# Patient Record
Sex: MALE | Race: WHITE | NOT HISPANIC OR LATINO | Employment: OTHER | URBAN - METROPOLITAN AREA
[De-identification: names, ages, dates, MRNs, and addresses within clinical notes are randomized per-mention and may not be internally consistent; named-entity substitution may affect disease eponyms.]

---

## 2017-05-04 ENCOUNTER — HOSPITAL ENCOUNTER (OUTPATIENT)
Dept: CARDIOLOGY | Facility: HOSPITAL | Age: 52
Discharge: HOME | End: 2017-05-04
Attending: FAMILY MEDICINE | Admitting: FAMILY MEDICINE

## 2017-11-27 ENCOUNTER — HOSPITAL ENCOUNTER (OUTPATIENT)
Dept: OTHER | Facility: HOSPITAL | Age: 52
Discharge: HOME | End: 2017-11-27
Attending: ORTHOPAEDIC SURGERY | Admitting: ORTHOPAEDIC SURGERY

## 2017-11-27 LAB
ANION GAP SERPL CALC-SCNC: 12 MMOL/L (ref 10–20)
BACTERIA SPEC AEROBE CULT: NORMAL
BASOPHILS # BLD AUTO: 0 10*3/UL (ref 0–0.2)
BASOPHILS NFR BLD AUTO: 1 % (ref 0–2)
BILIRUB UR QL STRIP: NEGATIVE MG/DL
BUN SERPL-MCNC: 18 MG/DL (ref 8–20)
BUN/CREAT SERPL: 20 (ref 6.2–20.3)
CALCIUM SERPL-MCNC: 8.5 MG/DL (ref 8.9–10.3)
CASTS URNS QL MICRO: NORMAL /[LPF]
CHLORIDE SERPL-SCNC: 105 MMOL/L (ref 101–111)
COLOR UR: YELLOW
CONV BACTERIA IN URINE MICRO: NEGATIVE
CONV CLARITY OF URINE: CLEAR
CONV CO2: 24 MMOL/L (ref 22–32)
CONV HYALINE CASTS IN URINE MICRO: 0 /[LPF] (ref 0–5)
CONV PROTEIN IN URINE BY AUTOMATED TEST STRIP: NEGATIVE MG/DL
CONV SMALL ROUND CELLS: NORMAL /[HPF]
CONV UROBILINOGEN IN URINE BY AUTOMATED TEST STRIP: 1 MG/DL
CREAT UR-MCNC: 0.9 MG/DL (ref 0.7–1.2)
CULTURE INDICATED?: NORMAL
DIFFERENTIAL METHOD BLD: (no result)
EOSINOPHIL # BLD AUTO: 0.1 10*3/UL (ref 0–0.3)
EOSINOPHIL # BLD AUTO: 2 % (ref 0–3)
ERYTHROCYTE [DISTWIDTH] IN BLOOD BY AUTOMATED COUNT: 13.6 % (ref 11.5–14.5)
GLUCOSE SERPL-MCNC: 112 MG/DL (ref 65–99)
GLUCOSE UR QL: NEGATIVE MG/DL
HCT VFR BLD AUTO: 41.4 % (ref 40–54)
HGB BLD-MCNC: 14 G/DL (ref 14–18)
HGB UR QL STRIP: NEGATIVE
INR PPP: 1
KETONES UR QL STRIP: NEGATIVE MG/DL
LEUKOCYTE ESTERASE UR QL STRIP: NEGATIVE
LYMPHOCYTES # BLD AUTO: 0.9 10*3/UL (ref 0.8–4.8)
LYMPHOCYTES NFR BLD AUTO: 19 % (ref 18–42)
Lab: NORMAL
MCH RBC QN AUTO: 33.6 PG (ref 26–32)
MCHC RBC AUTO-ENTMCNC: 33.8 G/DL (ref 32–36)
MCV RBC AUTO: 99.4 FL (ref 80–94)
MICRO REPORT STATUS: NORMAL
MONOCYTES # BLD AUTO: 0.4 10*3/UL (ref 0.1–1.3)
MONOCYTES NFR BLD AUTO: 7 % (ref 2–11)
NEUTROPHILS # BLD AUTO: 3.5 10*3/UL (ref 2.3–8.6)
NEUTROPHILS NFR BLD AUTO: 71 % (ref 50–75)
NITRITE UR QL STRIP: NEGATIVE
NRBC BLD AUTO-RTO: 0 /100{WBCS}
NRBC/RBC NFR BLD MANUAL: 0 10*3/UL
PH UR STRIP.AUTO: 7 [PH] (ref 4.5–8)
PLATELET # BLD AUTO: 129 10*3/UL (ref 150–450)
PMV BLD AUTO: 8.7 FL (ref 7.4–10.4)
POTASSIUM SERPL-SCNC: 4 MMOL/L (ref 3.6–5.1)
PROTHROMBIN TIME: 10.6 SEC (ref 9.6–11.7)
RBC # BLD AUTO: 4.16 10*6/UL (ref 4.6–6)
RBC #/AREA URNS HPF: 2 /[HPF] (ref 0–3)
SODIUM SERPL-SCNC: 137 MMOL/L (ref 136–144)
SP GR UR: 1.02 (ref 1–1.03)
SPECIMEN SOURCE: NORMAL
SPERM URNS QL MICRO: NORMAL /[HPF]
SQUAMOUS SPT QL MICRO: 0 /[HPF] (ref 0–5)
UNIDENT CRYS URNS QL MICRO: NORMAL /[HPF]
WBC # BLD AUTO: 5 10*3/UL (ref 4.5–11.5)
WBC #/AREA URNS HPF: 1 /[HPF] (ref 0–5)
YEAST SPEC QL WET PREP: NORMAL /[HPF]

## 2017-12-12 ENCOUNTER — HOSPITAL ENCOUNTER (OUTPATIENT)
Dept: OTHER | Facility: HOSPITAL | Age: 52
Setting detail: SPECIMEN
Discharge: HOME | End: 2017-12-12
Attending: ORTHOPAEDIC SURGERY | Admitting: ORTHOPAEDIC SURGERY

## 2017-12-12 LAB
ALBUMIN SERPL-MCNC: 3.2 G/DL (ref 3.5–4.8)
ALBUMIN/GLOB SERPL: 0.8 {RATIO} (ref 1–1.7)
ALP SERPL-CCNC: 61 IU/L (ref 32–91)
ALT SERPL-CCNC: 30 IU/L (ref 17–63)
ANION GAP SERPL CALC-SCNC: 9.3 MMOL/L (ref 10–20)
AST SERPL-CCNC: 35 IU/L (ref 15–41)
BILIRUB SERPL-MCNC: 0.8 MG/DL (ref 0.3–1.2)
BUN SERPL-MCNC: 14 MG/DL (ref 8–20)
BUN/CREAT SERPL: 17.5 (ref 6.2–20.3)
CALCIUM SERPL-MCNC: 8.8 MG/DL (ref 8.9–10.3)
CHLORIDE SERPL-SCNC: 104 MMOL/L (ref 101–111)
CONV CO2: 26 MMOL/L (ref 22–32)
CONV TOTAL PROTEIN: 7.1 G/DL (ref 6.1–7.9)
CREAT UR-MCNC: 0.8 MG/DL (ref 0.7–1.2)
GLOBULIN UR ELPH-MCNC: 3.9 G/DL (ref 2.5–3.8)
GLUCOSE SERPL-MCNC: 118 MG/DL (ref 65–99)
POTASSIUM SERPL-SCNC: 4.3 MMOL/L (ref 3.6–5.1)
SODIUM SERPL-SCNC: 135 MMOL/L (ref 136–144)

## 2018-02-26 ENCOUNTER — HOSPITAL ENCOUNTER (OUTPATIENT)
Dept: CARDIOLOGY | Facility: HOSPITAL | Age: 53
Discharge: HOME | End: 2018-02-26
Attending: NURSE PRACTITIONER | Admitting: NURSE PRACTITIONER

## 2018-03-02 ENCOUNTER — INPATIENT HOSPITAL (OUTPATIENT)
Dept: URBAN - METROPOLITAN AREA HOSPITAL 84 | Facility: HOSPITAL | Age: 53
End: 2018-03-02

## 2018-03-02 DIAGNOSIS — R19.5 OTHER FECAL ABNORMALITIES: ICD-10-CM

## 2018-03-02 DIAGNOSIS — D12.3 BENIGN NEOPLASM OF TRANSVERSE COLON: ICD-10-CM

## 2018-03-02 DIAGNOSIS — K29.70 GASTRITIS, UNSPECIFIED, WITHOUT BLEEDING: ICD-10-CM

## 2018-03-02 DIAGNOSIS — K31.9 DISEASE OF STOMACH AND DUODENUM, UNSPECIFIED: ICD-10-CM

## 2018-03-02 DIAGNOSIS — K25.9 GASTRIC ULCER, UNSPECIFIED AS ACUTE OR CHRONIC, WITHOUT HEMO: ICD-10-CM

## 2018-03-02 PROCEDURE — 45385 COLONOSCOPY W/LESION REMOVAL: CPT | Performed by: INTERNAL MEDICINE

## 2018-03-02 PROCEDURE — 43239 EGD BIOPSY SINGLE/MULTIPLE: CPT | Performed by: INTERNAL MEDICINE

## 2018-03-09 ENCOUNTER — HOSPITAL ENCOUNTER (OUTPATIENT)
Dept: LAB | Facility: HOSPITAL | Age: 53
Discharge: HOME | End: 2018-03-09
Attending: NURSE PRACTITIONER | Admitting: NURSE PRACTITIONER

## 2018-03-12 ENCOUNTER — HOSPITAL ENCOUNTER (OUTPATIENT)
Dept: LAB | Facility: HOSPITAL | Age: 53
Discharge: HOME | End: 2018-03-12
Attending: NURSE PRACTITIONER | Admitting: NURSE PRACTITIONER

## 2018-03-14 ENCOUNTER — HOSPITAL ENCOUNTER (OUTPATIENT)
Dept: LAB | Facility: HOSPITAL | Age: 53
Discharge: HOME | End: 2018-03-14
Attending: NURSE PRACTITIONER | Admitting: NURSE PRACTITIONER

## 2018-03-15 ENCOUNTER — HOSPITAL ENCOUNTER (OUTPATIENT)
Dept: PHARMACY | Facility: HOSPITAL | Age: 53
Discharge: HOME | End: 2018-03-15
Attending: NURSE PRACTITIONER | Admitting: NURSE PRACTITIONER

## 2018-03-19 ENCOUNTER — HOSPITAL ENCOUNTER (OUTPATIENT)
Dept: PHARMACY | Facility: HOSPITAL | Age: 53
Discharge: HOME | End: 2018-03-19
Attending: NURSE PRACTITIONER | Admitting: NURSE PRACTITIONER

## 2018-03-26 ENCOUNTER — HOSPITAL ENCOUNTER (OUTPATIENT)
Dept: PHARMACY | Facility: HOSPITAL | Age: 53
Discharge: HOME | End: 2018-03-26
Attending: NURSE PRACTITIONER | Admitting: NURSE PRACTITIONER

## 2018-03-28 ENCOUNTER — HOSPITAL ENCOUNTER (OUTPATIENT)
Dept: PHARMACY | Facility: HOSPITAL | Age: 53
Discharge: HOME | End: 2018-03-28
Attending: NURSE PRACTITIONER | Admitting: NURSE PRACTITIONER

## 2018-03-29 ENCOUNTER — HOSPITAL ENCOUNTER (OUTPATIENT)
Dept: PHARMACY | Facility: HOSPITAL | Age: 53
Discharge: HOME | End: 2018-03-29
Attending: NURSE PRACTITIONER | Admitting: NURSE PRACTITIONER

## 2018-04-02 ENCOUNTER — HOSPITAL ENCOUNTER (OUTPATIENT)
Dept: PHARMACY | Facility: HOSPITAL | Age: 53
Discharge: HOME | End: 2018-04-02
Attending: NURSE PRACTITIONER | Admitting: NURSE PRACTITIONER

## 2018-04-04 ENCOUNTER — HOSPITAL ENCOUNTER (OUTPATIENT)
Dept: PHARMACY | Facility: HOSPITAL | Age: 53
Discharge: HOME | End: 2018-04-04
Attending: NURSE PRACTITIONER | Admitting: NURSE PRACTITIONER

## 2018-04-09 ENCOUNTER — OFFICE (OUTPATIENT)
Dept: URBAN - METROPOLITAN AREA CLINIC 64 | Facility: CLINIC | Age: 53
End: 2018-04-09

## 2018-04-09 VITALS
HEART RATE: 80 BPM | HEIGHT: 73 IN | SYSTOLIC BLOOD PRESSURE: 139 MMHG | WEIGHT: 308 LBS | DIASTOLIC BLOOD PRESSURE: 79 MMHG

## 2018-04-09 DIAGNOSIS — R11.0 NAUSEA: ICD-10-CM

## 2018-04-09 DIAGNOSIS — K59.00 CONSTIPATION, UNSPECIFIED: ICD-10-CM

## 2018-04-09 DIAGNOSIS — K29.70 GASTRITIS, UNSPECIFIED, WITHOUT BLEEDING: ICD-10-CM

## 2018-04-09 DIAGNOSIS — K62.5 HEMORRHAGE OF ANUS AND RECTUM: ICD-10-CM

## 2018-04-09 DIAGNOSIS — Z86.010 PERSONAL HISTORY OF COLONIC POLYPS: ICD-10-CM

## 2018-04-09 DIAGNOSIS — K62.89 OTHER SPECIFIED DISEASES OF ANUS AND RECTUM: ICD-10-CM

## 2018-04-09 PROCEDURE — 99214 OFFICE O/P EST MOD 30 MIN: CPT | Performed by: NURSE PRACTITIONER

## 2018-04-09 RX ORDER — PANTOPRAZOLE SODIUM 40 MG/1
40 TABLET, DELAYED RELEASE ORAL
Qty: 90 | Refills: 3 | Status: COMPLETED
Start: 2018-04-09 | End: 2018-08-14

## 2018-04-09 RX ORDER — LINACLOTIDE 290 UG/1
290 CAPSULE, GELATIN COATED ORAL
Qty: 90 | Refills: 3 | Status: COMPLETED
Start: 2018-04-09 | End: 2018-06-11

## 2018-04-09 RX ORDER — ONDANSETRON HYDROCHLORIDE 4 MG/1
16 TABLET, ORALLY DISINTEGRATING ORAL
Qty: 60 | Refills: 0 | Status: COMPLETED
Start: 2018-04-09 | End: 2018-08-14

## 2018-04-11 ENCOUNTER — HOSPITAL ENCOUNTER (OUTPATIENT)
Dept: PHARMACY | Facility: HOSPITAL | Age: 53
Discharge: HOME | End: 2018-04-11
Attending: NURSE PRACTITIONER | Admitting: NURSE PRACTITIONER

## 2018-04-19 ENCOUNTER — HOSPITAL ENCOUNTER (OUTPATIENT)
Dept: PHYSICAL THERAPY | Facility: HOSPITAL | Age: 53
Setting detail: RECURRING SERIES
Discharge: HOME | End: 2018-08-01
Attending: NURSE PRACTITIONER | Admitting: NURSE PRACTITIONER

## 2018-05-16 ENCOUNTER — CONVERSION ENCOUNTER (OUTPATIENT)
Dept: SURGERY | Facility: CLINIC | Age: 53
End: 2018-05-16

## 2018-06-11 ENCOUNTER — OFFICE (OUTPATIENT)
Dept: URBAN - METROPOLITAN AREA CLINIC 64 | Facility: CLINIC | Age: 53
End: 2018-06-11

## 2018-06-11 VITALS
WEIGHT: 313 LBS | HEIGHT: 73 IN | SYSTOLIC BLOOD PRESSURE: 141 MMHG | DIASTOLIC BLOOD PRESSURE: 81 MMHG | HEART RATE: 77 BPM

## 2018-06-11 DIAGNOSIS — K62.5 HEMORRHAGE OF ANUS AND RECTUM: ICD-10-CM

## 2018-06-11 DIAGNOSIS — Z79.01 LONG TERM (CURRENT) USE OF ANTICOAGULANTS: ICD-10-CM

## 2018-06-11 DIAGNOSIS — K62.89 OTHER SPECIFIED DISEASES OF ANUS AND RECTUM: ICD-10-CM

## 2018-06-11 DIAGNOSIS — K59.00 CONSTIPATION, UNSPECIFIED: ICD-10-CM

## 2018-06-11 DIAGNOSIS — R11.0 NAUSEA: ICD-10-CM

## 2018-06-11 DIAGNOSIS — R07.9 CHEST PAIN, UNSPECIFIED: ICD-10-CM

## 2018-06-11 PROCEDURE — 99214 OFFICE O/P EST MOD 30 MIN: CPT | Performed by: NURSE PRACTITIONER

## 2018-06-11 RX ORDER — LIDOCAINE 50 MG/G
CREAM TOPICAL
Qty: 30 | Refills: 1 | Status: COMPLETED
Start: 2018-06-11 | End: 2018-08-14

## 2018-06-11 RX ORDER — ONDANSETRON HYDROCHLORIDE 4 MG/1
16 TABLET, ORALLY DISINTEGRATING ORAL
Qty: 60 | Refills: 0 | Status: COMPLETED
Start: 2018-06-11 | End: 2022-11-04

## 2018-06-11 RX ORDER — HYDROCORTISONE 25 MG/G
OINTMENT TOPICAL
Qty: 30 | Refills: 6 | Status: COMPLETED
Start: 2018-06-11 | End: 2022-11-04

## 2018-06-20 ENCOUNTER — CONVERSION ENCOUNTER (OUTPATIENT)
Dept: SURGERY | Facility: CLINIC | Age: 53
End: 2018-06-20

## 2018-07-25 ENCOUNTER — HOSPITAL ENCOUNTER (OUTPATIENT)
Dept: CARDIOLOGY | Facility: HOSPITAL | Age: 53
Discharge: HOME | End: 2018-07-25
Attending: SURGERY | Admitting: SURGERY

## 2018-08-01 ENCOUNTER — CONVERSION ENCOUNTER (OUTPATIENT)
Dept: SURGERY | Facility: CLINIC | Age: 53
End: 2018-08-01

## 2018-08-14 ENCOUNTER — OFFICE (OUTPATIENT)
Dept: URBAN - METROPOLITAN AREA CLINIC 64 | Facility: CLINIC | Age: 53
End: 2018-08-14

## 2018-08-14 ENCOUNTER — HOSPITAL ENCOUNTER (OUTPATIENT)
Dept: PHYSICAL THERAPY | Facility: HOSPITAL | Age: 53
Setting detail: RECURRING SERIES
Discharge: HOME | End: 2018-10-11
Attending: NURSE PRACTITIONER | Admitting: NURSE PRACTITIONER

## 2018-08-14 VITALS
DIASTOLIC BLOOD PRESSURE: 72 MMHG | WEIGHT: 315 LBS | HEART RATE: 102 BPM | HEIGHT: 73 IN | SYSTOLIC BLOOD PRESSURE: 110 MMHG

## 2018-08-14 DIAGNOSIS — R11.0 NAUSEA: ICD-10-CM

## 2018-08-14 DIAGNOSIS — K62.89 OTHER SPECIFIED DISEASES OF ANUS AND RECTUM: ICD-10-CM

## 2018-08-14 DIAGNOSIS — K59.00 CONSTIPATION, UNSPECIFIED: ICD-10-CM

## 2018-08-14 DIAGNOSIS — R07.9 CHEST PAIN, UNSPECIFIED: ICD-10-CM

## 2018-08-14 DIAGNOSIS — K62.5 HEMORRHAGE OF ANUS AND RECTUM: ICD-10-CM

## 2018-08-14 PROCEDURE — 99213 OFFICE O/P EST LOW 20 MIN: CPT | Performed by: NURSE PRACTITIONER

## 2018-09-06 ENCOUNTER — CONVERSION ENCOUNTER (OUTPATIENT)
Dept: SURGERY | Facility: CLINIC | Age: 53
End: 2018-09-06

## 2018-09-18 ENCOUNTER — CONVERSION ENCOUNTER (OUTPATIENT)
Dept: SURGERY | Facility: CLINIC | Age: 53
End: 2018-09-18

## 2018-09-18 ENCOUNTER — HOSPITAL ENCOUNTER (OUTPATIENT)
Dept: CARDIOLOGY | Facility: HOSPITAL | Age: 53
Discharge: HOME | End: 2018-09-18
Attending: INTERNAL MEDICINE | Admitting: INTERNAL MEDICINE

## 2018-10-10 ENCOUNTER — CONVERSION ENCOUNTER (OUTPATIENT)
Dept: SURGERY | Facility: CLINIC | Age: 53
End: 2018-10-10

## 2018-11-20 ENCOUNTER — OFFICE (OUTPATIENT)
Dept: URBAN - METROPOLITAN AREA CLINIC 64 | Facility: CLINIC | Age: 53
End: 2018-11-20

## 2018-11-20 VITALS
HEART RATE: 80 BPM | SYSTOLIC BLOOD PRESSURE: 154 MMHG | HEIGHT: 73 IN | WEIGHT: 315 LBS | DIASTOLIC BLOOD PRESSURE: 76 MMHG

## 2018-11-20 DIAGNOSIS — K62.5 HEMORRHAGE OF ANUS AND RECTUM: ICD-10-CM

## 2018-11-20 DIAGNOSIS — K62.89 OTHER SPECIFIED DISEASES OF ANUS AND RECTUM: ICD-10-CM

## 2018-11-20 DIAGNOSIS — R07.9 CHEST PAIN, UNSPECIFIED: ICD-10-CM

## 2018-11-20 DIAGNOSIS — K59.00 CONSTIPATION, UNSPECIFIED: ICD-10-CM

## 2018-11-20 PROCEDURE — 99213 OFFICE O/P EST LOW 20 MIN: CPT | Performed by: NURSE PRACTITIONER

## 2018-11-28 ENCOUNTER — CONVERSION ENCOUNTER (OUTPATIENT)
Dept: SURGERY | Facility: CLINIC | Age: 53
End: 2018-11-28

## 2018-12-11 ENCOUNTER — HOSPITAL ENCOUNTER (OUTPATIENT)
Dept: PHYSICAL THERAPY | Facility: HOSPITAL | Age: 53
Setting detail: RECURRING SERIES
Discharge: HOME | End: 2018-12-31
Attending: PHYSICIAN ASSISTANT | Admitting: PHYSICIAN ASSISTANT

## 2018-12-12 ENCOUNTER — CONVERSION ENCOUNTER (OUTPATIENT)
Dept: SURGERY | Facility: CLINIC | Age: 53
End: 2018-12-12

## 2018-12-19 ENCOUNTER — CONVERSION ENCOUNTER (OUTPATIENT)
Dept: SURGERY | Facility: CLINIC | Age: 53
End: 2018-12-19

## 2019-01-30 ENCOUNTER — CONVERSION ENCOUNTER (OUTPATIENT)
Dept: SURGERY | Facility: CLINIC | Age: 54
End: 2019-01-30

## 2019-02-26 ENCOUNTER — CONVERSION ENCOUNTER (OUTPATIENT)
Dept: SURGERY | Facility: CLINIC | Age: 54
End: 2019-02-26

## 2019-04-03 ENCOUNTER — CONVERSION ENCOUNTER (OUTPATIENT)
Dept: SURGERY | Facility: CLINIC | Age: 54
End: 2019-04-03

## 2019-04-30 ENCOUNTER — HOSPITAL ENCOUNTER (OUTPATIENT)
Dept: OTHER | Facility: HOSPITAL | Age: 54
Discharge: HOME | End: 2019-04-30
Attending: SURGERY | Admitting: SURGERY

## 2019-05-13 ENCOUNTER — HOSPITAL ENCOUNTER (OUTPATIENT)
Dept: PHYSICAL THERAPY | Facility: HOSPITAL | Age: 54
Setting detail: RECURRING SERIES
Discharge: HOME | End: 2019-06-06

## 2019-06-03 VITALS
OXYGEN SATURATION: 95 % | HEART RATE: 106 BPM | DIASTOLIC BLOOD PRESSURE: 82 MMHG | HEIGHT: 73 IN | OXYGEN SATURATION: 95 % | BODY MASS INDEX: 45.22 KG/M2 | HEIGHT: 73 IN | WEIGHT: 315 LBS | WEIGHT: 315 LBS | WEIGHT: 315 LBS | HEART RATE: 73 BPM | WEIGHT: 315 LBS | OXYGEN SATURATION: 94 % | BODY MASS INDEX: 41.75 KG/M2 | DIASTOLIC BLOOD PRESSURE: 98 MMHG | SYSTOLIC BLOOD PRESSURE: 119 MMHG | WEIGHT: 315 LBS | HEIGHT: 73 IN | SYSTOLIC BLOOD PRESSURE: 112 MMHG | HEART RATE: 97 BPM | BODY MASS INDEX: 41.75 KG/M2 | DIASTOLIC BLOOD PRESSURE: 76 MMHG | SYSTOLIC BLOOD PRESSURE: 115 MMHG | BODY MASS INDEX: 41.75 KG/M2 | OXYGEN SATURATION: 96 % | WEIGHT: 315 LBS | SYSTOLIC BLOOD PRESSURE: 126 MMHG | HEIGHT: 73 IN | OXYGEN SATURATION: 93 % | WEIGHT: 315 LBS | BODY MASS INDEX: 41.75 KG/M2 | DIASTOLIC BLOOD PRESSURE: 78 MMHG | SYSTOLIC BLOOD PRESSURE: 130 MMHG | HEART RATE: 90 BPM | SYSTOLIC BLOOD PRESSURE: 128 MMHG | SYSTOLIC BLOOD PRESSURE: 151 MMHG | HEART RATE: 80 BPM | DIASTOLIC BLOOD PRESSURE: 70 MMHG | BODY MASS INDEX: 41.75 KG/M2 | HEART RATE: 78 BPM | DIASTOLIC BLOOD PRESSURE: 73 MMHG | BODY MASS INDEX: 41.75 KG/M2 | WEIGHT: 315 LBS | HEART RATE: 77 BPM | SYSTOLIC BLOOD PRESSURE: 147 MMHG | HEIGHT: 73 IN | DIASTOLIC BLOOD PRESSURE: 79 MMHG | OXYGEN SATURATION: 96 % | SYSTOLIC BLOOD PRESSURE: 114 MMHG | WEIGHT: 315 LBS | OXYGEN SATURATION: 96 % | OXYGEN SATURATION: 96 % | DIASTOLIC BLOOD PRESSURE: 90 MMHG | OXYGEN SATURATION: 97 % | BODY MASS INDEX: 41.75 KG/M2 | WEIGHT: 311 LBS | HEIGHT: 73 IN | HEART RATE: 72 BPM | HEIGHT: 73 IN | HEART RATE: 112 BPM | WEIGHT: 315 LBS | BODY MASS INDEX: 42.81 KG/M2 | SYSTOLIC BLOOD PRESSURE: 138 MMHG | SYSTOLIC BLOOD PRESSURE: 114 MMHG | HEART RATE: 109 BPM | BODY MASS INDEX: 44.63 KG/M2 | HEART RATE: 74 BPM | OXYGEN SATURATION: 96 % | DIASTOLIC BLOOD PRESSURE: 86 MMHG | HEIGHT: 73 IN | OXYGEN SATURATION: 96 % | BODY MASS INDEX: 41.22 KG/M2 | DIASTOLIC BLOOD PRESSURE: 77 MMHG | DIASTOLIC BLOOD PRESSURE: 84 MMHG | DIASTOLIC BLOOD PRESSURE: 94 MMHG | SYSTOLIC BLOOD PRESSURE: 131 MMHG | HEART RATE: 84 BPM | BODY MASS INDEX: 42.61 KG/M2 | OXYGEN SATURATION: 100 % | WEIGHT: 315 LBS

## 2019-08-16 PROBLEM — R07.9 CHEST PAIN: Status: ACTIVE | Noted: 2018-09-06

## 2019-08-16 PROBLEM — R06.02 SHORTNESS OF BREATH: Status: ACTIVE | Noted: 2018-09-06

## 2019-12-11 PROBLEM — I82.409 DEEP VEIN THROMBOSIS (DVT) (HCC): Status: ACTIVE | Noted: 2018-05-16

## 2019-12-11 PROBLEM — I89.0 LYMPHEDEMA: Status: ACTIVE | Noted: 2018-06-20

## 2019-12-11 PROBLEM — L03.119 CELLULITIS OF LOWER EXTREMITY: Status: ACTIVE | Noted: 2018-12-19

## 2019-12-11 PROBLEM — M19.90 ARTHRITIS: Status: ACTIVE | Noted: 2018-05-16

## 2019-12-11 PROBLEM — J45.909 ASTHMA: Status: ACTIVE | Noted: 2018-05-16

## 2019-12-26 ENCOUNTER — OFFICE VISIT (OUTPATIENT)
Dept: CARDIOLOGY | Facility: CLINIC | Age: 54
End: 2019-12-26

## 2019-12-26 VITALS
OXYGEN SATURATION: 95 % | HEART RATE: 84 BPM | WEIGHT: 315 LBS | BODY MASS INDEX: 41.75 KG/M2 | HEIGHT: 73 IN | DIASTOLIC BLOOD PRESSURE: 85 MMHG | SYSTOLIC BLOOD PRESSURE: 123 MMHG

## 2019-12-26 DIAGNOSIS — R07.89 CHEST PRESSURE: Primary | ICD-10-CM

## 2019-12-26 DIAGNOSIS — R07.9 CHEST PAIN, UNSPECIFIED TYPE: ICD-10-CM

## 2019-12-26 DIAGNOSIS — R06.02 SHORTNESS OF BREATH: ICD-10-CM

## 2019-12-26 PROCEDURE — 93000 ELECTROCARDIOGRAM COMPLETE: CPT | Performed by: INTERNAL MEDICINE

## 2019-12-26 PROCEDURE — 99214 OFFICE O/P EST MOD 30 MIN: CPT | Performed by: INTERNAL MEDICINE

## 2019-12-26 NOTE — PROGRESS NOTES
Encounter Date:12/26/2019  Last seen 2/26/2019      Patient ID: Robert Randhawa Jr. is a 54 y.o. male.    Chief Complaint:  Acute visit-chest discomfort      History of Present Illness  Patient recently has been having chest discomfort mostly located in the left precordial area exertional substernal heaviness and tightness lasting for 10 minutes.    Since I have last seen, the patient has been without any shortness of breath, palpitations, dizziness or syncope.  Denies having any headache ,abdominal pain ,nausea, vomiting , diarrhea constipation, loss of weight or loss of appetite.  Denies having any excessive bruising ,hematuria or blood in the stool.    Review of all systems negative except as indicated  Assessment and Plan       ]]]]]]]]]]]]]]]]]]  Impression  ===========   -chest discomfort and shortness of breath -possible angina pectoris.     Myriam scan Cardiolite test is negative for myocardial ischemia 09/18/2018  Echocardiogram 09/18/2018 is normal     -exogenous obesity     -history of chronic DVT asthma and arthritis. Eliquis has been discontinued recently.      -status post left knee surgery  appendectomy     -former smoker     -allergic to sulfa     -family history is negative for coronary artery disease  ============  Plan  ============   Patient is having chest discomfort concerning for angina pectoris.  EKG showed sinus rhythm without any ischemic changes  Medications were reviewed and updated.  Stress Cardiolite test  Echocardiogram  Follow-up in the office on the same day.  Further plan will depend on patient's progress.  [[[[[[[[[[[[[[[[[[           Diagnosis Plan   1. Chest pressure  Adult Transthoracic Echo Complete W/ Cont if Necessary Per Protocol    Stress Test With Myocardial Perfusion One Day   2. Shortness of breath     3. Chest pain, unspecified type     LAB RESULTS (LAST 7 DAYS)    CBC        BMP        CMP         BNP        TROPONIN        CoAg        Creatinine Clearance  CrCl  cannot be calculated (Patient's most recent lab result is older than the maximum 30 days allowed.).    ABG        Radiology  No radiology results for the last day                The following portions of the patient's history were reviewed and updated as appropriate: allergies, current medications, past family history, past medical history, past social history, past surgical history and problem list.    Review of Systems   Constitution: Negative for malaise/fatigue.   Cardiovascular: Positive for chest pain and leg swelling (left leg). Negative for palpitations and syncope.   Respiratory: Positive for shortness of breath.    Skin: Negative for rash.   Gastrointestinal: Negative for nausea and vomiting.   Neurological: Positive for light-headedness. Negative for dizziness and numbness.         Current Outpatient Medications:   •  oxyCODONE (oxyCONTIN) 10 MG 12 hr tablet, Daily., Disp: , Rfl:   •  Plecanatide (TRULANCE PO), TRULANCE TABS, Disp: , Rfl:     Allergies   Allergen Reactions   • Sulfa Antibiotics Unknown (See Comments)       Family History   Problem Relation Age of Onset   • Heart disease Mother    • Diabetes Mother    • Arthritis Father        Past Surgical History:   Procedure Laterality Date   • APPENDECTOMY     • KNEE SURGERY Left 2018       Past Medical History:   Diagnosis Date   • Arthritis    • Asthma    • Chronic deep vein thrombosis (DVT) (CMS/[facility])        Family History   Problem Relation Age of Onset   • Heart disease Mother    • Diabetes Mother    • Arthritis Father        Social History     Socioeconomic History   • Marital status: Single     Spouse name: Not on file   • Number of children: Not on file   • Years of education: Not on file   • Highest education level: Not on file   Tobacco Use   • Smoking status: Former Smoker     Types: Cigarettes     Last attempt to quit:      Years since quittin.0   • Smokeless tobacco: Never Used   Substance and Sexual Activity   • Alcohol use:  "Never     Frequency: Never           ECG 12 Lead  Date/Time: 12/26/2019 11:21 AM  Performed by: Kenney Reyez MD  Authorized by: Kenney Reyez MD   Comparison: compared with previous ECG   Similar to previous ECG  Comments: Normal sinus rhythm nonspecific ST-T wave changes 72/min normal axis normal intervals no ectopy no significant change from 11/28/2018              Objective:       Physical Exam    /85   Pulse 84   Ht 185.4 cm (73\")   Wt (!) 154 kg (339 lb 8 oz)   SpO2 95%   BMI 44.79 kg/m²   The patient is alert, oriented and in no distress.    Vital signs as noted above.    Head and neck revealed no carotid bruits or jugular venous distension.  No thyromegaly or lymphadenopathy is present.    Lungs clear.  No wheezing.  Breath sounds are normal bilaterally.    Heart normal first and second heart sounds.  No murmur..  No pericardial rub is present.  No gallop is present.    Abdomen soft and nontender.  No organomegaly is present.    Extremities revealed good peripheral pulses without any pedal edema.    Skin warm and dry.    Musculoskeletal system is grossly normal.    CNS grossly normal.        "

## 2020-01-07 ENCOUNTER — TELEPHONE (OUTPATIENT)
Dept: CARDIOLOGY | Facility: CLINIC | Age: 55
End: 2020-01-07

## 2020-01-07 NOTE — TELEPHONE ENCOUNTER
Pt states he can not walk tmt rosana, sched tomorrow at [facility], please advise  [phone]

## 2020-01-08 ENCOUNTER — OFFICE VISIT (OUTPATIENT)
Dept: CARDIOLOGY | Facility: CLINIC | Age: 55
End: 2020-01-08

## 2020-01-08 ENCOUNTER — HOSPITAL ENCOUNTER (OUTPATIENT)
Dept: CARDIOLOGY | Facility: HOSPITAL | Age: 55
Discharge: HOME | End: 2020-01-08
Admitting: INTERNAL MEDICINE

## 2020-01-08 ENCOUNTER — HOSPITAL ENCOUNTER (OUTPATIENT)
Dept: NUCLEAR MEDICINE | Facility: HOSPITAL | Age: 55
Discharge: HOME | End: 2020-01-08

## 2020-01-08 VITALS
WEIGHT: 315 LBS | DIASTOLIC BLOOD PRESSURE: 84 MMHG | BODY MASS INDEX: 41.75 KG/M2 | HEIGHT: 73 IN | SYSTOLIC BLOOD PRESSURE: 128 MMHG | HEART RATE: 75 BPM

## 2020-01-08 VITALS
BODY MASS INDEX: 41.75 KG/M2 | DIASTOLIC BLOOD PRESSURE: 84 MMHG | SYSTOLIC BLOOD PRESSURE: 128 MMHG | HEART RATE: 75 BPM | HEIGHT: 73 IN | RESPIRATION RATE: 20 BRPM | WEIGHT: 315 LBS

## 2020-01-08 DIAGNOSIS — R07.89 CHEST PRESSURE: ICD-10-CM

## 2020-01-08 DIAGNOSIS — R06.02 SHORTNESS OF BREATH: Primary | ICD-10-CM

## 2020-01-08 LAB
BH CV ECHO MEAS - ACS: 1.9 CM
BH CV ECHO MEAS - AO MAX PG (FULL): 1.6 MMHG
BH CV ECHO MEAS - AO MAX PG: 8.9 MMHG
BH CV ECHO MEAS - AO MEAN PG (FULL): 2.1 MMHG
BH CV ECHO MEAS - AO MEAN PG: 5.6 MMHG
BH CV ECHO MEAS - AO ROOT AREA (BSA CORRECTED): 1.3
BH CV ECHO MEAS - AO ROOT AREA: 9 CM^2
BH CV ECHO MEAS - AO ROOT DIAM: 3.4 CM
BH CV ECHO MEAS - AO V2 MAX: 149.5 CM/SEC
BH CV ECHO MEAS - AO V2 MEAN: 112.3 CM/SEC
BH CV ECHO MEAS - AO V2 VTI: 29.5 CM
BH CV ECHO MEAS - AORTIC HR: 78 BPM
BH CV ECHO MEAS - AORTIC R-R: 0.77 SEC
BH CV ECHO MEAS - ASC AORTA: 2.9 CM
BH CV ECHO MEAS - AVA(I,A): 2.7 CM^2
BH CV ECHO MEAS - AVA(I,D): 2.7 CM^2
BH CV ECHO MEAS - AVA(V,A): 2.8 CM^2
BH CV ECHO MEAS - AVA(V,D): 2.8 CM^2
BH CV ECHO MEAS - BSA(HAYCOCK): 2.9 M^2
BH CV ECHO MEAS - BSA: 2.7 M^2
BH CV ECHO MEAS - BZI_BMI: 44.7 KILOGRAMS/M^2
BH CV ECHO MEAS - BZI_METRIC_HEIGHT: 185.4 CM
BH CV ECHO MEAS - BZI_METRIC_WEIGHT: 153.8 KG
BH CV ECHO MEAS - CI(AO): 7.7 L/MIN/M^2
BH CV ECHO MEAS - CI(LVOT): 2.3 L/MIN/M^2
BH CV ECHO MEAS - CO(AO): 20.6 L/MIN
BH CV ECHO MEAS - CO(LVOT): 6.2 L/MIN
BH CV ECHO MEAS - EDV(CUBED): 96.9 ML
BH CV ECHO MEAS - EDV(MOD-SP2): 87.7 ML
BH CV ECHO MEAS - EDV(MOD-SP4): 136.7 ML
BH CV ECHO MEAS - EDV(TEICH): 97 ML
BH CV ECHO MEAS - EF(CUBED): 61.7 %
BH CV ECHO MEAS - EF(MOD-BP): 65 %
BH CV ECHO MEAS - EF(MOD-SP2): 58.1 %
BH CV ECHO MEAS - EF(MOD-SP4): 67.8 %
BH CV ECHO MEAS - EF(TEICH): 53.3 %
BH CV ECHO MEAS - ESV(CUBED): 37.1 ML
BH CV ECHO MEAS - ESV(MOD-SP2): 36.8 ML
BH CV ECHO MEAS - ESV(MOD-SP4): 44 ML
BH CV ECHO MEAS - ESV(TEICH): 45.3 ML
BH CV ECHO MEAS - FS: 27.4 %
BH CV ECHO MEAS - IVS/LVPW: 0.76
BH CV ECHO MEAS - IVSD: 1.1 CM
BH CV ECHO MEAS - LA DIMENSION(2D): 3.1 CM
BH CV ECHO MEAS - LV DIASTOLIC VOL/BSA (35-75): 50.8 ML/M^2
BH CV ECHO MEAS - LV MASS(C)D: 212.3 GRAMS
BH CV ECHO MEAS - LV MASS(C)DI: 78.8 GRAMS/M^2
BH CV ECHO MEAS - LV MAX PG: 7.3 MMHG
BH CV ECHO MEAS - LV MEAN PG: 3.5 MMHG
BH CV ECHO MEAS - LV SYSTOLIC VOL/BSA (12-30): 16.4 ML/M^2
BH CV ECHO MEAS - LV V1 MAX: 135 CM/SEC
BH CV ECHO MEAS - LV V1 MEAN: 80.9 CM/SEC
BH CV ECHO MEAS - LV V1 VTI: 25.9 CM
BH CV ECHO MEAS - LVIDD: 4.6 CM
BH CV ECHO MEAS - LVIDS: 3.3 CM
BH CV ECHO MEAS - LVOT AREA: 3.1 CM^2
BH CV ECHO MEAS - LVOT DIAM: 2 CM
BH CV ECHO MEAS - LVPWD: 1.4 CM
BH CV ECHO MEAS - MV A MAX VEL: 71 CM/SEC
BH CV ECHO MEAS - MV DEC SLOPE: 213.1 CM/SEC^2
BH CV ECHO MEAS - MV DEC TIME: 0.25 SEC
BH CV ECHO MEAS - MV E MAX VEL: 52.2 CM/SEC
BH CV ECHO MEAS - MV E/A: 0.74
BH CV ECHO MEAS - MV MAX PG: 2.5 MMHG
BH CV ECHO MEAS - MV MEAN PG: 1.2 MMHG
BH CV ECHO MEAS - MV V2 MAX: 79 CM/SEC
BH CV ECHO MEAS - MV V2 MEAN: 52.5 CM/SEC
BH CV ECHO MEAS - MV V2 VTI: 15.4 CM
BH CV ECHO MEAS - MVA(VTI): 5.2 CM^2
BH CV ECHO MEAS - PA ACC TIME: 0.15 SEC
BH CV ECHO MEAS - PA MAX PG (FULL): 1.1 MMHG
BH CV ECHO MEAS - PA MAX PG: 6.3 MMHG
BH CV ECHO MEAS - PA MEAN PG (FULL): 1.1 MMHG
BH CV ECHO MEAS - PA MEAN PG: 3.5 MMHG
BH CV ECHO MEAS - PA PR(ACCEL): 10.5 MMHG
BH CV ECHO MEAS - PA V2 MAX: 125.1 CM/SEC
BH CV ECHO MEAS - PA V2 MEAN: 89.7 CM/SEC
BH CV ECHO MEAS - PA V2 VTI: 23.9 CM
BH CV ECHO MEAS - PVA(I,A): 6.7 CM^2
BH CV ECHO MEAS - PVA(I,D): 6.7 CM^2
BH CV ECHO MEAS - PVA(V,A): 7 CM^2
BH CV ECHO MEAS - PVA(V,D): 7 CM^2
BH CV ECHO MEAS - QP/QS: 2
BH CV ECHO MEAS - RAP SYSTOLE: 8 MMHG
BH CV ECHO MEAS - RV MAX PG: 5.1 MMHG
BH CV ECHO MEAS - RV MEAN PG: 2.4 MMHG
BH CV ECHO MEAS - RV V1 MAX: 113.3 CM/SEC
BH CV ECHO MEAS - RV V1 MEAN: 69.2 CM/SEC
BH CV ECHO MEAS - RV V1 VTI: 20.8 CM
BH CV ECHO MEAS - RVDD: 3.8 CM
BH CV ECHO MEAS - RVOT AREA: 7.7 CM^2
BH CV ECHO MEAS - RVOT DIAM: 3.1 CM
BH CV ECHO MEAS - RVSP: 25.4 MMHG
BH CV ECHO MEAS - SI(AO): 98 ML/M^2
BH CV ECHO MEAS - SI(CUBED): 22.2 ML/M^2
BH CV ECHO MEAS - SI(LVOT): 29.5 ML/M^2
BH CV ECHO MEAS - SI(MOD-SP2): 18.9 ML/M^2
BH CV ECHO MEAS - SI(MOD-SP4): 34.4 ML/M^2
BH CV ECHO MEAS - SI(TEICH): 19.2 ML/M^2
BH CV ECHO MEAS - SV(AO): 264 ML
BH CV ECHO MEAS - SV(CUBED): 59.8 ML
BH CV ECHO MEAS - SV(LVOT): 79.4 ML
BH CV ECHO MEAS - SV(MOD-SP2): 51 ML
BH CV ECHO MEAS - SV(MOD-SP4): 92.6 ML
BH CV ECHO MEAS - SV(RVOT): 161.1 ML
BH CV ECHO MEAS - SV(TEICH): 51.7 ML
BH CV ECHO MEAS - TR MAX VEL: 208.8 CM/SEC
BH CV NUCLEAR PRIOR STUDY: 3
BH CV STRESS BP STAGE 1: NORMAL
BH CV STRESS BP STAGE 2: NORMAL
BH CV STRESS BP STAGE 3: NORMAL
BH CV STRESS BP STAGE 4: NORMAL
BH CV STRESS COMMENTS STAGE 1: NORMAL
BH CV STRESS COMMENTS STAGE 2: NORMAL
BH CV STRESS DOSE REGADENOSON STAGE 1: 0.4
BH CV STRESS DURATION MIN STAGE 1: 0
BH CV STRESS DURATION MIN STAGE 2: 4
BH CV STRESS DURATION SEC STAGE 1: 10
BH CV STRESS DURATION SEC STAGE 2: 0
BH CV STRESS HR STAGE 1: 92
BH CV STRESS HR STAGE 2: 98
BH CV STRESS HR STAGE 3: 90
BH CV STRESS HR STAGE 4: 91
BH CV STRESS PROTOCOL 1: NORMAL
BH CV STRESS RECOVERY BP: NORMAL MMHG
BH CV STRESS RECOVERY HR: 88 BPM
BH CV STRESS STAGE 1: 1
BH CV STRESS STAGE 2: 2
BH CV STRESS STAGE 3: 3
BH CV STRESS STAGE 4: 4
LV EF 2D ECHO EST: 60 %
MAXIMAL PREDICTED HEART RATE: 165 BPM
MAXIMAL PREDICTED HEART RATE: 165 BPM
PERCENT MAX PREDICTED HR: 54.55 %
STRESS BASELINE BP: NORMAL MMHG
STRESS BASELINE HR: 74 BPM
STRESS PERCENT HR: 64 %
STRESS POST PEAK BP: NORMAL MMHG
STRESS POST PEAK HR: 90 BPM
STRESS TARGET HR: 140 BPM
STRESS TARGET HR: 140 BPM

## 2020-01-08 PROCEDURE — 0 TECHNETIUM SESTAMIBI: Performed by: INTERNAL MEDICINE

## 2020-01-08 PROCEDURE — 93016 CV STRESS TEST SUPVJ ONLY: CPT | Performed by: INTERNAL MEDICINE

## 2020-01-08 PROCEDURE — 93306 TTE W/DOPPLER COMPLETE: CPT

## 2020-01-08 PROCEDURE — 93018 CV STRESS TEST I&R ONLY: CPT | Performed by: INTERNAL MEDICINE

## 2020-01-08 PROCEDURE — A9500 TC99M SESTAMIBI: HCPCS | Performed by: INTERNAL MEDICINE

## 2020-01-08 PROCEDURE — 78452 HT MUSCLE IMAGE SPECT MULT: CPT

## 2020-01-08 PROCEDURE — 93306 TTE W/DOPPLER COMPLETE: CPT | Performed by: INTERNAL MEDICINE

## 2020-01-08 PROCEDURE — 93017 CV STRESS TEST TRACING ONLY: CPT

## 2020-01-08 PROCEDURE — 99213 OFFICE O/P EST LOW 20 MIN: CPT | Performed by: INTERNAL MEDICINE

## 2020-01-08 PROCEDURE — 25010000002 REGADENOSON 0.4 MG/5ML SOLUTION: Performed by: INTERNAL MEDICINE

## 2020-01-08 PROCEDURE — 78452 HT MUSCLE IMAGE SPECT MULT: CPT | Performed by: INTERNAL MEDICINE

## 2020-01-08 RX ADMIN — REGADENOSON 0.4 MG: 0.08 INJECTION, SOLUTION INTRAVENOUS at 12:45

## 2020-01-08 RX ADMIN — TECHNETIUM TC 99M SESTAMIBI 1 DOSE: 1 INJECTION INTRAVENOUS at 12:45

## 2020-01-08 RX ADMIN — TECHNETIUM TC 99M SESTAMIBI 1 DOSE: 1 INJECTION INTRAVENOUS at 10:50

## 2020-01-08 NOTE — PROGRESS NOTES
Encounter Date:01/08/2020  Last seen 12/26/2019      Patient ID: Robert Randhawa Jr. is a 54 y.o. male.    Chief Complaint:  Chest discomfort  Shortness of breath      History of Present Illness  Patient recently has been having chest discomfort mostly located in the left precordial area exertional substernal heaviness and tightness lasting for 10 minutes.     Since I have last seen, the patient has been without any shortness of breath, palpitations, dizziness or syncope.  Denies having any headache ,abdominal pain ,nausea, vomiting , diarrhea constipation, loss of weight or loss of appetite.  Denies having any excessive bruising ,hematuria or blood in the stool.     Review of all systems negative except as indicated    Assessment and Plan       ]]]]]]]]]]]]]]]]]]  Impression  ===========   -chest discomfort and shortness of breath -possible angina pectoris.  Echocardiogram 1/8/2020 revealed right atrium right ventricle enlargement and normal left ventricle function.  Normal cardiac valves.  Lexiscan Cardiolite test is negative for myocardial ischemia 1/8/2020 (performed at [facility] as an outpatient due to patient's weight)     Myriam scan Cardiolite test is negative for myocardial ischemia 09/18/2018  Echocardiogram 09/18/2018 is normal except for mild right atrial enlargement     -exogenous obesity     -history of chronic DVT asthma and arthritis. Eliquis has been discontinued recently.      -status post left knee surgery  appendectomy     -former smoker     -allergic to sulfa     -family history is negative for coronary artery disease  ============  Plan  ============  Patient is having chest discomfort concerning for angina pectoris.  EKG showed sinus rhythm without any ischemic changes  Medications were reviewed and updated.  Stress Cardiolite test-normal as above  Echocardiogram- mild right atrial enlargement  Follow-up in the office in 6 weeks.  No need for cardiac catheterization at this  time  Consideration for cardiac catheterization if patient has continued symptoms  Further plan will depend on patient's progress.  [[[[[[[[[[[[[[[[[[            Diagnosis Plan   1. Shortness of breath     2. Chest pressure     LAB RESULTS (LAST 7 DAYS)    CBC        BMP        CMP         BNP        TROPONIN        CoAg        Creatinine Clearance  CrCl cannot be calculated (Patient's most recent lab result is older than the maximum 30 days allowed.).    ABG        Radiology  No radiology results for the last day                The following portions of the patient's history were reviewed and updated as appropriate: allergies, current medications, past family history, past medical history, past social history, past surgical history and problem list.    Review of Systems   Constitution: Negative for malaise/fatigue.   Cardiovascular: Negative for chest pain, leg swelling, palpitations and syncope.   Respiratory: Negative for shortness of breath.    Skin: Negative for rash.   Gastrointestinal: Negative for nausea and vomiting.   Neurological: Negative for dizziness, light-headedness and numbness.         Current Outpatient Medications:   •  oxyCODONE (oxyCONTIN) 10 MG 12 hr tablet, Daily., Disp: , Rfl:   •  Plecanatide (TRULANCE PO), TRULANCE TABS, Disp: , Rfl:   No current facility-administered medications for this visit.     Allergies   Allergen Reactions   • Sulfa Antibiotics Unknown (See Comments)       Family History   Problem Relation Age of Onset   • Heart disease Mother    • Diabetes Mother    • Arthritis Father        Past Surgical History:   Procedure Laterality Date   • APPENDECTOMY  1974   • KNEE SURGERY Left 2018       Past Medical History:   Diagnosis Date   • Arthritis    • Asthma    • Chronic deep vein thrombosis (DVT) (CMS/[facility])        Family History   Problem Relation Age of Onset   • Heart disease Mother    • Diabetes Mother    • Arthritis Father        Social History     Socioeconomic History   •  "Marital status: Single     Spouse name: Not on file   • Number of children: Not on file   • Years of education: Not on file   • Highest education level: Not on file   Tobacco Use   • Smoking status: Former Smoker     Types: Cigarettes     Last attempt to quit:      Years since quittin.0   • Smokeless tobacco: Never Used   Substance and Sexual Activity   • Alcohol use: Never     Frequency: Never         Procedures      Objective:       Physical Exam    /84   Pulse 75   Ht 185.4 cm (73\")   Wt (!) 154 kg (339 lb)   BMI 44.73 kg/m²   The patient is alert, oriented and in no distress.    Vital signs as noted above.    Head and neck revealed no carotid bruits or jugular venous distension.  No thyromegaly or lymphadenopathy is present.    Lungs clear.  No wheezing.  Breath sounds are normal bilaterally.    Heart normal first and second heart sounds.  No murmur..  No pericardial rub is present.  No gallop is present.    Abdomen soft and nontender.  No organomegaly is present.    Extremities revealed good peripheral pulses without any pedal edema.    Skin warm and dry.    Musculoskeletal system is grossly normal.    CNS grossly normal.        "

## 2020-02-24 ENCOUNTER — OFFICE VISIT (OUTPATIENT)
Dept: CARDIOLOGY | Facility: CLINIC | Age: 55
End: 2020-02-24

## 2020-02-24 VITALS
HEIGHT: 73 IN | WEIGHT: 315 LBS | HEART RATE: 80 BPM | DIASTOLIC BLOOD PRESSURE: 84 MMHG | BODY MASS INDEX: 41.75 KG/M2 | SYSTOLIC BLOOD PRESSURE: 118 MMHG | OXYGEN SATURATION: 95 %

## 2020-02-24 DIAGNOSIS — R07.89 CHEST PRESSURE: ICD-10-CM

## 2020-02-24 DIAGNOSIS — R06.02 SHORTNESS OF BREATH: ICD-10-CM

## 2020-02-24 DIAGNOSIS — I20.0 UNSTABLE ANGINA (HCC): Primary | ICD-10-CM

## 2020-02-24 PROCEDURE — 99215 OFFICE O/P EST HI 40 MIN: CPT | Performed by: INTERNAL MEDICINE

## 2020-02-24 NOTE — PROGRESS NOTES
Encounter Date:02/24/2020  Last seen 1/8/2020      Patient ID: Robert Randhawa Jr. is a 54 y.o. male.    Chief Complaint:  Follow-up  Chest discomfort and shortness of breath-continued symptoms      History of Present Illness  Patient has continued symptoms of chest discomfort and shortness of breath    Since I have last seen, the patient has been without any Palpitations, dizziness or syncope.  Denies having any headache ,abdominal pain ,nausea, vomiting , diarrhea constipation, loss of weight or loss of appetite.  Denies having any excessive bruising ,hematuria or blood in the stool.    Review of all systems negative except as indicated  Assessment and Plan       ]]]]]]]]]]]]]]]]]]  Impression  ===========   -chest discomfort and shortness of breath -possible unstable angina pectoris.    Echocardiogram 1/8/2020 revealed right atrium right ventricle enlargement and normal left ventricle function.  Normal cardiac valves.  Lexiscan Cardiolite test is negative for myocardial ischemia 1/8/2020 (performed at [facility] as an outpatient due to patient's weight)      -exogenous obesity     -history of chronic DVT asthma and arthritis. Eliquis has been discontinued recently.      -status post left knee surgery  appendectomy     -former smoker     -allergic to sulfa     -family history is negative for coronary artery disease  ============  Plan  ============  Patient is having continued chest discomfort concerning for unstable angina pectoris.  EKG showed sinus rhythm without any ischemic changes  Medications were reviewed and updated.  Stress Cardiolite test-normal as above  Echocardiogram- mild right atrial enlargement  In view of symptoms that are suggestive of unstable angina pectoris patient was advised cardiac catheterization and coronary arteriography (right and left heart).  Risks and benefits pros and cons of the procedure were discussed with patient.  Further plan will depend on patient's  progress.  [[[[[[[[[[[[[[[[[[           Diagnosis Plan   1. Unstable angina (CMS/HCC)  Case Request Cath Lab: Left and Right Heart Cath with Coronary Angiography    CBC (No Diff)    Basic Metabolic Panel    Protime-INR    Lipid Panel    ECG 12 Lead    XR Chest 2 View   2. Chest pressure  Case Request Cath Lab: Left and Right Heart Cath with Coronary Angiography    CBC (No Diff)    Basic Metabolic Panel    Protime-INR    Lipid Panel    ECG 12 Lead    XR Chest 2 View   3. Shortness of breath  Case Request Cath Lab: Left and Right Heart Cath with Coronary Angiography    CBC (No Diff)    Basic Metabolic Panel    Protime-INR    Lipid Panel    ECG 12 Lead    XR Chest 2 View   LAB RESULTS (LAST 7 DAYS)    CBC        BMP        CMP         BNP        TROPONIN        CoAg        Creatinine Clearance  CrCl cannot be calculated (Patient's most recent lab result is older than the maximum 30 days allowed.).    ABG        Radiology  No radiology results for the last day                The following portions of the patient's history were reviewed and updated as appropriate: allergies, current medications, past family history, past medical history, past social history, past surgical history and problem list.    Review of Systems   Constitution: Negative for malaise/fatigue.   Cardiovascular: Positive for chest pain (at times). Negative for leg swelling, palpitations and syncope.   Respiratory: Positive for shortness of breath.    Skin: Negative for rash.   Gastrointestinal: Negative for nausea and vomiting.   Neurological: Positive for light-headedness (at times) and numbness (arm). Negative for dizziness.       No current outpatient medications on file.    Allergies   Allergen Reactions   • Sulfa Antibiotics Unknown (See Comments)       Family History   Problem Relation Age of Onset   • Heart disease Mother    • Diabetes Mother    • Arthritis Father        Past Surgical History:   Procedure Laterality Date   • APPENDECTOMY  1974  "  • KNEE SURGERY Left 2018       Past Medical History:   Diagnosis Date   • Arthritis    • Asthma    • Chronic deep vein thrombosis (DVT) (CMS/[facility])        Family History   Problem Relation Age of Onset   • Heart disease Mother    • Diabetes Mother    • Arthritis Father        Social History     Socioeconomic History   • Marital status: Single     Spouse name: Not on file   • Number of children: Not on file   • Years of education: Not on file   • Highest education level: Not on file   Tobacco Use   • Smoking status: Former Smoker     Types: Cigarettes     Last attempt to quit:      Years since quittin.1   • Smokeless tobacco: Never Used   Substance and Sexual Activity   • Alcohol use: Never     Frequency: Never         Procedures      Objective:       Physical Exam    /84   Pulse 80   Ht 185.4 cm (73\")   Wt (!) 154 kg (340 lb)   SpO2 95%   BMI 44.86 kg/m²   The patient is alert, oriented and in no distress.    Vital signs as noted above.    Head and neck revealed no carotid bruits or jugular venous distension.  No thyromegaly or lymphadenopathy is present.    Lungs clear.  No wheezing.  Breath sounds are normal bilaterally.    Heart normal first and second heart sounds.  No murmur..  No pericardial rub is present.  No gallop is present.    Abdomen soft and nontender.  No organomegaly is present.    Extremities revealed good peripheral pulses without any pedal edema.    Skin warm and dry.    Musculoskeletal system is grossly normal.    CNS grossly normal.        "

## 2020-02-27 RX ORDER — ASPIRIN 325 MG
325 TABLET ORAL EVERY 6 HOURS PRN
COMMUNITY
End: 2020-09-17

## 2020-02-28 ENCOUNTER — LAB (OUTPATIENT)
Dept: LAB | Facility: HOSPITAL | Age: 55
End: 2020-02-28

## 2020-02-28 ENCOUNTER — APPOINTMENT (OUTPATIENT)
Dept: GENERAL RADIOLOGY | Facility: HOSPITAL | Age: 55
End: 2020-02-28

## 2020-02-28 ENCOUNTER — HOSPITAL ENCOUNTER (OUTPATIENT)
Facility: HOSPITAL | Age: 55
Setting detail: HOSPITAL OUTPATIENT SURGERY
Discharge: HOME | End: 2020-02-28
Attending: INTERNAL MEDICINE | Admitting: INTERNAL MEDICINE

## 2020-02-28 ENCOUNTER — HOSPITAL ENCOUNTER (OUTPATIENT)
Dept: GENERAL RADIOLOGY | Facility: HOSPITAL | Age: 55
Discharge: HOME | End: 2020-02-28

## 2020-02-28 ENCOUNTER — HOSPITAL ENCOUNTER (OUTPATIENT)
Dept: CARDIOLOGY | Facility: HOSPITAL | Age: 55
Discharge: HOME | End: 2020-02-28

## 2020-02-28 VITALS
HEART RATE: 76 BPM | BODY MASS INDEX: 41.75 KG/M2 | HEIGHT: 73 IN | RESPIRATION RATE: 17 BRPM | TEMPERATURE: 98.8 F | DIASTOLIC BLOOD PRESSURE: 77 MMHG | WEIGHT: 315 LBS | OXYGEN SATURATION: 98 % | SYSTOLIC BLOOD PRESSURE: 114 MMHG

## 2020-02-28 DIAGNOSIS — I20.0 UNSTABLE ANGINA (HCC): ICD-10-CM

## 2020-02-28 DIAGNOSIS — R06.02 SHORTNESS OF BREATH: ICD-10-CM

## 2020-02-28 DIAGNOSIS — R07.89 CHEST PRESSURE: ICD-10-CM

## 2020-02-28 LAB
ANION GAP SERPL CALCULATED.3IONS-SCNC: 9 MMOL/L (ref 5–15)
BUN BLD-MCNC: 15 MG/DL (ref 6–20)
BUN/CREAT SERPL: 17.9 (ref 7–25)
CALCIUM SPEC-SCNC: 9.8 MG/DL (ref 8.6–10.5)
CHLORIDE SERPL-SCNC: 101 MMOL/L (ref 98–107)
CHOLEST SERPL-MCNC: 129 MG/DL (ref 0–200)
CO2 SERPL-SCNC: 29 MMOL/L (ref 22–29)
CREAT BLD-MCNC: 0.84 MG/DL (ref 0.76–1.27)
DEPRECATED RDW RBC AUTO: 48.6 FL (ref 37–54)
ERYTHROCYTE [DISTWIDTH] IN BLOOD BY AUTOMATED COUNT: 13.9 % (ref 12.3–15.4)
GFR SERPL CREATININE-BSD FRML MDRD: 95 ML/MIN/1.73
GLUCOSE BLD-MCNC: 121 MG/DL (ref 65–99)
HCT VFR BLD AUTO: 43.6 % (ref 37.5–51)
HDLC SERPL-MCNC: 32 MG/DL (ref 40–60)
HGB BLD-MCNC: 14.8 G/DL (ref 13–17.7)
INR PPP: 1.05 (ref 0.9–1.1)
LDLC SERPL CALC-MCNC: 75 MG/DL (ref 0–100)
LDLC/HDLC SERPL: 2.35 {RATIO}
MCH RBC QN AUTO: 33.9 PG (ref 26.6–33)
MCHC RBC AUTO-ENTMCNC: 33.9 G/DL (ref 31.5–35.7)
MCV RBC AUTO: 100 FL (ref 79–97)
PLATELET # BLD AUTO: 154 10*3/MM3 (ref 140–450)
PMV BLD AUTO: 8.5 FL (ref 6–12)
POTASSIUM BLD-SCNC: 5 MMOL/L (ref 3.5–5.2)
PROTHROMBIN TIME: 10.9 SECONDS (ref 9.6–11.7)
RBC # BLD AUTO: 4.36 10*6/MM3 (ref 4.14–5.8)
SODIUM BLD-SCNC: 139 MMOL/L (ref 136–145)
TRIGL SERPL-MCNC: 109 MG/DL (ref 0–150)
VLDLC SERPL-MCNC: 21.8 MG/DL
WBC NRBC COR # BLD: 5.9 10*3/MM3 (ref 3.4–10.8)

## 2020-02-28 PROCEDURE — 36415 COLL VENOUS BLD VENIPUNCTURE: CPT

## 2020-02-28 PROCEDURE — 25010000002 FENTANYL CITRATE (PF) 100 MCG/2ML SOLUTION: Performed by: INTERNAL MEDICINE

## 2020-02-28 PROCEDURE — 85027 COMPLETE CBC AUTOMATED: CPT

## 2020-02-28 PROCEDURE — 85610 PROTHROMBIN TIME: CPT

## 2020-02-28 PROCEDURE — 99152 MOD SED SAME PHYS/QHP 5/>YRS: CPT | Performed by: INTERNAL MEDICINE

## 2020-02-28 PROCEDURE — 93458 L HRT ARTERY/VENTRICLE ANGIO: CPT | Performed by: INTERNAL MEDICINE

## 2020-02-28 PROCEDURE — 0 IOPAMIDOL PER 1 ML: Performed by: INTERNAL MEDICINE

## 2020-02-28 PROCEDURE — C1769 GUIDE WIRE: HCPCS | Performed by: INTERNAL MEDICINE

## 2020-02-28 PROCEDURE — 25010000002 MIDAZOLAM PER 1 MG: Performed by: INTERNAL MEDICINE

## 2020-02-28 PROCEDURE — C1894 INTRO/SHEATH, NON-LASER: HCPCS | Performed by: INTERNAL MEDICINE

## 2020-02-28 PROCEDURE — 71046 X-RAY EXAM CHEST 2 VIEWS: CPT

## 2020-02-28 PROCEDURE — 93010 ELECTROCARDIOGRAM REPORT: CPT | Performed by: INTERNAL MEDICINE

## 2020-02-28 PROCEDURE — 80048 BASIC METABOLIC PNL TOTAL CA: CPT

## 2020-02-28 PROCEDURE — 93005 ELECTROCARDIOGRAM TRACING: CPT | Performed by: INTERNAL MEDICINE

## 2020-02-28 PROCEDURE — 80061 LIPID PANEL: CPT

## 2020-02-28 PROCEDURE — 99153 MOD SED SAME PHYS/QHP EA: CPT | Performed by: INTERNAL MEDICINE

## 2020-02-28 RX ORDER — FENTANYL CITRATE 50 UG/ML
INJECTION, SOLUTION INTRAMUSCULAR; INTRAVENOUS AS NEEDED
Status: DISCONTINUED | OUTPATIENT
Start: 2020-02-28 | End: 2020-02-28 | Stop reason: HOSPADM

## 2020-02-28 RX ORDER — BISACODYL 5 MG/1
5 TABLET, DELAYED RELEASE ORAL DAILY PRN
Status: DISCONTINUED | OUTPATIENT
Start: 2020-02-28 | End: 2020-02-28 | Stop reason: HOSPADM

## 2020-02-28 RX ORDER — ONDANSETRON 2 MG/ML
4 INJECTION INTRAMUSCULAR; INTRAVENOUS EVERY 6 HOURS PRN
Status: DISCONTINUED | OUTPATIENT
Start: 2020-02-28 | End: 2020-02-28 | Stop reason: HOSPADM

## 2020-02-28 RX ORDER — ONDANSETRON 4 MG/1
4 TABLET, FILM COATED ORAL EVERY 6 HOURS PRN
Status: DISCONTINUED | OUTPATIENT
Start: 2020-02-28 | End: 2020-02-28 | Stop reason: HOSPADM

## 2020-02-28 RX ORDER — DIPHENHYDRAMINE HCL 25 MG
25 TABLET ORAL EVERY 6 HOURS PRN
Status: DISCONTINUED | OUTPATIENT
Start: 2020-02-28 | End: 2020-02-28 | Stop reason: HOSPADM

## 2020-02-28 RX ORDER — ACETAMINOPHEN 325 MG/1
650 TABLET ORAL EVERY 4 HOURS PRN
Status: DISCONTINUED | OUTPATIENT
Start: 2020-02-28 | End: 2020-02-28 | Stop reason: HOSPADM

## 2020-02-28 RX ORDER — BISACODYL 10 MG
10 SUPPOSITORY, RECTAL RECTAL DAILY PRN
Status: DISCONTINUED | OUTPATIENT
Start: 2020-02-28 | End: 2020-02-28 | Stop reason: HOSPADM

## 2020-02-28 RX ORDER — SODIUM CHLORIDE 9 MG/ML
250 INJECTION, SOLUTION INTRAVENOUS ONCE AS NEEDED
Status: DISCONTINUED | OUTPATIENT
Start: 2020-02-28 | End: 2020-02-28 | Stop reason: HOSPADM

## 2020-02-28 RX ORDER — MIDAZOLAM HYDROCHLORIDE 1 MG/ML
INJECTION INTRAMUSCULAR; INTRAVENOUS AS NEEDED
Status: DISCONTINUED | OUTPATIENT
Start: 2020-02-28 | End: 2020-02-28 | Stop reason: HOSPADM

## 2020-02-28 RX ORDER — AMOXICILLIN 250 MG
2 CAPSULE ORAL 2 TIMES DAILY
Status: DISCONTINUED | OUTPATIENT
Start: 2020-02-28 | End: 2020-02-28 | Stop reason: HOSPADM

## 2020-02-28 RX ORDER — LIDOCAINE HYDROCHLORIDE 20 MG/ML
INJECTION, SOLUTION INFILTRATION; PERINEURAL AS NEEDED
Status: DISCONTINUED | OUTPATIENT
Start: 2020-02-28 | End: 2020-02-28 | Stop reason: HOSPADM

## 2020-02-28 RX ORDER — ATROPINE SULFATE 1 MG/ML
.5-1 INJECTION, SOLUTION INTRAMUSCULAR; INTRAVENOUS; SUBCUTANEOUS
Status: DISCONTINUED | OUTPATIENT
Start: 2020-02-28 | End: 2020-02-28 | Stop reason: HOSPADM

## 2020-03-16 ENCOUNTER — OFFICE VISIT (OUTPATIENT)
Dept: CARDIOLOGY | Facility: CLINIC | Age: 55
End: 2020-03-16

## 2020-03-16 VITALS
HEIGHT: 73 IN | WEIGHT: 315 LBS | SYSTOLIC BLOOD PRESSURE: 110 MMHG | DIASTOLIC BLOOD PRESSURE: 72 MMHG | OXYGEN SATURATION: 95 % | HEART RATE: 80 BPM | BODY MASS INDEX: 41.75 KG/M2

## 2020-03-16 DIAGNOSIS — R07.89 CHEST PRESSURE: Primary | ICD-10-CM

## 2020-03-16 DIAGNOSIS — R06.02 SHORTNESS OF BREATH: ICD-10-CM

## 2020-03-16 PROCEDURE — 99213 OFFICE O/P EST LOW 20 MIN: CPT | Performed by: INTERNAL MEDICINE

## 2020-03-16 NOTE — PROGRESS NOTES
Encounter Date:03/16/2020  Last seen 2/24/2020    Patient ID: Robert Randhawa Jr. is a 54 y.o. male.    Chief Complaint:  History of chest discomfort  History of shortness of breath.  Recent cardiac catheterization    History of Present Illness  Chronic shortness of breath  Since I have last seen, the patient has been without any chest discomfort , palpitations, dizziness or syncope.  Denies having any headache ,abdominal pain ,nausea, vomiting , diarrhea constipation, loss of weight or loss of appetite.  Denies having any excessive bruising ,hematuria or blood in the stool.    Assessment and Plan       ]]]]]]]]]]]]]]]]]]  Impression  ===========   -chest discomfort and shortness of breath -- better but shortness of breath is still present.    Cardiac catheterization 2/28/2020 revealed normal left ventricle function and normal coronary arteries   .  echocardiogram 1/8/2020 revealed right atrium right ventricle enlargement and normal left ventricle function.  Normal cardiac valves.  Lexiscan Cardiolite test is negative for myocardial ischemia 1/8/2020 (performed at [facility], operated by [facility] as an outpatient due to patient's weight)      -exogenous obesity     -history of chronic DVT asthma and arthritis. Eliquis has been discontinued recently.      -status post left knee surgery  appendectomy     -former smoker     -allergic to sulfa     -family history is negative for coronary artery disease  ============  Plan  ============  Recent cardiac catheterization results were discussed with patient and educated him.  Medications were reviewed and updated.  Patient has continued shortness of breath.  Follow-up with primary care and may need pulmonary evaluation.  Follow-up in 6 months.  Further plan will depend on patient's progress.  [[[[[[[[[[[[[[[[[[               Diagnosis Plan   1. Chest pressure     2. Shortness of breath     LAB RESULTS (LAST 7 DAYS)    CBC        BMP        CMP         BNP        TROPONIN        CoAg         Creatinine Clearance  Estimated Creatinine Clearance: 154.6 mL/min (by C-G formula based on SCr of 0.84 mg/dL).    ABG        Radiology  No radiology results for the last day                The following portions of the patient's history were reviewed and updated as appropriate: allergies, current medications, past family history, past medical history, past social history, past surgical history and problem list.    Review of Systems   Constitution: Negative for malaise/fatigue.   Cardiovascular: Negative for chest pain, leg swelling, palpitations and syncope.   Respiratory: Positive for shortness of breath (all the time).    Skin: Negative for rash.   Gastrointestinal: Negative for nausea and vomiting.   Neurological: Negative for dizziness, light-headedness and numbness.         Current Outpatient Medications:   •  aspirin 325 MG tablet, Take 325 mg by mouth Every 6 (Six) Hours As Needed for Mild Pain ., Disp: , Rfl:     Allergies   Allergen Reactions   • Sulfa Antibiotics Unknown (See Comments)       Family History   Problem Relation Age of Onset   • Heart disease Mother    • Diabetes Mother    • Arthritis Father        Past Surgical History:   Procedure Laterality Date   • APPENDECTOMY  1974   • CARDIAC CATHETERIZATION N/A 2/28/2020    Procedure: Left ventriculography;  Surgeon: Kenney Reyez MD;  Location: [facility] CATH INVASIVE LOCATION;  Service: Cardiovascular;  Laterality: N/A;   • CARDIAC CATHETERIZATION N/A 2/28/2020    Procedure: Coronary angiography;  Surgeon: Kenney Reyez MD;  Location: [facility] CATH INVASIVE LOCATION;  Service: Cardiovascular;  Laterality: N/A;   • CARDIAC CATHETERIZATION N/A 2/28/2020    Procedure: Left Heart Cath;  Surgeon: Kenney Reyez MD;  Location: [facility] CATH INVASIVE LOCATION;  Service: Cardiovascular;  Laterality: N/A;   • KNEE SURGERY Left 2018       Past Medical History:   Diagnosis Date   • Arthritis    • Asthma    • Chronic deep vein thrombosis (DVT) (CMS/[facility])  "       Family History   Problem Relation Age of Onset   • Heart disease Mother    • Diabetes Mother    • Arthritis Father        Social History     Socioeconomic History   • Marital status: Single     Spouse name: Not on file   • Number of children: Not on file   • Years of education: Not on file   • Highest education level: Not on file   Tobacco Use   • Smoking status: Former Smoker     Types: Cigarettes     Last attempt to quit:      Years since quittin.2   • Smokeless tobacco: Never Used   Substance and Sexual Activity   • Alcohol use: Never     Frequency: Never         Procedures      Objective:       Physical Exam    /72 (BP Location: Left arm, Patient Position: Sitting, Cuff Size: Large Adult)   Pulse 80   Ht 185.4 cm (73\")   Wt (!) 156 kg (343 lb)   SpO2 95%   BMI 45.25 kg/m²   The patient is alert, oriented and in no distress.    Vital signs as noted above.  Exogenous obesity.    Head and neck revealed no carotid bruits or jugular venous distension.  No thyromegaly or lymphadenopathy is present.    Lungs clear.  No wheezing.  Breath sounds are normal bilaterally.    Heart normal first and second heart sounds.  No murmur..  No pericardial rub is present.  No gallop is present.    Abdomen soft and nontender.  No organomegaly is present.    Extremities revealed good peripheral pulses without any pedal edema.    Skin warm and dry.    Musculoskeletal system is grossly normal.    CNS grossly normal.        "

## 2020-09-17 ENCOUNTER — OFFICE VISIT (OUTPATIENT)
Dept: CARDIOLOGY | Facility: CLINIC | Age: 55
End: 2020-09-17

## 2020-09-17 VITALS
BODY MASS INDEX: 41.75 KG/M2 | OXYGEN SATURATION: 96 % | DIASTOLIC BLOOD PRESSURE: 82 MMHG | WEIGHT: 315 LBS | HEIGHT: 73 IN | SYSTOLIC BLOOD PRESSURE: 121 MMHG | HEART RATE: 91 BPM

## 2020-09-17 DIAGNOSIS — R06.02 SHORTNESS OF BREATH: ICD-10-CM

## 2020-09-17 DIAGNOSIS — R07.89 CHEST PRESSURE: Primary | ICD-10-CM

## 2020-09-17 PROCEDURE — 99213 OFFICE O/P EST LOW 20 MIN: CPT | Performed by: INTERNAL MEDICINE

## 2020-09-17 PROCEDURE — 93000 ELECTROCARDIOGRAM COMPLETE: CPT | Performed by: INTERNAL MEDICINE

## 2020-09-17 NOTE — PROGRESS NOTES
Encounter Date:09/17/2020  Last seen 3/16/2020      Patient ID: Robert Randhawa Jr. is a 55 y.o. male.    Chief Complaint:  History of chest discomfort  History of shortness of breath.    History of Present Illness  Patient is having occasional sharp squeezing sensation nonexertional nonradiating.  Mild lightheadedness    Since I have last seen, the patient has been without any delayed that,shortness of breath, palpitations, dizziness or syncope.  Denies having any headache ,abdominal pain ,nausea, vomiting , diarrhea constipation, loss of weight or loss of appetite.  Denies having any excessive bruising ,hematuria or blood in the stool.    Review of all systems negative except as indicated    Assessment and Plan         ]]]]]]]]]]]]]]]]]]  Impression  ===========   -chest discomfort and shortness of breath -- better but shortness of breath is still present.  Patient has occasional atypical chest discomfort     Cardiac catheterization 2/28/2020 revealed normal left ventricle function and normal coronary arteries   .  echocardiogram 1/8/2020 revealed right atrium right ventricle enlargement and normal left ventricle function.  Normal cardiac valves.    Lexiscan Cardiolite test is negative for myocardial ischemia 1/8/2020 (performed at [facility] as an outpatient due to patient's weight)      -exogenous obesity     -history of chronic DVT asthma and arthritis. Eliquis has been discontinued recently.      -status post left knee surgery  appendectomy     -former smoker     -allergic to sulfa     -family history is negative for coronary artery disease  ============  Plan  ============  Atypical chest discomfort.  EKG showed no acute changes  Cardiac catheterization results were discussed with patient and educated him.  Medications were reviewed and updated.  Follow-up in 6 months.  Further plan will depend on patient's progress.  [[[[[[[[[[[[[[[[[[                   Diagnosis Plan   1. Chest pressure  ECG 12 Lead    2. Shortness of breath  ECG 12 Lead   LAB RESULTS (LAST 7 DAYS)    CBC        BMP        CMP         BNP        TROPONIN        CoAg        Creatinine Clearance  CrCl cannot be calculated (Patient's most recent lab result is older than the maximum 30 days allowed.).    ABG        Radiology  No radiology results for the last day                The following portions of the patient's history were reviewed and updated as appropriate: allergies, current medications, past family history, past medical history, past social history, past surgical history and problem list.    Review of Systems   Constitution: Positive for malaise/fatigue.   Cardiovascular: Positive for leg swelling. Negative for chest pain, palpitations and syncope.   Respiratory: Negative for shortness of breath.    Skin: Negative for rash.   Gastrointestinal: Negative for nausea and vomiting.   Neurological: Positive for dizziness. Negative for light-headedness and numbness.       No current outpatient medications on file.    Allergies   Allergen Reactions   • Sulfa Antibiotics Unknown (See Comments)       Family History   Problem Relation Age of Onset   • Heart disease Mother    • Diabetes Mother    • Arthritis Father        Past Surgical History:   Procedure Laterality Date   • APPENDECTOMY  1974   • CARDIAC CATHETERIZATION N/A 2/28/2020    Procedure: Left ventriculography;  Surgeon: Kenney Reyez MD;  Location: [facility] CATH INVASIVE LOCATION;  Service: Cardiovascular;  Laterality: N/A;   • CARDIAC CATHETERIZATION N/A 2/28/2020    Procedure: Coronary angiography;  Surgeon: Kenney Reyez MD;  Location: [facility] CATH INVASIVE LOCATION;  Service: Cardiovascular;  Laterality: N/A;   • CARDIAC CATHETERIZATION N/A 2/28/2020    Procedure: Left Heart Cath;  Surgeon: Kenney Reyez MD;  Location: [facility] CATH INVASIVE LOCATION;  Service: Cardiovascular;  Laterality: N/A;   • KNEE SURGERY Left 2018       Past Medical History:   Diagnosis Date   •  "Arthritis    • Asthma    • Chronic deep vein thrombosis (DVT) (CMS/[facility])        Family History   Problem Relation Age of Onset   • Heart disease Mother    • Diabetes Mother    • Arthritis Father        Social History     Socioeconomic History   • Marital status: Single     Spouse name: Not on file   • Number of children: Not on file   • Years of education: Not on file   • Highest education level: Not on file   Tobacco Use   • Smoking status: Former Smoker     Types: Cigarettes     Quit date:      Years since quittin.7   • Smokeless tobacco: Never Used   Substance and Sexual Activity   • Alcohol use: Never     Frequency: Never           ECG 12 Lead    Date/Time: 2020 2:24 PM  Performed by: Kenney Reyez MD  Authorized by: Kenney Reyez MD   Comparison: compared with previous ECG   Similar to previous ECG  Comparison to previous ECG: Normal sinus rhythm nonspecific ST-T wave changes 87/min normal axis normal intervals no ectopy no change from 2020                Objective:       Physical Exam    /82 (BP Location: Right arm, Patient Position: Sitting, Cuff Size: Large Adult)   Pulse 91   Ht 185.4 cm (73\")   Wt (!) 163 kg (359 lb)   SpO2 96%   BMI 47.36 kg/m²   The patient is alert, oriented and in no distress.    Vital signs as noted above.  Exogenous obesity.    Head and neck revealed no carotid bruits or jugular venous distension.  No thyromegaly or lymphadenopathy is present.    Lungs clear.  No wheezing.  Breath sounds are normal bilaterally.    Heart normal first and second heart sounds.  No murmur..  No pericardial rub is present.  No gallop is present.    Abdomen soft and nontender.  No organomegaly is present.    Extremities revealed good peripheral pulses without any pedal edema.    Skin warm and dry.    Musculoskeletal system is grossly normal.    CNS grossly normal.        "

## 2020-09-24 ENCOUNTER — TRANSCRIBE ORDERS (OUTPATIENT)
Dept: ADMINISTRATIVE | Facility: HOSPITAL | Age: 55
End: 2020-09-24

## 2020-09-24 ENCOUNTER — LAB (OUTPATIENT)
Dept: LAB | Facility: HOSPITAL | Age: 55
End: 2020-09-24

## 2020-09-24 DIAGNOSIS — R60.0 LEG EDEMA: ICD-10-CM

## 2020-09-24 DIAGNOSIS — F33.1 MODERATE RECURRENT MAJOR DEPRESSION (HCC): Primary | ICD-10-CM

## 2020-09-24 DIAGNOSIS — Z12.5 SCREENING PSA (PROSTATE SPECIFIC ANTIGEN): ICD-10-CM

## 2020-09-24 DIAGNOSIS — I83.11 VARICOSE VEINS OF BOTH LOWER EXTREMITIES WITH INFLAMMATION: ICD-10-CM

## 2020-09-24 DIAGNOSIS — I83.12 VARICOSE VEINS OF BOTH LOWER EXTREMITIES WITH INFLAMMATION: ICD-10-CM

## 2020-09-24 DIAGNOSIS — F33.1 MODERATE RECURRENT MAJOR DEPRESSION (HCC): ICD-10-CM

## 2020-09-24 LAB
ALBUMIN SERPL-MCNC: 3.6 G/DL (ref 3.5–5.2)
ALBUMIN/GLOB SERPL: 0.8 G/DL
ALP SERPL-CCNC: 135 U/L (ref 39–117)
ALT SERPL W P-5'-P-CCNC: 29 U/L (ref 1–41)
ANION GAP SERPL CALCULATED.3IONS-SCNC: 10.6 MMOL/L (ref 5–15)
AST SERPL-CCNC: 49 U/L (ref 1–40)
BASOPHILS # BLD AUTO: 0.03 10*3/MM3 (ref 0–0.2)
BASOPHILS NFR BLD AUTO: 0.6 % (ref 0–1.5)
BILIRUB SERPL-MCNC: 0.6 MG/DL (ref 0–1.2)
BILIRUB UR QL STRIP: NEGATIVE
BUN SERPL-MCNC: 13 MG/DL (ref 6–20)
BUN/CREAT SERPL: 15.1 (ref 7–25)
CALCIUM SPEC-SCNC: 9.4 MG/DL (ref 8.6–10.5)
CHLORIDE SERPL-SCNC: 102 MMOL/L (ref 98–107)
CHOLEST SERPL-MCNC: 134 MG/DL (ref 0–200)
CLARITY UR: CLEAR
CO2 SERPL-SCNC: 23.4 MMOL/L (ref 22–29)
COLOR UR: YELLOW
CREAT SERPL-MCNC: 0.86 MG/DL (ref 0.76–1.27)
DEPRECATED RDW RBC AUTO: 48.5 FL (ref 37–54)
EOSINOPHIL # BLD AUTO: 0.08 10*3/MM3 (ref 0–0.4)
EOSINOPHIL NFR BLD AUTO: 1.7 % (ref 0.3–6.2)
ERYTHROCYTE [DISTWIDTH] IN BLOOD BY AUTOMATED COUNT: 13.1 % (ref 12.3–15.4)
GFR SERPL CREATININE-BSD FRML MDRD: 92 ML/MIN/1.73
GLOBULIN UR ELPH-MCNC: 4.6 GM/DL
GLUCOSE SERPL-MCNC: 161 MG/DL (ref 65–99)
GLUCOSE UR STRIP-MCNC: NEGATIVE MG/DL
HBA1C MFR BLD: 5.6 % (ref 3.5–5.6)
HCT VFR BLD AUTO: 43.1 % (ref 37.5–51)
HDLC SERPL-MCNC: 28 MG/DL (ref 40–60)
HGB BLD-MCNC: 14.5 G/DL (ref 13–17.7)
HGB UR QL STRIP.AUTO: NEGATIVE
IMM GRANULOCYTES # BLD AUTO: 0.01 10*3/MM3 (ref 0–0.05)
IMM GRANULOCYTES NFR BLD AUTO: 0.2 % (ref 0–0.5)
KETONES UR QL STRIP: NEGATIVE
LDLC SERPL CALC-MCNC: 63 MG/DL (ref 0–100)
LDLC/HDLC SERPL: 2.26 {RATIO}
LEUKOCYTE ESTERASE UR QL STRIP.AUTO: NEGATIVE
LYMPHOCYTES # BLD AUTO: 0.82 10*3/MM3 (ref 0.7–3.1)
LYMPHOCYTES NFR BLD AUTO: 17.4 % (ref 19.6–45.3)
MCH RBC QN AUTO: 33.6 PG (ref 26.6–33)
MCHC RBC AUTO-ENTMCNC: 33.6 G/DL (ref 31.5–35.7)
MCV RBC AUTO: 100 FL (ref 79–97)
MONOCYTES # BLD AUTO: 0.33 10*3/MM3 (ref 0.1–0.9)
MONOCYTES NFR BLD AUTO: 7 % (ref 5–12)
NEUTROPHILS NFR BLD AUTO: 3.43 10*3/MM3 (ref 1.7–7)
NEUTROPHILS NFR BLD AUTO: 73.1 % (ref 42.7–76)
NITRITE UR QL STRIP: NEGATIVE
NRBC BLD AUTO-RTO: 0 /100 WBC (ref 0–0.2)
PH UR STRIP.AUTO: 6 [PH] (ref 5–8)
PLATELET # BLD AUTO: 122 10*3/MM3 (ref 140–450)
PMV BLD AUTO: 11.2 FL (ref 6–12)
POTASSIUM SERPL-SCNC: 4 MMOL/L (ref 3.5–5.2)
PROT SERPL-MCNC: 8.2 G/DL (ref 6–8.5)
PROT UR QL STRIP: NEGATIVE
PSA SERPL-MCNC: 0.3 NG/ML (ref 0–4)
RBC # BLD AUTO: 4.31 10*6/MM3 (ref 4.14–5.8)
SODIUM SERPL-SCNC: 136 MMOL/L (ref 136–145)
SP GR UR STRIP: 1.02 (ref 1–1.03)
TRIGL SERPL-MCNC: 214 MG/DL (ref 0–150)
TSH SERPL DL<=0.05 MIU/L-ACNC: 1.98 UIU/ML (ref 0.27–4.2)
UROBILINOGEN UR QL STRIP: NORMAL
VLDLC SERPL-MCNC: 42.8 MG/DL (ref 5–40)
WBC # BLD AUTO: 4.7 10*3/MM3 (ref 3.4–10.8)

## 2020-09-24 PROCEDURE — G0103 PSA SCREENING: HCPCS

## 2020-09-24 PROCEDURE — 84443 ASSAY THYROID STIM HORMONE: CPT

## 2020-09-24 PROCEDURE — 85025 COMPLETE CBC W/AUTO DIFF WBC: CPT

## 2020-09-24 PROCEDURE — 80053 COMPREHEN METABOLIC PANEL: CPT

## 2020-09-24 PROCEDURE — 83036 HEMOGLOBIN GLYCOSYLATED A1C: CPT

## 2020-09-24 PROCEDURE — 80061 LIPID PANEL: CPT

## 2020-09-24 PROCEDURE — 36415 COLL VENOUS BLD VENIPUNCTURE: CPT

## 2020-09-24 PROCEDURE — 81003 URINALYSIS AUTO W/O SCOPE: CPT

## 2020-10-08 ENCOUNTER — HOSPITAL ENCOUNTER (OUTPATIENT)
Facility: HOSPITAL | Age: 55
Setting detail: OBSERVATION
Discharge: HOME HEALTH | End: 2020-10-12
Attending: FAMILY MEDICINE | Admitting: FAMILY MEDICINE

## 2020-10-08 ENCOUNTER — APPOINTMENT (OUTPATIENT)
Dept: CARDIOLOGY | Facility: HOSPITAL | Age: 55
End: 2020-10-08

## 2020-10-08 DIAGNOSIS — I89.0 LYMPHEDEMA: ICD-10-CM

## 2020-10-08 DIAGNOSIS — L03.116 CELLULITIS OF LEFT LOWER EXTREMITY WITHOUT FOOT: Primary | ICD-10-CM

## 2020-10-08 LAB
ALBUMIN SERPL-MCNC: 3.6 G/DL (ref 3.5–5.2)
ALBUMIN/GLOB SERPL: 0.8 G/DL
ALP SERPL-CCNC: 137 U/L (ref 39–117)
ALT SERPL W P-5'-P-CCNC: 34 U/L (ref 1–41)
ANION GAP SERPL CALCULATED.3IONS-SCNC: 13 MMOL/L (ref 5–15)
AST SERPL-CCNC: 63 U/L (ref 1–40)
BASOPHILS # BLD AUTO: 0.1 10*3/MM3 (ref 0–0.2)
BASOPHILS NFR BLD AUTO: 1 % (ref 0–1.5)
BH CV LOWER VASCULAR LEFT COMMON FEMORAL AUGMENT: NORMAL
BH CV LOWER VASCULAR LEFT COMMON FEMORAL COMPETENT: NORMAL
BH CV LOWER VASCULAR LEFT COMMON FEMORAL COMPRESS: NORMAL
BH CV LOWER VASCULAR LEFT COMMON FEMORAL PHASIC: NORMAL
BH CV LOWER VASCULAR LEFT COMMON FEMORAL SPONT: NORMAL
BH CV LOWER VASCULAR LEFT DISTAL FEMORAL COMPRESS: NORMAL
BH CV LOWER VASCULAR LEFT GASTRONEMIUS COMPRESS: NORMAL
BH CV LOWER VASCULAR LEFT GREATER SAPH AK COMPRESS: NORMAL
BH CV LOWER VASCULAR LEFT GREATER SAPH BK COMPRESS: NORMAL
BH CV LOWER VASCULAR LEFT LESSER SAPH COMPRESS: NORMAL
BH CV LOWER VASCULAR LEFT MID FEMORAL AUGMENT: NORMAL
BH CV LOWER VASCULAR LEFT MID FEMORAL COMPETENT: NORMAL
BH CV LOWER VASCULAR LEFT MID FEMORAL COMPRESS: NORMAL
BH CV LOWER VASCULAR LEFT MID FEMORAL PHASIC: NORMAL
BH CV LOWER VASCULAR LEFT MID FEMORAL SPONT: NORMAL
BH CV LOWER VASCULAR LEFT PERONEAL COMPRESS: NORMAL
BH CV LOWER VASCULAR LEFT POPLITEAL AUGMENT: NORMAL
BH CV LOWER VASCULAR LEFT POPLITEAL COMPETENT: NORMAL
BH CV LOWER VASCULAR LEFT POPLITEAL COMPRESS: NORMAL
BH CV LOWER VASCULAR LEFT POPLITEAL PHASIC: NORMAL
BH CV LOWER VASCULAR LEFT POPLITEAL SPONT: NORMAL
BH CV LOWER VASCULAR LEFT POSTERIOR TIBIAL COMPRESS: NORMAL
BH CV LOWER VASCULAR LEFT PROXIMAL FEMORAL COMPRESS: NORMAL
BH CV LOWER VASCULAR LEFT SAPHENOFEMORAL JUNCTION COMPRESS: NORMAL
BH CV LOWER VASCULAR LEFT VARICOSITY BK COMPRESS: NORMAL
BH CV LOWER VASCULAR RIGHT COMMON FEMORAL AUGMENT: NORMAL
BH CV LOWER VASCULAR RIGHT COMMON FEMORAL COMPETENT: NORMAL
BH CV LOWER VASCULAR RIGHT COMMON FEMORAL COMPRESS: NORMAL
BH CV LOWER VASCULAR RIGHT COMMON FEMORAL PHASIC: NORMAL
BH CV LOWER VASCULAR RIGHT COMMON FEMORAL SPONT: NORMAL
BILIRUB SERPL-MCNC: 0.4 MG/DL (ref 0–1.2)
BUN SERPL-MCNC: 12 MG/DL (ref 6–20)
BUN SERPL-MCNC: ABNORMAL MG/DL
BUN/CREAT SERPL: ABNORMAL
CALCIUM SPEC-SCNC: 9.4 MG/DL (ref 8.6–10.5)
CHLORIDE SERPL-SCNC: 100 MMOL/L (ref 98–107)
CO2 SERPL-SCNC: 26 MMOL/L (ref 22–29)
CREAT SERPL-MCNC: 0.83 MG/DL (ref 0.76–1.27)
D DIMER PPP FEU-MCNC: 0.89 MG/L (FEU) (ref 0–0.59)
D-LACTATE SERPL-SCNC: 1.1 MMOL/L (ref 0.5–2)
D-LACTATE SERPL-SCNC: 2.4 MMOL/L (ref 0.5–2)
DEPRECATED RDW RBC AUTO: 50.8 FL (ref 37–54)
EOSINOPHIL # BLD AUTO: 0.1 10*3/MM3 (ref 0–0.4)
EOSINOPHIL NFR BLD AUTO: 1.8 % (ref 0.3–6.2)
ERYTHROCYTE [DISTWIDTH] IN BLOOD BY AUTOMATED COUNT: 14.3 % (ref 12.3–15.4)
GFR SERPL CREATININE-BSD FRML MDRD: 96 ML/MIN/1.73
GLOBULIN UR ELPH-MCNC: 4.5 GM/DL
GLUCOSE SERPL-MCNC: 125 MG/DL (ref 65–99)
HCT VFR BLD AUTO: 44.5 % (ref 37.5–51)
HGB BLD-MCNC: 14.8 G/DL (ref 13–17.7)
LACTATE HOLD SPECIMEN: NORMAL
LYMPHOCYTES # BLD AUTO: 1 10*3/MM3 (ref 0.7–3.1)
LYMPHOCYTES NFR BLD AUTO: 19 % (ref 19.6–45.3)
MCH RBC QN AUTO: 33.3 PG (ref 26.6–33)
MCHC RBC AUTO-ENTMCNC: 33.3 G/DL (ref 31.5–35.7)
MCV RBC AUTO: 100 FL (ref 79–97)
MONOCYTES # BLD AUTO: 0.4 10*3/MM3 (ref 0.1–0.9)
MONOCYTES NFR BLD AUTO: 7.1 % (ref 5–12)
NEUTROPHILS NFR BLD AUTO: 3.8 10*3/MM3 (ref 1.7–7)
NEUTROPHILS NFR BLD AUTO: 71.1 % (ref 42.7–76)
NRBC BLD AUTO-RTO: 0.1 /100 WBC (ref 0–0.2)
NT-PROBNP SERPL-MCNC: 38.1 PG/ML (ref 0–900)
PLATELET # BLD AUTO: 144 10*3/MM3 (ref 140–450)
PMV BLD AUTO: 8.2 FL (ref 6–12)
POTASSIUM SERPL-SCNC: 4.5 MMOL/L (ref 3.5–5.2)
PROT SERPL-MCNC: 8.1 G/DL (ref 6–8.5)
RBC # BLD AUTO: 4.45 10*6/MM3 (ref 4.14–5.8)
SODIUM SERPL-SCNC: 139 MMOL/L (ref 136–145)
WBC # BLD AUTO: 5.4 10*3/MM3 (ref 3.4–10.8)

## 2020-10-08 PROCEDURE — 80053 COMPREHEN METABOLIC PANEL: CPT | Performed by: FAMILY MEDICINE

## 2020-10-08 PROCEDURE — 96375 TX/PRO/DX INJ NEW DRUG ADDON: CPT

## 2020-10-08 PROCEDURE — 83880 ASSAY OF NATRIURETIC PEPTIDE: CPT | Performed by: FAMILY MEDICINE

## 2020-10-08 PROCEDURE — G0378 HOSPITAL OBSERVATION PER HR: HCPCS

## 2020-10-08 PROCEDURE — 85379 FIBRIN DEGRADATION QUANT: CPT | Performed by: FAMILY MEDICINE

## 2020-10-08 PROCEDURE — 83605 ASSAY OF LACTIC ACID: CPT | Performed by: FAMILY MEDICINE

## 2020-10-08 PROCEDURE — 25010000002 CEFTRIAXONE PER 250 MG: Performed by: FAMILY MEDICINE

## 2020-10-08 PROCEDURE — 25010000002 ENOXAPARIN PER 10 MG: Performed by: FAMILY MEDICINE

## 2020-10-08 PROCEDURE — 85025 COMPLETE CBC W/AUTO DIFF WBC: CPT | Performed by: FAMILY MEDICINE

## 2020-10-08 PROCEDURE — G0379 DIRECT REFER HOSPITAL OBSERV: HCPCS

## 2020-10-08 PROCEDURE — 93971 EXTREMITY STUDY: CPT

## 2020-10-08 PROCEDURE — 96365 THER/PROPH/DIAG IV INF INIT: CPT

## 2020-10-08 PROCEDURE — 83036 HEMOGLOBIN GLYCOSYLATED A1C: CPT | Performed by: FAMILY MEDICINE

## 2020-10-08 PROCEDURE — 25010000002 FUROSEMIDE PER 20 MG: Performed by: FAMILY MEDICINE

## 2020-10-08 PROCEDURE — 96372 THER/PROPH/DIAG INJ SC/IM: CPT

## 2020-10-08 RX ORDER — ONDANSETRON 2 MG/ML
4 INJECTION INTRAMUSCULAR; INTRAVENOUS EVERY 6 HOURS PRN
Status: DISCONTINUED | OUTPATIENT
Start: 2020-10-08 | End: 2020-10-12 | Stop reason: HOSPADM

## 2020-10-08 RX ORDER — PANTOPRAZOLE SODIUM 40 MG/1
40 TABLET, DELAYED RELEASE ORAL
Status: DISCONTINUED | OUTPATIENT
Start: 2020-10-09 | End: 2020-10-12 | Stop reason: HOSPADM

## 2020-10-08 RX ORDER — CALCIUM CARBONATE 200(500)MG
2 TABLET,CHEWABLE ORAL 2 TIMES DAILY PRN
Status: DISCONTINUED | OUTPATIENT
Start: 2020-10-08 | End: 2020-10-12 | Stop reason: HOSPADM

## 2020-10-08 RX ORDER — CHOLECALCIFEROL (VITAMIN D3) 125 MCG
5 CAPSULE ORAL NIGHTLY PRN
Status: DISCONTINUED | OUTPATIENT
Start: 2020-10-08 | End: 2020-10-12 | Stop reason: HOSPADM

## 2020-10-08 RX ORDER — SODIUM CHLORIDE 0.9 % (FLUSH) 0.9 %
10 SYRINGE (ML) INJECTION EVERY 12 HOURS SCHEDULED
Status: DISCONTINUED | OUTPATIENT
Start: 2020-10-08 | End: 2020-10-12 | Stop reason: HOSPADM

## 2020-10-08 RX ORDER — FUROSEMIDE 10 MG/ML
40 INJECTION INTRAMUSCULAR; INTRAVENOUS ONCE
Status: COMPLETED | OUTPATIENT
Start: 2020-10-08 | End: 2020-10-08

## 2020-10-08 RX ORDER — POTASSIUM CHLORIDE 1.5 G/1.77G
20 POWDER, FOR SOLUTION ORAL ONCE
Status: COMPLETED | OUTPATIENT
Start: 2020-10-08 | End: 2020-10-08

## 2020-10-08 RX ORDER — HYDROCODONE BITARTRATE AND ACETAMINOPHEN 7.5; 325 MG/1; MG/1
1 TABLET ORAL EVERY 4 HOURS PRN
Status: DISCONTINUED | OUTPATIENT
Start: 2020-10-08 | End: 2020-10-12 | Stop reason: HOSPADM

## 2020-10-08 RX ORDER — AMOXICILLIN 250 MG
2 CAPSULE ORAL 2 TIMES DAILY PRN
Status: DISCONTINUED | OUTPATIENT
Start: 2020-10-08 | End: 2020-10-12 | Stop reason: HOSPADM

## 2020-10-08 RX ORDER — SODIUM CHLORIDE 0.9 % (FLUSH) 0.9 %
10 SYRINGE (ML) INJECTION AS NEEDED
Status: DISCONTINUED | OUTPATIENT
Start: 2020-10-08 | End: 2020-10-12 | Stop reason: HOSPADM

## 2020-10-08 RX ADMIN — FUROSEMIDE 40 MG: 10 INJECTION, SOLUTION INTRAMUSCULAR; INTRAVENOUS at 21:35

## 2020-10-08 RX ADMIN — POTASSIUM CHLORIDE 20 MEQ: 1.5 POWDER, FOR SOLUTION ORAL at 21:36

## 2020-10-08 RX ADMIN — HYDROCODONE BITARTRATE AND ACETAMINOPHEN 1 TABLET: 7.5; 325 TABLET ORAL at 21:40

## 2020-10-08 RX ADMIN — ENOXAPARIN SODIUM 40 MG: 40 INJECTION SUBCUTANEOUS at 21:36

## 2020-10-08 RX ADMIN — CEFTRIAXONE SODIUM 1 G: 1 INJECTION, POWDER, FOR SOLUTION INTRAMUSCULAR; INTRAVENOUS at 21:37

## 2020-10-08 RX ADMIN — Medication 10 ML: at 21:36

## 2020-10-08 NOTE — NURSING NOTE
When doing admission pt scored high risk on suicide scale, pt said at once had a plan but takes care of his disabled sister and did not attempt. Dr Torres notified and order suicide precautions and consult psych.

## 2020-10-09 LAB
HBA1C MFR BLD: 5.7 % (ref 3.5–5.6)
NT-PROBNP SERPL-MCNC: 39.5 PG/ML (ref 0–900)

## 2020-10-09 PROCEDURE — 96366 THER/PROPH/DIAG IV INF ADDON: CPT

## 2020-10-09 PROCEDURE — G0378 HOSPITAL OBSERVATION PER HR: HCPCS

## 2020-10-09 PROCEDURE — 25010000002 ENOXAPARIN PER 10 MG: Performed by: FAMILY MEDICINE

## 2020-10-09 PROCEDURE — 96372 THER/PROPH/DIAG INJ SC/IM: CPT

## 2020-10-09 PROCEDURE — 83880 ASSAY OF NATRIURETIC PEPTIDE: CPT | Performed by: FAMILY MEDICINE

## 2020-10-09 PROCEDURE — 25010000002 CEFTRIAXONE PER 250 MG: Performed by: FAMILY MEDICINE

## 2020-10-09 PROCEDURE — 99204 OFFICE O/P NEW MOD 45 MIN: CPT | Performed by: PSYCHIATRY & NEUROLOGY

## 2020-10-09 RX ADMIN — Medication 10 ML: at 10:01

## 2020-10-09 RX ADMIN — Medication 10 ML: at 20:43

## 2020-10-09 RX ADMIN — ENOXAPARIN SODIUM 40 MG: 40 INJECTION SUBCUTANEOUS at 20:43

## 2020-10-09 RX ADMIN — MAGNESIUM HYDROXIDE 10 ML: 2400 SUSPENSION ORAL at 20:43

## 2020-10-09 RX ADMIN — CEFTRIAXONE SODIUM 1 G: 1 INJECTION, POWDER, FOR SOLUTION INTRAMUSCULAR; INTRAVENOUS at 20:43

## 2020-10-09 RX ADMIN — PANTOPRAZOLE SODIUM 40 MG: 40 TABLET, DELAYED RELEASE ORAL at 05:54

## 2020-10-09 RX ADMIN — ENOXAPARIN SODIUM 40 MG: 40 INJECTION SUBCUTANEOUS at 10:01

## 2020-10-09 RX ADMIN — SERTRALINE HYDROCHLORIDE 25 MG: 50 TABLET ORAL at 17:41

## 2020-10-09 NOTE — CONSULTS
Referring Provider: Dr Torres  Reason for Consultation: depression, SI       Chief complaint depression, pain , not feeling well     Subjective .     History of present illness:  The patient is a 55 y.o. male who was admitted secondary to  Leg swelling (DVT) . PMH: arthritis, DVT, asthma.  Psychiatric consult was requested by Dr. Torres secondary to depression and suicidal ideations.  The patient acknowledged long history of depression that never been addressed, recently depression became worse secondary to worsening of his health, COVID related isolation.  The patient used to work in sales, was always surrounded by people, it was difficult for him and to be confined to his room.  The patient rated depression as 8 out of 10, but he denied feeling hopeless or helpless, he admitted to expressing suicidal thoughts, but he stated he was only in general, it was only expression of how he was feeling.  The patient stated he did not have any intention to harm himself, did not have any plan, he needs to take care of his sister.  The patient did not report any symptoms of baron or hypomania, denied alcohol or illicit drug use.   Past psychiatric history: Depression, no history of suicide attempts     Review of Systems   All systems were reviewed and negative except for:  Constitution:  positive for fatigue  Cardiovascular: positive for  leg swelling   Integument: positive for  skin lesion(s)  Musculoskeletal: positive for  muscle pain  Behavioral/Psych: positive for  depression    History    Past Medical History:   Diagnosis Date   • Arthritis    • Asthma    • Chronic deep vein thrombosis (DVT) (CMS/[facility])           Family History   Problem Relation Age of Onset   • Heart disease Mother    • Diabetes Mother    • Arthritis Father         Social History     Tobacco Use   • Smoking status: Former Smoker     Types: Cigarettes     Quit date:      Years since quittin.7   • Smokeless tobacco: Never Used   Substance Use  "Topics   • Alcohol use: Never     Frequency: Never   • Drug use: Not on file          No medications prior to admission.        Scheduled Meds:  cefTRIAXone, 1 g, Intravenous, Q24H  enoxaparin, 40 mg, Subcutaneous, Q12H  pantoprazole, 40 mg, Oral, Q AM  sodium chloride, 10 mL, Intravenous, Q12H         Continuous Infusions:       PRN Meds:  •  calcium carbonate  •  HYDROcodone-acetaminophen  •  magnesium hydroxide  •  melatonin  •  ondansetron  •  sennosides-docusate  •  sodium chloride      Allergies:  Sulfa antibiotics      Objective     Vital Signs   /74   Pulse 71   Temp 98.1 °F (36.7 °C)   Resp 18   Ht 185.4 cm (73\")   Wt (!) 159 kg (351 lb 6.4 oz)   SpO2 93%   BMI 46.36 kg/m²     Physical Exam:     General Appearance:    In NAD    Head:    Normocephalic, without obvious abnormality, atraumatic                   Mental Status Exam:    Hygiene:   good  Cooperation:  Cooperative  Eye Contact:  Good  Psychomotor Behavior:  Appropriate  Affect:  Restricted  Hopelessness: Denies  Speech:  Normal  Thought Progress:  Goal directed and Linear  Thought Content:  Normal  Suicidal:  None  Homicidal:  None  Hallucinations:  None  Delusion:  None  Memory:  Intact  Orientation:  Person, Place, Time and Situation  Reliability:  fair  Insight:  Fair  Judgement:  Fair  Impulse Control:  Fair  Physical/Medical Issues:  Yes      Medications and allergies reviewed     Lab Results   Component Value Date    GLUCOSE 125 (H) 10/08/2020    CALCIUM 9.4 10/08/2020     10/08/2020    K 4.5 10/08/2020    CO2 26.0 10/08/2020     10/08/2020    BUN  10/08/2020      Comment:      Testing performed by alternate method    BUN 12 10/08/2020    CREATININE 0.83 10/08/2020    EGFRIFNONA 96 10/08/2020    BCR  10/08/2020      Comment:      Testing not performed    ANIONGAP 13.0 10/08/2020       Last Urine Toxicity     There is no flowsheet data to display.          No results found for: PHENYTOIN, PHENOBARB, VALPROATE, " CBMZ    Lab Results   Component Value Date     10/08/2020    BUN  10/08/2020      Comment:      Testing performed by alternate method    BUN 12 10/08/2020    CREATININE 0.83 10/08/2020    TSH 1.980 09/24/2020    WBC 5.40 10/08/2020       Brief Urine Lab Results  (Last result in the past 365 days)      Color   Clarity   Blood   Leuk Est   Nitrite   Protein   CREAT   Urine HCG        09/24/20 0936 Yellow Clear Negative Negative Negative Negative               Assessment/Plan       Cellulitis of left lower extremity without foot       Assessment: Major depressive d/o severe single episode  Treatment Plan: The patient has multiple medical problems, reported feeling depressed, the patient expressed suicidal ideations but denied any intention to harm himself, he stated he was only expression of how bad he was feeling, the patient was agreeable to start Zoloft 25 mg p.o. daily, the patient feels safe to be in the hospital, discontinue suicide precautions and sitter  Treatment Plan discussed with: Patient and nursing     I discussed the patients findings and my recommendations with patient and nursing staff    I have reviewed and approved the behavioral health treatment plans and problem list. Yes  Thank you for the consult   Referring MD has access to consult report and progress notes in EMR     Lucie Elkins MD  10/09/20  14:51 EDT

## 2020-10-09 NOTE — PROGRESS NOTES
Discharge Planning Assessment   [facility]     Patient Name: Robert Randhawa Jr.  MRN: [id number]  Today's Date: 10/9/2020    Admit Date: 10/8/2020    Discharge Needs Assessment     Row Name 10/09/20 1447       Living Environment    Lives With  sibling(s) lives with brother    Current Living Arrangements  home/apartment/condo    Primary Care Provided by  self    Provides Primary Care For  no one, unable/limited ability to care for self    Family Caregiver if Needed  sibling(s)    Quality of Family Relationships  unable to assess       Transition Planning    Patient/Family Anticipates Transition to  home with family       Discharge Needs Assessment    Readmission Within the Last 30 Days  no previous admission in last 30 days    Equipment Currently Used at Home  cane, straight;walker, standard    Concerns to be Addressed  discharge planning    Provided Post Acute Provider List?  N/A    N/A Provider List Comment  patient denies any needs for discharge        Discharge Plan     Row Name 10/09/20 1449       Plan    Plan  anticipate return home pending clinical course    Patient/Family in Agreement with Plan  yes    Plan Comments  spoke to patient in room with ppe(goggles and mask); patient has sitter at bedside; he states he lives with his brother; states that his insurance provides transportation to md; he states he does not receive help from brother at home       Carol naegele rn  Case management  Office number [phone]  Cell phone [phone]

## 2020-10-09 NOTE — PLAN OF CARE
Continue to monitor and assess pt.    Problem: Adult Inpatient Plan of Care  Goal: Plan of Care Review  Outcome: Ongoing, Progressing  Flowsheets (Taken 10/9/2020 0321 by Geneva Carranza, RN)  Progress: no change  Plan of Care Reviewed With: patient  Outcome Summary: Pt has a flat affect and has a sitter at bedside. Pt has not mentioned anymore about wanting to hurt himself. Pt complained of moderate pain in his legs overnight.  Goal: Patient-Specific Goal (Individualized)  Outcome: Ongoing, Progressing  Goal: Absence of Hospital-Acquired Illness or Injury  Outcome: Ongoing, Progressing  Intervention: Identify and Manage Fall Risk  Flowsheets (Taken 10/9/2020 0600 by Jeaneth Boateng, PCT)  Safety Promotion/Fall Prevention:   assistive device/personal items within reach   clutter free environment maintained   fall prevention program maintained   nonskid shoes/slippers when out of bed   safety round/check completed  Goal: Optimal Comfort and Wellbeing  Outcome: Ongoing, Progressing  Intervention: Provide Person-Centered Care  Flowsheets (Taken 10/9/2020 1100 by Robert Denise)  Trust Relationship/Rapport:   care explained   thoughts/feelings acknowledged  Goal: Readiness for Transition of Care  Outcome: Ongoing, Progressing  Intervention: Mutually Develop Transition Plan  Flowsheets  Taken 10/9/2020 1447 by Naegele, Carol, RN  Equipment Currently Used at Home:   cane, straight   walker, standard  Concerns to be Addressed: discharge planning  Readmission Within the Last 30 Days: no previous admission in last 30 days  Patient/Family Anticipates Transition to: home with family  Taken 10/8/2020 1656 by Nicol Ignacio, RN  Transportation Anticipated: family or friend will provide  Patient/Family Anticipated Services at Transition:      Problem: Fall Injury Risk  Goal: Absence of Fall and Fall-Related Injury  Outcome: Ongoing, Progressing  Intervention: Identify and Manage Contributors to Fall Injury  Risk  Flowsheets (Taken 10/9/2020 1502)  Medication Review/Management:   medications reviewed   provider consulted  Self-Care Promotion: independence encouraged  Intervention: Promote Injury-Free Environment  Flowsheets (Taken 10/9/2020 0600 by Jeaneth Boateng, PCT)  Safety Promotion/Fall Prevention:   assistive device/personal items within reach   clutter free environment maintained   fall prevention program maintained   nonskid shoes/slippers when out of bed   safety round/check completed     Problem: Asthma Comorbidity  Goal: Maintenance of Asthma Control  Outcome: Ongoing, Progressing  Intervention: Maintain Asthma Symptom Control  Flowsheets (Taken 10/9/2020 1502)  Medication Review/Management:   medications reviewed   provider consulted     Problem: COPD Comorbidity  Goal: Maintenance of COPD Symptom Control  Outcome: Ongoing, Progressing  Intervention: Maintain COPD-Symptom Control  Flowsheets (Taken 10/9/2020 1502)  Medication Review/Management:   medications reviewed   provider consulted     Problem: Diabetes Comorbidity  Goal: Blood Glucose Level Within Desired Range  Outcome: Ongoing, Progressing  Intervention: Maintain Glycemic Control  Flowsheets (Taken 10/9/2020 1502)  Glycemic Management: oral hydration promoted     Problem: Heart Failure Comorbidity  Goal: Maintenance of Heart Failure Symptom Control  Outcome: Ongoing, Progressing  Intervention: Maintain Heart Failure-Management Strategies  Flowsheets (Taken 10/9/2020 1502)  Medication Review/Management:   medications reviewed   provider consulted     Problem: Hypertension Comorbidity  Goal: Blood Pressure in Desired Range  Outcome: Ongoing, Progressing  Intervention: Maintain Hypertension-Management Strategies  Flowsheets (Taken 10/9/2020 1502)  Medication Review/Management:   medications reviewed   provider consulted     Problem: Obstructive Sleep Apnea Risk or Actual (Comorbidity Management)  Goal: Unobstructed Breathing During Sleep  Outcome:  Ongoing, Progressing     Problem: Pain Chronic (Persistent) (Comorbidity Management)  Goal: Acceptable Pain Control and Functional Ability  Outcome: Ongoing, Progressing  Intervention: Manage Persistent Pain  Recent Flowsheet Documentation  Taken 10/9/2020 1502 by Nilam Martinez RN  Medication Review/Management:   medications reviewed   provider consulted     Problem: Seizure Disorder Comorbidity  Goal: Maintenance of Seizure Control  Outcome: Ongoing, Progressing  Intervention: Maintain Seizure-Symptom Control  Flowsheets (Taken 10/9/2020 1502)  Seizure Precautions: (psyc. consult placed and ok to d/c sitter)   activity supervised   other (see comments)     Problem: Suicide Risk  Goal: Absence of Self-Harm  Outcome: Ongoing, Progressing  Intervention: Assess Risk to Self and Maintain Safety  Flowsheets (Taken 10/9/2020 1502)  Behavior Management: (sitter.  sitter d/c after psyc say pt.) other (see comments)     Problem: Skin Injury Risk Increased  Goal: Skin Health and Integrity  Outcome: Ongoing, Progressing  Intervention: Optimize Skin Protection  Flowsheets (Taken 10/9/2020 1502)  Pressure Reduction Techniques: frequent weight shift encouraged  Head of Bed (HOB): HOB at 60-90 degrees   Goal Outcome Evaluation:  Plan of Care Reviewed With: patient  Progress: no change

## 2020-10-09 NOTE — SIGNIFICANT NOTE
"PT at first voiced that friends and family are not very supportive. \"I may call ten people and only hear from two.\" PT reports that he has been lonely and has limited social life due to lack of transportation. He hopes a brother soon will help him with transportation needs. He has certain family/friends bring food to the house when needed. PT reports that he cares for his sister who has special needs. Does not want her to have to go to a facility because he does not believe she will get the care she needs. PT reports that he is Zoroastrianism and is not currently supported by a local Orthodox. PT said he would get back with me if he would like me to seek a Orthodox for della and social support.        10/09/20 1100   Coping/Psychosocial   Observed Emotional State cooperative;agitated   Verbalized Emotional State loneliness   Trust Relationship/Rapport care explained;thoughts/feelings acknowledged   Family/Support Persons family;friend   Involvement in Care not present at bedside   Additional Documentation Spiritual Care (Group)   Spiritual Care   Spiritual Care Visit Type initial   Spiritual Care Source patient request (describe)   Receptivity to Spiritual Care visit welcomed   Spiritual Care Request spiritual/moral support;coping/stress of illness support   Spiritual Care Interventions supportive conversation provided   Response to Spiritual Care receptive of support;engaged in conversation   Use of Spiritual Resources spiritual support, lacking support network;non-Caodaism use of spiritual care   Spiritual Care Follow-Up follow-up planned regularly for general support     "

## 2020-10-09 NOTE — NURSING NOTE
Pt seen today for LE edema.  He has hx of lymphedema, and does have lymphedema pumps at home that he states he can not use due to difficulty taking them off.      Today he has nonpitting edema to BLE more so in the left. Some area of erythema to BLE. And one small partial thickness ulceration to the loeft lower extremity measuring 1x1x0.1cm wound bed is pink/moist/clean with moderate serosang drainage. Wound was cleaned with ns, maxsorb and silicone foam dressing placed. Rec. Change q  Days.    BLE pedal pulse not palpable through edema, bilat feet warm, cap refill wnl.  No shortness of air reported by patient.     Kerlix was placed to BLE and setopress compression applied. Discussed with pt that he would likely benefit from outpatient lymphedema treatment and will need to use his lymphedema pumps or problem with cellulitis / decreased mobitly will likely continue.     Recommend take down and reapply setopress compression daily ( see orders)

## 2020-10-09 NOTE — PROGRESS NOTES
"     LOS: 1 day   Patient Care Team:  Alfredo Torres MD as PCP - General  Alfredo Torres MD as PCP - Family Medicine  Kenney Reyez MD as Consulting Physician (Cardiology)    Subjective     Interval History: Robert Randhawa presents for evaluation of Edema, Skin Lesion for the past several years. Per the patient \"he  states \"\"I am here today for the pain and redness and sever swelling in my left leg.\".\" Patient is following up on  the following: Pain, Edema, Skin Lesion, Weight gain, Weakness In the patient's own words, \"The patient is a  very pleasant 55-year-old gentleman with a very elevated BMI and a very sedentary lifestyle he has history of  chronic left lower extremity DVT, bilateral lower extremity edema, venous hypertension both lower extremities  with chronic stasis dermatitis both lower extremities left worse than right. He was seen in our office a few  weeks ago. He had some swelling in his left lower extremity. He was treated he was advised to elevate his left  lower extremity while at home. He is coming here today complaining of pain in left lower extremity, swelling,  redness and severe itching and he has some sores on his leg that is becoming red and seeping fluid. His  weight on 9/21/2020 was 350 pounds and today his weight is 357 pounds despite he has been taking some  diuretics. He says that he is feeling feverish. He also complaining of feeling very depressed because of the  COVID-19. He says that he is confined to home he cannot drive. He has to depend on others to get him around  specially for follow-up appointments and wound care. He says that he is severely depressed..\"    Patient Complaints: Still complaining of redness heat itching and swelling lower extremity.    History taken from: patient    Review of Systems   Constitutional: Positive for unexpected weight change.   Respiratory: Positive for shortness of breath.    Endocrine: Negative.    Genitourinary: Positive for frequency.   " "  Musculoskeletal: Positive for arthralgias and joint swelling.   Skin: Positive for rash and wound.   Allergic/Immunologic: Negative.    Neurological: Positive for numbness.   Hematological: Negative.    Psychiatric/Behavioral: Positive for dysphoric mood.           Objective     Vital Signs  /74   Pulse 71   Temp 98.1 °F (36.7 °C)   Resp 18   Ht 185.4 cm (73\")   Wt (!) 159 kg (351 lb 6.4 oz)   SpO2 93%   BMI 46.36 kg/m²       Physical Exam:     General Appearance:    Alert, cooperative, in no acute distress   Head:    Normocephalic, without obvious abnormality, atraumatic   Eyes:            Lids and lashes normal, conjunctivae and sclerae normal, no   icterus, no pallor, corneas clear, PERRLA   Ears:    Ears appear intact with no abnormalities noted   Throat:   No oral lesions, no thrush, oral mucosa moist   Neck:   No adenopathy, supple, trachea midline, no thyromegaly, no   carotid bruit, no JVD   Lungs:     Clear to auscultation,respirations regular, even and                  unlabored    Heart:    Regular rhythm and normal rate, normal S1 and S2, no            murmur, no gallop, no rub, no click   Chest Wall:    No abnormalities observed   Abdomen:     Normal bowel sounds, no masses, no organomegaly, soft        non-tender, non-distended, no guarding, no rebound                tenderness   Extremities:   Moves all extremities well, The patient has chronic stasis dermatitis both lower extremities left more than right. He has a scratches and xcoriations on the left lower extremity below the knee. There is quite a bit of redness in the left lower extremity  skin.   Pulses:   Pulses palpable and equal bilaterally   Skin:   No bleeding, bruising or rash   Lymph nodes:   No palpable adenopathy   Neurologic:   Cranial nerves 2 - 12 grossly intact, sensation intact, DTR       present and equal bilaterally        Results Review:    Lab Results (last 24 hours)     Procedure Component Value Units Date/Time "    Hemoglobin A1c [[id number]]  (Abnormal) Collected: 10/08/20 1755    Specimen: Blood Updated: 10/09/20 0728     Hemoglobin A1C 5.7 %     Narrative:      Hemoglobin A1C Reference Range:    <5.7 %        Normal  5.7-6.4 %     Increased risk for diabetes  > 6.4 %        Diabetes       These guidelines have been recommended by the American Diabetic Association for Hgb A1c.      The following 2010 guidelines have been recommended by the American Diabetes Association for Hemoglobin A1c.    HBA1c 5.7-6.4% Increased risk for future diabetes (pre-diabetes)  HBA1c     >6.4% Diabetes      Lactic Acid, Reflex [[id number]]  (Normal) Collected: 10/08/20 2223    Specimen: Blood Updated: 10/08/20 2252     Lactate 1.1 mmol/L     Lactic Acid, Reflex Timer (This will reflex a repeat order 3-3:15 hours after ordered.) [[id number]] Collected: 10/08/20 1755    Specimen: Blood Updated: 10/08/20 2145     Hold Tube Hold for add-ons.     Comment: Auto resulted.       BUN [[id number]]  (Normal) Collected: 10/08/20 1755    Specimen: Blood Updated: 10/08/20 1944     BUN 12 mg/dL     Comprehensive Metabolic Panel [[id number]]  (Abnormal) Collected: 10/08/20 1755    Specimen: Blood Updated: 10/08/20 1856     Glucose 125 mg/dL      BUN --     Comment: Testing performed by alternate method        Creatinine 0.83 mg/dL      Sodium 139 mmol/L      Potassium 4.5 mmol/L      Comment: Slight hemolysis detected by analyzer. Results may be affected.        Chloride 100 mmol/L      CO2 26.0 mmol/L      Calcium 9.4 mg/dL      Total Protein 8.1 g/dL      Albumin 3.60 g/dL      ALT (SGPT) 34 U/L      AST (SGOT) 63 U/L      Comment: Slight hemolysis detected by analyzer. Results may be affected.        Alkaline Phosphatase 137 U/L      Total Bilirubin 0.4 mg/dL      eGFR Non African Amer 96 mL/min/1.73      Globulin 4.5 gm/dL      A/G Ratio 0.8 g/dL      BUN/Creatinine Ratio --     Comment: Testing not performed        Anion Gap 13.0 mmol/L     Narrative:       GFR Normal >60  Chronic Kidney Disease <60  Kidney Failure <15      BNP [[id number]]  (Normal) Collected: 10/08/20 1755    Specimen: Blood Updated: 10/08/20 1846     proBNP 38.1 pg/mL     Narrative:      Among patients with dyspnea, NT-proBNP is highly sensitive for the detection of acute congestive heart failure. In addition NT-proBNP of <300 pg/ml effectively rules out acute congestive heart failure with 99% negative predictive value.    Results may be falsely decreased if patient taking Biotin.      Lactic Acid, Plasma [[id number]]  (Abnormal) Collected: 10/08/20 1755    Specimen: Blood Updated: 10/08/20 1841     Lactate 2.4 mmol/L     D-dimer, Quantitative [[id number]]  (Abnormal) Collected: 10/08/20 1755    Specimen: Blood Updated: 10/08/20 1821     D-Dimer, Quantitative 0.89 mg/L (FEU)     Narrative:      Reference Range  --------------------------------------------------------------------     < 0.50   Negative Predictive Value  0.50-0.59   Indeterminate    >= 0.60   Probable VTE             A very low percentage of patients with DVT may yield D-Dimer results   below the cut-off of 0.50 mg/L FEU.  This is known to be more   prevalent in patients with distal DVT.             Results of this test should always be interpreted in conjunction with   the patient's medical history, clinical presentation and other   findings.  Clinical diagnosis should not be based on the result of   [place] D-Dimer alone.    CBC Auto Differential [[id number]]  (Abnormal) Collected: 10/08/20 1755    Specimen: Blood Updated: 10/08/20 1805     WBC 5.40 10*3/mm3      RBC 4.45 10*6/mm3      Hemoglobin 14.8 g/dL      Hematocrit 44.5 %      .0 fL      MCH 33.3 pg      MCHC 33.3 g/dL      RDW 14.3 %      RDW-SD 50.8 fl      MPV 8.2 fL      Platelets 144 10*3/mm3      Neutrophil % 71.1 %      Lymphocyte % 19.0 %      Monocyte % 7.1 %      Eosinophil % 1.8 %      Basophil % 1.0 %      Neutrophils, Absolute 3.80 10*3/mm3      Lymphocytes,  Absolute 1.00 10*3/mm3      Monocytes, Absolute 0.40 10*3/mm3      Eosinophils, Absolute 0.10 10*3/mm3      Basophils, Absolute 0.10 10*3/mm3      nRBC 0.1 /100 WBC            ECG/EMG Results (last 24 hours)     ** No results found for the last 24 hours. **          Imaging Results (Last 24 Hours)     ** No results found for the last 24 hours. **               I reviewed the patient's new clinical results.    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  Will have dressings x2.  Labs are pending.  He will have Rocephin continued 1 g each 24 hours.  We will ask dietitian to consult.      Plan for disposition: Discharge planning consult,  consult    Alfredo Torres MD  10/09/20  17:42 EDT

## 2020-10-09 NOTE — PLAN OF CARE
Goal Outcome Evaluation:  Plan of Care Reviewed With: patient  Progress: no change  Outcome Summary: Pt has a flat affect and has a sitter at bedside. Pt has not mentioned anymore about wanting to hurt himself. Pt complained of moderate pain in his legs overnight.

## 2020-10-10 LAB
ALBUMIN SERPL-MCNC: 3.1 G/DL (ref 3.5–5.2)
ALBUMIN/GLOB SERPL: 0.8 G/DL
ALP SERPL-CCNC: 103 U/L (ref 39–117)
ALT SERPL W P-5'-P-CCNC: 31 U/L (ref 1–41)
ANION GAP SERPL CALCULATED.3IONS-SCNC: 7 MMOL/L (ref 5–15)
AST SERPL-CCNC: 58 U/L (ref 1–40)
BASOPHILS # BLD AUTO: 0 10*3/MM3 (ref 0–0.2)
BASOPHILS NFR BLD AUTO: 0.8 % (ref 0–1.5)
BILIRUB SERPL-MCNC: 0.7 MG/DL (ref 0–1.2)
BUN SERPL-MCNC: 13 MG/DL (ref 6–20)
BUN SERPL-MCNC: ABNORMAL MG/DL
BUN/CREAT SERPL: ABNORMAL
CALCIUM SPEC-SCNC: 8.6 MG/DL (ref 8.6–10.5)
CHLORIDE SERPL-SCNC: 103 MMOL/L (ref 98–107)
CO2 SERPL-SCNC: 26 MMOL/L (ref 22–29)
CREAT SERPL-MCNC: 0.8 MG/DL (ref 0.76–1.27)
DEPRECATED RDW RBC AUTO: 49.4 FL (ref 37–54)
EOSINOPHIL # BLD AUTO: 0.1 10*3/MM3 (ref 0–0.4)
EOSINOPHIL NFR BLD AUTO: 1.4 % (ref 0.3–6.2)
ERYTHROCYTE [DISTWIDTH] IN BLOOD BY AUTOMATED COUNT: 14.1 % (ref 12.3–15.4)
GFR SERPL CREATININE-BSD FRML MDRD: 100 ML/MIN/1.73
GLOBULIN UR ELPH-MCNC: 4 GM/DL
GLUCOSE SERPL-MCNC: 110 MG/DL (ref 65–99)
HCT VFR BLD AUTO: 40.1 % (ref 37.5–51)
HGB BLD-MCNC: 13.5 G/DL (ref 13–17.7)
LYMPHOCYTES # BLD AUTO: 0.9 10*3/MM3 (ref 0.7–3.1)
LYMPHOCYTES NFR BLD AUTO: 20.9 % (ref 19.6–45.3)
MCH RBC QN AUTO: 33.7 PG (ref 26.6–33)
MCHC RBC AUTO-ENTMCNC: 33.7 G/DL (ref 31.5–35.7)
MCV RBC AUTO: 100 FL (ref 79–97)
MONOCYTES # BLD AUTO: 0.3 10*3/MM3 (ref 0.1–0.9)
MONOCYTES NFR BLD AUTO: 7.9 % (ref 5–12)
NEUTROPHILS NFR BLD AUTO: 2.9 10*3/MM3 (ref 1.7–7)
NEUTROPHILS NFR BLD AUTO: 69 % (ref 42.7–76)
NRBC BLD AUTO-RTO: 0.2 /100 WBC (ref 0–0.2)
PLATELET # BLD AUTO: 108 10*3/MM3 (ref 140–450)
PMV BLD AUTO: 8.8 FL (ref 6–12)
POTASSIUM SERPL-SCNC: 4.1 MMOL/L (ref 3.5–5.2)
PROT SERPL-MCNC: 7.1 G/DL (ref 6–8.5)
RBC # BLD AUTO: 4.01 10*6/MM3 (ref 4.14–5.8)
SODIUM SERPL-SCNC: 136 MMOL/L (ref 136–145)
WBC # BLD AUTO: 4.3 10*3/MM3 (ref 3.4–10.8)

## 2020-10-10 PROCEDURE — 96366 THER/PROPH/DIAG IV INF ADDON: CPT

## 2020-10-10 PROCEDURE — 96372 THER/PROPH/DIAG INJ SC/IM: CPT

## 2020-10-10 PROCEDURE — 85025 COMPLETE CBC W/AUTO DIFF WBC: CPT | Performed by: FAMILY MEDICINE

## 2020-10-10 PROCEDURE — 80053 COMPREHEN METABOLIC PANEL: CPT | Performed by: FAMILY MEDICINE

## 2020-10-10 PROCEDURE — 25010000002 ENOXAPARIN PER 10 MG: Performed by: FAMILY MEDICINE

## 2020-10-10 PROCEDURE — G0378 HOSPITAL OBSERVATION PER HR: HCPCS

## 2020-10-10 PROCEDURE — 25010000002 CEFTRIAXONE PER 250 MG: Performed by: FAMILY MEDICINE

## 2020-10-10 RX ADMIN — ENOXAPARIN SODIUM 40 MG: 40 INJECTION SUBCUTANEOUS at 20:09

## 2020-10-10 RX ADMIN — Medication 10 ML: at 10:17

## 2020-10-10 RX ADMIN — SERTRALINE HYDROCHLORIDE 25 MG: 50 TABLET ORAL at 10:16

## 2020-10-10 RX ADMIN — ENOXAPARIN SODIUM 40 MG: 40 INJECTION SUBCUTANEOUS at 10:16

## 2020-10-10 RX ADMIN — PANTOPRAZOLE SODIUM 40 MG: 40 TABLET, DELAYED RELEASE ORAL at 05:28

## 2020-10-10 RX ADMIN — CEFTRIAXONE SODIUM 1 G: 1 INJECTION, POWDER, FOR SOLUTION INTRAMUSCULAR; INTRAVENOUS at 20:09

## 2020-10-10 RX ADMIN — Medication 10 ML: at 20:09

## 2020-10-10 NOTE — PLAN OF CARE
Goal Outcome Evaluation:  Plan of Care Reviewed With: patient  Progress: no change  Outcome Summary: Pt has flat affect, he has been removed from suicide precautions, pt remains a falls risk, wound care dressed his wounds and ordered qd.  Will cotninue with current orders, care plans and monitoring.

## 2020-10-10 NOTE — PLAN OF CARE
Continue to monitor pt and assess pain.  Will redress his wounds.    Problem: Adult Inpatient Plan of Care  Goal: Plan of Care Review  Outcome: Ongoing, Progressing  Flowsheets (Taken 10/10/2020 0256 by Margo Aj LPN)  Progress: no change  Plan of Care Reviewed With: patient  Outcome Summary: Pt has flat affect, he has been removed from suicide precautions, pt remains a falls risk, wound care dressed his wounds and ordered qd.  Will cotninue with current orders, care plans and monitoring.  Goal: Patient-Specific Goal (Individualized)  Outcome: Ongoing, Progressing  Goal: Absence of Hospital-Acquired Illness or Injury  Outcome: Ongoing, Progressing  Intervention: Identify and Manage Fall Risk  Flowsheets  Taken 10/10/2020 1000 by Luis Freire  Safety Promotion/Fall Prevention: safety round/check completed  Taken 10/10/2020 0728 by Nilam Martinez RN  Safety Promotion/Fall Prevention:   assistive device/personal items within reach   clutter free environment maintained   fall prevention program maintained  Intervention: Prevent Infection  Recent Flowsheet Documentation  Taken 10/10/2020 0728 by Nilam Martinez RN  Infection Prevention: personal protective equipment utilized  Goal: Optimal Comfort and Wellbeing  Outcome: Ongoing, Progressing  Intervention: Provide Person-Centered Care  Flowsheets (Taken 10/9/2020 1947 by Margo Aj LPN)  Trust Relationship/Rapport:   care explained   questions answered   questions encouraged  Goal: Readiness for Transition of Care  Outcome: Ongoing, Progressing  Intervention: Mutually Develop Transition Plan  Flowsheets  Taken 10/9/2020 1447 by Naegele, Carol, RN  Equipment Currently Used at Home:   cane, straight   walker, standard  Concerns to be Addressed: discharge planning  Readmission Within the Last 30 Days: no previous admission in last 30 days  Patient/Family Anticipates Transition to: home with family  Taken 10/8/2020 1656 by Nicol Ignacio,  RN  Transportation Anticipated: family or friend will provide  Patient/Family Anticipated Services at Transition:      Problem: Fall Injury Risk  Goal: Absence of Fall and Fall-Related Injury  Outcome: Ongoing, Progressing  Intervention: Identify and Manage Contributors to Fall Injury Risk  Flowsheets  Taken 10/10/2020 0728  Medication Review/Management: medications reviewed  Taken 10/9/2020 1502  Self-Care Promotion: independence encouraged  Intervention: Promote Injury-Free Environment  Recent Flowsheet Documentation  Taken 10/10/2020 0728 by Nilam Martinez RN  Safety Promotion/Fall Prevention:   assistive device/personal items within reach   clutter free environment maintained   fall prevention program maintained     Problem: Asthma Comorbidity  Goal: Maintenance of Asthma Control  Outcome: Ongoing, Progressing  Intervention: Maintain Asthma Symptom Control  Flowsheets (Taken 10/10/2020 0728)  Medication Review/Management: medications reviewed     Problem: COPD Comorbidity  Goal: Maintenance of COPD Symptom Control  Outcome: Ongoing, Progressing  Intervention: Maintain COPD-Symptom Control  Flowsheets (Taken 10/10/2020 0728)  Medication Review/Management: medications reviewed     Problem: Diabetes Comorbidity  Goal: Blood Glucose Level Within Desired Range  Outcome: Ongoing, Progressing  Intervention: Maintain Glycemic Control  Flowsheets (Taken 10/9/2020 1502)  Glycemic Management: oral hydration promoted     Problem: Heart Failure Comorbidity  Goal: Maintenance of Heart Failure Symptom Control  Outcome: Ongoing, Progressing  Intervention: Maintain Heart Failure-Management Strategies  Flowsheets (Taken 10/10/2020 0728)  Medication Review/Management: medications reviewed     Problem: Hypertension Comorbidity  Goal: Blood Pressure in Desired Range  Outcome: Ongoing, Progressing  Intervention: Maintain Hypertension-Management Strategies  Flowsheets (Taken 10/10/2020 0728)  Medication  Review/Management: medications reviewed     Problem: Obstructive Sleep Apnea Risk or Actual (Comorbidity Management)  Goal: Unobstructed Breathing During Sleep  Outcome: Ongoing, Progressing     Problem: Pain Chronic (Persistent) (Comorbidity Management)  Goal: Acceptable Pain Control and Functional Ability  Outcome: Ongoing, Progressing  Intervention: Develop Pain Management Plan  Flowsheets (Taken 10/10/2020 1353)  Pain Management Interventions:   awakened for pain meds per patient request   pillow support provided  Intervention: Manage Persistent Pain  Flowsheets (Taken 10/10/2020 0728)  Medication Review/Management: medications reviewed  Intervention: Optimize Psychosocial Wellbeing  Flowsheets (Taken 10/9/2020 1947 by Margo Aj LPN)  Diversional Activities:   television   smartphone  Family/Support System Care:   self-care encouraged   support provided     Problem: Seizure Disorder Comorbidity  Goal: Maintenance of Seizure Control  Outcome: Ongoing, Progressing  Intervention: Maintain Seizure-Symptom Control  Flowsheets (Taken 10/10/2020 0728)  Seizure Precautions: clutter-free environment maintained     Problem: Suicide Risk  Goal: Absence of Self-Harm  Outcome: Ongoing, Progressing  Intervention: Assess Risk to Self and Maintain Safety  Flowsheets  Taken 10/9/2020 2300 by Margo Aj LPN  Self-Harm Prevention: environmental self-harm risks assessed  Taken 10/9/2020 1502 by Nilam Martinez RN  Behavior Management: (sitter.  sitter d/c after psyc say pt.) other (see comments)     Problem: Skin Injury Risk Increased  Goal: Skin Health and Integrity  Outcome: Ongoing, Progressing  Intervention: Optimize Skin Protection  Flowsheets  Taken 10/10/2020 0500 by Margo Aj LPN  Head of Bed (HOB): HOB elevated  Taken 10/9/2020 1947 by Margo Aj LPN  Pressure Reduction Techniques: frequent weight shift encouraged  Intervention: Promote and Optimize Oral Intake  Flowsheets (Taken 10/10/2020  1353)  Oral Nutrition Promotion: physical activity promoted   Goal Outcome Evaluation:  Plan of Care Reviewed With: patient  Progress: no change

## 2020-10-10 NOTE — CONSULTS
Nutrition Services    Patient Name:  Robert Randhawa Jr.  YOB: 1965  MRN: [id number]  Admit Date:  10/8/2020    Progress note:  Education consult received.  Chart reviewed.    RD attempted to reach patient via room telephone without answer.  Diet education on a Low Sodium diet including sample meal plan mailed to patient's home address.  RD contact information included for any follow up questions.    RD to follow per protocol.      Electronically signed by:  Wendi Reyes RD  10/10/20 15:58 EDT

## 2020-10-11 PROCEDURE — 25010000002 CEFTRIAXONE PER 250 MG: Performed by: FAMILY MEDICINE

## 2020-10-11 PROCEDURE — 96372 THER/PROPH/DIAG INJ SC/IM: CPT

## 2020-10-11 PROCEDURE — 25010000002 ENOXAPARIN PER 10 MG: Performed by: FAMILY MEDICINE

## 2020-10-11 PROCEDURE — G0378 HOSPITAL OBSERVATION PER HR: HCPCS

## 2020-10-11 PROCEDURE — 96366 THER/PROPH/DIAG IV INF ADDON: CPT

## 2020-10-11 RX ORDER — SPIRONOLACTONE 25 MG/1
25 TABLET ORAL 2 TIMES DAILY
Qty: 180 TABLET | Refills: 2 | Status: SHIPPED | OUTPATIENT
Start: 2020-10-11 | End: 2021-05-24

## 2020-10-11 RX ORDER — SERTRALINE HYDROCHLORIDE 25 MG/1
25 TABLET, FILM COATED ORAL DAILY
Qty: 30 TABLET | Refills: 2 | Status: SHIPPED | OUTPATIENT
Start: 2020-10-12 | End: 2020-12-15

## 2020-10-11 RX ORDER — FUROSEMIDE 20 MG/1
20 TABLET ORAL 2 TIMES DAILY
Qty: 180 TABLET | Refills: 2 | Status: SHIPPED | OUTPATIENT
Start: 2020-10-11 | End: 2021-05-24

## 2020-10-11 RX ORDER — CEPHALEXIN 500 MG/1
500 CAPSULE ORAL 4 TIMES DAILY
Qty: 40 CAPSULE | Refills: 0 | Status: SHIPPED | OUTPATIENT
Start: 2020-10-11 | End: 2020-10-21

## 2020-10-11 RX ADMIN — PANTOPRAZOLE SODIUM 40 MG: 40 TABLET, DELAYED RELEASE ORAL at 05:49

## 2020-10-11 RX ADMIN — ENOXAPARIN SODIUM 40 MG: 40 INJECTION SUBCUTANEOUS at 21:07

## 2020-10-11 RX ADMIN — ENOXAPARIN SODIUM 40 MG: 40 INJECTION SUBCUTANEOUS at 09:10

## 2020-10-11 RX ADMIN — Medication 10 ML: at 09:15

## 2020-10-11 RX ADMIN — SERTRALINE HYDROCHLORIDE 25 MG: 50 TABLET ORAL at 09:11

## 2020-10-11 RX ADMIN — Medication 10 ML: at 21:07

## 2020-10-11 RX ADMIN — CEFTRIAXONE SODIUM 1 G: 1 INJECTION, POWDER, FOR SOLUTION INTRAMUSCULAR; INTRAVENOUS at 21:06

## 2020-10-11 NOTE — PLAN OF CARE
Goal Outcome Evaluation:  Plan of Care Reviewed With: patient  Progress: no change  Outcome Summary: No c/o pain or discomfort. Pt remains on ABT for tx, will continue with currrent orders, care plans and monitoring.

## 2020-10-11 NOTE — NURSING NOTE
Called Dr. Torres about pt and pt's concerns.   Wanted to see if pt can have home health to help with dressing changes.  Contact  to let them know of the need for Home health and they are going to speak to him.

## 2020-10-11 NOTE — PLAN OF CARE
Continue to monitor and assess pain.  Will be changing dressing on pt this morning.  Problem: Adult Inpatient Plan of Care  Goal: Plan of Care Review  Outcome: Ongoing, Progressing  Flowsheets  Taken 10/11/2020 0344 by Margo Aj LPN  Plan of Care Reviewed With: patient  Outcome Summary: No c/o pain or discomfort. Pt remains on ABT for tx, will continue with currrent orders, care plans and monitoring.  Taken 10/10/2020 0256 by Margo Aj LPN  Progress: no change  Goal: Patient-Specific Goal (Individualized)  Outcome: Ongoing, Progressing  Goal: Absence of Hospital-Acquired Illness or Injury  Outcome: Ongoing, Progressing  Intervention: Identify and Manage Fall Risk  Flowsheets (Taken 10/11/2020 0741)  Safety Promotion/Fall Prevention:   clutter free environment maintained   assistive device/personal items within reach   fall prevention program maintained  Intervention: Prevent Skin Injury  Flowsheets (Taken 10/11/2020 0500 by Margo Aj LPN)  Body Position: position changed independently  Intervention: Prevent Infection  Recent Flowsheet Documentation  Taken 10/11/2020 0741 by Nilam Martinez, RN  Infection Prevention:   personal protective equipment utilized   rest/sleep promoted  Goal: Optimal Comfort and Wellbeing  Outcome: Ongoing, Progressing  Intervention: Provide Person-Centered Care  Flowsheets (Taken 10/11/2020 0741)  Trust Relationship/Rapport:   care explained   empathic listening provided   reassurance provided   thoughts/feelings acknowledged   questions answered  Goal: Readiness for Transition of Care  Outcome: Ongoing, Progressing  Intervention: Mutually Develop Transition Plan  Flowsheets  Taken 10/9/2020 1447 by Naegele, Carol, RN  Equipment Currently Used at Home:   cane, straight   walker, standard  Concerns to be Addressed: discharge planning  Readmission Within the Last 30 Days: no previous admission in last 30 days  Patient/Family Anticipates Transition to: home with  family  Taken 10/8/2020 1656 by Nicol Ignacio, RN  Transportation Anticipated: family or friend will provide  Patient/Family Anticipated Services at Transition:      Problem: Fall Injury Risk  Goal: Absence of Fall and Fall-Related Injury  Outcome: Ongoing, Progressing  Intervention: Identify and Manage Contributors to Fall Injury Risk  Flowsheets  Taken 10/11/2020 0500 by Margo Aj LPN  Medication Review/Management: medications reviewed  Taken 10/9/2020 1502 by Nilam Martinez RN  Self-Care Promotion: independence encouraged  Intervention: Promote Injury-Free Environment  Recent Flowsheet Documentation  Taken 10/11/2020 0741 by Nilam Martinez RN  Safety Promotion/Fall Prevention:   clutter free environment maintained   assistive device/personal items within reach   fall prevention program maintained     Problem: Asthma Comorbidity  Goal: Maintenance of Asthma Control  Outcome: Ongoing, Progressing  Intervention: Maintain Asthma Symptom Control  Flowsheets (Taken 10/11/2020 0500 by Margo Aj LPN)  Medication Review/Management: medications reviewed     Problem: COPD Comorbidity  Goal: Maintenance of COPD Symptom Control  Outcome: Ongoing, Progressing  Intervention: Maintain COPD-Symptom Control  Flowsheets (Taken 10/11/2020 0500 by Margo Aj LPN)  Medication Review/Management: medications reviewed     Problem: Diabetes Comorbidity  Goal: Blood Glucose Level Within Desired Range  Outcome: Ongoing, Progressing  Intervention: Maintain Glycemic Control  Flowsheets (Taken 10/10/2020 2002 by Margo Aj LPN)  Glycemic Management: oral hydration promoted     Problem: Heart Failure Comorbidity  Goal: Maintenance of Heart Failure Symptom Control  Outcome: Ongoing, Progressing  Intervention: Maintain Heart Failure-Management Strategies  Flowsheets (Taken 10/11/2020 0500 by Margo Aj LPN)  Medication Review/Management: medications reviewed     Problem: Hypertension  Comorbidity  Goal: Blood Pressure in Desired Range  Outcome: Ongoing, Progressing  Intervention: Maintain Hypertension-Management Strategies  Flowsheets (Taken 10/11/2020 0500 by Margo Aj LPN)  Medication Review/Management: medications reviewed     Problem: Obstructive Sleep Apnea Risk or Actual (Comorbidity Management)  Goal: Unobstructed Breathing During Sleep  Outcome: Ongoing, Progressing     Problem: Pain Chronic (Persistent) (Comorbidity Management)  Goal: Acceptable Pain Control and Functional Ability  Outcome: Ongoing, Progressing  Intervention: Develop Pain Management Plan  Flowsheets (Taken 10/10/2020 1353)  Pain Management Interventions:   awakened for pain meds per patient request   pillow support provided     Problem: Seizure Disorder Comorbidity  Goal: Maintenance of Seizure Control  Outcome: Ongoing, Progressing  Intervention: Maintain Seizure-Symptom Control  Flowsheets (Taken 10/11/2020 0741)  Seizure Precautions: clutter-free environment maintained     Problem: Suicide Risk  Goal: Absence of Self-Harm  Outcome: Ongoing, Progressing  Intervention: Assess Risk to Self and Maintain Safety  Flowsheets  Taken 10/9/2020 2300 by Margo Aj LPN  Self-Harm Prevention: environmental self-harm risks assessed  Taken 10/9/2020 1502 by Nilam Martinez RN  Behavior Management: (sitter.  sitter d/c after psyc say pt.) other (see comments)  Intervention: Promote Psychosocial Wellbeing  Flowsheets (Taken 10/10/2020 2002 by Margo Aj LPN)  Family/Support System Care:   self-care encouraged   support provided     Problem: Skin Injury Risk Increased  Goal: Skin Health and Integrity  Outcome: Ongoing, Progressing  Intervention: Optimize Skin Protection  Flowsheets  Taken 10/11/2020 0741 by Nilam Martinez RN  Pressure Reduction Techniques:   frequent weight shift encouraged   rest period provided between sit times  Skin Protection: skin-to-skin areas padded  Taken 10/11/2020 0500 by Jean Marie  RITESH Aragon  Head of Bed (HOB): [facility] elevated   Goal Outcome Evaluation:  Plan of Care Reviewed With: patient  Progress: no change

## 2020-10-11 NOTE — PROGRESS NOTES
"     LOS: 1 day   Patient Care Team:  Alfredo Torres MD as PCP - General  Alfredo Torres MD as PCP - Family Medicine  Kenney Reyez MD as Consulting Physician (Cardiology)    Subjective     Interval History: Robert Randhawa presents for evaluation of Edema, Skin Lesion for the past several years. Per the patient \"he  states \"\"I am here today for the pain and redness and sever swelling in my left leg.\".\" Patient is following up on  the following: Pain, Edema, Skin Lesion, Weight gain, Weakness In the patient's own words, \"The patient is a  very pleasant 55-year-old gentleman with a very elevated BMI and a very sedentary lifestyle he has history of  chronic left lower extremity DVT, bilateral lower extremity edema, venous hypertension both lower extremities  with chronic stasis dermatitis both lower extremities left worse than right. He was seen in our office a few  weeks ago. He had some swelling in his left lower extremity. He was treated he was advised to elevate his left  lower extremity while at home. He is coming here today complaining of pain in left lower extremity, swelling,  redness and severe itching and he has some sores on his leg that is becoming red and seeping fluid. His  weight on 9/21/2020 was 350 pounds and today his weight is 357 pounds despite he has been taking some  diuretics. He says that he is feeling feverish. He also complaining of feeling very depressed because of the  COVID-19. He says that he is confined to home he cannot drive. He has to depend on others to get him around  specially for follow-up appointments and wound care. He says that he is severely depressed..\"    Patient Complaints: Still complaining of redness heat itching and swelling lower extremity.    History taken from: patient    Review of Systems   Constitutional: Positive for unexpected weight change.   Respiratory: Positive for shortness of breath.    Endocrine: Negative.    Genitourinary: Positive for frequency. " "  Musculoskeletal: Positive for arthralgias and joint swelling.   Skin: Positive for rash and wound.   Allergic/Immunologic: Negative.    Neurological: Positive for numbness.   Hematological: Negative.    Psychiatric/Behavioral: Positive for dysphoric mood.           Objective     Vital Signs  /78 (BP Location: Right arm, Patient Position: Lying)   Pulse 63   Temp 98.1 °F (36.7 °C) (Oral)   Resp 18   Ht 185.4 cm (73\")   Wt (!) 158 kg (347 lb 14.2 oz)   SpO2 94%   BMI 45.90 kg/m²       Physical Exam:     General Appearance:    Alert, cooperative, in no acute distress   Head:    Normocephalic, without obvious abnormality, atraumatic   Eyes:            Lids and lashes normal, conjunctivae and sclerae normal, no   icterus, no pallor, corneas clear, PERRLA   Ears:    Ears appear intact with no abnormalities noted   Throat:   No oral lesions, no thrush, oral mucosa moist   Neck:   No adenopathy, supple, trachea midline, no thyromegaly, no   carotid bruit, no JVD   Lungs:     Clear to auscultation,respirations regular, even and                  unlabored    Heart:    Regular rhythm and normal rate, normal S1 and S2, no            murmur, no gallop, no rub, no click   Chest Wall:    No abnormalities observed   Abdomen:     Normal bowel sounds, no masses, no organomegaly, soft        non-tender, non-distended, no guarding, no rebound                tenderness   Extremities:   Moves all extremities well, The patient has chronic stasis dermatitis both lower extremities left more than right. He has a scratches and xcoriations on the left lower extremity below the knee. There is quite a bit of redness in the left lower extremity  skin.   Pulses:   Pulses palpable and equal bilaterally   Skin:   No bleeding, bruising or rash   Lymph nodes:   No palpable adenopathy   Neurologic:   Cranial nerves 2 - 12 grossly intact, sensation intact, DTR       present and equal bilaterally        Results Review:    Lab Results (last " 24 hours)     ** No results found for the last 24 hours. **           ECG/EMG Results (last 24 hours)     ** No results found for the last 24 hours. **          Imaging Results (Last 24 Hours)     ** No results found for the last 24 hours. **               I reviewed the patient's new clinical results.    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  Will have dressings x2.  Labs are pending.  He will have Rocephin continued 1 g each 24 hours.  We will ask dietitian to consult.      Plan for disposition: Discharge planning consult,  consult    Alfredo Torres MD  10/10/20  06:10EDT

## 2020-10-11 NOTE — DISCHARGE PLACEMENT REQUEST
"Josemanuel Randhawa Jr. (55 y.o. Male)     Date of Birth Social Security Number Address Home Phone MRN    1965  [address] [facility] [place] [address] [phone] [id number]    [place] Marital Status          [employer] Single       Admission Date Admission Type Admitting Provider Attending Provider Department, Room/Bed    10/8/20 Urgent Alfredo Torres MD Mohammed, Dost, MD [address] MEDICAL INPATIENT, 381/1    Discharge Date Discharge Disposition Discharge Destination                       Attending Provider: Alfredo Torres MD    Allergies: Sulfa Antibiotics    Isolation: None   Infection: None   Code Status: CPR    Ht: 185.4 cm (73\")   Wt: 158 kg (347 lb 14.2 oz)    Admission Cmt: None   Principal Problem: None                Active Insurance as of 10/8/2020     Primary Coverage     Payor Plan Insurance Group Employer/Plan Group    HUMANA MEDICARE REPLACEMENT HUMANA MEDICARE REPLACEMENT [id number]     Payor Plan Address Payor Plan Phone Number Payor Plan Fax Number Effective Dates    [address] [address] [phone]  2019 - None Entered    [facility] [address]       Subscriber Name Subscriber Birth Date Member ID       JOSEMANUEL RANDHAWA Adolfo 1965 [id number]                 Emergency Contacts      (Rel.) Home Phone Work Phone Mobile Phone    Sajan Randhawa (Brother) -- -- [phone]            [address] MEDICAL INPATIENT  [address] [facility] [place] [address]  Phone:  [phone]  Fax:   Date: Oct 11, 2020      Ambulatory Referral to Home Health     Patient:  Josemanuel Randhawa Jr. MRN:  [id number]   [address] [facility] [place] [address] :  1965  SSN:    Phone: [phone] Sex:  M      INSURANCE PAYOR PLAN GROUP # SUBSCRIBER ID   Primary:    HU[employer] MEDICARE REPLACEMENT [id number]      Referring Provider Information:  ALFREDO TORRES Phone: [phone] Fax:       Referral Information:   # Visits:  1 " Referral Type: Home Health [42]   Urgency:  Routine Referral Reason: Specialty Services Required   Start Date: Oct 11, 2020 End Date:  To be determined by Insurer   Diagnosis: Cellulitis of left lower extremity without foot (L03.116 [ICD-10-CM] 682.6 [ICD-9-CM])  Lymphedema (I89.0 [ICD-10-CM] 457.1 [ICD-9-CM])      Refer to Dept:   Refer to Provider:   Refer to Facility:       Face to Face Visit Date: 10/11/2020  Follow-up provider for Plan of Care? I will be treating the patient on an ongoing basis.  Please send me the Plan of Care for signature.  Follow-up provider: LETA TORRES [[id number]]  Reason/Clinical Findings: post-hospital eval  Describe mobility limitations that make leaving home difficult: tires easily  Nursing/Therapeutic Services Requested: Skilled Nursing (HHC to eval)  Nursing/Therapeutic Services Requested: Other  Skilled nursing orders: Wound care dressing/changes  Frequency: 1 Week 1     This document serves as a request of services and does not constitute Insurance authorization or approval of services.  To determine eligibility, please contact the members Insurance carrier to verify and review coverage.     If you have medical questions regarding this request for services. Please contact [address] MEDICAL INPATIENT at [phone] during normal business hours.       Verbal Order Mode: Telephone with readback   Authorizing Provider: Leta Torres MD  Authorizing Provider's NPI: [id number]     Order Entered By: Simin Lott RN 10/11/2020  6:12 PM

## 2020-10-11 NOTE — PROGRESS NOTES
Discharge Planning Assessment   [facility]     Patient Name: Robert Randhawa Jr.  MRN: [id number]  Today's Date: 10/11/2020    Admit Date: 10/8/2020      Discharge Plan     Row Name 10/11/20 1816       Plan    Plan  DC plan: home with [facility] C pending acceptance.    Provided Post Acute Provider List?  Yes    Post Acute Provider List  Home Health    Delivered To  Patient    Method of Delivery  Telephone    Plan Comments  MD requesting [facility] for skilled nursing- dressing changes and wound management. Called pt in room and provided list via phone. Pt's #1 choice is VNA. Referral sent via Epic and called to Jeana [phone]. Will check benefits for HHC on 10/12/20. Order verified.        Continued Care and Services - Admitted Since 10/8/2020     Home Medical Care     Service Provider Request Status Selected Services Address Phone Fax    [facility]  [facility] - Request Sent N/A [address] [address] [phone]              Expected Discharge Date and Time     Expected Discharge Date Expected Discharge Time    Oct 11, 2020             Simin Lott RN

## 2020-10-11 NOTE — NURSING NOTE
Change dressing with Steropress compression tape.  Replaced the keflex tape and washed the legs with NS along with replacing the Steropress compression tape.

## 2020-10-12 VITALS
BODY MASS INDEX: 41.75 KG/M2 | RESPIRATION RATE: 20 BRPM | DIASTOLIC BLOOD PRESSURE: 73 MMHG | HEIGHT: 73 IN | HEART RATE: 63 BPM | WEIGHT: 315 LBS | OXYGEN SATURATION: 94 % | TEMPERATURE: 98.1 F | SYSTOLIC BLOOD PRESSURE: 110 MMHG

## 2020-10-12 PROCEDURE — 25010000002 ENOXAPARIN PER 10 MG: Performed by: FAMILY MEDICINE

## 2020-10-12 PROCEDURE — 96372 THER/PROPH/DIAG INJ SC/IM: CPT

## 2020-10-12 PROCEDURE — G0378 HOSPITAL OBSERVATION PER HR: HCPCS

## 2020-10-12 RX ADMIN — Medication 10 ML: at 08:27

## 2020-10-12 RX ADMIN — ENOXAPARIN SODIUM 40 MG: 40 INJECTION SUBCUTANEOUS at 08:27

## 2020-10-12 RX ADMIN — PANTOPRAZOLE SODIUM 40 MG: 40 TABLET, DELAYED RELEASE ORAL at 05:02

## 2020-10-12 RX ADMIN — SERTRALINE HYDROCHLORIDE 25 MG: 50 TABLET ORAL at 08:27

## 2020-10-12 NOTE — PROGRESS NOTES
Discharge Planning Assessment   [facility]     Patient Name: Robert Randhawa Jr.  MRN: [id number]  Today's Date: 10/12/2020    Admit Date: 10/8/2020          Plan    Plan  home with [facility]    Plan Comments  per lizz with [facility] they can accept patient with home health services       Carol naegele rn  Case management  Office number [phone]  Cell phone [phone]

## 2020-10-12 NOTE — PLAN OF CARE
Goal Outcome Evaluation:  Plan of Care Reviewed With: patient  Progress: improving  Outcome Summary: Pt slept well through the night, no c/o pain noted. Pt's D/C orders are in, waiting on acceptance with home health. Will continue to monitor.

## 2020-10-13 NOTE — PROGRESS NOTES
Discharge Planning Assessment   [facility]     Patient Name: Robert Randhawa Jr.  MRN: [id number]  Today's Date: 10/13/2020    Admit Date: 10/8/2020          Plan    Final Discharge Disposition Code  06 - home with home health care    Final Note  home with [facility]       Carol naegele rn  Case management  Office number [phone]  Cell phone [phone]

## 2020-10-20 NOTE — DISCHARGE SUMMARY
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          Hospital Course    Robert Randhawa presents for evaluation of Edema, Skin Lesion for the past several years. Per the patient \"he  states \"\"I am here today for the pain and redness and sever swelling in my left leg.\".\" Patient is following up on  the following: Pain, Edema, Skin Lesion, Weight gain, Weakness In the patient's own words, \"The patient is a  very pleasant 55-year-old gentleman with a very elevated BMI and a very sedentary lifestyle he has history of  chronic left lower extremity DVT, bilateral lower extremity edema, venous hypertension both lower extremities  with chronic stasis dermatitis both lower extremities left worse than right. He was seen in our office a few  weeks ago. He had some swelling in his left lower extremity. He was treated he was advised to elevate his left  lower extremity while at home. He is coming here today complaining of pain in left lower extremity, swelling,  redness and severe itching and he has some sores on his leg that is becoming red and seeping fluid. His  weight on 9/21/2020 was 350 pounds and today his weight is 357 pounds despite he has been taking some  diuretics. He says that he is feeling feverish. He also complaining of feeling very depressed because of the  COVID-19. He says that he is " "confined to home he cannot drive. He has to depend on others to get him around  specially for follow-up appointments and wound care. He says that he is severely depressed..\" The patient is very noncompliant he was admitted as a direct admit for cellulitis and severe edema.  He was started on IV furosemide and spironolactone mild.  He was a started on Rocephin.  He was advised to elevate lower extremities.  Nurses applied compression dressing twice daily.  Wound was dressed twice daily.  The patient was educated about elevation.  Home health nurse was arranged to go home and take care of him.  He was discharged to home.      Procedures Performed         Consults:   Consults     Date and Time Order Name Status Description    10/8/2020 1722 Inpatient Psychiatrist Consult Completed           Pertinent Test Results:    Lab Results (most recent)     Procedure Component Value Units Date/Time    BUN [[id number]]  (Normal) Collected: 10/10/20 0321    Specimen: Blood Updated: 10/10/20 0717     BUN 13 mg/dL     Comprehensive Metabolic Panel [[id number]]  (Abnormal) Collected: 10/10/20 0321    Specimen: Blood Updated: 10/10/20 0445     Glucose 110 mg/dL      BUN --     Comment: Testing performed by alternate method        Creatinine 0.80 mg/dL      Sodium 136 mmol/L      Potassium 4.1 mmol/L      Chloride 103 mmol/L      CO2 26.0 mmol/L      Calcium 8.6 mg/dL      Total Protein 7.1 g/dL      Albumin 3.10 g/dL      ALT (SGPT) 31 U/L      AST (SGOT) 58 U/L      Alkaline Phosphatase 103 U/L      Total Bilirubin 0.7 mg/dL      eGFR Non African Amer 100 mL/min/1.73      Globulin 4.0 gm/dL      A/G Ratio 0.8 g/dL      BUN/Creatinine Ratio --     Comment: Testing not performed        Anion Gap 7.0 mmol/L     Narrative:      GFR Normal >60  Chronic Kidney Disease <60  Kidney Failure <15      CBC & Differential [[id number]]  (Abnormal) Collected: 10/10/20 0321    Specimen: Blood Updated: 10/10/20 0409    Narrative:      The following " orders were created for panel order CBC & Differential.  Procedure                               Abnormality         Status                     ---------                               -----------         ------                     CBC Auto Differential[[id number]]        Abnormal            Final result                 Please view results for these tests on the individual orders.    CBC Auto Differential [[id number]]  (Abnormal) Collected: 10/10/20 0321    Specimen: Blood Updated: 10/10/20 0409     WBC 4.30 10*3/mm3      RBC 4.01 10*6/mm3      Hemoglobin 13.5 g/dL      Hematocrit 40.1 %      .0 fL      MCH 33.7 pg      MCHC 33.7 g/dL      RDW 14.1 %      RDW-SD 49.4 fl      MPV 8.8 fL      Platelets 108 10*3/mm3      Neutrophil % 69.0 %      Lymphocyte % 20.9 %      Monocyte % 7.9 %      Eosinophil % 1.4 %      Basophil % 0.8 %      Neutrophils, Absolute 2.90 10*3/mm3      Lymphocytes, Absolute 0.90 10*3/mm3      Monocytes, Absolute 0.30 10*3/mm3      Eosinophils, Absolute 0.10 10*3/mm3      Basophils, Absolute 0.00 10*3/mm3      nRBC 0.2 /100 WBC     BNP [[id number]]  (Normal) Collected: 10/09/20 1828    Specimen: Blood Updated: 10/09/20 1948     proBNP 39.5 pg/mL     Narrative:      Among patients with dyspnea, NT-proBNP is highly sensitive for the detection of acute congestive heart failure. In addition NT-proBNP of <300 pg/ml effectively rules out acute congestive heart failure with 99% negative predictive value.    Results may be falsely decreased if patient taking Biotin.      Hemoglobin A1c [[id number]]  (Abnormal) Collected: 10/08/20 1755    Specimen: Blood Updated: 10/09/20 0728     Hemoglobin A1C 5.7 %     Narrative:      Hemoglobin A1C Reference Range:    <5.7 %        Normal  5.7-6.4 %     Increased risk for diabetes  > 6.4 %        Diabetes       These guidelines have been recommended by the American Diabetic Association for Hgb A1c.      The following 2010 guidelines have been recommended by the  American Diabetes Association for Hemoglobin A1c.    HBA1c 5.7-6.4% Increased risk for future diabetes (pre-diabetes)  HBA1c     >6.4% Diabetes      Lactic Acid, Reflex [[id number]]  (Normal) Collected: 10/08/20 2223    Specimen: Blood Updated: 10/08/20 2252     Lactate 1.1 mmol/L     Lactic Acid, Reflex Timer (This will reflex a repeat order 3-3:15 hours after ordered.) [[id number]] Collected: 10/08/20 1755    Specimen: Blood Updated: 10/08/20 2145     Hold Tube Hold for add-ons.     Comment: Auto resulted.       BUN [[id number]]  (Normal) Collected: 10/08/20 1755    Specimen: Blood Updated: 10/08/20 1944     BUN 12 mg/dL     Comprehensive Metabolic Panel [[id number]]  (Abnormal) Collected: 10/08/20 1755    Specimen: Blood Updated: 10/08/20 1856     Glucose 125 mg/dL      BUN --     Comment: Testing performed by alternate method        Creatinine 0.83 mg/dL      Sodium 139 mmol/L      Potassium 4.5 mmol/L      Comment: Slight hemolysis detected by analyzer. Results may be affected.        Chloride 100 mmol/L      CO2 26.0 mmol/L      Calcium 9.4 mg/dL      Total Protein 8.1 g/dL      Albumin 3.60 g/dL      ALT (SGPT) 34 U/L      AST (SGOT) 63 U/L      Comment: Slight hemolysis detected by analyzer. Results may be affected.        Alkaline Phosphatase 137 U/L      Total Bilirubin 0.4 mg/dL      eGFR Non African Amer 96 mL/min/1.73      Globulin 4.5 gm/dL      A/G Ratio 0.8 g/dL      BUN/Creatinine Ratio --     Comment: Testing not performed        Anion Gap 13.0 mmol/L     Narrative:      GFR Normal >60  Chronic Kidney Disease <60  Kidney Failure <15      BNP [[id number]]  (Normal) Collected: 10/08/20 1755    Specimen: Blood Updated: 10/08/20 1846     proBNP 38.1 pg/mL     Narrative:      Among patients with dyspnea, NT-proBNP is highly sensitive for the detection of acute congestive heart failure. In addition NT-proBNP of <300 pg/ml effectively rules out acute congestive heart failure with 99% negative predictive  value.    Results may be falsely decreased if patient taking Biotin.      Lactic Acid, Plasma [[id number]]  (Abnormal) Collected: 10/08/20 1755    Specimen: Blood Updated: 10/08/20 1841     Lactate 2.4 mmol/L     D-dimer, Quantitative [[id number]]  (Abnormal) Collected: 10/08/20 1755    Specimen: Blood Updated: 10/08/20 1821     D-Dimer, Quantitative 0.89 mg/L (FEU)     Narrative:      Reference Range  --------------------------------------------------------------------     < 0.50   Negative Predictive Value  0.50-0.59   Indeterminate    >= 0.60   Probable VTE             A very low percentage of patients with DVT may yield D-Dimer results   below the cut-off of 0.50 mg/L FEU.  This is known to be more   prevalent in patients with distal DVT.             Results of this test should always be interpreted in conjunction with   the patient's medical history, clinical presentation and other   findings.  Clinical diagnosis should not be based on the result of   [place] D-Dimer alone.    CBC Auto Differential [[id number]]  (Abnormal) Collected: 10/08/20 1755    Specimen: Blood Updated: 10/08/20 1805     WBC 5.40 10*3/mm3      RBC 4.45 10*6/mm3      Hemoglobin 14.8 g/dL      Hematocrit 44.5 %      .0 fL      MCH 33.3 pg      MCHC 33.3 g/dL      RDW 14.3 %      RDW-SD 50.8 fl      MPV 8.2 fL      Platelets 144 10*3/mm3      Neutrophil % 71.1 %      Lymphocyte % 19.0 %      Monocyte % 7.1 %      Eosinophil % 1.8 %      Basophil % 1.0 %      Neutrophils, Absolute 3.80 10*3/mm3      Lymphocytes, Absolute 1.00 10*3/mm3      Monocytes, Absolute 0.40 10*3/mm3      Eosinophils, Absolute 0.10 10*3/mm3      Basophils, Absolute 0.10 10*3/mm3      nRBC 0.1 /100 WBC            Imaging Results (Last 72 Hours)     ** No results found for the last 72 hours. **            Results for orders placed during the hospital encounter of 01/08/20   Adult Transthoracic Echo Complete W/ Cont if Necessary Per Protocol    Narrative · Estimated  "EF = 60%.  · Left ventricular systolic function is normal.     Indications  Chest discomfort    Technically satisfactory study.  Mitral valve is structurally normal.  Tricuspid valve is structurally normal.  Aortic valve is structurally normal.  Pulmonic valve could not be well visualized.  No evidence for mitral tricuspid or aortic regurgitation is seen by   Doppler study.  Left atrium is normal in size.  Right atrium is enlarged.  Left ventricle is normal in size and contractility with ejection fraction   of 60%.  Right ventricle is normal in size.  Atrial septum is intact.  Aorta is normal.  No pericardial effusion or intracardiac thrombus is seen.    Impression  Right atrial enlargement.  Left ventricle is normal in size and contractility with ejection fraction   of 60%.  Structurally and functionally normal cardiac valves.  No pericardial effusion or intracardiac thrombus.          ECG/EMG Results (last 24 hours)     ** No results found for the last 24 hours. **          Pulmonary Functions Testing Results:    No results found for: FEV1, FVC, [id number], TLC, DLCO       Condition on Discharge:   Some improvement  Vital Signs  /73 (BP Location: Right arm, Patient Position: Lying)   Pulse 63   Temp 98.1 °F (36.7 °C) (Oral)   Resp 20   Ht 185.4 cm (73\")   Wt (!) 152 kg (335 lb 1.6 oz)   SpO2 94%   BMI 44.21 kg/m²       Physical Exam:     General Appearance:    Alert, cooperative, in no acute distress   Head:    Normocephalic, without obvious abnormality, atraumatic   Eyes:            Lids and lashes normal, conjunctivae and sclerae normal, no   icterus, no pallor, corneas clear, PERRLA   Ears:    Ears appear intact with no abnormalities noted   Throat:   No oral lesions, no thrush, oral mucosa moist   Neck:   No adenopathy, supple, trachea midline, no thyromegaly, no   carotid bruit, no JVD   Lungs:     Clear to auscultation,respirations regular, even and                  unlabored    Heart:    Regular " rhythm and normal rate, normal S1 and S2, no            murmur, no gallop, no rub, no click   Chest Wall:    No abnormalities observed   Abdomen:     Normal bowel sounds, no masses, no organomegaly, soft        non-tender, non-distended, no guarding, no rebound                tenderness   Extremities:  There was some reduction in the edema with elevation and compression dressings   Pulses:   Pulses palpable and equal bilaterally   Skin:  The erythema and heat in the left lower extremity has subsided.  Chronic stasis dermatitis   Lymph nodes:   No palpable adenopathy   Neurologic:   Cranial nerves 2 - 12 grossly intact, sensation intact, DTR       present and equal bilaterally       Discharge Disposition  Home-Health Care c    Discharge Medications     Discharge Medications      New Medications      Instructions Start Date   cephalexin 500 MG capsule  Commonly known as: Keflex   500 mg, Oral, 4 Times Daily      furosemide 20 MG tablet  Commonly known as: Lasix   20 mg, Oral, 2 Times Daily      sertraline 25 MG tablet  Commonly known as: ZOLOFT   25 mg, Oral, Daily      spironolactone 25 MG tablet  Commonly known as: Aldactone   25 mg, Oral, 2 Times Daily             Discharge Diet:     Activity at Discharge:     Follow-up Appointments  Future Appointments   Date Time Provider Department Center   3/29/2021  1:40 PM Kenney Reyez MD [facility]         Test Results Pending at Discharge       Alfredo Torres MD  10/12/20  19:53 EDT    Time: [id number]

## 2020-11-03 NOTE — PROGRESS NOTES
HEMATOLOGY ONCOLOGY OUTPATIENT CONSULTATION       Patient name: Robert Randhawa Jr.  : 1965  MRN: [id number]  Primary Care Physician: Alfredo Torres MD  Referring Physician: Alfredo Torres MD  Reason For Consult:     Chief Complaint   Patient presents with   • Follow-up     Thrombocytopenia         HPI:   History of Present Illness:  Robert Randhawa Jr. is 55 y.o. male who presented to our office on 20 for consultation regarding thrombocytopenia.  Patient had a CBC on 10/10/2020 that showed a platelet count of 108.  MCV was also elevated to 100.  On review previous labs since , patient does have mild intermittent thrombocytopenia.  Also has chronic macrocytosis.  Patient does have history of chronic left leg cellulitis and edema and also chronic DVT.  He does have a history of morbid obesity and also somewhat immobilized..      · 2017: WBC 5, hemoglobin 14, platelets 129, MCV 99.4  · 2018: Doppler of the lower extremities: Chronic left gastrocnemius vein DVT  · 2018: WBC 6.1, hemoglobin 13.6, platelets 148  · 2020: Platelets 154,   · 2020: PSA 0.3, TSH 1.98, WBC 4.7, hemoglobin 14.5, platelets 122 low,  · 10/8/2020-10/12/2020: Patient admitted to [facility] with cellulitis of the left lower extremity, bilateral lower extremity edema left greater than right,.  He was diagnosed with chronic venous hypertension bilaterally.  He was treated with IV diuretics as well as antibiotics Rocephin.  He was treated with compression dressings.    · 10/10/2020: WBC 4.3, hemoglobin 13.5,  high, platelets 108 low,  creatinine 0.8, AST 58 high, ALT 31, bilirubin 0.7, albumin 3.1,        Subjective:  Patient presents to the facility for an initial consult  Regarding thrombocytopenia. His PCP is Dr. Torres. He was seen in the hospital last month regarding cellulitis in his left leg. He does not smoke.  He does report excessive bruising.   Does not drink alcohol..  Does not report any history of liver disease and also does not have any personal or family history of autoimmune disease.  He is not on any medications that cause thrombocytopenia.  Denies any bleeding per rectum.  Also reports being depressed.  He has been very sedentary since last December 2019.  He is on disability.      The following portions of the patient's history were reviewed and updated as appropriate: problem list.    Past Medical History:   Diagnosis Date   • Arthritis    • Asthma    • Chronic deep vein thrombosis (DVT) (CMS/HCC)        Past Surgical History:   Procedure Laterality Date   • APPENDECTOMY  1974   • CARDIAC CATHETERIZATION N/A 2/28/2020    Procedure: Left ventriculography;  Surgeon: Kenney Reyez MD;  Location: [facility] CATH INVASIVE LOCATION;  Service: Cardiovascular;  Laterality: N/A;   • CARDIAC CATHETERIZATION N/A 2/28/2020    Procedure: Coronary angiography;  Surgeon: Kenney Reyez MD;  Location:  [facility] CATH INVASIVE LOCATION;  Service: Cardiovascular;  Laterality: N/A;   • CARDIAC CATHETERIZATION N/A 2/28/2020    Procedure: Left Heart Cath;  Surgeon: Kenney Reyez MD;  Location: [facility] CATH INVASIVE LOCATION;  Service: Cardiovascular;  Laterality: N/A;   • KNEE SURGERY Left 2018         Current Outpatient Medications:   •  furosemide (Lasix) 20 MG tablet, Take 1 tablet by mouth 2 (Two) Times a Day for 90 days., Disp: 180 tablet, Rfl: 2  •  sertraline (ZOLOFT) 25 MG tablet, Take 1 tablet by mouth Daily for 90 days., Disp: 30 tablet, Rfl: 2  •  spironolactone (Aldactone) 25 MG tablet, Take 1 tablet by mouth 2 (Two) Times a Day for 90 days., Disp: 180 tablet, Rfl: 2    Allergies   Allergen Reactions   • Sulfa Antibiotics Unknown (See Comments)       Family History   Problem Relation Age of Onset   • Heart disease Mother    • Diabetes Mother    • Arthritis Father        Cancer-related family history is not on file.    Social History     Tobacco Use   •  "Smoking status: Former Smoker     Types: Cigarettes     Quit date:      Years since quittin.8   • Smokeless tobacco: Never Used   Substance Use Topics   • Alcohol use: Never     Frequency: Never   • Drug use: Not on file     Social History     Social History Narrative   • Not on file        ROS:     Review of Systems   Constitutional: Negative for chills and fever.   HENT: Negative for ear pain, mouth sores, nosebleeds and sore throat.    Eyes: Negative for photophobia and visual disturbance.   Respiratory: Negative for wheezing and stridor.    Cardiovascular: Negative for chest pain and palpitations.   Gastrointestinal: Negative for abdominal pain, diarrhea, nausea and vomiting.   Endocrine: Negative for cold intolerance and heat intolerance.   Genitourinary: Negative for dysuria and hematuria.   Musculoskeletal: Negative for joint swelling and neck stiffness.   Skin: Negative for color change and rash.   Neurological: Negative for seizures and syncope.   Hematological: Negative for adenopathy.        No obvious bleeding   Psychiatric/Behavioral: Negative for agitation, confusion and hallucinations.             Objective:    Vitals:    20 1447   BP: 118/81   Pulse: 101   Resp: 18   Temp: 97.3 °F (36.3 °C)   Weight: (!) 156 kg (343 lb)   Height: 185.4 cm (73\")   PainSc:   6   PainLoc: Leg     Body mass index is 45.25 kg/m².  ECOG  (2) Ambulatory and capable of self care, unable to carry out work activity, up and about > 50% or waking hours    Physical Exam:     Physical Exam  Vitals signs and nursing note reviewed.   Constitutional:       General: He is not in acute distress.     Appearance: Normal appearance. He is obese. He is not ill-appearing or diaphoretic.      Comments: Walks with a cane.   HENT:      Head: Normocephalic and atraumatic.      Mouth/Throat:      Comments: Edentulous  Eyes:      General: No scleral icterus.        Right eye: No discharge.         Left eye: No discharge.      " Conjunctiva/sclera: Conjunctivae normal.   Neck:      Musculoskeletal: Normal range of motion and neck supple.      Thyroid: No thyromegaly.   Cardiovascular:      Rate and Rhythm: Normal rate and regular rhythm.      Heart sounds: Normal heart sounds. No friction rub. No gallop.    Pulmonary:      Effort: Pulmonary effort is normal. No respiratory distress.      Breath sounds: No stridor. No wheezing.   Abdominal:      General: Bowel sounds are normal.      Palpations: Abdomen is soft. There is no mass.      Tenderness: There is no abdominal tenderness. There is no guarding or rebound.   Musculoskeletal: Normal range of motion.         General: No tenderness.      Comments: 3-4+ nonpitting left leg edema.  There is swelling medially on the left lower leg.  Also erythema noted.   Lymphadenopathy:      Cervical: No cervical adenopathy.   Skin:     General: Skin is warm.      Findings: No erythema or rash.   Neurological:      General: No focal deficit present.      Mental Status: He is alert and oriented to person, place, and time.      Motor: No abnormal muscle tone.   Psychiatric:         Mood and Affect: Mood normal.         Behavior: Behavior normal.         Thought Content: Thought content normal.               Lab Results - Last 18 Months   Lab Units 10/10/20  0321 10/08/20  1755 09/24/20  0936   WBC 10*3/mm3 4.30 5.40 4.70   HEMOGLOBIN g/dL 13.5 14.8 14.5   HEMATOCRIT % 40.1 44.5 43.1   PLATELETS 10*3/mm3 108* 144 122*   MCV fL 100.0* 100.0* 100.0*     Lab Results - Last 18 Months   Lab Units 10/10/20  0321 10/08/20  1755 09/24/20  0936   SODIUM mmol/L 136 139 136   POTASSIUM mmol/L 4.1 4.5 4.0   CHLORIDE mmol/L 103 100 102   CO2 mmol/L 26.0 26.0 23.4   BUN  13 12 13   CREATININE mg/dL 0.80 0.83 0.86   CALCIUM mg/dL 8.6 9.4 9.4   BILIRUBIN mg/dL 0.7 0.4 0.6   ALK PHOS U/L 103 137* 135*   ALT (SGPT) U/L 31 34 29   AST (SGOT) U/L 58* 63* 49*   GLUCOSE mg/dL 110* 125* 161*       Lab Results   Component Value Date     GLUCOSE 110 (H) 10/10/2020    BUN  10/10/2020      Comment:      Testing performed by alternate method    BUN 13 10/10/2020    CREATININE 0.80 10/10/2020    EGFRIFNONA 100 10/10/2020    BCR  10/10/2020      Comment:      Testing not performed    K 4.1 10/10/2020    CO2 26.0 10/10/2020    CALCIUM 8.6 10/10/2020    ALBUMIN 3.10 (L) 10/10/2020    LABIL2 0.8 (L) 04/12/2018    AST 58 (H) 10/10/2020    ALT 31 10/10/2020       Lab Results - Last 18 Months   Lab Units 02/28/20  1126   INR  1.05       No results found for: IRON, TIBC, FERRITIN    No results found for: FOLATE    No results found for: OCCULTBLD    No results found for: RETICCTPCT    No results found for: TOCAIDBR26  No results found for: SPEP, UPEP  No results found for: LDH, URICACID  Lab Results   Component Value Date    SEDRATE 54 (H) 04/14/2018     No results found for: FIBRINOGEN, HAPTOGLOBIN  Lab Results   Component Value Date    PTT 31.5 (H) 04/13/2018    INR 1.05 02/28/2020     No results found for:   No results found for: CEA  No components found for: CA-19-9  Lab Results   Component Value Date    PSA 0.304 09/24/2020             Assessment/Plan     Assessment:  1. Thrombocytopenia: This is chronic and mild.  Known since 2017.  Also intermittent since sometimes platelets were noted to be above 150.  Possible etiologies could be hypersplenism ITP.  Other etiologies need to be ruled out.  2. Macrocytosis: Could have liver disease/fatty liver  3. Left leg swelling: History of chronic DVT, venous insufficiency and cellulitis.          Plan:  1. We will do work-up for thrombocytopenia.  Will check B12, folate, serum PIERRE, lupus anticoagulant,.  Check repeat platelet count in blue top tube to rule out pseudothrombocytopenia  2. Ultrasound of the liver and the spleen to rule out hypersplenism, splenomegaly and liver disease.  3. Doppler of the left lower extremity to rule out recurrent DVT.  4. Follow patient back in 3 to 4 weeks.                Thank  you very much for providing the opportunity to participate in this patient’s care. Please do not hesitate to call if there are any other questions        I have reviewed and confirmed the accuracy of the patient's history: Chief complaint, HPI, ROS and Subjective as entered by the MA/LPN/RN. Uri Finley MD 11/04/20     Electronically signed by Uri Finley MD, 11/04/20, 3:27 PM EST.

## 2020-11-04 ENCOUNTER — CONSULT (OUTPATIENT)
Dept: ONCOLOGY | Facility: CLINIC | Age: 55
End: 2020-11-04

## 2020-11-04 ENCOUNTER — LAB (OUTPATIENT)
Dept: LAB | Facility: HOSPITAL | Age: 55
End: 2020-11-04

## 2020-11-04 VITALS
BODY MASS INDEX: 41.75 KG/M2 | WEIGHT: 315 LBS | HEART RATE: 101 BPM | DIASTOLIC BLOOD PRESSURE: 81 MMHG | RESPIRATION RATE: 18 BRPM | TEMPERATURE: 97.3 F | HEIGHT: 73 IN | SYSTOLIC BLOOD PRESSURE: 118 MMHG

## 2020-11-04 DIAGNOSIS — D69.6 THROMBOCYTOPENIA (HCC): ICD-10-CM

## 2020-11-04 DIAGNOSIS — D69.6 THROMBOCYTOPENIA (HCC): Primary | ICD-10-CM

## 2020-11-04 DIAGNOSIS — R60.0 LOCALIZED EDEMA: ICD-10-CM

## 2020-11-04 LAB
BASOPHILS # BLD AUTO: 0.02 10*3/MM3 (ref 0–0.2)
BASOPHILS NFR BLD AUTO: 0.3 % (ref 0–1.5)
DEPRECATED RDW RBC AUTO: 51 FL (ref 37–54)
EOSINOPHIL # BLD AUTO: 0.12 10*3/MM3 (ref 0–0.4)
EOSINOPHIL NFR BLD AUTO: 1.9 % (ref 0.3–6.2)
ERYTHROCYTE [DISTWIDTH] IN BLOOD BY AUTOMATED COUNT: 13.5 % (ref 12.3–15.4)
HCT VFR BLD AUTO: 47.7 % (ref 37.5–51)
HGB BLD-MCNC: 15.4 G/DL (ref 13–17.7)
LDH SERPL-CCNC: 154 U/L (ref 135–225)
LYMPHOCYTES # BLD AUTO: 1.04 10*3/MM3 (ref 0.7–3.1)
LYMPHOCYTES NFR BLD AUTO: 16.8 % (ref 19.6–45.3)
MCH RBC QN AUTO: 33.3 PG (ref 26.6–33)
MCHC RBC AUTO-ENTMCNC: 32.3 G/DL (ref 31.5–35.7)
MCV RBC AUTO: 103 FL (ref 79–97)
MONOCYTES # BLD AUTO: 0.46 10*3/MM3 (ref 0.1–0.9)
MONOCYTES NFR BLD AUTO: 7.4 % (ref 5–12)
NEUTROPHILS NFR BLD AUTO: 4.55 10*3/MM3 (ref 1.7–7)
NEUTROPHILS NFR BLD AUTO: 73.6 % (ref 42.7–76)
PLATELET # BLD AUTO: 133 10*3/MM3 (ref 140–450)
PLATELETS (CITRATED) BY AUTOMATED COUNT: 120 10*3/MM3 (ref 140–450)
PMV BLD AUTO: 10.7 FL (ref 6–12)
RBC # BLD AUTO: 4.63 10*6/MM3 (ref 4.14–5.8)
WBC # BLD AUTO: 6.19 10*3/MM3 (ref 3.4–10.8)

## 2020-11-04 PROCEDURE — 83615 LACTATE (LD) (LDH) ENZYME: CPT

## 2020-11-04 PROCEDURE — 83010 ASSAY OF HAPTOGLOBIN QUANT: CPT

## 2020-11-04 PROCEDURE — 85025 COMPLETE CBC W/AUTO DIFF WBC: CPT

## 2020-11-04 PROCEDURE — 85049 AUTOMATED PLATELET COUNT: CPT | Performed by: INTERNAL MEDICINE

## 2020-11-04 PROCEDURE — 82607 VITAMIN B-12: CPT

## 2020-11-04 PROCEDURE — 99204 OFFICE O/P NEW MOD 45 MIN: CPT | Performed by: INTERNAL MEDICINE

## 2020-11-04 PROCEDURE — 82746 ASSAY OF FOLIC ACID SERUM: CPT

## 2020-11-04 PROCEDURE — 36415 COLL VENOUS BLD VENIPUNCTURE: CPT | Performed by: INTERNAL MEDICINE

## 2020-11-04 PROCEDURE — 86038 ANTINUCLEAR ANTIBODIES: CPT

## 2020-11-05 LAB
CARDIOLIPIN IGG SER IA-ACNC: 16 GPL U/ML (ref 0–14)
CARDIOLIPIN IGM SER IA-ACNC: 11 MPL U/ML (ref 0–12)
FOLATE SERPL-MCNC: 5.7 NG/ML (ref 4.78–24.2)
HAPTOGLOB SERPL-MCNC: 107 MG/DL (ref 30–200)
IGA SERPL-MCNC: 574 MG/DL (ref 90–386)
IGG SERPL-MCNC: 2593 MG/DL (ref 603–1613)
IGM SERPL-MCNC: 127 MG/DL (ref 20–172)
PROT PATTERN SERPL IFE-IMP: ABNORMAL
VIT B12 BLD-MCNC: 525 PG/ML (ref 211–946)

## 2020-11-06 LAB
ANA SER QL: NEGATIVE
B2 GLYCOPROT1 IGA SER-ACNC: <9 GPI IGA UNITS (ref 0–25)
B2 GLYCOPROT1 IGG SER-ACNC: <9 GPI IGG UNITS (ref 0–20)
B2 GLYCOPROT1 IGM SER-ACNC: <9 GPI IGM UNITS (ref 0–32)

## 2020-11-07 LAB
PS IGA SER-ACNC: 7 APS IGA (ref 0–20)
PS IGG SER-ACNC: 5 GPS IGG (ref 0–11)
PS IGM SER-ACNC: 6 MPS IGM (ref 0–25)

## 2020-11-14 LAB
APTT SCREEN TO CONFIRM RATIO: 0.95 RATIO (ref 0–1.4)
CONFIRM APTT/NORMAL: 38.9 SEC (ref 0–55)
LA 2 SCREEN W REFLEX-IMP: NORMAL
SCREEN APTT: 29.6 SEC (ref 0–51.9)
SCREEN DRVVT: 33.8 SEC (ref 0–47)
THROMBIN TIME: 20.4 SEC (ref 0–23)

## 2020-11-16 DIAGNOSIS — R60.0 LOCALIZED EDEMA: ICD-10-CM

## 2020-11-16 DIAGNOSIS — D69.6 THROMBOCYTOPENIA (HCC): Primary | ICD-10-CM

## 2020-11-24 NOTE — PROGRESS NOTES
HEMATOLOGY ONCOLOGY OUTPATIENT FOLLOW UP       Patient name: Robert Randhawa Jr.  : 1965  MRN: [id number]  Primary Care Physician: Alfredo Torres MD  Referring Physician: Alfredo Torres MD  Reason For Consult:     Chief Complaint   Patient presents with   • Follow-up     Thrombocytopenia         HPI:   History of Present Illness:  Robert Randhawa Jr. is 55 y.o. male who presented to our office on 20 for consultation regarding thrombocytopenia.  Patient had a CBC on 10/10/2020 that showed a platelet count of 108.  MCV was also elevated to 100.  On review previous labs since , patient does have mild intermittent thrombocytopenia.  Also has chronic macrocytosis.  Patient does have history of chronic left leg cellulitis and edema and also chronic DVT.  He does have a history of morbid obesity and also somewhat immobilized..      · 2017: WBC 5, hemoglobin 14, platelets 129, MCV 99.4  · 2018: Doppler of the lower extremities: Chronic left gastrocnemius vein DVT  · 2018: WBC 6.1, hemoglobin 13.6, platelets 148  · 2020: Platelets 154,   · 2020: PSA 0.3, TSH 1.98, WBC 4.7, hemoglobin 14.5, platelets 122 low,  · 10/8/2020-10/12/2020: Patient admitted to [facility] with cellulitis of the left lower extremity, bilateral lower extremity edema left greater than right,.  He was diagnosed with chronic venous hypertension bilaterally.  He was treated with IV diuretics as well as antibiotics Rocephin.  He was treated with compression dressings.    · 10/10/2020: WBC 4.3, hemoglobin 13.5,  high, platelets 108 low,  creatinine 0.8, AST 58 high, ALT 31, bilirubin 0.7, albumin 3.1,  · 2020: Patient presents to the facility for an initial consult  Regarding thrombocytopenia. His PCP is Dr. Torres. He was seen in the hospital last month regarding cellulitis in his left leg. He does not smoke.  He does report excessive bruising.  Does not  drink alcohol..  Does not report any history of liver disease and also does not have any personal or family history of autoimmune disease.  He is not on any medications that cause thrombocytopenia.  Denies any bleeding per rectum. Also reports being depressed.  He has been very sedentary since last December 2019.  He is on disability.    · 11/4/2020: Anticardiolipin IgG 16 high, beta-2 glycoprotein antibodies negative, phosphatidylserine antibodies negative, citrated platelet count 120, serum PIERRE negative, serum folate 5.7, haptoglobin low 107, serum immunofixation negative, IgG 2593 high, IgA 574 high, IgM 127, , lupus anticoagulant negative, vitamin B12 525,  · 11/4/2020: WBC 6.2, hemoglobin 15.4, platelets 133 low, ,  · 11/25/2020: Abdominal ultrasound: Shows hepatosplenomegaly.  Evidence of hepatic steatosis.  Liver is enlarged up to 22.4 cm.  Spleen measures 17 cm.  · 11/25/2020: Ultrasound Doppler left leg: Normal.  No evidence of DVT.  · 11/30/2020: WBC 5.45, hemoglobin 15, platelets 120 low, .3    Subjective:  Patient continues to have swelling of the left leg.  It is definitely bothering him.  He is also reporting swelling of the left foot.  He is wearing compression stockings and reports that it is extremely hard for him to wear them.  He has visiting nurses who come and help with the dressings.  He does not recall seeing wound care in the recent past.  He is having pain in his right knee and does not see any orthopedics at this time.    The following portions of the patient's history were reviewed and updated as appropriate: problem list.    Past Medical History:   Diagnosis Date   • Arthritis    • Asthma    • Chronic deep vein thrombosis (DVT) (CMS/HCC)        Past Surgical History:   Procedure Laterality Date   • APPENDECTOMY  1974   • CARDIAC CATHETERIZATION N/A 2/28/2020    Procedure: Left ventriculography;  Surgeon: Kenney Reyez MD;  Location:  INVASIVE LOCATION;   Service: Cardiovascular;  Laterality: N/A;   • CARDIAC CATHETERIZATION N/A 2020    Procedure: Coronary angiography;  Surgeon: Kenney Reyez MD;  Location: [facility] CATH INVASIVE LOCATION;  Service: Cardiovascular;  Laterality: N/A;   • CARDIAC CATHETERIZATION N/A 2020    Procedure: Left Heart Cath;  Surgeon: Kenney Reyez MD;  Location: [facility] CATH INVASIVE LOCATION;  Service: Cardiovascular;  Laterality: N/A;   • KNEE SURGERY Left 2018         Current Outpatient Medications:   •  furosemide (Lasix) 20 MG tablet, Take 1 tablet by mouth 2 (Two) Times a Day for 90 days., Disp: 180 tablet, Rfl: 2  •  sertraline (ZOLOFT) 25 MG tablet, Take 1 tablet by mouth Daily for 90 days., Disp: 30 tablet, Rfl: 2  •  spironolactone (Aldactone) 25 MG tablet, Take 1 tablet by mouth 2 (Two) Times a Day for 90 days., Disp: 180 tablet, Rfl: 2    Allergies   Allergen Reactions   • Sulfa Antibiotics Unknown (See Comments)       Family History   Problem Relation Age of Onset   • Heart disease Mother    • Diabetes Mother    • Arthritis Father        Cancer-related family history is not on file.    Social History     Tobacco Use   • Smoking status: Former Smoker     Types: Cigarettes     Quit date:      Years since quittin.9   • Smokeless tobacco: Never Used   Substance Use Topics   • Alcohol use: Never     Frequency: Never   • Drug use: Not on file     Social History     Social History Narrative   • Not on file        ROS:     Review of Systems   Constitutional: Negative for chills and fever.   HENT: Negative for ear pain, mouth sores, nosebleeds and sore throat.    Eyes: Negative for photophobia and visual disturbance.   Respiratory: Negative for wheezing and stridor.    Cardiovascular: Negative for chest pain and palpitations.   Gastrointestinal: Negative for abdominal pain, diarrhea, nausea and vomiting.   Endocrine: Negative for cold intolerance and heat intolerance.   Genitourinary: Negative for dysuria and  "hematuria.   Musculoskeletal: Positive for arthralgias. Negative for joint swelling and neck stiffness.        Swelling both the legs, left greater than right.  Right knee pain   Skin: Negative for color change and rash.   Neurological: Negative for seizures and syncope.   Hematological: Negative for adenopathy.        No obvious bleeding   Psychiatric/Behavioral: Negative for agitation, confusion and hallucinations.         Objective:    Vitals:    11/30/20 1342   BP: 133/84   Pulse: 79   Resp: 18   Temp: 97.8 °F (36.6 °C)   Weight: (!) 160 kg (352 lb 3.2 oz)   Height: 185.4 cm (73\")   PainSc:   6   PainLoc: Leg     Body mass index is 46.47 kg/m².  ECOG  (2) Ambulatory and capable of self care, unable to carry out work activity, up and about > 50% or waking hours    Physical Exam:     Physical Exam  Vitals signs and nursing note reviewed.   Constitutional:       General: He is not in acute distress.     Appearance: Normal appearance. He is obese. He is not ill-appearing or diaphoretic.      Comments: Walks with a cane.   HENT:      Head: Normocephalic and atraumatic.      Mouth/Throat:      Comments: Edentulous  Eyes:      General: No scleral icterus.        Right eye: No discharge.         Left eye: No discharge.      Conjunctiva/sclera: Conjunctivae normal.   Neck:      Musculoskeletal: Normal range of motion and neck supple.      Thyroid: No thyromegaly.   Cardiovascular:      Rate and Rhythm: Normal rate and regular rhythm.      Heart sounds: Normal heart sounds. No friction rub. No gallop.    Pulmonary:      Effort: Pulmonary effort is normal. No respiratory distress.      Breath sounds: No stridor. No wheezing.   Abdominal:      General: Bowel sounds are normal.      Palpations: Abdomen is soft. There is no mass.      Tenderness: There is no abdominal tenderness. There is no guarding or rebound.   Musculoskeletal: Normal range of motion.         General: No tenderness.      Comments: 3-4+ nonpitting left leg " edema.  There is swelling medially on the left lower leg.  Also erythema noted.   Lymphadenopathy:      Cervical: No cervical adenopathy.   Skin:     General: Skin is warm.      Findings: No erythema or rash.   Neurological:      General: No focal deficit present.      Mental Status: He is alert and oriented to person, place, and time.      Motor: No abnormal muscle tone.   Psychiatric:         Mood and Affect: Mood normal.         Behavior: Behavior normal.         Thought Content: Thought content normal.           Lab Results - Last 18 Months   Lab Units 11/30/20  1338 11/04/20  1617 10/10/20  0321   WBC 10*3/mm3 5.45 6.19 4.30   HEMOGLOBIN g/dL 15.0 15.4 13.5   HEMATOCRIT % 45.3 47.7 40.1   PLATELETS 10*3/mm3 120* 120*  133* 108*   MCV fL 101.3* 103.0* 100.0*     Lab Results - Last 18 Months   Lab Units 10/10/20  0321 10/08/20  1755 09/24/20  0936   SODIUM mmol/L 136 139 136   POTASSIUM mmol/L 4.1 4.5 4.0   CHLORIDE mmol/L 103 100 102   CO2 mmol/L 26.0 26.0 23.4   BUN  13 12 13   CREATININE mg/dL 0.80 0.83 0.86   CALCIUM mg/dL 8.6 9.4 9.4   BILIRUBIN mg/dL 0.7 0.4 0.6   ALK PHOS U/L 103 137* 135*   ALT (SGPT) U/L 31 34 29   AST (SGOT) U/L 58* 63* 49*   GLUCOSE mg/dL 110* 125* 161*       Lab Results   Component Value Date    GLUCOSE 110 (H) 10/10/2020    BUN  10/10/2020      Comment:      Testing performed by alternate method    BUN 13 10/10/2020    CREATININE 0.80 10/10/2020    EGFRIFNONA 100 10/10/2020    BCR  10/10/2020      Comment:      Testing not performed    K 4.1 10/10/2020    CO2 26.0 10/10/2020    CALCIUM 8.6 10/10/2020    ALBUMIN 3.10 (L) 10/10/2020    LABIL2 0.8 (L) 04/12/2018    AST 58 (H) 10/10/2020    ALT 31 10/10/2020       Lab Results - Last 18 Months   Lab Units 02/28/20  1126   INR  1.05       No results found for: IRON, TIBC, FERRITIN    Lab Results   Component Value Date    FOLATE 5.70 11/04/2020       No results found for: OCCULTBLD    No results found for: RETICCTPCT    Lab Results    Component Value Date    REIDXAUK25 525 11/04/2020     No results found for: SPEP, UPEP  LDH   Date Value Ref Range Status   11/04/2020 154 135 - 225 U/L Final     Lab Results   Component Value Date    PIERRE Negative 11/04/2020    SEDRATE 54 (H) 04/14/2018     Lab Results   Component Value Date    HAPTOGLOBIN 107 11/04/2020     Lab Results   Component Value Date    PTT 31.5 (H) 04/13/2018    INR 1.05 02/28/2020     No results found for:   No results found for: CEA  No components found for: CA-19-9  Lab Results   Component Value Date    PSA 0.304 09/24/2020             Assessment/Plan     Assessment:  1. Thrombocytopenia: This is chronic and mild.  Known since 2017.  Also intermittent since sometimes platelets were noted to be above 150.  Ultrasound showed splenomegaly/hepatomegaly.  Most likely etiology is hypersplenism.  ITP could also be a possibility.  Serum PIERRE, lupus anticoagulant testing negative.  B12 and folate levels were normal.  WBC and hemoglobin are normal therefore hematological malignancy is unlikely..  2. Macrocytosis: Likely from fatty liver.  3. Fatty liver:  4. Left leg swelling: History of chronic DVT, venous insufficiency and cellulitis.  Repeat Doppler 11/25/2020 did not show any DVT.        Plan:  1. Mild thrombocytopenia: At this point only observation.  Patient does not have symptoms  2. Recommend wound care consultation for left leg swelling/skin breakdown  3. Podiatry consultation for further swelling of the left foot.  4. Follow-up with me in 6 months     Thank you very much for providing the opportunity to participate in this patient’s care. Please do not hesitate to call if there are any other questions        I have reviewed and confirmed the accuracy of the patient's history: Chief complaint, HPI, ROS and Subjective as entered by the MA/LPN/RN. Uri Finley MD 11/04/20     Electronically signed by Uri Finley MD, 11/04/20, 3:27 PM EST.

## 2020-11-25 ENCOUNTER — HOSPITAL ENCOUNTER (OUTPATIENT)
Dept: ULTRASOUND IMAGING | Facility: HOSPITAL | Age: 55
Discharge: HOME | End: 2020-11-25

## 2020-11-25 ENCOUNTER — HOSPITAL ENCOUNTER (OUTPATIENT)
Dept: CARDIOLOGY | Facility: HOSPITAL | Age: 55
Discharge: HOME | End: 2020-11-25

## 2020-11-25 DIAGNOSIS — R60.0 LOCALIZED EDEMA: ICD-10-CM

## 2020-11-25 DIAGNOSIS — D69.6 THROMBOCYTOPENIA (HCC): ICD-10-CM

## 2020-11-25 LAB
BH CV LOWER VASCULAR LEFT COMMON FEMORAL AUGMENT: NORMAL
BH CV LOWER VASCULAR LEFT COMMON FEMORAL COMPETENT: NORMAL
BH CV LOWER VASCULAR LEFT COMMON FEMORAL COMPRESS: NORMAL
BH CV LOWER VASCULAR LEFT COMMON FEMORAL PHASIC: NORMAL
BH CV LOWER VASCULAR LEFT COMMON FEMORAL SPONT: NORMAL
BH CV LOWER VASCULAR LEFT DISTAL FEMORAL COMPRESS: NORMAL
BH CV LOWER VASCULAR LEFT GASTRONEMIUS COMPRESS: NORMAL
BH CV LOWER VASCULAR LEFT GREATER SAPH AK COMPRESS: NORMAL
BH CV LOWER VASCULAR LEFT GREATER SAPH BK COMPRESS: NORMAL
BH CV LOWER VASCULAR LEFT LESSER SAPH COMPRESS: NORMAL
BH CV LOWER VASCULAR LEFT MID FEMORAL AUGMENT: NORMAL
BH CV LOWER VASCULAR LEFT MID FEMORAL COMPETENT: NORMAL
BH CV LOWER VASCULAR LEFT MID FEMORAL COMPRESS: NORMAL
BH CV LOWER VASCULAR LEFT MID FEMORAL PHASIC: NORMAL
BH CV LOWER VASCULAR LEFT MID FEMORAL SPONT: NORMAL
BH CV LOWER VASCULAR LEFT POPLITEAL AUGMENT: NORMAL
BH CV LOWER VASCULAR LEFT POPLITEAL COMPETENT: NORMAL
BH CV LOWER VASCULAR LEFT POPLITEAL COMPRESS: NORMAL
BH CV LOWER VASCULAR LEFT POPLITEAL PHASIC: NORMAL
BH CV LOWER VASCULAR LEFT POPLITEAL SPONT: NORMAL
BH CV LOWER VASCULAR LEFT PROXIMAL FEMORAL COMPRESS: NORMAL
BH CV LOWER VASCULAR LEFT SAPHENOFEMORAL JUNCTION COMPRESS: NORMAL
BH CV LOWER VASCULAR RIGHT COMMON FEMORAL AUGMENT: NORMAL
BH CV LOWER VASCULAR RIGHT COMMON FEMORAL COMPETENT: NORMAL
BH CV LOWER VASCULAR RIGHT COMMON FEMORAL COMPRESS: NORMAL
BH CV LOWER VASCULAR RIGHT COMMON FEMORAL PHASIC: NORMAL
BH CV LOWER VASCULAR RIGHT COMMON FEMORAL SPONT: NORMAL

## 2020-11-25 PROCEDURE — 93971 EXTREMITY STUDY: CPT

## 2020-11-25 PROCEDURE — 76705 ECHO EXAM OF ABDOMEN: CPT

## 2020-11-30 ENCOUNTER — OFFICE VISIT (OUTPATIENT)
Dept: ONCOLOGY | Facility: CLINIC | Age: 55
End: 2020-11-30

## 2020-11-30 ENCOUNTER — APPOINTMENT (OUTPATIENT)
Dept: LAB | Facility: HOSPITAL | Age: 55
End: 2020-11-30

## 2020-11-30 VITALS
TEMPERATURE: 97.8 F | WEIGHT: 315 LBS | DIASTOLIC BLOOD PRESSURE: 84 MMHG | HEIGHT: 73 IN | HEART RATE: 79 BPM | RESPIRATION RATE: 18 BRPM | BODY MASS INDEX: 41.75 KG/M2 | SYSTOLIC BLOOD PRESSURE: 133 MMHG

## 2020-11-30 DIAGNOSIS — R60.0 LOCALIZED EDEMA: ICD-10-CM

## 2020-11-30 DIAGNOSIS — D69.6 THROMBOCYTOPENIA (HCC): Primary | ICD-10-CM

## 2020-11-30 DIAGNOSIS — M79.89 SWELLING OF LEFT FOOT: ICD-10-CM

## 2020-11-30 LAB
BASOPHILS # BLD AUTO: 0.01 10*3/MM3 (ref 0–0.2)
BASOPHILS NFR BLD AUTO: 0.2 % (ref 0–1.5)
DEPRECATED RDW RBC AUTO: 49.5 FL (ref 37–54)
EOSINOPHIL # BLD AUTO: 0.11 10*3/MM3 (ref 0–0.4)
EOSINOPHIL NFR BLD AUTO: 2 % (ref 0.3–6.2)
ERYTHROCYTE [DISTWIDTH] IN BLOOD BY AUTOMATED COUNT: 13.4 % (ref 12.3–15.4)
HCT VFR BLD AUTO: 45.3 % (ref 37.5–51)
HGB BLD-MCNC: 15 G/DL (ref 13–17.7)
LYMPHOCYTES # BLD AUTO: 0.99 10*3/MM3 (ref 0.7–3.1)
LYMPHOCYTES NFR BLD AUTO: 18.2 % (ref 19.6–45.3)
MCH RBC QN AUTO: 33.6 PG (ref 26.6–33)
MCHC RBC AUTO-ENTMCNC: 33.1 G/DL (ref 31.5–35.7)
MCV RBC AUTO: 101.3 FL (ref 79–97)
MONOCYTES # BLD AUTO: 0.47 10*3/MM3 (ref 0.1–0.9)
MONOCYTES NFR BLD AUTO: 8.6 % (ref 5–12)
NEUTROPHILS NFR BLD AUTO: 3.87 10*3/MM3 (ref 1.7–7)
NEUTROPHILS NFR BLD AUTO: 71 % (ref 42.7–76)
PLATELET # BLD AUTO: 120 10*3/MM3 (ref 140–450)
PMV BLD AUTO: 10.3 FL (ref 6–12)
RBC # BLD AUTO: 4.47 10*6/MM3 (ref 4.14–5.8)
WBC # BLD AUTO: 5.45 10*3/MM3 (ref 3.4–10.8)

## 2020-11-30 PROCEDURE — 99214 OFFICE O/P EST MOD 30 MIN: CPT | Performed by: INTERNAL MEDICINE

## 2020-11-30 PROCEDURE — 85025 COMPLETE CBC W/AUTO DIFF WBC: CPT | Performed by: INTERNAL MEDICINE

## 2020-11-30 PROCEDURE — 36415 COLL VENOUS BLD VENIPUNCTURE: CPT | Performed by: INTERNAL MEDICINE

## 2020-12-07 ENCOUNTER — TRANSCRIBE ORDERS (OUTPATIENT)
Dept: ADMINISTRATIVE | Facility: HOSPITAL | Age: 55
End: 2020-12-07

## 2020-12-07 ENCOUNTER — OFFICE VISIT (OUTPATIENT)
Dept: WOUND CARE | Facility: HOSPITAL | Age: 55
End: 2020-12-07

## 2020-12-07 ENCOUNTER — LAB (OUTPATIENT)
Dept: LAB | Facility: HOSPITAL | Age: 55
End: 2020-12-07

## 2020-12-07 ENCOUNTER — TRANSCRIBE ORDERS (OUTPATIENT)
Dept: LAB | Facility: HOSPITAL | Age: 55
End: 2020-12-07

## 2020-12-07 DIAGNOSIS — R73.03 PRE-DIABETES: ICD-10-CM

## 2020-12-07 DIAGNOSIS — I87.303 IDIOPATHIC CHRONIC VENOUS HYPERTENSION OF BOTH LOWER EXTREMITIES WITHOUT COMPLICATIONS: Primary | ICD-10-CM

## 2020-12-07 DIAGNOSIS — I83.013 VENOUS STASIS ULCER OF RIGHT ANKLE WITH FAT LAYER EXPOSED, UNSPECIFIED WHETHER VARICOSE VEINS PRESENT (HCC): ICD-10-CM

## 2020-12-07 DIAGNOSIS — L97.312 VENOUS STASIS ULCER OF RIGHT ANKLE WITH FAT LAYER EXPOSED, UNSPECIFIED WHETHER VARICOSE VEINS PRESENT (HCC): ICD-10-CM

## 2020-12-07 DIAGNOSIS — I87.303 IDIOPATHIC CHRONIC VENOUS HYPERTENSION OF BOTH LOWER EXTREMITIES WITHOUT COMPLICATIONS: ICD-10-CM

## 2020-12-07 DIAGNOSIS — I83.018 VENOUS STASIS ULCER OF OTHER PART OF RIGHT LOWER LEG WITH FAT LAYER EXPOSED WITH VARICOSE VEINS (HCC): ICD-10-CM

## 2020-12-07 DIAGNOSIS — L97.812 VENOUS STASIS ULCER OF OTHER PART OF RIGHT LOWER LEG WITH FAT LAYER EXPOSED WITH VARICOSE VEINS (HCC): ICD-10-CM

## 2020-12-07 LAB
ALBUMIN SERPL-MCNC: 4 G/DL (ref 3.5–5.2)
ALBUMIN/GLOB SERPL: 0.9 G/DL
ALP SERPL-CCNC: 126 U/L (ref 39–117)
ALT SERPL W P-5'-P-CCNC: 34 U/L (ref 1–41)
ANION GAP SERPL CALCULATED.3IONS-SCNC: 5.3 MMOL/L (ref 5–15)
AST SERPL-CCNC: 59 U/L (ref 1–40)
BASOPHILS # BLD AUTO: 0.04 10*3/MM3 (ref 0–0.2)
BASOPHILS NFR BLD AUTO: 0.6 % (ref 0–1.5)
BILIRUB SERPL-MCNC: 0.8 MG/DL (ref 0–1.2)
BUN SERPL-MCNC: 18 MG/DL (ref 6–20)
BUN/CREAT SERPL: 23.1 (ref 7–25)
CALCIUM SPEC-SCNC: 9.6 MG/DL (ref 8.6–10.5)
CHLORIDE SERPL-SCNC: 100 MMOL/L (ref 98–107)
CO2 SERPL-SCNC: 29.7 MMOL/L (ref 22–29)
CREAT SERPL-MCNC: 0.78 MG/DL (ref 0.76–1.27)
DEPRECATED RDW RBC AUTO: 46.6 FL (ref 37–54)
EOSINOPHIL # BLD AUTO: 0.13 10*3/MM3 (ref 0–0.4)
EOSINOPHIL NFR BLD AUTO: 2 % (ref 0.3–6.2)
ERYTHROCYTE [DISTWIDTH] IN BLOOD BY AUTOMATED COUNT: 13 % (ref 12.3–15.4)
GFR SERPL CREATININE-BSD FRML MDRD: 103 ML/MIN/1.73
GLOBULIN UR ELPH-MCNC: 4.7 GM/DL
GLUCOSE SERPL-MCNC: 84 MG/DL (ref 65–99)
HBA1C MFR BLD: 5.7 % (ref 3.5–5.6)
HCT VFR BLD AUTO: 43.7 % (ref 37.5–51)
HGB BLD-MCNC: 14.8 G/DL (ref 13–17.7)
IMM GRANULOCYTES # BLD AUTO: 0.02 10*3/MM3 (ref 0–0.05)
IMM GRANULOCYTES NFR BLD AUTO: 0.3 % (ref 0–0.5)
LYMPHOCYTES # BLD AUTO: 1.11 10*3/MM3 (ref 0.7–3.1)
LYMPHOCYTES NFR BLD AUTO: 17 % (ref 19.6–45.3)
MCH RBC QN AUTO: 33.5 PG (ref 26.6–33)
MCHC RBC AUTO-ENTMCNC: 33.9 G/DL (ref 31.5–35.7)
MCV RBC AUTO: 98.9 FL (ref 79–97)
MONOCYTES # BLD AUTO: 0.51 10*3/MM3 (ref 0.1–0.9)
MONOCYTES NFR BLD AUTO: 7.8 % (ref 5–12)
NEUTROPHILS NFR BLD AUTO: 4.72 10*3/MM3 (ref 1.7–7)
NEUTROPHILS NFR BLD AUTO: 72.3 % (ref 42.7–76)
NRBC BLD AUTO-RTO: 0 /100 WBC (ref 0–0.2)
PLATELET # BLD AUTO: 138 10*3/MM3 (ref 140–450)
PMV BLD AUTO: 10.9 FL (ref 6–12)
POTASSIUM SERPL-SCNC: 4.7 MMOL/L (ref 3.5–5.2)
PREALB SERPL-MCNC: 14.3 MG/DL (ref 20–40)
PROT SERPL-MCNC: 8.7 G/DL (ref 6–8.5)
RBC # BLD AUTO: 4.42 10*6/MM3 (ref 4.14–5.8)
SODIUM SERPL-SCNC: 135 MMOL/L (ref 136–145)
WBC # BLD AUTO: 6.53 10*3/MM3 (ref 3.4–10.8)

## 2020-12-07 PROCEDURE — 84134 ASSAY OF PREALBUMIN: CPT

## 2020-12-07 PROCEDURE — 83036 HEMOGLOBIN GLYCOSYLATED A1C: CPT

## 2020-12-07 PROCEDURE — 85025 COMPLETE CBC W/AUTO DIFF WBC: CPT

## 2020-12-07 PROCEDURE — G0463 HOSPITAL OUTPT CLINIC VISIT: HCPCS

## 2020-12-07 PROCEDURE — 80053 COMPREHEN METABOLIC PANEL: CPT

## 2020-12-07 PROCEDURE — 36415 COLL VENOUS BLD VENIPUNCTURE: CPT

## 2020-12-08 ENCOUNTER — APPOINTMENT (OUTPATIENT)
Dept: CARDIOLOGY | Facility: HOSPITAL | Age: 55
End: 2020-12-08

## 2020-12-10 ENCOUNTER — HOSPITAL ENCOUNTER (OUTPATIENT)
Dept: CARDIOLOGY | Facility: HOSPITAL | Age: 55
Discharge: HOME | End: 2020-12-10
Admitting: SURGERY

## 2020-12-10 DIAGNOSIS — L97.812 VENOUS STASIS ULCER OF OTHER PART OF RIGHT LOWER LEG WITH FAT LAYER EXPOSED WITH VARICOSE VEINS (HCC): ICD-10-CM

## 2020-12-10 DIAGNOSIS — I83.018 VENOUS STASIS ULCER OF OTHER PART OF RIGHT LOWER LEG WITH FAT LAYER EXPOSED WITH VARICOSE VEINS (HCC): ICD-10-CM

## 2020-12-10 DIAGNOSIS — L97.312 VENOUS STASIS ULCER OF RIGHT ANKLE WITH FAT LAYER EXPOSED, UNSPECIFIED WHETHER VARICOSE VEINS PRESENT (HCC): ICD-10-CM

## 2020-12-10 DIAGNOSIS — I83.013 VENOUS STASIS ULCER OF RIGHT ANKLE WITH FAT LAYER EXPOSED, UNSPECIFIED WHETHER VARICOSE VEINS PRESENT (HCC): ICD-10-CM

## 2020-12-10 LAB
BH CV LOWER ARTERIAL LEFT ABI RATIO: 1.2
BH CV LOWER ARTERIAL LEFT DORSALIS PEDIS SYS MAX: 142 MMHG
BH CV LOWER ARTERIAL LEFT GREAT TOE SYS MAX: 97 MMHG
BH CV LOWER ARTERIAL LEFT POST TIBIAL SYS MAX: 153 MMHG
BH CV LOWER ARTERIAL LEFT TBI RATIO: 0.76
BH CV LOWER ARTERIAL RIGHT ABI RATIO: 1.13
BH CV LOWER ARTERIAL RIGHT DORSALIS PEDIS SYS MAX: 140 MMHG
BH CV LOWER ARTERIAL RIGHT GREAT TOE SYS MAX: 126 MMHG
BH CV LOWER ARTERIAL RIGHT POST TIBIAL SYS MAX: 144 MMHG
BH CV LOWER ARTERIAL RIGHT TBI RATIO: 0.99
UPPER ARTERIAL LEFT ARM BRACHIAL SYS MAX: 117 MMHG
UPPER ARTERIAL RIGHT ARM BRACHIAL SYS MAX: 127 MMHG

## 2020-12-10 PROCEDURE — 93922 UPR/L XTREMITY ART 2 LEVELS: CPT

## 2020-12-14 ENCOUNTER — OFFICE VISIT (OUTPATIENT)
Dept: WOUND CARE | Facility: HOSPITAL | Age: 55
End: 2020-12-14

## 2020-12-15 ENCOUNTER — OFFICE VISIT (OUTPATIENT)
Dept: PODIATRY | Facility: CLINIC | Age: 55
End: 2020-12-15

## 2020-12-15 VITALS
HEART RATE: 99 BPM | HEIGHT: 73 IN | DIASTOLIC BLOOD PRESSURE: 78 MMHG | BODY MASS INDEX: 41.75 KG/M2 | WEIGHT: 315 LBS | SYSTOLIC BLOOD PRESSURE: 117 MMHG

## 2020-12-15 DIAGNOSIS — M19.072 ARTHRITIS OF LEFT FOOT: ICD-10-CM

## 2020-12-15 DIAGNOSIS — I89.0 LYMPHEDEMA: ICD-10-CM

## 2020-12-15 DIAGNOSIS — M79.672 LEFT FOOT PAIN: Primary | ICD-10-CM

## 2020-12-15 DIAGNOSIS — B35.1 ONYCHOMYCOSIS: ICD-10-CM

## 2020-12-15 PROCEDURE — 99203 OFFICE O/P NEW LOW 30 MIN: CPT | Performed by: PODIATRIST

## 2020-12-15 NOTE — PROGRESS NOTES
12/15/2020  Foot and Ankle Surgery - New Patient   Provider: Dr. Ramon Carrasuqillo DPM  Location: [facility]    Subjective:  Robert Randhawa Jr. is a 55 y.o. male.     Chief Complaint   Patient presents with   • Left Foot - Edema, Pain       HPI: Patient is a 55-year-old male with prominent lymphedema involving both lower extremities, left greater than right that presents with significant pain involving the left foot.  He states that these symptoms have been gradually progressive to the left foot.  He was referred to me by Dr. Finley.  Patient is also seen in the wound care center regarding his lymphedema.  He is unaware of any injury to the left lower extremity but states that pain is quite significant with weightbearing activity and at the end of the day.  Patient claims extensive history of arthritis involving multiple joints.  He has required previous left knee replacement.  Patient apparently has seen the lymphedema clinic in the past and does have lymphedema pumps at home.    Allergies   Allergen Reactions   • Sulfa Antibiotics Unknown (See Comments)       Past Medical History:   Diagnosis Date   • Arthritis    • Asthma    • Chronic deep vein thrombosis (DVT) (CMS/[facility])        Past Surgical History:   Procedure Laterality Date   • APPENDECTOMY  1974   • CARDIAC CATHETERIZATION N/A 2/28/2020    Procedure: Left ventriculography;  Surgeon: Kenney Reyez MD;  Location: [facility] INVASIVE LOCATION;  Service: Cardiovascular;  Laterality: N/A;   • CARDIAC CATHETERIZATION N/A 2/28/2020    Procedure: Coronary angiography;  Surgeon: Kenney Reyez MD;  Location: [facility] INVASIVE LOCATION;  Service: Cardiovascular;  Laterality: N/A;   • CARDIAC CATHETERIZATION N/A 2/28/2020    Procedure: Left Heart Cath;  Surgeon: Kenney Reyez MD;  Location: [facility] INVASIVE LOCATION;  Service: Cardiovascular;  Laterality: N/A;   • KNEE SURGERY Left 2018       Family History   Problem Relation Age of Onset  "  • Heart disease Mother    • Diabetes Mother    • Arthritis Father        Social History     Socioeconomic History   • Marital status: Single     Spouse name: Not on file   • Number of children: Not on file   • Years of education: Not on file   • Highest education level: Not on file   Tobacco Use   • Smoking status: Former Smoker     Types: Cigarettes     Quit date:      Years since quittin.9   • Smokeless tobacco: Never Used   Substance and Sexual Activity   • Alcohol use: Never     Frequency: Never   • Drug use: Defer   • Sexual activity: Defer        Current Outpatient Medications on File Prior to Visit   Medication Sig Dispense Refill   • furosemide (Lasix) 20 MG tablet Take 1 tablet by mouth 2 (Two) Times a Day for 90 days. 180 tablet 2   • spironolactone (Aldactone) 25 MG tablet Take 1 tablet by mouth 2 (Two) Times a Day for 90 days. 180 tablet 2   • [DISCONTINUED] sertraline (ZOLOFT) 25 MG tablet Take 1 tablet by mouth Daily for 90 days. 30 tablet 2     No current facility-administered medications on file prior to visit.        Review of Systems:  General: Denies fever, chills, fatigue, and weakness.  Eyes: Denies vision loss, blurry vision, and excessive redness.  ENT: Denies hearing issues and difficulty swallowing.  Cardiovascular: Denies palpitations, chest pain, or syncopal episodes.  Respiratory: Denies shortness of breath, wheezing, and coughing.  GI: Denies abdominal pain, nausea, and vomiting.   : Denies frequency, hematuria, and urgency.  Musculoskeletal:+ Left foot pain  Derm: Denies rash, open wounds, or suspicious lesions.  Neuro: Denies headaches, numbness, loss of coordination, and tremors.  Psych: Denies anxiety and depression.  Endocrine: Denies temperature intolerance and changes in appetite.  Heme: Denies bleeding disorders or abnormal bruising.     Objective   /78   Pulse 99   Ht 185.4 cm (73\")   Wt (!) 160 kg (353 lb)   BMI 46.57 kg/m²     Foot/Ankle Exam:       " General:   Appearance: appears stated age and healthy and obesity    Orientation: AAOx3    Affect: appropriate    Gait: antalgic    Assistance: cane      VASCULAR      Right Foot Vascularity   Normal vascular exam    Dorsalis pedis:  2+  Posterior tibial:  2+  Skin Temperature: warm    Edema Gradin+ and pitting  CFT:  < 3 seconds  Pedal Hair Growth:  Absent     Left Foot Vascularity   Normal vascular exam    Dorsalis pedis:  2+  Posterior tibial:  2+  Skin Temperature: warm    Edema Gradin+ and pitting  CFT:  < 3 seconds  Pedal Hair Growth:  Absent      NEUROLOGIC     Right Foot Neurologic   Light touch sensation:  Normal  Hot/Cold sensation: normal    Achilles reflex:  1+     Left Foot Neurologic   Light touch sensation:  Normal  Hot/cold sensation: normal    Achilles reflex:  1+     MUSCULOSKELETAL      Right Foot Musculoskeletal   Ecchymosis:  None  Tenderness: none    Arch:  Normal     Left Foot Musculoskeletal   Ecchymosis:  None  Tenderness: dorsal foot    Arch:  Normal     MUSCLE STRENGTH     Right Foot Muscle Strength   Normal strength    Foot dorsiflexion:  5  Foot plantar flexion:  5  Foot inversion:  5  Foot eversion:  5     Left Foot Muscle Strength   Normal strength    Foot dorsiflexion:  5  Foot plantar flexion:  5  Foot inversion:  5  Foot eversion:  5     DERMATOLOGIC     Right Foot Dermatologic   Skin: skin intact    Nails: onychomycosis, abnormally thick and dystrophic nails       Left Foot Dermatologic   Skin: skin intact    Nails: onychomycosis, abnormally thick and dystrophic nails        Right Foot Additional Comments No obvious pedal or ankle deformity but there is extensive lymphedema involving both lower extremities.      Left Foot Additional Comments: Pain with palpation to the dorsal midfoot.      Assessment/Plan   Diagnoses and all orders for this visit:    1. Left foot pain (Primary)  -     XR Foot 3+ View Left    2. Arthritis of left foot    3. Lymphedema    4.  Onychomycosis      Patient presents with pain involving the left foot.  Imaging was reviewed today showing moderate to severe degenerative changes involving the midtarsal joints.  No fractures or dislocations are identified.  I explained the imaging, diagnosis, and treatment options at length.  Patient has excessive weight secondary to obesity and lymphedema which is likely causing exacerbation of primary arthritis.  He is in a difficult situation for treatment as I do not feel that any offloading devices will adequately fit him secondary to his lymphedema.  Patient is not an ideal surgical candidate given the lymphedema.  We did discuss proper range of motion and strengthening exercises.  I have asked that he start to use his lymphedema pumps on a routine basis.  He is to keep follow-up appointments with the wound care center.  We did discuss rice therapy.  Patient is to see me on an as-needed basis.    Orders Placed This Encounter   Procedures   • XR Foot 3+ View Left     Order Specific Question:   Reason for Exam:     Answer:   Left foot pain and swelling pain is on the top of the foot RM 14 NWB        Note is dictated utilizing voice recognition software. Unfortunately this leads to occasional typographical errors. I apologize in advance if the situation occurs. If questions occur please do not hesitate to call our office.

## 2020-12-21 ENCOUNTER — OFFICE VISIT (OUTPATIENT)
Dept: WOUND CARE | Facility: HOSPITAL | Age: 55
End: 2020-12-21

## 2020-12-28 ENCOUNTER — OFFICE VISIT (OUTPATIENT)
Dept: WOUND CARE | Facility: HOSPITAL | Age: 55
End: 2020-12-28

## 2021-01-04 ENCOUNTER — OFFICE VISIT (OUTPATIENT)
Dept: WOUND CARE | Facility: HOSPITAL | Age: 56
End: 2021-01-04

## 2021-01-11 ENCOUNTER — OFFICE VISIT (OUTPATIENT)
Dept: WOUND CARE | Facility: HOSPITAL | Age: 56
End: 2021-01-11

## 2021-01-18 ENCOUNTER — OFFICE VISIT (OUTPATIENT)
Dept: WOUND CARE | Facility: HOSPITAL | Age: 56
End: 2021-01-18

## 2021-01-25 ENCOUNTER — OFFICE VISIT (OUTPATIENT)
Dept: WOUND CARE | Facility: HOSPITAL | Age: 56
End: 2021-01-25

## 2021-02-01 ENCOUNTER — OFFICE VISIT (OUTPATIENT)
Dept: WOUND CARE | Facility: HOSPITAL | Age: 56
End: 2021-02-01

## 2021-02-08 ENCOUNTER — OFFICE VISIT (OUTPATIENT)
Dept: WOUND CARE | Facility: HOSPITAL | Age: 56
End: 2021-02-08

## 2021-02-15 ENCOUNTER — OFFICE VISIT (OUTPATIENT)
Dept: WOUND CARE | Facility: HOSPITAL | Age: 56
End: 2021-02-15

## 2021-02-15 ENCOUNTER — TRANSCRIBE ORDERS (OUTPATIENT)
Dept: PHYSICAL THERAPY | Facility: CLINIC | Age: 56
End: 2021-02-15

## 2021-02-15 DIAGNOSIS — I89.0 LYMPHEDEMA OF LOWER EXTREMITY, UNSPECIFIED LATERALITY: Primary | ICD-10-CM

## 2021-02-26 ENCOUNTER — TREATMENT (OUTPATIENT)
Dept: PHYSICAL THERAPY | Facility: CLINIC | Age: 56
End: 2021-02-26

## 2021-02-26 DIAGNOSIS — I89.0 LYMPHEDEMA: Primary | ICD-10-CM

## 2021-02-26 PROCEDURE — 97166 OT EVAL MOD COMPLEX 45 MIN: CPT | Performed by: OCCUPATIONAL THERAPIST

## 2021-02-26 NOTE — PROGRESS NOTES
Occupational Therapy Initial Evaluation    Patient: Robert Randhawa Jr.   : 1965  Diagnosis/ICD-10 Code:  Lymphedema [I89.0]  Referring practitioner: Aide Renee, *  Date of Initial Visit: 2021  Today's Date: 2021  Patient seen for 1 sessions               Subjective Questionnaire: LEFS: 55%      Subjective   Pt is a 55 obese male here for treatment for BLE lymphedema   He was referred lymphedema treatments and compression wraps to LLE   Pt states that he is followed by a cardiologist and a blood doctor   Objective   Pt reports that he has been dealing with lymphedema in his legs since   He reports that his lymphedema became much worse in his LLE when he underwent knee surgery      Since that time he has undergone various treatments from wrapping his legs , to unna boots, to Tubigrip sleeves   At present time he has no wounds but the lower extremities and the feet are edematous   He reports pain level at 5  He is wearing circaid on his RLE     He reports he has a lymphatic pump but he can not get his foot into the pump which therapist and patient discussed as these pump boots are quite large with zippers and are oversized to accommodate edematous extremities    Therapist suggested he contact the vendor and gave the patient updated contact info for the [employer] company representative   Pt reports that he had his legs wrapped a few years ago but he does not have transportation so he can not come to clinic regularly   Discussed self wrapping    The patient stated that he is unable to wrap his legs as he can not bend and manipulate    He reports he has difficulty putting on his own shoes   Pt also reporting that he has been depressed due to his inability to get out and his transportation issues    He also spoke about his living situation ,he lives with his brother but he is depressed as he reports his brother does not help him with ADL or IADL   His sister in law and other brother have  been helpful with grocery and DR appointments     LLE  Dorsum   33 cm   Ankle  33 cm   Above ankle   50 cm   Mid calf    59 cm   Upper calf  62 cm   Knee 54.5 cm       Assessment & Plan     Assessment  Impairments: abnormal gait, abnormal or restricted ROM, activity intolerance, impaired balance, impaired physical strength, lacks appropriate home exercise program, pain with function and safety issue  Assessment details: Pt reports that he is unable to perform self wrapping and has difficulty don doff shoes socks   Pt reports he has pneumatic pump and will follow up with representative regarding being unable to get legs into pump   Suggested wearing the circaid alternately on each legs    Issued tubigrip and assisted in don of sleeve and discussed how to wear and demonstrated able to pull onto leg     Barriers to therapy: size and mobility difficulty   Prognosis: fair  Functional Limitations: carrying objects, stooping, reaching behind back and unable to perform repetitive tasks  Goals  Plan Goals:   stg    Pt to determine if he will be able to attend therapy 2nd to transportation issues   He reports he will follow up and contact dept if he is able   stg  Review HEP  On ankle pumps and knee flex ext   stg   Review deep diaphragmatic breathing to help lymphatic motility   stg   Follow up with [employer] reps regarding pump fitting     LTG     MLD to abdomen and BLE to improve lymphatic motility   LTG    Graded exercise program to improve deep breathing and BLE motion       Pt is indicated for skilled OT    Plan  Planned therapy interventions: manual therapy, postural training, soft tissue mobilization, strengthening and compression  Other planned therapy interventions: manual lymphatic therapy   Frequency: 2x week  Plan details: 20        History # of Personal Factors and/or Comorbidities: MODERATE (1-2)  Examination of Body System(s): # of elements: MODERATE (3)  Clinical Presentation: STABLE   Clinical Decision Making:  MODERATE      Timed Codes        Manual Therapy:         mins  45540;    Therapeutic Exercise:         mins  97736;     Neuromuscular Cat:        mins  44469;    Therapeutic Activity:          mins  33359;      Ultrasound:          mins  42253;    Community/ work         mins  68485  Self Care/ Son         mins  07641  Sensory Re-Int.                  mins   81909  Cogn[employer]ve Re-train         mins  06597     Un-timed Codes  Electrical Stimulation:         mins 9 37546 ( );  OT low eval          mins  85505  OT mod eval.             45      mins   60071  OT high eval          mins 39275        Timed Treatment:  0    mins   Total Treatment:    45    mins    OT SIGNATURE: Andressa Duque OT   DATE TREATMENT INITIATED: 2/26/2021    Initial Certification  Certification Period: 5/27/2021  I certify that the therapy services are furnished while this patient is under my care.  The services outlined above are required by this patient, and will be reviewed every 90 days.     PHYSICIAN: Aide Renee MD      DATE:     Please sign and return via fax to [phone].. Thank you, [facility].

## 2021-05-24 ENCOUNTER — OFFICE VISIT (OUTPATIENT)
Dept: ONCOLOGY | Facility: CLINIC | Age: 56
End: 2021-05-24

## 2021-05-24 ENCOUNTER — LAB (OUTPATIENT)
Dept: LAB | Facility: HOSPITAL | Age: 56
End: 2021-05-24

## 2021-05-24 VITALS
SYSTOLIC BLOOD PRESSURE: 153 MMHG | DIASTOLIC BLOOD PRESSURE: 85 MMHG | HEART RATE: 116 BPM | TEMPERATURE: 97.8 F | HEIGHT: 73 IN | RESPIRATION RATE: 20 BRPM | WEIGHT: 315 LBS | BODY MASS INDEX: 41.75 KG/M2

## 2021-05-24 DIAGNOSIS — D69.6 THROMBOCYTOPENIA (HCC): Primary | ICD-10-CM

## 2021-05-24 DIAGNOSIS — D69.6 THROMBOCYTOPENIA (HCC): ICD-10-CM

## 2021-05-24 LAB
BASOPHILS # BLD AUTO: 0.01 10*3/MM3 (ref 0–0.2)
BASOPHILS NFR BLD AUTO: 0.2 % (ref 0–1.5)
DEPRECATED RDW RBC AUTO: 50.1 FL (ref 37–54)
EOSINOPHIL # BLD AUTO: 0.1 10*3/MM3 (ref 0–0.4)
EOSINOPHIL NFR BLD AUTO: 2.2 % (ref 0.3–6.2)
ERYTHROCYTE [DISTWIDTH] IN BLOOD BY AUTOMATED COUNT: 13.3 % (ref 12.3–15.4)
HCT VFR BLD AUTO: 44.2 % (ref 37.5–51)
HGB BLD-MCNC: 14.3 G/DL (ref 13–17.7)
LYMPHOCYTES # BLD AUTO: 0.89 10*3/MM3 (ref 0.7–3.1)
LYMPHOCYTES NFR BLD AUTO: 19.9 % (ref 19.6–45.3)
MCH RBC QN AUTO: 33.3 PG (ref 26.6–33)
MCHC RBC AUTO-ENTMCNC: 32.4 G/DL (ref 31.5–35.7)
MCV RBC AUTO: 102.8 FL (ref 79–97)
MONOCYTES # BLD AUTO: 0.35 10*3/MM3 (ref 0.1–0.9)
MONOCYTES NFR BLD AUTO: 7.8 % (ref 5–12)
NEUTROPHILS NFR BLD AUTO: 3.13 10*3/MM3 (ref 1.7–7)
NEUTROPHILS NFR BLD AUTO: 69.9 % (ref 42.7–76)
PLATELET # BLD AUTO: 108 10*3/MM3 (ref 140–450)
PMV BLD AUTO: 10.2 FL (ref 6–12)
RBC # BLD AUTO: 4.3 10*6/MM3 (ref 4.14–5.8)
WBC # BLD AUTO: 4.48 10*3/MM3 (ref 3.4–10.8)

## 2021-05-24 PROCEDURE — 36415 COLL VENOUS BLD VENIPUNCTURE: CPT

## 2021-05-24 PROCEDURE — 99214 OFFICE O/P EST MOD 30 MIN: CPT | Performed by: INTERNAL MEDICINE

## 2021-05-24 PROCEDURE — 85025 COMPLETE CBC W/AUTO DIFF WBC: CPT

## 2021-05-24 RX ORDER — SPIRONOLACTONE 25 MG/1
25 TABLET ORAL DAILY
COMMUNITY
End: 2023-01-26

## 2021-05-24 RX ORDER — FUROSEMIDE 20 MG/1
20 TABLET ORAL DAILY
COMMUNITY
End: 2023-01-26

## 2021-05-26 ENCOUNTER — OFFICE VISIT (OUTPATIENT)
Dept: CARDIOLOGY | Facility: CLINIC | Age: 56
End: 2021-05-26

## 2021-05-26 VITALS
HEIGHT: 73 IN | HEART RATE: 106 BPM | DIASTOLIC BLOOD PRESSURE: 88 MMHG | WEIGHT: 315 LBS | BODY MASS INDEX: 41.75 KG/M2 | SYSTOLIC BLOOD PRESSURE: 127 MMHG | OXYGEN SATURATION: 95 %

## 2021-05-26 DIAGNOSIS — E66.01 OBESITY, MORBID (HCC): ICD-10-CM

## 2021-05-26 DIAGNOSIS — R06.02 SHORTNESS OF BREATH: ICD-10-CM

## 2021-05-26 DIAGNOSIS — R07.89 CHEST PRESSURE: Primary | ICD-10-CM

## 2021-05-26 PROCEDURE — 99213 OFFICE O/P EST LOW 20 MIN: CPT | Performed by: INTERNAL MEDICINE

## 2021-05-26 PROCEDURE — 93000 ELECTROCARDIOGRAM COMPLETE: CPT | Performed by: INTERNAL MEDICINE

## 2021-05-26 NOTE — PROGRESS NOTES
Encounter Date:05/26/2021  Last seen 9/17/2020      Patient ID: Robert Randhawa Jr. is a 56 y.o. male.    Chief Complaint:    History of chest discomfort  History of shortness of breath.     History of Present Illness  Since I have last seen, the patient has been without any chest discomfort , unusual shortness of breath, palpitations, dizziness or syncope.  Denies having any headache ,abdominal pain ,nausea, vomiting , diarrhea constipation, loss of weight or loss of appetite.  Denies having any excessive bruising ,hematuria or blood in the stool.    Review of all systems negative except as indicated.    Reviewed ROS.  Assessment and Plan         ]]]]]]]]]]]]]]]]]]  Impression  ===========   -chest discomfort and shortness of breath -- better but shortness of breath is still present.  Patient has occasional atypical chest discomfort     Cardiac catheterization 2/28/2020 revealed normal left ventricle function and normal coronary arteries   .  echocardiogram 1/8/2020 revealed right atrium right ventricle enlargement and normal left ventricle function.  Normal cardiac valves.     Lexiscan Cardiolite test is negative for myocardial ischemia 1/8/2020 (performed at [facility] as an outpatient due to patient's weight)      -exogenous obesity     -history of chronic DVT asthma and arthritis. Eliquis has been discontinued recently.      -status post left knee surgery  appendectomy     -former smoker     -allergic to sulfa     -family history is negative for coronary artery disease  ============  Plan  ============  Atypical chest discomfort.-Better    Shortness of breath-chronic and unchanged.    EKG showed no acute changes    Medications were reviewed and updated.  Follow-up in 6 months.  Further plan will depend on patient's progress.  [[[[[[[[[[[[[[[[[[                Diagnosis Plan   1. Chest pressure  ECG 12 Lead   2. Shortness of breath  ECG 12 Lead   3. Obesity, morbid (CMS/HCC)  ECG 12 Lead   LAB RESULTS (LAST  7 DAYS)    CBC  Results from last 7 days   Lab Units 05/24/21  1427   WBC 10*3/mm3 4.48   RBC 10*6/mm3 4.30   HEMOGLOBIN g/dL 14.3   HEMATOCRIT % 44.2   MCV fL 102.8*   PLATELETS 10*3/mm3 108*       BMP        CMP         BNP        TROPONIN        CoAg        Creatinine Clearance  CrCl cannot be calculated (Patient's most recent lab result is older than the maximum 30 days allowed.).    ABG        Radiology  No radiology results for the last day                The following portions of the patient's history were reviewed and updated as appropriate: allergies, current medications, past family history, past medical history, past social history, past surgical history and problem list.    Review of Systems   Cardiovascular: Positive for leg swelling. Negative for chest pain and palpitations.   Respiratory: Positive for shortness of breath.    Neurological: Positive for dizziness and numbness.         Current Outpatient Medications:   •  furosemide (LASIX) 20 MG tablet, Take 20 mg by mouth Daily., Disp: , Rfl:   •  spironolactone (ALDACTONE) 25 MG tablet, Take 25 mg by mouth Daily., Disp: , Rfl:     Allergies   Allergen Reactions   • Sulfa Antibiotics Unknown (See Comments)       Family History   Problem Relation Age of Onset   • Heart disease Mother    • Diabetes Mother    • Arthritis Father        Past Surgical History:   Procedure Laterality Date   • APPENDECTOMY  1974   • CARDIAC CATHETERIZATION N/A 2/28/2020    Procedure: Left ventriculography;  Surgeon: Kenney Reyez MD;  Location: [facility] CATH INVASIVE LOCATION;  Service: Cardiovascular;  Laterality: N/A;   • CARDIAC CATHETERIZATION N/A 2/28/2020    Procedure: Coronary angiography;  Surgeon: Kenney Reyez MD;  Location: [facility] CATH INVASIVE LOCATION;  Service: Cardiovascular;  Laterality: N/A;   • CARDIAC CATHETERIZATION N/A 2/28/2020    Procedure: Left Heart Cath;  Surgeon: Kenney Reyez MD;  Location: [facility] CATH INVASIVE LOCATION;  Service:  "Cardiovascular;  Laterality: N/A;   • KNEE SURGERY Left 2018       Past Medical History:   Diagnosis Date   • Arthritis    • Asthma    • Chronic deep vein thrombosis (DVT) (CMS/HCC)        Family History   Problem Relation Age of Onset   • Heart disease Mother    • Diabetes Mother    • Arthritis Father        Social History     Socioeconomic History   • Marital status: Single     Spouse name: Not on file   • Number of children: Not on file   • Years of education: Not on file   • Highest education level: Not on file   Tobacco Use   • Smoking status: Former Smoker     Types: Cigarettes     Quit date:      Years since quittin.4   • Smokeless tobacco: Never Used   Substance and Sexual Activity   • Alcohol use: Never   • Drug use: Defer   • Sexual activity: Defer           ECG 12 Lead    Date/Time: 2021 1:35 PM  Performed by: Kenney Reyez MD  Authorized by: Kenney Reyez MD   Comparison: compared with previous ECG   Similar to previous ECG  Comparison to previous ECG: Normal sinus rhythm nonspecific ST-T wave changes 95/min normal axis normal intervals no ectopy no significant change from 2020                Objective:       Physical Exam    /88 (BP Location: Left arm, Patient Position: Sitting, Cuff Size: Large Adult)   Pulse 106   Ht 185.4 cm (73\")   Wt (!) 167 kg (368 lb)   SpO2 95%   BMI 48.55 kg/m²   The patient is alert, oriented and in no distress.    Vital signs as noted above.  Exogenous obesity.  BMI 49.    Head and neck revealed no carotid bruits or jugular venous distension.  No thyromegaly or lymphadenopathy is present.    Lungs clear.  No wheezing.  Breath sounds are normal bilaterally.    Heart normal first and second heart sounds.  No murmur..  No pericardial rub is present.  No gallop is present.    Abdomen soft and nontender.  No organomegaly is present.    Extremities revealed good peripheral pulses.  Bilateral nonpitting edema.    Skin warm and " dry.    Musculoskeletal system is grossly normal.    CNS grossly normal.    Reviewed and unchanged from last visit.

## 2021-06-03 ENCOUNTER — TELEPHONE (OUTPATIENT)
Dept: ONCOLOGY | Facility: CLINIC | Age: 56
End: 2021-06-03

## 2021-06-03 DIAGNOSIS — M79.89 SWELLING OF LEFT FOOT: Primary | ICD-10-CM

## 2021-06-03 DIAGNOSIS — I89.0 LYMPHEDEMA: ICD-10-CM

## 2021-06-03 DIAGNOSIS — I82.4Y2 DEEP VEIN THROMBOSIS (DVT) OF PROXIMAL VEIN OF LEFT LOWER EXTREMITY, UNSPECIFIED CHRONICITY (HCC): ICD-10-CM

## 2021-06-03 DIAGNOSIS — R60.0 LOCALIZED EDEMA: ICD-10-CM

## 2021-06-03 NOTE — TELEPHONE ENCOUNTER
Caller: Robert Randhawa Jr.    Relationship: Self    Best call back number: [phone]    What is the best time to reach you: ANYTIME DURING THEY DAY    What was the call regarding: DR. CAZARES HAD TOLD PATIENT THAT HE WOULD SCHEDULE HOME HEALTH TO WRAP HIS LEGS DUE TO LYMPHODEMA BUT PATIENT HAS NOT HEARD FROM ANYONE REGARDING THAT.      Do you require a callback: PLEASE CONTACT PATIENT AS SOON AS POSSIBLE REGARDING THIS SCHEDULING

## 2021-06-08 NOTE — TELEPHONE ENCOUNTER
Spoke with patient and apologized that the order was not put in for home health. Patient states that he prefers [facility] because he has received care with this company in the past.   Spoke with Estefany with [facility] and was given the fax number [phone] to send office note, demographics and orders to.   This was done and fax confirmation received.

## 2021-06-11 ENCOUNTER — TELEPHONE (OUTPATIENT)
Dept: ONCOLOGY | Facility: CLINIC | Age: 56
End: 2021-06-11

## 2021-06-11 NOTE — TELEPHONE ENCOUNTER
Spoke with Nery from [facility]. Advised that Dr. Finley would like mary ellen-boots placed on patient. Nery merritt/yojana.

## 2021-07-15 ENCOUNTER — HOSPITAL ENCOUNTER (OUTPATIENT)
Facility: HOSPITAL | Age: 56
Setting detail: OBSERVATION
LOS: 1 days | Discharge: HOME HEALTH | End: 2021-07-18
Attending: FAMILY MEDICINE | Admitting: FAMILY MEDICINE

## 2021-07-15 DIAGNOSIS — L03.116 CELLULITIS OF LEFT LOWER EXTREMITY: Primary | ICD-10-CM

## 2021-07-15 LAB
ALBUMIN SERPL-MCNC: 3.4 G/DL (ref 3.5–5.2)
ALBUMIN/GLOB SERPL: 0.8 G/DL
ALP SERPL-CCNC: 138 U/L (ref 39–117)
ALT SERPL W P-5'-P-CCNC: 24 U/L (ref 1–41)
ANION GAP SERPL CALCULATED.3IONS-SCNC: 8 MMOL/L (ref 5–15)
AST SERPL-CCNC: 58 U/L (ref 1–40)
BASOPHILS # BLD AUTO: 0 10*3/MM3 (ref 0–0.2)
BASOPHILS NFR BLD AUTO: 0.7 % (ref 0–1.5)
BILIRUB SERPL-MCNC: 0.7 MG/DL (ref 0–1.2)
BUN SERPL-MCNC: 12 MG/DL (ref 6–20)
BUN/CREAT SERPL: 12.9 (ref 7–25)
CALCIUM SPEC-SCNC: 8.7 MG/DL (ref 8.6–10.5)
CHLORIDE SERPL-SCNC: 104 MMOL/L (ref 98–107)
CO2 SERPL-SCNC: 25 MMOL/L (ref 22–29)
CREAT SERPL-MCNC: 0.93 MG/DL (ref 0.76–1.27)
DEPRECATED RDW RBC AUTO: 50.8 FL (ref 37–54)
EOSINOPHIL # BLD AUTO: 0.1 10*3/MM3 (ref 0–0.4)
EOSINOPHIL NFR BLD AUTO: 1.6 % (ref 0.3–6.2)
ERYTHROCYTE [DISTWIDTH] IN BLOOD BY AUTOMATED COUNT: 14.5 % (ref 12.3–15.4)
GFR SERPL CREATININE-BSD FRML MDRD: 84 ML/MIN/1.73
GLOBULIN UR ELPH-MCNC: 4.3 GM/DL
GLUCOSE SERPL-MCNC: 123 MG/DL (ref 65–99)
HCT VFR BLD AUTO: 42.1 % (ref 37.5–51)
HGB BLD-MCNC: 14.2 G/DL (ref 13–17.7)
LYMPHOCYTES # BLD AUTO: 0.9 10*3/MM3 (ref 0.7–3.1)
LYMPHOCYTES NFR BLD AUTO: 18.7 % (ref 19.6–45.3)
MCH RBC QN AUTO: 34.1 PG (ref 26.6–33)
MCHC RBC AUTO-ENTMCNC: 33.7 G/DL (ref 31.5–35.7)
MCV RBC AUTO: 101.1 FL (ref 79–97)
MONOCYTES # BLD AUTO: 0.4 10*3/MM3 (ref 0.1–0.9)
MONOCYTES NFR BLD AUTO: 8.6 % (ref 5–12)
NEUTROPHILS NFR BLD AUTO: 3.3 10*3/MM3 (ref 1.7–7)
NEUTROPHILS NFR BLD AUTO: 70.4 % (ref 42.7–76)
NRBC BLD AUTO-RTO: 0.2 /100 WBC (ref 0–0.2)
NT-PROBNP SERPL-MCNC: 60.9 PG/ML (ref 0–900)
PLATELET # BLD AUTO: 98 10*3/MM3 (ref 140–450)
PMV BLD AUTO: 8.7 FL (ref 6–12)
POTASSIUM SERPL-SCNC: 4.1 MMOL/L (ref 3.5–5.2)
PROT SERPL-MCNC: 7.7 G/DL (ref 6–8.5)
RBC # BLD AUTO: 4.16 10*6/MM3 (ref 4.14–5.8)
SODIUM SERPL-SCNC: 137 MMOL/L (ref 136–145)
TSH SERPL DL<=0.05 MIU/L-ACNC: 1.23 UIU/ML (ref 0.27–4.2)
WBC # BLD AUTO: 4.7 10*3/MM3 (ref 3.4–10.8)

## 2021-07-15 PROCEDURE — 87186 SC STD MICRODIL/AGAR DIL: CPT | Performed by: FAMILY MEDICINE

## 2021-07-15 PROCEDURE — 96372 THER/PROPH/DIAG INJ SC/IM: CPT

## 2021-07-15 PROCEDURE — 25010000002 ENOXAPARIN PER 10 MG: Performed by: FAMILY MEDICINE

## 2021-07-15 PROCEDURE — 87205 SMEAR GRAM STAIN: CPT | Performed by: FAMILY MEDICINE

## 2021-07-15 PROCEDURE — 85025 COMPLETE CBC W/AUTO DIFF WBC: CPT | Performed by: FAMILY MEDICINE

## 2021-07-15 PROCEDURE — 83036 HEMOGLOBIN GLYCOSYLATED A1C: CPT | Performed by: FAMILY MEDICINE

## 2021-07-15 PROCEDURE — 25010000002 VANCOMYCIN 10 G RECONSTITUTED SOLUTION: Performed by: FAMILY MEDICINE

## 2021-07-15 PROCEDURE — 87070 CULTURE OTHR SPECIMN AEROBIC: CPT | Performed by: FAMILY MEDICINE

## 2021-07-15 PROCEDURE — 80053 COMPREHEN METABOLIC PANEL: CPT | Performed by: FAMILY MEDICINE

## 2021-07-15 PROCEDURE — 87147 CULTURE TYPE IMMUNOLOGIC: CPT | Performed by: FAMILY MEDICINE

## 2021-07-15 PROCEDURE — 83880 ASSAY OF NATRIURETIC PEPTIDE: CPT | Performed by: FAMILY MEDICINE

## 2021-07-15 PROCEDURE — 84443 ASSAY THYROID STIM HORMONE: CPT | Performed by: FAMILY MEDICINE

## 2021-07-15 RX ORDER — BISACODYL 10 MG
10 SUPPOSITORY, RECTAL RECTAL DAILY PRN
Status: DISCONTINUED | OUTPATIENT
Start: 2021-07-15 | End: 2021-07-18 | Stop reason: HOSPADM

## 2021-07-15 RX ORDER — AMOXICILLIN 250 MG
2 CAPSULE ORAL 2 TIMES DAILY
Status: DISCONTINUED | OUTPATIENT
Start: 2021-07-15 | End: 2021-07-18 | Stop reason: HOSPADM

## 2021-07-15 RX ORDER — ACETAMINOPHEN 325 MG/1
650 TABLET ORAL EVERY 4 HOURS PRN
Status: DISCONTINUED | OUTPATIENT
Start: 2021-07-15 | End: 2021-07-18 | Stop reason: HOSPADM

## 2021-07-15 RX ORDER — SPIRONOLACTONE 25 MG/1
25 TABLET ORAL DAILY
Status: DISCONTINUED | OUTPATIENT
Start: 2021-07-15 | End: 2021-07-18 | Stop reason: HOSPADM

## 2021-07-15 RX ORDER — CALCIUM CARBONATE 200(500)MG
2 TABLET,CHEWABLE ORAL 2 TIMES DAILY PRN
Status: DISCONTINUED | OUTPATIENT
Start: 2021-07-15 | End: 2021-07-18 | Stop reason: HOSPADM

## 2021-07-15 RX ORDER — FUROSEMIDE 20 MG/1
20 TABLET ORAL DAILY
Status: DISCONTINUED | OUTPATIENT
Start: 2021-07-15 | End: 2021-07-18 | Stop reason: HOSPADM

## 2021-07-15 RX ORDER — POLYETHYLENE GLYCOL 3350 17 G/17G
17 POWDER, FOR SOLUTION ORAL DAILY PRN
Status: DISCONTINUED | OUTPATIENT
Start: 2021-07-15 | End: 2021-07-18 | Stop reason: HOSPADM

## 2021-07-15 RX ORDER — BISACODYL 5 MG/1
5 TABLET, DELAYED RELEASE ORAL DAILY PRN
Status: DISCONTINUED | OUTPATIENT
Start: 2021-07-15 | End: 2021-07-18 | Stop reason: HOSPADM

## 2021-07-15 RX ORDER — SODIUM CHLORIDE 0.9 % (FLUSH) 0.9 %
3-10 SYRINGE (ML) INJECTION AS NEEDED
Status: DISCONTINUED | OUTPATIENT
Start: 2021-07-15 | End: 2021-07-18 | Stop reason: HOSPADM

## 2021-07-15 RX ORDER — FAMOTIDINE 20 MG/1
40 TABLET, FILM COATED ORAL DAILY
Status: DISCONTINUED | OUTPATIENT
Start: 2021-07-15 | End: 2021-07-18 | Stop reason: HOSPADM

## 2021-07-15 RX ORDER — SODIUM CHLORIDE 0.9 % (FLUSH) 0.9 %
3 SYRINGE (ML) INJECTION EVERY 12 HOURS SCHEDULED
Status: DISCONTINUED | OUTPATIENT
Start: 2021-07-15 | End: 2021-07-18 | Stop reason: HOSPADM

## 2021-07-15 RX ORDER — ONDANSETRON 2 MG/ML
4 INJECTION INTRAMUSCULAR; INTRAVENOUS EVERY 6 HOURS PRN
Status: DISCONTINUED | OUTPATIENT
Start: 2021-07-15 | End: 2021-07-18 | Stop reason: HOSPADM

## 2021-07-15 RX ADMIN — SPIRONOLACTONE 25 MG: 25 TABLET ORAL at 20:52

## 2021-07-15 RX ADMIN — FAMOTIDINE 40 MG: 20 TABLET ORAL at 20:52

## 2021-07-15 RX ADMIN — VANCOMYCIN HYDROCHLORIDE 2000 MG: 100 INJECTION, POWDER, LYOPHILIZED, FOR SOLUTION INTRAVENOUS at 22:34

## 2021-07-15 RX ADMIN — Medication 3 ML: at 20:52

## 2021-07-15 RX ADMIN — DOCUSATE SODIUM 50 MG AND SENNOSIDES 8.6 MG 2 TABLET: 8.6; 5 TABLET, FILM COATED ORAL at 20:52

## 2021-07-15 RX ADMIN — ENOXAPARIN SODIUM 40 MG: 40 INJECTION SUBCUTANEOUS at 20:52

## 2021-07-15 RX ADMIN — ACETAMINOPHEN 650 MG: 325 TABLET, FILM COATED ORAL at 20:53

## 2021-07-15 RX ADMIN — FUROSEMIDE 20 MG: 20 TABLET ORAL at 20:52

## 2021-07-15 RX ADMIN — CLINDAMYCIN PHOSPHATE 450 MG: 150 INJECTION, SOLUTION INTRAVENOUS at 20:55

## 2021-07-15 NOTE — NURSING NOTE
1800- Direct Admit to floor, vitals obtained, placed in yellow gown. Attempted IV site, unsuccessful. Cellulitis to left lower extremity, Abrasion to shin. Patient alert and oriented. Will continue to monitor.

## 2021-07-15 NOTE — PROGRESS NOTES
"Pharmacy Antimicrobial Dosing Service    Subjective:  Robert Randhawa Jr. is a 56 y.o.male admitted with wound infection. Pharmacy has been consulted to dose vancomycin for possible infection.    PMH: obese/infection      Assessment/Plan    1. Day #1 Vancomycin: 2 gm x 1 at 2000 PM then 1250mg ( 15mg/kg DW 114kg) iv q 12hr w next dose at 800 AM on 7/16/21. AUC goal 400-600 trough =10-20 mcg/ml goal.  7 AM draw on 7/17/21.    2. Day #1 clindamyicn 450mg iv q 6 hrs.    Will continue to monitor drug levels, renal function, culture and sensitivities, and patient clinical status.       Objective:  Relevant clinical data and objective history reviewed:  185.4 cm (73\")   (!) 165 kg (363 lb 15.7 oz)   Ideal body weight: 79.9 kg (176 lb 2.4 oz)  Adjusted ideal body weight: 114 kg (251 lb 4.5 oz)  Body mass index is 48.02 kg/m².        Results from last 7 days   Lab Units 07/15/21  1734   CREATININE mg/dL 0.93     Estimated Creatinine Clearance: 141.3 mL/min (by C-G formula based on SCr of 0.93 mg/dL).  No intake/output data recorded.    Results from last 7 days   Lab Units 07/15/21  1734   WBC 10*3/mm3 4.70     Temperature    07/15/21 1735   Temp: 99.5 °F (37.5 °C)     Baseline culture/source/susceptibility:  Microbiology Results (last 10 days)       ** No results found for the last 240 hours. **              Anti-Infectives (From admission, onward)      Ordered     Dose/Rate Route Frequency Start Stop    07/15/21 1840  vancomycin 1250 mg/250 mL 0.9% NS IVPB ([facility])     Ordering Provider: Alfredo Torres MD    1,250 mg Intravenous Every 12 Hours 07/16/21 0800 07/23/21 0759    07/15/21 1839  vancomycin 2000 mg/500 mL 0.9% NS IVPB ([facility])     Ordering Provider: Alfredo Torres MD    2,000 mg Intravenous Once 07/15/21 2000      07/15/21 1721  clindamycin (CLEOCIN) 450 mg in dextrose (D5W) 5 % 100 mL IVPB     Ordering Provider: Alfredo Torres MD    450 mg  200 mL/hr over 30 Minutes Intravenous Every 6 Hours 07/15/21 1812 07/20/21 " 1814    07/15/21 1721  Pharmacy to dose vancomycin     Ordering Provider: Alfredo Torres MD     Does not apply Continuous PRN 07/15/21 1721 07/20/21 1720            Ramona James RPH  07/15/21 18:45 EDT

## 2021-07-16 PROBLEM — L97.921 NON-PRESSURE CHRONIC ULCER LEFT LOWER LEG, LIMITED TO BREAKDOWN SKIN: Status: ACTIVE | Noted: 2021-07-16

## 2021-07-16 PROBLEM — L03.116 CELLULITIS OF LEFT FOOT: Status: ACTIVE | Noted: 2021-07-16

## 2021-07-16 LAB — HBA1C MFR BLD: 5.7 % (ref 3.5–5.6)

## 2021-07-16 PROCEDURE — 25010000002 VANCOMYCIN 10 G RECONSTITUTED SOLUTION: Performed by: FAMILY MEDICINE

## 2021-07-16 PROCEDURE — 99224 PR SBSQ OBSERVATION CARE/DAY 15 MINUTES: CPT | Performed by: NURSE PRACTITIONER

## 2021-07-16 PROCEDURE — 25010000002 ENOXAPARIN PER 10 MG: Performed by: FAMILY MEDICINE

## 2021-07-16 PROCEDURE — G0378 HOSPITAL OBSERVATION PER HR: HCPCS

## 2021-07-16 PROCEDURE — 97166 OT EVAL MOD COMPLEX 45 MIN: CPT

## 2021-07-16 PROCEDURE — 96365 THER/PROPH/DIAG IV INF INIT: CPT

## 2021-07-16 PROCEDURE — 96366 THER/PROPH/DIAG IV INF ADDON: CPT

## 2021-07-16 PROCEDURE — 96372 THER/PROPH/DIAG INJ SC/IM: CPT

## 2021-07-16 PROCEDURE — 97162 PT EVAL MOD COMPLEX 30 MIN: CPT

## 2021-07-16 RX ADMIN — CLINDAMYCIN PHOSPHATE 450 MG: 150 INJECTION, SOLUTION INTRAVENOUS at 06:46

## 2021-07-16 RX ADMIN — CLINDAMYCIN PHOSPHATE 450 MG: 150 INJECTION, SOLUTION INTRAVENOUS at 02:03

## 2021-07-16 RX ADMIN — FUROSEMIDE 20 MG: 20 TABLET ORAL at 09:01

## 2021-07-16 RX ADMIN — DOCUSATE SODIUM 50 MG AND SENNOSIDES 8.6 MG 2 TABLET: 8.6; 5 TABLET, FILM COATED ORAL at 20:03

## 2021-07-16 RX ADMIN — Medication 3 ML: at 20:03

## 2021-07-16 RX ADMIN — DOCUSATE SODIUM 50 MG AND SENNOSIDES 8.6 MG 2 TABLET: 8.6; 5 TABLET, FILM COATED ORAL at 09:01

## 2021-07-16 RX ADMIN — VANCOMYCIN HYDROCHLORIDE 1250 MG: 10 INJECTION, POWDER, LYOPHILIZED, FOR SOLUTION INTRAVENOUS at 19:58

## 2021-07-16 RX ADMIN — ENOXAPARIN SODIUM 40 MG: 40 INJECTION SUBCUTANEOUS at 19:58

## 2021-07-16 RX ADMIN — FAMOTIDINE 40 MG: 20 TABLET ORAL at 09:01

## 2021-07-16 RX ADMIN — VANCOMYCIN HYDROCHLORIDE 1250 MG: 10 INJECTION, POWDER, LYOPHILIZED, FOR SOLUTION INTRAVENOUS at 09:06

## 2021-07-16 RX ADMIN — SPIRONOLACTONE 25 MG: 25 TABLET ORAL at 09:01

## 2021-07-16 RX ADMIN — ENOXAPARIN SODIUM 40 MG: 40 INJECTION SUBCUTANEOUS at 09:01

## 2021-07-16 RX ADMIN — CLINDAMYCIN PHOSPHATE 450 MG: 150 INJECTION, SOLUTION INTRAVENOUS at 23:55

## 2021-07-16 RX ADMIN — CLINDAMYCIN PHOSPHATE 450 MG: 150 INJECTION, SOLUTION INTRAVENOUS at 17:06

## 2021-07-16 RX ADMIN — CLINDAMYCIN PHOSPHATE 450 MG: 150 INJECTION, SOLUTION INTRAVENOUS at 12:58

## 2021-07-16 NOTE — PLAN OF CARE
Goal Outcome Evaluation:  Plan of Care Reviewed With: patient           Outcome Summary: 55 y/o M who presented with cellulitis L LE per chart. Pt lives with his brother and niece but is alone most of the time. Pt reports mutiple falls this year at home secondary to decreased sensation in LLE. Pt reports diffictulty with LB ADLs secondary to decreased AROM with legs. Pt required mod A for donning/doffing socks/shoes on LLE; SBA for functional mobility with RW. OT recommended a shower chair at home for safety with bathing. Pt presenting below baseline and will benefit from HHOT. Will follow 3x/week.

## 2021-07-16 NOTE — PLAN OF CARE
Goal Outcome Evaluation:  Plan of Care Reviewed With: patient           Outcome Summary: 55 y/o M who presented with cellulitis L LE per chart. Pt lives with his brother and nephew but is alone most of the time. He ambulates with STC. He drives. He reports significant difficulty standing from a sitting position on any surface at baseline. He reports 12 falls in the past 6 months all of which have occured in his home and seem to be related to decreased sensation L LE. He reports difficulty perfoming lower body dressing and bathing. OT consult requested. Pt owns RW. He has 2 small steps to enter. Pt with postural instability with cane that is improved with RW. Ambulates 20' with RW and SBA. Recommending use of RW at all times at home and in community and HHPT upon d/c. Pt expressing concern about needing more help at home than he has. Communicated to CM to determine if caregiver services are available for this patient.

## 2021-07-16 NOTE — DISCHARGE PLACEMENT REQUEST
"EdisJosemanuel Jr. (56 y.o. Male)     Date of Birth Social Security Number Address Home Phone MRN    1965  [address] [id number] [phone] [id number]    Congregation Marital Status          None Single       Admission Date Admission Type Admitting Provider Attending Provider Department, Room/Bed    7/15/21 Elective Alfredo Torres MD Mohammed, Dost, MD [facility] SURGICAL INPATIENT, 4123/1    Discharge Date Discharge Disposition Discharge Destination                       Attending Provider: Alfredo Torres MD    Allergies: Sulfa Antibiotics    Isolation: None   Infection: None   Code Status: Prior    Ht: 185.4 cm (73\")   Wt: 165 kg (363 lb 15.7 oz)    Admission Cmt: None   Principal Problem: None                Active Insurance as of 7/15/2021     Primary Coverage     Payor Plan Insurance Group Employer/Plan Group    HUMANA MEDICARE REPLACEMENT HUMANA MEDICARE REPLACEMENT [id number]     Payor Plan Address Payor Plan Phone Number Payor Plan Fax Number Effective Dates    [address] [address] [phone]  12/1/2019 - None Entered    [facility] [address]       Subscriber Name Subscriber Birth Date Member ID       JOSEMANUEL RANDHAWA  1965 [id number]                 Emergency Contacts      (Rel.) Home Phone Work Phone Mobile Phone    Sajan Randhawa (Brother) -- -- [phone]            History & Physical    No notes of this type exist for this encounter.         "

## 2021-07-16 NOTE — THERAPY EVALUATION
Patient Name: Robert Randhawa Jr.  : 1965    MRN: [id number]                              Today's Date: 2021       Admit Date: 7/15/2021    Visit Dx: No diagnosis found.  Patient Active Problem List   Diagnosis   • Chest pain   • Shortness of breath   • Arthritis   • Asthma   • Deep vein thrombosis (DVT) (CMS/[facility])   • Lymphedema   • Cellulitis of lower extremity   • Chest pressure   • Unstable angina (CMS/[facility])   • Cellulitis of left lower extremity without foot   • Obesity, morbid (CMS/[facility])   • Cellulitis of left lower extremity   • Non-pressure chronic ulcer left lower leg, limited to breakdown skin (CMS/[facility])     Past Medical History:   Diagnosis Date   • Arthritis    • Asthma    • Chronic deep vein thrombosis (DVT) (CMS/[facility])      Past Surgical History:   Procedure Laterality Date   • APPENDECTOMY     • CARDIAC CATHETERIZATION N/A 2020    Procedure: Left ventriculography;  Surgeon: Kenney Reyez MD;  Location: [facility] CATH INVASIVE LOCATION;  Service: Cardiovascular;  Laterality: N/A;   • CARDIAC CATHETERIZATION N/A 2020    Procedure: Coronary angiography;  Surgeon: Kenney Reyez MD;  Location:  [facility] CATH INVASIVE LOCATION;  Service: Cardiovascular;  Laterality: N/A;   • CARDIAC CATHETERIZATION N/A 2020    Procedure: Left Heart Cath;  Surgeon: Kenney Reyez MD;  Location: [facility] CATH INVASIVE LOCATION;  Service: Cardiovascular;  Laterality: N/A;   • KNEE SURGERY Left 2018     General Information     Row Name 21 0944          Physical Therapy Time and Intention    Mode of Treatment  physical therapy  -SS     Row Name 21 1250 21 0944       General Information    Patient Profile Reviewed  -- uses STC at baseline. Has RW available  -SS  yes needs assist with lower body bathing and dressing but does not have it. 12 falls in the last 6 months  -SS    Prior Level of Function  independent:;ADL's;all household mobility;community mobility;driving  -SS  --     Barriers to Rehab  previous functional deficit  -SS  --    Row Name 07/16/21 1250          Living Environment    Lives With  sibling(s) brother and his son but pt is alone mostly  -SS     Row Name 07/16/21 1250          Home Main Entrance    Number of Stairs, Main Entrance  two small steps that he can manage with RW  -SS     Row Name 07/16/21 1250          Stairs Within Home, Primary    Number of Stairs, Within Home, Primary  none  -SS     Row Name 07/16/21 1250          Cognition    Orientation Status (Cognition)  oriented x 4  -SS     Row Name 07/16/21 1250          Safety Issues, Functional Mobility    Impairments Affecting Function (Mobility)  endurance/activity tolerance;balance;strength;sensation/sensory awareness  -     Comment, Safety Issues/Impairments (Mobility)  states he cannot feel his L LE which often leads to falls  -SS       User Key  (r) = Recorded By, (t) = Taken By, (c) = Cosigned By    Initials Name Provider Type    SS Vianey Hawley PT Physical Therapist        Mobility     Row Name 07/16/21 1251          Bed Mobility    Bed Mobility  bed mobility (all) activities  -     All Activities, [place] (Bed Mobility)  modified independence  -     Row Name 07/16/21 1251          Sit-Stand Transfer    Sit-Stand [place] (Transfers)  supervision  -     Assistive Device (Sit-Stand Transfers)  walker, front-wheeled  -     Row Name 07/16/21 1251          Gait/Stairs (Locomotion)    [place] Level (Gait)  supervision  -     Assistive Device (Gait)  walker, front-wheeled  -SS     Distance in Feet (Gait)  20'  -     Deviations/Abnormal Patterns (Gait)  gait speed decreased  -     Comment (Gait/Stairs)  limited by fatigue, pain. gait optimized with RW comapred to increased postural instability with cane  -       User Key  (r) = Recorded By, (t) = Taken By, (c) = Cosigned By    Initials Name Provider Type    SS Vianey Hawley PT Physical Therapist        Obj/Interventions      [facility] Name 07/16/21 1253          Range of Motion Comprehensive    Comment, General Range of Motion  B LE ROM limited by pain, edema, body habitus. OT referral requested due to inability to perform LBD and bathing  -[facility] Name 07/16/21 1253          Strength Comprehensive (MMT)    Comment, General Manual Muscle Testing (MMT) Assessment  B LE >3/5  -[facility] Name 07/16/21 1253          Balance    Balance Assessment  sitting static balance;standing static balance  -SS     Static Sitting Balance  WNL  -SS     Static Standing Balance  mild impairment  -SS     Row Name 07/16/21 1253          Sensory Assessment (Somatosensory)    Sensory Assessment (Somatosensory)  -- decreased sensation L LE proprioception and light touch  -       User Key  (r) = Recorded By, (t) = Taken By, (c) = Cosigned By    Initials Name Provider Type    SS Vianey Hawley PT Physical Therapist        Goals/Plan     Row Name 07/16/21 1259          Transfer Goal 1 (PT)    Activity/Assistive Device (Transfer Goal 1, PT)  transfers, all;walker, rolling  -SS     [place] Level/Cues Needed (Transfer Goal 1, PT)  modified independence  -SS     Time Frame (Transfer Goal 1, PT)  long term goal (LTG);2 weeks  -SS     Row Name 07/16/21 1258          Gait Training Goal 1 (PT)    Activity/Assistive Device (Gait Training Goal 1, PT)  gait (walking locomotion);walker, rolling  -SS     [place] Level (Gait Training Goal 1, PT)  modified independence  -SS     Distance (Gait Training Goal 1, PT)  50'  -SS     Time Frame (Gait Training Goal 1, PT)  long term goal (LTG);2 weeks  -       User Key  (r) = Recorded By, (t) = Taken By, (c) = Cosigned By    Initials Name Provider Type    SS Vianey Hawley PT Physical Therapist        Clinical Impression     [facility] Name 07/16/21 125          Pain    Additional Documentation  Pain Scale: FACES Pre/Post-Treatment (Group)  -[facility] Name 07/16/21 1251          Pain Scale: FACES Pre/Post-Treatment     Pain: FACES Scale, Pretreatment  2-->hurts little bit  -     Posttreatment Pain Rating  2-->hurts little bit  -     Row Name 07/16/21 1254          Plan of Care Review    Plan of Care Reviewed With  patient  -     Outcome Summary  57 y/o M who presented with cellulitis L LE per chart. Pt lives with his brother and nephew but is alone most of the time. He ambulates with STC. He drives. He reports significant difficulty standing from a sitting position on any surface at baseline. He reports 12 falls in the past 6 months all of which have occured in his home and seem to be related to decreased sensation L LE. He reports difficulty perfoming lower body dressing and bathing. OT consult requested. Pt owns RW. He has 2 small steps to enter. Pt with postural instability with cane that is improved with RW. Ambulates 20' with RW and SBA. Recommending use of RW at all times at home and in community and HHPT upon d/c. Pt expressing concern about needing more help at home than he has. Communicated to CM to determine if caregiver services are available for this patient.  -     Row Name 07/16/21 1254          Therapy Assessment/Plan (PT)    Rehab Potential (PT)  good, to achieve stated therapy goals  -     Criteria for Skilled Interventions Met (PT)  yes;meets criteria  -     Predicted Duration of Therapy Intervention (PT)  until d/c  -     Row Name 07/16/21 1254          Positioning and Restraints    Pre-Treatment Position  in bed  -     Post Treatment Position  bed  -       User Key  (r) = Recorded By, (t) = Taken By, (c) = Cosigned By    Initials Name Provider Type     Vianey Hawley, PT Physical Therapist        Outcome Measures    No documentation.       Physical Therapy Education                 Title: PT OT SLP Therapies (Done)     Topic: Physical Therapy (Done)     Point: Mobility training (Done)     Learning Progress Summary           Patient Acceptance, E, VU by  at 7/16/2021 1300                                User Key     Initials Effective Dates Name Provider Type Discipline     06/16/21 -  Vianey Hawley PT Physical Therapist PT              PT Recommendation and Plan  Planned Therapy Interventions (PT): gait training, home exercise program, patient/family education, transfer training, strengthening  Plan of Care Reviewed With: patient  Outcome Summary: 55 y/o M who presented with cellulitis L LE per chart. Pt lives with his brother and nephew but is alone most of the time. He ambulates with STC. He drives. He reports significant difficulty standing from a sitting position on any surface at baseline. He reports 12 falls in the past 6 months all of which have occured in his home and seem to be related to decreased sensation L LE. He reports difficulty perfoming lower body dressing and bathing. OT consult requested. Pt owns RW. He has 2 small steps to enter. Pt with postural instability with cane that is improved with RW. Ambulates 20' with RW and SBA. Recommending use of RW at all times at home and in community and HHPT upon d/c. Pt expressing concern about needing more help at home than he has. Communicated to CM to determine if caregiver services are available for this patient.     Time Calculation:   PT Charges     Row Name 07/16/21 1301             Time Calculation    Start Time  0938  -      Stop Time  1015  -      Time Calculation (min)  37 min  -      PT Received On  07/16/21  -      PT - Next Appointment  07/18/21  -      PT Goal Re-Cert Due Date  07/30/21  -         Time Calculation- PT    Total Timed Code Minutes- PT  0 minute(s)  -        User Key  (r) = Recorded By, (t) = Taken By, (c) = Cosigned By    Initials Name Provider Type    SS Vianey Hawley PT Physical Therapist        Therapy Charges for Today     Code Description Service Date Service Provider Modifiers Qty    59129712737 HC PT EVAL MOD COMPLEXITY 4 7/16/2021 Vianey Hawley PT GP 1                Vianey Hawley, PT  7/16/2021

## 2021-07-16 NOTE — PROGRESS NOTES
Wound Initial Evaluation  [facility]     Patient Name: Robert Randhawa Jr.  : 1965  MRN: [id number]  Today's Date: 2021 Room Number: 4123/1      Admit Date: 7/15/2021  Attending: Alfredo Torres MD    Consult Requested By: Dr Torres    Reason For Consult: LE wounds    Chief Complaint: 56-year-old male who was a direct admit from his PCP for cellulitis to the left lower extremity.  Patient has a long history of lower extremity lymphedema and ulcerations.  Patient has seen wound care center as well as lymphedema clinic in the past he is not current with them.  Patient states that he does perform his lymphedema pumps daily.  He has home health who is been performing Unna boot dressings however the patient states that up until a couple days ago he did not have any open wounds.  Patient does have CircAid's he is unsure as to why he is continuing to use an Unna boot when he has CircAid's available.  He presented to his PCP with concerns of increasing erythema and edema and blistering to the leg    Visit Dx:  No diagnosis found.  Patient Active Problem List   Diagnosis   • Chest pain   • Shortness of breath   • Arthritis   • Asthma   • Deep vein thrombosis (DVT) (CMS/HCC)   • Lymphedema   • Cellulitis of lower extremity   • Chest pressure   • Unstable angina (CMS/HCC)   • Cellulitis of left lower extremity without foot   • Obesity, morbid (CMS/HCC)   • Cellulitis of left lower extremity   • Non-pressure chronic ulcer left lower leg, limited to breakdown skin (CMS/HCC)       History:   Past Medical History:   Diagnosis Date   • Arthritis    • Asthma    • Chronic deep vein thrombosis (DVT) (CMS/HCC)      Past Surgical History:   Procedure Laterality Date   • APPENDECTOMY     • CARDIAC CATHETERIZATION N/A 2020    Procedure: Left ventriculography;  Surgeon: Kenney Reyez MD;  Location: [facility] INVASIVE LOCATION;  Service: Cardiovascular;  Laterality: N/A;   • CARDIAC CATHETERIZATION N/A  2020    Procedure: Coronary angiography;  Surgeon: Kenney Reyez MD;  Location: [facility] CATH INVASIVE LOCATION;  Service: Cardiovascular;  Laterality: N/A;   • CARDIAC CATHETERIZATION N/A 2020    Procedure: Left Heart Cath;  Surgeon: Kenney Reyez MD;  Location: [facility] CATH INVASIVE LOCATION;  Service: Cardiovascular;  Laterality: N/A;   • KNEE SURGERY Left 2018     Social History     Socioeconomic History   • Marital status: Single     Spouse name: Not on file   • Number of children: Not on file   • Years of education: Not on file   • Highest education level: Not on file   Tobacco Use   • Smoking status: Former Smoker     Types: Cigarettes     Quit date:      Years since quittin.5   • Smokeless tobacco: Never Used   Substance and Sexual Activity   • Alcohol use: Never   • Drug use: Defer   • Sexual activity: Defer       Allergies:  Allergies   Allergen Reactions   • Sulfa Antibiotics Unknown (See Comments)       Medications:    Current Facility-Administered Medications:   •  acetaminophen (TYLENOL) tablet 650 mg, 650 mg, Oral, Q4H PRN, Alfredo Torres MD, 650 mg at 07/15/21 2053  •  sennosides-docusate (PERICOLACE) 8.6-50 MG per tablet 2 tablet, 2 tablet, Oral, BID, 2 tablet at 21 0901 **AND** polyethylene glycol (MIRALAX) packet 17 g, 17 g, Oral, Daily PRN **AND** bisacodyl (DULCOLAX) EC tablet 5 mg, 5 mg, Oral, Daily PRN **AND** bisacodyl (DULCOLAX) suppository 10 mg, 10 mg, Rectal, Daily PRN, Alfredo Torres MD  •  calcium carbonate (TUMS) chewable tablet 500 mg (200 mg elemental), 2 tablet, Oral, BID PRN, Alfredo Torres MD  •  clindamycin (CLEOCIN) 450 mg in dextrose (D5W) 5 % 100 mL IVPB, 450 mg, Intravenous, Q6H, Alfredo Torres MD, Last Rate: 200 mL/hr at 21 0646, 450 mg at 21 0646  •  enoxaparin (LOVENOX) syringe 40 mg, 40 mg, Subcutaneous, Q12H, Alfredo Torres MD, 40 mg at 21 0901  •  famotidine (PEPCID) tablet 40 mg, 40 mg, Oral, Daily, Alfredo Torres,  MD, 40 mg at 07/16/21 0901  •  furosemide (LASIX) tablet 20 mg, 20 mg, Oral, Daily, Alfredo Torres MD, 20 mg at 07/16/21 0901  •  ondansetron (ZOFRAN) injection 4 mg, 4 mg, Intravenous, Q6H PRN, Alfredo Torres MD  •  Pharmacy to Dose enoxaparin (LOVENOX), , Does not apply, Continuous PRN, Alfredo Torres MD  •  Pharmacy to dose vancomycin, , Does not apply, Continuous PRN, Alfredo Torres MD  •  sodium chloride 0.9 % flush 3 mL, 3 mL, Intravenous, Q12H, Alfredo Torres MD, 3 mL at 07/15/21 2052  •  sodium chloride 0.9 % flush 3-10 mL, 3-10 mL, Intravenous, PRN, Alfredo Torres MD  •  spironolactone (ALDACTONE) tablet 25 mg, 25 mg, Oral, Daily, Alfredo Torres MD, 25 mg at 07/16/21 0901  •  vancomycin 1250 mg/250 mL 0.9% NS IVPB ([facility]), 1,250 mg, Intravenous, Q12H, Alfredo Torres MD, 1,250 mg at 07/16/21 0906    Results Review:  Lab Results (last 48 hours)     Procedure Component Value Units Date/Time    Hemoglobin A1c [[id number]]  (Abnormal) Collected: 07/15/21 1734    Specimen: Blood Updated: 07/16/21 1020     Hemoglobin A1C 5.7 %     Narrative:      Hemoglobin A1C Reference Range:    <5.7 %        Normal  5.7-6.4 %     Increased risk for diabetes  > 6.4 %        Diabetes       These guidelines have been recommended by the American Diabetic Association for Hgb A1c.      The following 2010 guidelines have been recommended by the American Diabetes Association for Hemoglobin A1c.    HBA1c 5.7-6.4% Increased risk for future diabetes (pre-diabetes)  HBA1c     >6.4% Diabetes      Wound Culture - Drainage, Leg, Left [[id number]] Collected: 07/15/21 2012    Specimen: Drainage from Leg, Left Updated: 07/16/21 0643     Wound Culture Growth present, too young to evaluate     Gram Stain No organisms seen    BNP [[id number]]  (Normal) Collected: 07/15/21 1734    Specimen: Blood Updated: 07/15/21 1815     proBNP 60.9 pg/mL     Narrative:      Among patients with dyspnea, NT-proBNP is highly sensitive for the detection of acute  congestive heart failure. In addition NT-proBNP of <300 pg/ml effectively rules out acute congestive heart failure with 99% negative predictive value.    Results may be falsely decreased if patient taking Biotin.      TSH [[id number]]  (Normal) Collected: 07/15/21 1734    Specimen: Blood Updated: 07/15/21 1815     TSH 1.230 uIU/mL     Comprehensive Metabolic Panel [[id number]]  (Abnormal) Collected: 07/15/21 1734    Specimen: Blood Updated: 07/15/21 1815     Glucose 123 mg/dL      BUN 12 mg/dL      Creatinine 0.93 mg/dL      Sodium 137 mmol/L      Potassium 4.1 mmol/L      Comment: Slight hemolysis detected by analyzer. Results may be affected.        Chloride 104 mmol/L      CO2 25.0 mmol/L      Calcium 8.7 mg/dL      Total Protein 7.7 g/dL      Albumin 3.40 g/dL      ALT (SGPT) 24 U/L      AST (SGOT) 58 U/L      Alkaline Phosphatase 138 U/L      Total Bilirubin 0.7 mg/dL      eGFR Non African Amer 84 mL/min/1.73      Globulin 4.3 gm/dL      A/G Ratio 0.8 g/dL      BUN/Creatinine Ratio 12.9     Anion Gap 8.0 mmol/L     Narrative:      GFR Normal >60  Chronic Kidney Disease <60  Kidney Failure <15      CBC Auto Differential [[id number]]  (Abnormal) Collected: 07/15/21 1734    Specimen: Blood Updated: 07/15/21 1742     WBC 4.70 10*3/mm3      RBC 4.16 10*6/mm3      Hemoglobin 14.2 g/dL      Hematocrit 42.1 %      .1 fL      MCH 34.1 pg      MCHC 33.7 g/dL      RDW 14.5 %      RDW-SD 50.8 fl      MPV 8.7 fL      Platelets 98 10*3/mm3      Neutrophil % 70.4 %      Lymphocyte % 18.7 %      Monocyte % 8.6 %      Eosinophil % 1.6 %      Basophil % 0.7 %      Neutrophils, Absolute 3.30 10*3/mm3      Lymphocytes, Absolute 0.90 10*3/mm3      Monocytes, Absolute 0.40 10*3/mm3      Eosinophils, Absolute 0.10 10*3/mm3      Basophils, Absolute 0.00 10*3/mm3      nRBC 0.2 /100 WBC         Imaging Results (Last 72 Hours)     ** No results found for the last 72 hours. **          Review of Systems:  Review of Systems    Constitutional: Negative for fever.   HENT: Negative.    Respiratory: Negative for cough, shortness of breath, wheezing and stridor.    Cardiovascular: Positive for leg swelling. Negative for chest pain.   Gastrointestinal: Negative for constipation, diarrhea, nausea and vomiting.   Genitourinary: Negative for dysuria.   Skin: Positive for wound.   Neurological: Negative.    Psychiatric/Behavioral: Negative.        Physical Assessment:  Wound Left lower leg Abrasion (Active)                                             Left lower extremity ulcer: There is 3 open areas to the left lower extremity all of the areas are partial-thickness in nature and they have a pink clean base there is a moderate amount of serosanguineous exudate noted.  There is surrounding erythema and warmth noted to the leg.  No streaking noted above the knee.  Approximate size of the open areas 2 x 1 cm and the 2 smaller areas are 0.5 x 0.5 cm each    Final diagnosis  Lymphedema  Nonpressure ulcer at skin level  Cellulitis    Recommendation and Plan  Will recommend treating with a calcium alginate dressing and silicone foam border dressing can add light compression using Ace wraps patient is encouraged to use his CircAid's this will give a more consistent form of compression than Unna boots.  Patient may benefit from follow-up with outpatient wound care center after discharge    Brisa Tarango, QUEENIE   7/16/2021   12:51 EDT

## 2021-07-16 NOTE — PROGRESS NOTES
LOS: 1 day   Patient Care Team:  Alfredo Torres MD as PCP - General  Alfredo Torres MD as PCP - Family Medicine  Kenney Reyez MD as Consulting Physician (Cardiology)    Subjective     Interval History:     The patient is a very pleasant 56-year-old  male with known history of left lower lobe lymphedema, lower extremities chronic venous hypertension, varicosis vein of the left lower extremity with inflammation, bilateral lower extremity edema, severe stasis dermatitis of the lower extremity with prior history of cellulitis of left lower extremity, essential hypertension, gastroesophageal reflux disorder, poor mobility very sedentary lifestyle, body mass index of 48.2 presented to our office with severe swelling in the left lower extremity with bruising and oozing for the past week progressively getting worse.  He says that he has so much pain and also itching that he is scratched his left lower extremity and now he has some bleeding and pus formation.  He has had some chills and fever.  Now the redness in the left lower extremity is spreading up towards.  He says because of the severe swelling is unable to ambulate.  His appetite is slightly down.  Is not very compliant with elevating his lower extremity.  He has been diagnosed with major depression last year.  He says his condition makes him feel very depressed.  He has pain in the lower extremities especially when he walks.  He is unable to walk because his foot has been having pain and he has seen Dr. Carrasquillo and was diagnosed with a collapsed arch and he is not a candidate for surgery.  He has a history of tonic venous hypertension and prior history of deep vein thrombosis.  Because he lives at home alone he does not have anyone to care for him, he has swelling and bruising of fluids out of his leg will admit him start him on IV antibiotics.  He has had wound care in the past which he will need wound care in the future.   will be  "consulted and  will be consulted see if we can help him to rise he is at risk of losing his lower extremity with recurrent myelitis and deep venous thrombosis.  I talked to him on multiple occasions that he might benefit from some kind of weight management his weight is slowly limiting his ability to ambulate.    Patient Complaints:     The patient has been seen by wound care today.  The wound has been wrapped.  The patient is urinating more today.  Unfortunately when I entered to the room the patient was sitting on the side of the bed instead of elevating his lower extremities.  Denies any shortness of breath.  He does have some pain in left lower extremity.  He does not have any fever or chills.    History taken from: patient    Review of Systems   HENT: Negative.    Eyes: Negative.    Respiratory: Negative.    Cardiovascular: Positive for leg swelling.   Endocrine: Negative.    Genitourinary: Negative.    Musculoskeletal: Positive for arthralgias and gait problem.   Skin: Positive for color change, rash and wound.   Allergic/Immunologic: Negative.    Neurological: Positive for weakness.   Hematological: Negative.    Psychiatric/Behavioral: Negative.            Objective     Vital Signs  /67 (BP Location: Right arm, Patient Position: Lying)   Pulse 65   Temp 97.9 °F (36.6 °C) (Oral)   Resp 18   Ht 185.4 cm (73\")   Wt (!) 165 kg (363 lb 15.7 oz)   SpO2 96%   BMI 48.02 kg/m²       Physical Exam:     General Appearance:    Alert, cooperative, in no acute distress   Head:    Normocephalic, without obvious abnormality, atraumatic   Eyes:            Lids and lashes normal, conjunctivae and sclerae normal, no   icterus, no pallor, corneas clear, PERRLA   Ears:    Ears appear intact with no abnormalities noted   Throat:   No oral lesions, no thrush, oral mucosa moist   Neck:   No adenopathy, supple, trachea midline, no thyromegaly, no   carotid bruit, no JVD   Lungs:     Clear to " auscultation,respirations regular, even and                  unlabored    Heart:    Regular rhythm and normal rate, normal S1 and S2, no            murmur, no gallop, no rub, no click   Chest Wall:    No abnormalities observed   Abdomen:     Normal bowel sounds, no masses, no organomegaly, soft        non-tender, non-distended, no guarding, no rebound                tenderness   Extremities:   Left lower extremity ulcer: There is 3 open areas to the left lower extremity all of the areas are partial-thickness in nature and they have a pink clean base there is a moderate amount of serosanguineous exudate noted.  There is surrounding erythema and warmth noted to the leg.  No streaking noted above the knee.  Approximate size of the open areas 2 x 1 cm and the 2 smaller areas are 0.5 x 0.5 cm each.  There is lymphedema of the right lower extremity.  There is no cellulitis in the right lower extremity.  He has stasis dermatitis of the right lower extremity.   Pulses:   Pulses palpable and equal bilaterally   Skin:   No bleeding, bruising or rash   Lymph nodes:   No palpable adenopathy   Neurologic:   Cranial nerves 2 - 12 grossly intact, sensation intact, DTR       present and equal bilaterally        Results Review:    Lab Results (last 24 hours)     Procedure Component Value Units Date/Time    Hemoglobin A1c [[id number]]  (Abnormal) Collected: 07/15/21 1734    Specimen: Blood Updated: 07/16/21 1020     Hemoglobin A1C 5.7 %     Narrative:      Hemoglobin A1C Reference Range:    <5.7 %        Normal  5.7-6.4 %     Increased risk for diabetes  > 6.4 %        Diabetes       These guidelines have been recommended by the American Diabetic Association for Hgb A1c.      The following 2010 guidelines have been recommended by the American Diabetes Association for Hemoglobin A1c.    HBA1c 5.7-6.4% Increased risk for future diabetes (pre-diabetes)  HBA1c     >6.4% Diabetes      Wound Culture - Drainage, Leg, Left [[id number]]  Collected: 07/15/21 2012    Specimen: Drainage from Leg, Left Updated: 07/16/21 0643     Wound Culture Growth present, too young to evaluate     Gram Stain No organisms seen           ECG/EMG Results (last 24 hours)     ** No results found for the last 24 hours. **          Imaging Results (Last 24 Hours)     ** No results found for the last 24 hours. **               I reviewed the patient's new clinical results.    Medication Review:   Scheduled Meds:clindamycin, 450 mg, Intravenous, Q6H  enoxaparin, 40 mg, Subcutaneous, Q12H  famotidine, 40 mg, Oral, Daily  furosemide, 20 mg, Oral, Daily  senna-docusate sodium, 2 tablet, Oral, BID  sodium chloride, 3 mL, Intravenous, Q12H  spironolactone, 25 mg, Oral, Daily  vancomycin, 1,250 mg, Intravenous, Q12H      Continuous Infusions:Pharmacy to dose vancomycin,       PRN Meds:.•  acetaminophen  •  senna-docusate sodium **AND** polyethylene glycol **AND** bisacodyl **AND** bisacodyl  •  calcium carbonate  •  ondansetron  •  Pharmacy to dose vancomycin  •  sodium chloride     Assessment/Plan     Left lower extremity cellulitis  Left lower extremity severe lymphedema  Bilateral lower extremity venous hypertension chronic, venous insufficiency  Nonpressure ulcer left lower leg with broken skin  Onychomycosis bilateral feet  History of deep venous thrombosis left lower extremity  Essential hypertension  Immobility syndrome due to severe emphysema and obesity as well as depression  Poor compliance to medical management  Major depression moderate recurrent currently in remission  Osteoarthritis left foot left foot deformity  Prediabetic  Mild hypoalbuminemia  Abnormal LFTs  Fatty liver  Macrocytosis  Thrombocytopenia chronic and mild known since 2017, serum PIERRE, lupus anticoagulants, B12, folate were normal  Hypersplenism  BMI 48 kg/square meter    Active Problems:    Lymphedema    Cellulitis of left lower extremity    Non-pressure chronic ulcer left lower leg, limited to  breakdown skin (CMS/HCC)    Cellulitis of left foot      Continue the same treatment.  Physical therapy occupational therapy.   consult.  He will need outpatient wound care.  Findings of labs were discussed with the patient and the nurses.  Follow the wound care nurse recommendation.    Plan for disposition: Case management and  consult    Alfredo Torres MD  07/16/21  18:05 EDT

## 2021-07-16 NOTE — PLAN OF CARE
Goal Outcome Evaluation:              Outcome Summary: pt recieving multiple iv abx for cellulitis and LLE wound. wocn rounded on patient and ordered dressing changes. pt tolerating treatments. waiting on dr sebastian to round.

## 2021-07-16 NOTE — H&P
"    Patient Care Team:  Alfredo Torres MD as PCP - General  Alfredo Torres MD as PCP - Family Medicine  Kenney Reyez MD as Consulting Physician (Cardiology)    Chief complaint :\" My legs are swollen, my left leg is oozing, I have sores on my left leg progressively getting worse, left leg hurts and it has fever, it is red and the redness is a spreading upward towards my knee, had cellulitis in my left leg, I think I have cellulitis.\"    Subjective   The patient is a very pleasant 56-year-old  male with known history of left lower lobe lymphedema, lower extremities chronic venous hypertension, varicosis vein of the left lower extremity with inflammation, bilateral lower extremity edema, severe stasis dermatitis of the lower extremity with prior history of cellulitis of left lower extremity, essential hypertension, gastroesophageal reflux disorder, poor mobility very sedentary lifestyle, body mass index of 48.2 presented to our office with severe swelling in the left lower extremity with bruising and oozing for the past week progressively getting worse.  He says that he has so much pain and also itching that he is scratched his left lower extremity and now he has some bleeding and pus formation.  He has had some chills and fever.  Now the redness in the left lower extremity is spreading up towards.  He says because of the severe swelling is unable to ambulate.  His appetite is slightly down.  Is not very compliant with elevating his lower extremity.  He has been diagnosed with major depression last year.  He says his condition makes him feel very depressed.  He has pain in the lower extremities especially when he walks.  He is unable to walk because his foot has been having pain and he has seen Dr. Carrasquillo and was diagnosed with a collapsed arch and he is not a candidate for surgery.  He has a history of tonic venous hypertension and prior history of deep vein thrombosis.  Because he lives at home alone " he does not have anyone to care for him, he has swelling and bruising of fluids out of his leg will admit him start him on IV antibiotics.  He has had wound care in the past which he will need wound care in the future.   will be consulted and  will be consulted see if we can help him to rise he is at risk of losing his lower extremity with recurrent myelitis and deep venous thrombosis.  I talked to him on multiple occasions that he might benefit from some kind of weight management his weight is slowly limiting his ability to ambulate.    Review of Systems       History  Past Medical History:   Diagnosis Date   • Arthritis    • Asthma    • Chronic deep vein thrombosis (DVT) (CMS/[facility])      Past Surgical History:   Procedure Laterality Date   • APPENDECTOMY     • CARDIAC CATHETERIZATION N/A 2020    Procedure: Left ventriculography;  Surgeon: Kenney Reyez MD;  Location: [facility] CATH INVASIVE LOCATION;  Service: Cardiovascular;  Laterality: N/A;   • CARDIAC CATHETERIZATION N/A 2020    Procedure: Coronary angiography;  Surgeon: Kenney Reyez MD;  Location: [facility] CATH INVASIVE LOCATION;  Service: Cardiovascular;  Laterality: N/A;   • CARDIAC CATHETERIZATION N/A 2020    Procedure: Left Heart Cath;  Surgeon: Kenney Reyez MD;  Location: [facility] CATH INVASIVE LOCATION;  Service: Cardiovascular;  Laterality: N/A;   • KNEE SURGERY Left 2018     Family History   Problem Relation Age of Onset   • Heart disease Mother    • Diabetes Mother    • Arthritis Father      Social History     Tobacco Use   • Smoking status: Former Smoker     Types: Cigarettes     Quit date:      Years since quittin.5   • Smokeless tobacco: Never Used   Substance Use Topics   • Alcohol use: Never   • Drug use: Defer     Medications Prior to Admission   Medication Sig Dispense Refill Last Dose   • furosemide (LASIX) 20 MG tablet Take 20 mg by mouth Daily.   2021 at Unknown time   •  "spironolactone (ALDACTONE) 25 MG tablet Take 25 mg by mouth Daily.   7/14/2021 at Unknown time     Allergies:  Sulfa antibiotics    Objective     Vital Signs  /67 (BP Location: Right arm, Patient Position: Lying)   Pulse 65   Temp 97.9 °F (36.6 °C) (Oral)   Resp 18   Ht 185.4 cm (73\")   Wt (!) 165 kg (363 lb 15.7 oz)   SpO2 96%   BMI 48.02 kg/m²       Physical Exam:      General Appearance:    Alert, cooperative, in no acute distress, he is ambulating with difficulty.  Is using a cane in my office.  He has been somewhat short of air.   Head:    Normocephalic, without obvious abnormality, atraumatic   Eyes:            Lids and lashes normal, conjunctivae and sclerae normal, no   icterus, no pallor, corneas clear, PERRLA   Ears:    Ears appear intact with no abnormalities noted   Throat:   No oral lesions, no thrush, oral mucosa moist   Neck:   No adenopathy, supple, trachea midline, no thyromegaly, no   carotid bruit, no JVD   Lungs:     Clear to auscultation,respirations regular, even and                  unlabored, I did not hear any rales.  He has slight rhonchi when he exerts himself    Heart:    Regular rhythm and normal rate, normal S1 and S2, no            murmur, no gallop, no rub, no click   Chest Wall:    No abnormalities observed   Abdomen:     Normal bowel sounds, no masses, no organomegaly, soft        non-tender, non-distended, no guarding, no rebound                tenderness   Extremities:  He has severe edema in both lower extremities the left worse than the right.  The left lower extremity has about 4+ edema.  He has purulent discharge from the left lower extremity.  He has some excoriation and scratching in the left lower extremity.  He has severe erythema and heat in the left lower extremity.  Was unable to palpate the pulses.  Severe stasis dermatitis of both lower extremities left worse than right.  He has foot deformity.  He has severe swelling in the left lower extremity as well.  " His left foot left foot is warm to touch.  He has quite severe edema in the left foot.  Light touch sensation was normal in both feet.  Achilles tendon reflex on both feet or ankle was 2+.  He has onychomycosis with abnormally thick and dystrophic nails.  He has pain in the left foot by palpation to the dorsal midfoot.  His previous x-rays show severe degenerative changes involving the midtarsal joints.   Pulses:   Pulses palpable and equal bilaterally   Skin:  Cellulitis of the left lower extremity extending from the ankle to mid calf area.  Severe stasis dermatitis of both lower extremities.   Lymph nodes:   No palpable adenopathy   Neurologic:   Cranial nerves 2 - 12 grossly intact, sensation intact, DTR       present and equal bilaterally       Results Review:     Imaging Results (Last 24 Hours)     ** No results found for the last 24 hours. **             ECG/EMG Results (last 24 hours)     ** No results found for the last 24 hours. **            Lab Results (last 24 hours)     Procedure Component Value Units Date/Time    Hemoglobin A1c [[id number]]  (Abnormal) Collected: 07/15/21 1734    Specimen: Blood Updated: 07/16/21 1020     Hemoglobin A1C 5.7 %     Narrative:      Hemoglobin A1C Reference Range:    <5.7 %        Normal  5.7-6.4 %     Increased risk for diabetes  > 6.4 %        Diabetes       These guidelines have been recommended by the American Diabetic Association for Hgb A1c.      The following 2010 guidelines have been recommended by the American Diabetes Association for Hemoglobin A1c.    HBA1c 5.7-6.4% Increased risk for future diabetes (pre-diabetes)  HBA1c     >6.4% Diabetes      Wound Culture - Drainage, Leg, Left [[id number]] Collected: 07/15/21 2012    Specimen: Drainage from Leg, Left Updated: 07/16/21 0643     Wound Culture Growth present, too young to evaluate     Gram Stain No organisms seen           I reviewed the patient's new clinical results.    Assessment/Plan     Left lower extremity  cellulitis  Left lower extremity severe lymphedema  Bilateral lower extremity venous hypertension chronic, venous insufficiency  Nonpressure ulcer left lower leg with broken skin  Onychomycosis bilateral feet  History of deep venous thrombosis left lower extremity  Essential hypertension  Immobility syndrome due to severe emphysema and obesity as well as depression  Poor compliance to medical management  Major depression moderate recurrent currently in remission  Osteoarthritis left foot left foot deformity  Prediabetic  Mild hypoalbuminemia  Abnormal LFTs  Fatty liver  Macrocytosis  Thrombocytopenia chronic and mild known since 2017, serum PIERRE, lupus anticoagulants, B12, folate were normal  Hypersplenism  BMI 48 kg/square meter      Active Problems:    Lymphedema    Cellulitis of left lower extremity    Non-pressure chronic ulcer left lower leg, limited to breakdown skin (CMS/HCC)    Cellulitis of left foot      The patient will be admitted as a direct admit to MedSurg unit.  He will be started on clindamycin and vancomycin.  I will will ask wound care to see him.  Case management and  help.  Physical therapy and Occupational Therapy help.  We will continue his home medications.  He probably needs dietary consultation.  Is unable to take care of himself.  Probably needs some help, he needs some counseling and psychotherapy as well.    I discussed the patients findings and my recommendations with patient.     Alfredo Torres MD  07/16/21  17:14 EDT

## 2021-07-16 NOTE — THERAPY EVALUATION
Patient Name: Robert Randhawa Jr.  : 1965    MRN: [id number]                              Today's Date: 2021       Admit Date: 7/15/2021    Visit Dx:     ICD-10-CM ICD-9-CM   1. Cellulitis of left lower extremity  L03.116 682.6     Patient Active Problem List   Diagnosis   • Chest pain   • Shortness of breath   • Arthritis   • Asthma   • Deep vein thrombosis (DVT) (CMS/[facility])   • Lymphedema   • Cellulitis of lower extremity   • Chest pressure   • Unstable angina (CMS/[facility])   • Cellulitis of left lower extremity without foot   • Obesity, morbid (CMS/[facility])   • Cellulitis of left lower extremity   • Non-pressure chronic ulcer left lower leg, limited to breakdown skin (CMS/[facility])   • Cellulitis of left foot     Past Medical History:   Diagnosis Date   • Arthritis    • Asthma    • Chronic deep vein thrombosis (DVT) (CMS/[facility])      Past Surgical History:   Procedure Laterality Date   • APPENDECTOMY     • CARDIAC CATHETERIZATION N/A 2020    Procedure: Left ventriculography;  Surgeon: Kenney Reyez MD;  Location: [facility] CATH INVASIVE LOCATION;  Service: Cardiovascular;  Laterality: N/A;   • CARDIAC CATHETERIZATION N/A 2020    Procedure: Coronary angiography;  Surgeon: Kenney Reyez MD;  Location: [facility] CATH INVASIVE LOCATION;  Service: Cardiovascular;  Laterality: N/A;   • CARDIAC CATHETERIZATION N/A 2020    Procedure: Left Heart Cath;  Surgeon: Kenney Reyez MD;  Location: [facility] CATH INVASIVE LOCATION;  Service: Cardiovascular;  Laterality: N/A;   • KNEE SURGERY Left      General Information     Row Name 21 1516          OT Time and Intention    Document Type  evaluation  -BL     Mode of Treatment  occupational therapy  -BL     Row Name 21 1516          General Information    Patient Profile Reviewed  yes  -BL     Prior Level of Function  independent:;ADL's;all household mobility  -BL     Barriers to Rehab  previous functional deficit  -BL     Row Name 21 1516           Living Environment    Lives With  sibling(s)  -hospitals Name 07/16/21 1516          Home Main Entrance    Number of Stairs, Main Entrance  two  -hospitals Name 07/16/21 1516          Cognition    Orientation Status (Cognition)  oriented x 4  -BL     Row Name 07/16/21 1516          Safety Issues, Functional Mobility    Impairments Affecting Function (Mobility)  endurance/activity tolerance;balance;strength;sensation/sensory awareness  -       User Key  (r) = Recorded By, (t) = Taken By, (c) = Cosigned By    Initials Name Provider Type     Ira Nugent OT Occupational Therapist          Mobility/ADL's     Row Name 07/16/21 1517          Bed Mobility    Bed Mobility  bed mobility (all) activities  -     All Activities, Banner (Bed Mobility)  modified independence  -BL     Row Name 07/16/21 1517          Transfers    Sit-Stand Banner (Transfers)  supervision  -BL     Row Name 07/16/21 1517          Sit-Stand Transfer    Assistive Device (Sit-Stand Transfers)  walker, front-wheeled  -BL     Row Name 07/16/21 1517          Activities of Daily Living    BADL Assessment/Intervention  lower body dressing  -BL     Row Name 07/16/21 [facility]7          Lower Body Dressing Assessment/Training    Banner Level (Lower Body Dressing)  moderate assist (50% patient effort);shoes/slippers;don;doff;socks  -     Position (Lower Body Dressing)  edge of bed sitting  -       User Key  (r) = Recorded By, (t) = Taken By, (c) = Cosigned By    Initials Name Provider Type     Ira Nugent OT Occupational Therapist        Obj/Interventions     [facility] Name 07/16/21 1517          Sensory Assessment (Somatosensory)    Sensory Assessment (Somatosensory)  sensation intact  -BL     Row Name 07/16/21 1517          Vision Assessment/Intervention    Visual Impairment/Limitations  WFL  -BL     Row Name 07/16/21 1517          Range of Motion Comprehensive    General Range of Motion  no range of motion deficits  identified  -     Row Name 07/16/21 1517          Strength Comprehensive (MMT)    Comment, General Manual Muscle Testing (MMT) Assessment  BUE grossly 5/5  -BL     Row Name 07/16/21 1517          Balance    Balance Assessment  sitting static balance;sitting dynamic balance;standing static balance  -BL     Static Sitting Balance  WNL  -BL     Dynamic Sitting Balance  WFL  -BL     Static Standing Balance  mild impairment;supported  -BL       User Key  (r) = Recorded By, (t) = Taken By, (c) = Cosigned By    Initials Name Provider Type    BL Ira Nugent OT Occupational Therapist        Goals/Plan     [facility] Name 07/16/21 1525          Bathing Goal 1 (OT)    Activity/Device (Bathing Goal 1, OT)  bathing skills, all  -BL     [place] Level/Cues Needed (Bathing Goal 1, OT)  contact guard assist  -BL     Time Frame (Bathing Goal 1, OT)  long term goal (LTG);2 weeks  -BL     [facility] Name 07/16/21 1525          Dressing Goal 1 (OT)    Activity/Device (Dressing Goal 1, OT)  lower body dressing  -BL     [place]/Cues Needed (Dressing Goal 1, OT)  contact guard assist  -BL     Time Frame (Dressing Goal 1, OT)  long term goal (LTG);2 weeks  -BL     [facility] Name 07/16/21 1525          Grooming Goal 1 (OT)    Activity/Device (Grooming Goal 1, OT)  grooming skills, all  -BL     [place] (Grooming Goal 1, OT)  modified independence  -BL     Time Frame (Grooming Goal 1, OT)  long term goal (LTG);2 weeks  -BL       User Key  (r) = Recorded By, (t) = Taken By, (c) = Cosigned By    Initials Name Provider Type    BL Ira Nugent, OT Occupational Therapist        Clinical Impression     [facility] Name 07/16/21 1518          Pain Scale: FACES Pre/Post-Treatment    Pain: FACES Scale, Pretreatment  2-->hurts little bit  -BL     Posttreatment Pain Rating  2-->hurts little bit  -BL     Row Name 07/16/21 1518          Plan of Care Review    Plan of Care Reviewed With  patient  -BL     Outcome Summary  55 y/o M who presented with  cellulitis L LE per chart. Pt lives with his brother and niece but is alone most of the time. Pt reports mutiple falls this year at home secondary to decreased sensation in LLE. Pt reports diffictulty with LB ADLs secondary to decreased AROM with legs. Pt required mod A for donning/doffing socks/shoes on LLE; SBA for functional mobility with RW. OT recommended a shower chair at home for safety with bathing. Pt presenting below baseline and will benefit from HHOT. Will follow 3x/week.  -BL     Row Name 07/16/21 1518          Therapy Assessment/Plan (OT)    Therapy Frequency (OT)  3 times/wk  -BL     Row Name 07/16/21 1518          Therapy Plan Review/Discharge Plan (OT)    Anticipated Discharge Disposition (OT)  home with home health  -BL     Row Name 07/16/21 1518          Positioning and Restraints    Pre-Treatment Position  in bed  -BL     Post Treatment Position  bed  -BL     In Bed  notified nsg;call light within reach;encouraged to call for assist;exit alarm on  -BL       User Key  (r) = Recorded By, (t) = Taken By, (c) = Cosigned By    Initials Name Provider Type     Ira Nugent, OT Occupational Therapist        Outcome Measures    No documentation.       Occupational Therapy Education                 Title: PT OT SLP Therapies (In Progress)     Topic: Occupational Therapy (In Progress)     Point: ADL training (Done)     Description:   Instruct learner(s) on proper safety adaptation and remediation techniques during self care or transfers.   Instruct in proper use of assistive devices.              Learning Progress Summary           Patient Acceptance, E,TB, VU by  at 7/16/2021 1526                   Point: Home exercise program (Not Started)     Description:   Instruct learner(s) on appropriate technique for monitoring, assisting and/or progressing therapeutic exercises/activities.              Learner Progress:  Not documented in this visit.          Point: Precautions (Not Started)      Description:   Instruct learner(s) on prescribed precautions during self-care and functional transfers.              Learner Progress:  Not documented in this visit.          Point: Body mechanics (Not Started)     Description:   Instruct learner(s) on proper positioning and spine alignment during self-care, functional mobility activities and/or exercises.              Learner Progress:  Not documented in this visit.                      User Key     Initials Effective Dates Name Provider Type [facility] 04/13/21 -  Mamie Berry OT Occupational Therapist OT              OT Recommendation and Plan  Therapy Frequency (OT): 3 times/wk  Plan of Care Review  Plan of Care Reviewed With: patient  Outcome Summary: 55 y/o M who presented with cellulitis L LE per chart. Pt lives with his brother and niece but is alone most of the time. Pt reports mutiple falls this year at home secondary to decreased sensation in LLE. Pt reports diffictulty with LB ADLs secondary to decreased AROM with legs. Pt required mod A for donning/doffing socks/shoes on LLE; SBA for functional mobility with RW. OT recommended a shower chair at home for safety with bathing. Pt presenting below baseline and will benefit from HHOT. Will follow 3x/week.     Time Calculation:   Time Calculation- OT     Row Name 07/16/21 1527             Time Calculation- OT    OT Start Time  1341  -BL      OT Stop Time  1407  -BL      OT Time Calculation (min)  26 min  -BL      OT Received On  07/16/21  -      OT - Next Appointment  07/19/21  -      OT Goal Re-Cert Due Date  07/30/21  -BL        User Key  (r) = Recorded By, (t) = Taken By, (c) = Cosigned By    Initials Name Provider Type     Mamie Berry OT Occupational Therapist        Therapy Charges for Today     Code Description Service Date Service Provider Modifiers Qty    11706029923 HC OT EVAL MOD COMPLEXITY 4 7/16/2021 Mamie Berry OT GO 1               MAMIE BERRY OT  7/16/2021

## 2021-07-16 NOTE — CASE MANAGEMENT/SOCIAL WORK
Discharge Planning Assessment   [place]     Patient Name: Robert Randhawa Jr.  MRN: [id number]  Today's Date: 7/16/2021    Admit Date: 7/15/2021    Discharge Needs Assessment     Row Name 07/16/21 1430       Living Environment    Lives With  sibling(s)    Name(s) of Who Lives With Patient  Patient reported that he lives with his brother and his teenage daughter who is only there part of the time.    Current Living Arrangements  home/apartment/condo    Primary Care Provided by  self    Provides Primary Care For  no one    Family Caregiver if Needed  sibling(s)    Quality of Family Relationships  unable to assess    Able to Return to Prior Arrangements  yes       Resource/Environmental Concerns    Resource/Environmental Concerns  none    Transportation Concerns  car, none       Transition Planning    Patient/Family Anticipates Transition to  home with family;home with help/services    Patient/Family Anticipated Services at Transition  home health care    Transportation Anticipated  family or friend will provide       Discharge Needs Assessment    Readmission Within the Last 30 Days  no previous admission in last 30 days    Equipment Currently Used at Home  cane, straight;walker, rolling    Concerns to be Addressed  care coordination/care conferences;discharge planning    Anticipated Changes Related to Illness  none    Equipment Needed After Discharge  none    Discharge Facility/Level of Care Needs  home with home health    Provided Post Acute Provider List?  N/A        Discharge Plan     Row Name 07/16/21 1430       Plan    Plan  Current with [facility] ([facility] order placed and accepted). Anticipate routine home with brother and HH.    Patient/Family in Agreement with Plan  yes    Plan Comments  CM met with patient at bedside to discuss dc planning. PCP confirmed (Brian), reported no trouble affording medications, preferred pharmacy confirmed ([facility] Declined needs for any DME at this time. Patient  reported that he gets disability and 90% of his check goes towards his utilities. Reported he has HH services [facility] . Reported that the nurse comes once per week to wrap his legs. CORRY sent resumption of care order to basket and discussed with liaisonJeana who confirmed patient was current with [facility] nursing. DC Barriers: Wound care following, IV abx.        Continued Care and Services - Admitted Since 7/15/2021     Home Medical Care Coordination complete    Service Provider Request Status Selected Services Address Phone Fax Patient Preferred    [facility] [address] [place] [address] [phone]                 Demographic Summary     Row Name 07/16/21 1430       General Information    Admission Type  inpatient    Reason for Consult  discharge planning    Preferred Language  English     Used During This Interaction  no       Contact Information    Permission Granted to Share Info With          Functional Status     Row Name 07/16/21 1430       Functional Status    Usual Activity Tolerance  moderate    Current Activity Tolerance  moderate       Functional Status, IADL    Medications  assistive equipment and person    Meal Preparation  assistive equipment and person    Housekeeping  assistive equipment and person    Laundry  assistive equipment and person    Shopping  assistive equipment and person        Met with patient in room wearing PPE: mask/goggles.      Maintained distance greater than six feet and spent less than 15 minutes in the room.      Noris Mehta RN     Office Phone: [phone]  Office Cell: [phone]

## 2021-07-17 LAB
ANION GAP SERPL CALCULATED.3IONS-SCNC: 9 MMOL/L (ref 5–15)
BASOPHILS # BLD AUTO: 0 10*3/MM3 (ref 0–0.2)
BASOPHILS NFR BLD AUTO: 0.5 % (ref 0–1.5)
BUN SERPL-MCNC: 12 MG/DL (ref 6–20)
BUN/CREAT SERPL: 17.4 (ref 7–25)
CALCIUM SPEC-SCNC: 8.4 MG/DL (ref 8.6–10.5)
CHLORIDE SERPL-SCNC: 104 MMOL/L (ref 98–107)
CO2 SERPL-SCNC: 25 MMOL/L (ref 22–29)
CREAT SERPL-MCNC: 0.69 MG/DL (ref 0.76–1.27)
CRP SERPL-MCNC: 1.01 MG/DL (ref 0–0.5)
DEPRECATED RDW RBC AUTO: 51.2 FL (ref 37–54)
EOSINOPHIL # BLD AUTO: 0.1 10*3/MM3 (ref 0–0.4)
EOSINOPHIL NFR BLD AUTO: 2.8 % (ref 0.3–6.2)
ERYTHROCYTE [DISTWIDTH] IN BLOOD BY AUTOMATED COUNT: 14.6 % (ref 12.3–15.4)
GFR SERPL CREATININE-BSD FRML MDRD: 118 ML/MIN/1.73
GLUCOSE SERPL-MCNC: 95 MG/DL (ref 65–99)
HCT VFR BLD AUTO: 40.3 % (ref 37.5–51)
HGB BLD-MCNC: 13.5 G/DL (ref 13–17.7)
LYMPHOCYTES # BLD AUTO: 0.9 10*3/MM3 (ref 0.7–3.1)
LYMPHOCYTES NFR BLD AUTO: 22.5 % (ref 19.6–45.3)
MCH RBC QN AUTO: 34 PG (ref 26.6–33)
MCHC RBC AUTO-ENTMCNC: 33.4 G/DL (ref 31.5–35.7)
MCV RBC AUTO: 101.8 FL (ref 79–97)
MONOCYTES # BLD AUTO: 0.4 10*3/MM3 (ref 0.1–0.9)
MONOCYTES NFR BLD AUTO: 8.9 % (ref 5–12)
NEUTROPHILS NFR BLD AUTO: 2.7 10*3/MM3 (ref 1.7–7)
NEUTROPHILS NFR BLD AUTO: 65.3 % (ref 42.7–76)
NRBC BLD AUTO-RTO: 0.1 /100 WBC (ref 0–0.2)
PLATELET # BLD AUTO: 88 10*3/MM3 (ref 140–450)
PMV BLD AUTO: 9 FL (ref 6–12)
POTASSIUM SERPL-SCNC: 4.1 MMOL/L (ref 3.5–5.2)
RBC # BLD AUTO: 3.96 10*6/MM3 (ref 4.14–5.8)
SODIUM SERPL-SCNC: 138 MMOL/L (ref 136–145)
VANCOMYCIN SERPL-MCNC: 10.4 MCG/ML (ref 5–40)
WBC # BLD AUTO: 4.2 10*3/MM3 (ref 3.4–10.8)

## 2021-07-17 PROCEDURE — 85025 COMPLETE CBC W/AUTO DIFF WBC: CPT | Performed by: FAMILY MEDICINE

## 2021-07-17 PROCEDURE — 80048 BASIC METABOLIC PNL TOTAL CA: CPT | Performed by: FAMILY MEDICINE

## 2021-07-17 PROCEDURE — 25010000002 VANCOMYCIN 10 G RECONSTITUTED SOLUTION: Performed by: FAMILY MEDICINE

## 2021-07-17 PROCEDURE — 25010000002 ENOXAPARIN PER 10 MG: Performed by: FAMILY MEDICINE

## 2021-07-17 PROCEDURE — 96372 THER/PROPH/DIAG INJ SC/IM: CPT

## 2021-07-17 PROCEDURE — 80202 ASSAY OF VANCOMYCIN: CPT | Performed by: FAMILY MEDICINE

## 2021-07-17 PROCEDURE — G0378 HOSPITAL OBSERVATION PER HR: HCPCS

## 2021-07-17 PROCEDURE — 86140 C-REACTIVE PROTEIN: CPT | Performed by: FAMILY MEDICINE

## 2021-07-17 PROCEDURE — 97116 GAIT TRAINING THERAPY: CPT

## 2021-07-17 RX ORDER — VANCOMYCIN 1.75 GRAM/500 ML IN 0.9 % SODIUM CHLORIDE INTRAVENOUS
1750 EVERY 12 HOURS
Status: DISCONTINUED | OUTPATIENT
Start: 2021-07-17 | End: 2021-07-17

## 2021-07-17 RX ADMIN — Medication 3 ML: at 08:54

## 2021-07-17 RX ADMIN — CLINDAMYCIN PHOSPHATE 450 MG: 150 INJECTION, SOLUTION INTRAVENOUS at 13:36

## 2021-07-17 RX ADMIN — FAMOTIDINE 40 MG: 20 TABLET ORAL at 08:55

## 2021-07-17 RX ADMIN — CLINDAMYCIN PHOSPHATE 450 MG: 150 INJECTION, SOLUTION INTRAVENOUS at 18:15

## 2021-07-17 RX ADMIN — Medication 3 ML: at 20:40

## 2021-07-17 RX ADMIN — DOCUSATE SODIUM 50 MG AND SENNOSIDES 8.6 MG 2 TABLET: 8.6; 5 TABLET, FILM COATED ORAL at 08:55

## 2021-07-17 RX ADMIN — DOCUSATE SODIUM 50 MG AND SENNOSIDES 8.6 MG 2 TABLET: 8.6; 5 TABLET, FILM COATED ORAL at 20:40

## 2021-07-17 RX ADMIN — SPIRONOLACTONE 25 MG: 25 TABLET ORAL at 08:55

## 2021-07-17 RX ADMIN — ENOXAPARIN SODIUM 40 MG: 40 INJECTION SUBCUTANEOUS at 19:23

## 2021-07-17 RX ADMIN — CLINDAMYCIN PHOSPHATE 450 MG: 150 INJECTION, SOLUTION INTRAVENOUS at 05:15

## 2021-07-17 RX ADMIN — VANCOMYCIN HYDROCHLORIDE 1250 MG: 10 INJECTION, POWDER, LYOPHILIZED, FOR SOLUTION INTRAVENOUS at 08:54

## 2021-07-17 RX ADMIN — FUROSEMIDE 20 MG: 20 TABLET ORAL at 08:55

## 2021-07-17 RX ADMIN — VANCOMYCIN HYDROCHLORIDE 1750 MG: 10 INJECTION, POWDER, LYOPHILIZED, FOR SOLUTION INTRAVENOUS at 09:26

## 2021-07-17 RX ADMIN — ENOXAPARIN SODIUM 40 MG: 40 INJECTION SUBCUTANEOUS at 08:54

## 2021-07-17 NOTE — PLAN OF CARE
Subjective: Pt agreeable to therapeutic plan of care. Stated has some help at home but needs assist to wrap legs    Objective:     Bed mobility - Modified-Independent  Transfers - Supervision and with rolling walker  Ambulation - 40 feet Supervision and with rolling walker    Pain: 7 VAS LLE with meds  Education: Provided education on importance of mobility and skilled verbal / tactile cueing throughout intervention.     Assessment: Robert Randhawa Jr. presents with functional mobility impairments which indicate the need for skilled intervention. Tolerating session today without incident.Patient still guarding LLE, presents with slowed nawaf, lat sway and WBOS. Will continue to follow and progress as tolerated.     Plan/Recommendations:   Pt would benefit from Home with family assist and and Home Health at discharge from facility and requires no DME at discharge.   Pt desires Home with family assist and and Home Health at discharge. Pt cooperative; agreeable to therapeutic recommendations and plan of care.     Basic Mobility 6-click:  Rollin = Total, A lot = 2, A little = 3; 4 = None  Supine>Sit:   1 = Total, A lot = 2, A little = 3; 4 = None   Sit>Stand with arms:  1 = Total, A lot = 2, A little = 3; 4 = None  Bed>Chair:   1 = Total, A lot = 2, A little = 3; 4 = None  Ambulate in room:  1 = Total, A lot = 2, A little = 3; 4 = None  3-5 Steps with railin = Total, A lot = 2, A little = 3; 4 = None  Score: 22      Post-Tx Position: Up in Chair, Alarms activated and Call light and personal items within reach  PPE: gloves, surgical mask, eyewear protection

## 2021-07-17 NOTE — PLAN OF CARE
Goal Outcome Evaluation:  Plan of Care Reviewed With: patient        Progress: improving  Outcome Summary: Pt is stable and resting in bed. Will most likely discharge tomorrow. Will continue to monitor.

## 2021-07-17 NOTE — PLAN OF CARE
Goal Outcome Evaluation:  Plan of Care Reviewed With: patient        Progress: improving  Outcome Summary: Hoping to go home today sometime with home health. Stated that he is unable to do own dressing changes. both legs cont to be very swollen. States he is feeling much better.

## 2021-07-17 NOTE — PROGRESS NOTES
LOS: 1 day   Patient Care Team:  Alfredo Torres MD as PCP - General  Alfredo Torres MD as PCP - Family Medicine  Kenney Reyez MD as Consulting Physician (Cardiology)    Subjective     Interval History:     The patient is a very pleasant 56-year-old  male with known history of left lower lobe lymphedema, lower extremities chronic venous hypertension, varicosis vein of the left lower extremity with inflammation, bilateral lower extremity edema, severe stasis dermatitis of the lower extremity with prior history of cellulitis of left lower extremity, essential hypertension, gastroesophageal reflux disorder, poor mobility very sedentary lifestyle, body mass index of 48.2 presented to our office with severe swelling in the left lower extremity with bruising and oozing for the past week progressively getting worse.  He says that he has so much pain and also itching that he is scratched his left lower extremity and now he has some bleeding and pus formation.  He has had some chills and fever.  Now the redness in the left lower extremity is spreading up towards.  He says because of the severe swelling is unable to ambulate.  His appetite is slightly down.  Is not very compliant with elevating his lower extremity.  He has been diagnosed with major depression last year.  He says his condition makes him feel very depressed.  He has pain in the lower extremities especially when he walks.  He is unable to walk because his foot has been having pain and he has seen Dr. Carrasquillo and was diagnosed with a collapsed arch and he is not a candidate for surgery.  He has a history of tonic venous hypertension and prior history of deep vein thrombosis.  Because he lives at home alone he does not have anyone to care for him, he has swelling and bruising of fluids out of his leg will admit him start him on IV antibiotics.  He has had wound care in the past which he will need wound care in the future.   will be  "consulted and  will be consulted see if we can help him to rise he is at risk of losing his lower extremity with recurrent myelitis and deep venous thrombosis.  I talked to him on multiple occasions that he might benefit from some kind of weight management his weight is slowly limiting his ability to ambulate.    Patient Complaints:     The patient has been seen by wound care today.  The wound has been wrapped.  The patient is urinating more today.  Unfortunately when I entered to the room the patient was sitting on the side of the bed instead of elevating his lower extremities.  Denies any shortness of breath.  He does have some pain in left lower extremity.  He does not have any fever or chills.    History taken from: patient    Review of Systems   HENT: Negative.    Eyes: Negative.    Respiratory: Negative.    Cardiovascular: Positive for leg swelling.   Endocrine: Negative.    Genitourinary: Negative.    Musculoskeletal: Positive for arthralgias and gait problem.   Skin: Positive for color change, rash and wound.   Allergic/Immunologic: Negative.    Neurological: Positive for weakness.   Hematological: Negative.    Psychiatric/Behavioral: Negative.            Objective     Vital Signs  /70 (BP Location: Left arm, Patient Position: Sitting)   Pulse 65   Temp 98.6 °F (37 °C) (Oral)   Resp 20   Ht 185.4 cm (73\")   Wt (!) 158 kg (348 lb 12.3 oz)   SpO2 94%   BMI 46.01 kg/m²       Physical Exam:     General Appearance:    Alert, cooperative, in no acute distress   Head:    Normocephalic, without obvious abnormality, atraumatic   Eyes:            Lids and lashes normal, conjunctivae and sclerae normal, no   icterus, no pallor, corneas clear, PERRLA   Ears:    Ears appear intact with no abnormalities noted   Throat:   No oral lesions, no thrush, oral mucosa moist   Neck:   No adenopathy, supple, trachea midline, no thyromegaly, no   carotid bruit, no JVD   Lungs:     Clear to " auscultation,respirations regular, even and                  unlabored    Heart:    Regular rhythm and normal rate, normal S1 and S2, no            murmur, no gallop, no rub, no click   Chest Wall:    No abnormalities observed   Abdomen:     Normal bowel sounds, no masses, no organomegaly, soft        non-tender, non-distended, no guarding, no rebound                tenderness   Extremities:   Left lower extremity ulcer: There is 3 open areas to the left lower extremity all of the areas are partial-thickness in nature and they have a pink clean base there is a moderate amount of serosanguineous exudate noted.  There is surrounding erythema and warmth noted to the leg.  No streaking noted above the knee.  Approximate size of the open areas 2 x 1 cm and the 2 smaller areas are 0.5 x 0.5 cm each.  There is lymphedema of the right lower extremity.  There is no cellulitis in the right lower extremity.  He has stasis dermatitis of the right lower extremity.   Pulses:   Pulses palpable and equal bilaterally   Skin:   No bleeding, bruising or rash   Lymph nodes:   No palpable adenopathy   Neurologic:   Cranial nerves 2 - 12 grossly intact, sensation intact, DTR       present and equal bilaterally        Results Review:    Lab Results (last 24 hours)     Procedure Component Value Units Date/Time    Wound Culture - Drainage, Leg, Left [[id number]]  (Abnormal) Collected: 07/15/21 2012    Specimen: Drainage from Leg, Left Updated: 07/17/21 1052     Wound Culture Scant growth (1+) Staphylococcus aureus     Gram Stain No organisms seen    Vancomycin, Random [[id number]]  (Normal) Collected: 07/17/21 0504    Specimen: Blood Updated: 07/17/21 0603     Vancomycin Random 10.40 mcg/mL     Narrative:      Therapeutic Ranges for Vancomycin    Vancomycin Random   5.0-40.0 mcg/mL  Vancomycin Trough   5.0-20.0 mcg/mL  Vancomycin Peak     20.0-40.0 mcg/mL    Basic Metabolic Panel [[id number]]  (Abnormal) Collected: 07/17/21 0504    Specimen:  Blood Updated: 07/17/21 0602     Glucose 95 mg/dL      BUN 12 mg/dL      Creatinine 0.69 mg/dL      Sodium 138 mmol/L      Potassium 4.1 mmol/L      Comment: Slight hemolysis detected by analyzer. Results may be affected.        Chloride 104 mmol/L      CO2 25.0 mmol/L      Calcium 8.4 mg/dL      eGFR Non African Amer 118 mL/min/1.73      BUN/Creatinine Ratio 17.4     Anion Gap 9.0 mmol/L     Narrative:      GFR Normal >60  Chronic Kidney Disease <60  Kidney Failure <15      C-reactive Protein [[id number]]  (Abnormal) Collected: 07/17/21 0504    Specimen: Blood Updated: 07/17/21 0602     C-Reactive Protein 1.01 mg/dL     CBC & Differential [[id number]]  (Abnormal) Collected: 07/17/21 0504    Specimen: Blood Updated: 07/17/21 0539    Narrative:      The following orders were created for panel order CBC & Differential.  Procedure                               Abnormality         Status                     ---------                               -----------         ------                     CBC Auto Differential[[id number]]        Abnormal            Final result                 Please view results for these tests on the individual orders.    CBC Auto Differential [[id number]]  (Abnormal) Collected: 07/17/21 0504    Specimen: Blood Updated: 07/17/21 0539     WBC 4.20 10*3/mm3      RBC 3.96 10*6/mm3      Hemoglobin 13.5 g/dL      Hematocrit 40.3 %      .8 fL      MCH 34.0 pg      MCHC 33.4 g/dL      RDW 14.6 %      RDW-SD 51.2 fl      MPV 9.0 fL      Platelets 88 10*3/mm3      Neutrophil % 65.3 %      Lymphocyte % 22.5 %      Monocyte % 8.9 %      Eosinophil % 2.8 %      Basophil % 0.5 %      Neutrophils, Absolute 2.70 10*3/mm3      Lymphocytes, Absolute 0.90 10*3/mm3      Monocytes, Absolute 0.40 10*3/mm3      Eosinophils, Absolute 0.10 10*3/mm3      Basophils, Absolute 0.00 10*3/mm3      nRBC 0.1 /100 WBC            ECG/EMG Results (last 24 hours)     ** No results found for the last 24 hours. **          Imaging  Results (Last 24 Hours)     ** No results found for the last 24 hours. **               I reviewed the patient's new clinical results.    Medication Review:   Scheduled Meds:clindamycin, 450 mg, Intravenous, Q6H  [START ON 7/18/2021] clindamycin, 450 mg, Intravenous, Q6H  enoxaparin, 40 mg, Subcutaneous, Q12H  famotidine, 40 mg, Oral, Daily  furosemide, 20 mg, Oral, Daily  senna-docusate sodium, 2 tablet, Oral, BID  sodium chloride, 3 mL, Intravenous, Q12H  spironolactone, 25 mg, Oral, Daily  vancomycin, 1,750 mg, Intravenous, Q12H      Continuous Infusions:Pharmacy to dose vancomycin,       PRN Meds:.•  acetaminophen  •  senna-docusate sodium **AND** polyethylene glycol **AND** bisacodyl **AND** bisacodyl  •  calcium carbonate  •  ondansetron  •  Pharmacy to dose vancomycin  •  sodium chloride     Assessment/Plan     Left lower extremity cellulitis  Left lower extremity severe lymphedema  Bilateral lower extremity venous hypertension chronic, venous insufficiency  Nonpressure ulcer left lower leg with broken skin  Onychomycosis bilateral feet  History of deep venous thrombosis left lower extremity  Essential hypertension  Immobility syndrome due to severe emphysema and obesity as well as depression  Poor compliance to medical management  Major depression moderate recurrent currently in remission  Osteoarthritis left foot left foot deformity  Prediabetic  Mild hypoalbuminemia  Abnormal LFTs  Fatty liver  Macrocytosis  Thrombocytopenia chronic and mild known since 2017, serum PIERRE, lupus anticoagulants, B12, folate were normal  Hypersplenism  BMI 48 kg/square meter    Active Problems:    Lymphedema    Cellulitis of left lower extremity    Non-pressure chronic ulcer left lower leg, limited to breakdown skin (CMS/HCC)    Cellulitis of left foot      Continue the same treatment.  Physical therapy occupational therapy.   consult.  He will need outpatient wound care.  Findings of labs were discussed with the  patient and the nurses.  Follow the wound care nurse recommendation.    Plan for disposition: Case management and  consult    Alfredo Torres MD  07/17/21  13:22 EDT

## 2021-07-17 NOTE — PROGRESS NOTES
"Pharmacy Antimicrobial Dosing Service    Subjective:  Robert Randhawa Jr. is a 56 y.o.male admitted with LLE cellulitis. Pharmacy has been consulted to dose Vancomycin for possible SSTI. Direct admit from Dr. Torres's office for LLE swelling with bruising and oozing x 1 week getting progressively worse. Patient reported scratching LLE and now has bleeding and pus.     PMH: Left lower lobe lymphedema, LE chronic venous HTN, B/L LE edema, severe stasis dermatitis of LE, cellulitis LLE, morbid obesity (BMI >40), onychomycosis B/L feet      Assessment/Plan    1. Day #3 Vancomycin: Goal -600 mcg*h/mL. Received 2000 mg (~18 mg/kg DBW) IV x1 followed by 1250 mg (~11 mg/kg DBW) IV q12h. Trough prior to 4th dose today = 10.4 mcg/mL (~8 hrs post dose), true trough even lower. Calculated , half-life ~5 hrs. Based on subtherapeutic levels, will increase dose to 1750 mg (~16 mg/kg DBW) IV q12h. Repeat trough 7/18 at 0900. Suspect patient will accumulate given BMI.     2. Day #3 Clindamycin: 450 mg IV q6h    Will continue to monitor drug levels, renal function, culture and sensitivities, and patient clinical status.       Objective:  Relevant clinical data and objective history reviewed:  185.4 cm (73\")   (!) 158 kg (348 lb 12.3 oz)   Ideal body weight: 79.9 kg (176 lb 2.4 oz)  Adjusted ideal body weight: 111 kg (245 lb 3.1 oz)  Body mass index is 46.01 kg/m².    Results from last 7 days   Lab Units 07/17/21  0504   VANCOMYCIN RM mcg/mL 10.40     Results from last 7 days   Lab Units 07/17/21  0504 07/15/21  1734   CREATININE mg/dL 0.69* 0.93     Estimated Creatinine Clearance: 185.4 mL/min (A) (by C-G formula based on SCr of 0.69 mg/dL (L)).  I/O last 3 completed shifts:  In: 4673 [P.O.:1680; I.V.:2193; IV Piggyback:800]  Out: 2125 [Urine:2125]    Results from last 7 days   Lab Units 07/17/21  0504 07/15/21  1734   WBC 10*3/mm3 4.20 4.70     Temperature    07/16/21 1642 07/16/21 2100 07/17/21 0400   Temp: 97.9 " °F (36.6 °C) 98.1 °F (36.7 °C) 98.3 °F (36.8 °C)     Baseline culture/source/susceptibility:  Microbiology Results (last 10 days)       Procedure Component Value - Date/Time    Wound Culture - Drainage, Leg, Left [[id number]] Collected: 07/15/21 2012    Lab Status: Preliminary result Specimen: Drainage from Leg, Left Updated: 07/16/21 0643     Wound Culture Growth present, too young to evaluate     Gram Stain No organisms seen              Anti-Infectives (From admission, onward)      Ordered     Dose/Rate Route Frequency Start Stop    07/17/21 0901  vancomycin IVPB 1750 mg in 0.9% Sodium Chloride (premix) 500 mL     Ordering Provider: Alfredo Torres MD    1,750 mg Intravenous Every 12 Hours 07/17/21 1000 07/20/21 2159    07/15/21 1839  vancomycin 2000 mg/500 mL 0.9% NS IVPB ([facility])     Ordering Provider: Alfredo Torres MD    2,000 mg Intravenous Once 07/15/21 2000 07/15/21 2234    07/15/21 1721  clindamycin (CLEOCIN) 450 mg in dextrose (D5W) 5 % 100 mL IVPB     Ordering Provider: Alfreod Torres MD    450 mg  200 mL/hr over 30 Minutes Intravenous Every 6 Hours 07/15/21 1815 07/20/21 1814    07/15/21 1721  Pharmacy to dose vancomycin     Ordering Provider: Alfredo Torres MD     Does not apply Continuous PRN 07/15/21 1721 07/20/21 1720            Luna Mercer PharmD  07/17/21 09:02 EDT

## 2021-07-18 VITALS
DIASTOLIC BLOOD PRESSURE: 79 MMHG | WEIGHT: 315 LBS | HEIGHT: 73 IN | BODY MASS INDEX: 41.75 KG/M2 | HEART RATE: 65 BPM | RESPIRATION RATE: 18 BRPM | SYSTOLIC BLOOD PRESSURE: 123 MMHG | TEMPERATURE: 98.1 F | OXYGEN SATURATION: 93 %

## 2021-07-18 LAB
BACTERIA SPEC AEROBE CULT: ABNORMAL
GRAM STN SPEC: ABNORMAL

## 2021-07-18 PROCEDURE — G0378 HOSPITAL OBSERVATION PER HR: HCPCS

## 2021-07-18 PROCEDURE — 96372 THER/PROPH/DIAG INJ SC/IM: CPT

## 2021-07-18 PROCEDURE — 25010000002 ENOXAPARIN PER 10 MG: Performed by: FAMILY MEDICINE

## 2021-07-18 RX ORDER — CLINDAMYCIN HYDROCHLORIDE 150 MG/1
450 CAPSULE ORAL EVERY 8 HOURS SCHEDULED
Qty: 60 CAPSULE | Refills: 0 | Status: SHIPPED | OUTPATIENT
Start: 2021-07-18 | End: 2021-07-25

## 2021-07-18 RX ADMIN — SPIRONOLACTONE 25 MG: 25 TABLET ORAL at 08:27

## 2021-07-18 RX ADMIN — Medication 3 ML: at 08:27

## 2021-07-18 RX ADMIN — FUROSEMIDE 20 MG: 20 TABLET ORAL at 08:27

## 2021-07-18 RX ADMIN — ENOXAPARIN SODIUM 40 MG: 40 INJECTION SUBCUTANEOUS at 08:27

## 2021-07-18 RX ADMIN — FAMOTIDINE 40 MG: 20 TABLET ORAL at 08:27

## 2021-07-18 RX ADMIN — CLINDAMYCIN HYDROCHLORIDE 450 MG: 300 CAPSULE ORAL at 05:19

## 2021-07-18 RX ADMIN — DOCUSATE SODIUM 50 MG AND SENNOSIDES 8.6 MG 2 TABLET: 8.6; 5 TABLET, FILM COATED ORAL at 08:27

## 2021-07-18 NOTE — PLAN OF CARE
Goal Outcome Evaluation:  Plan of Care Reviewed With: patient        Progress: improving  Outcome Summary: Pt is stable and resting in bed. Ambulating in room well with walker and stand by assistance. Possible discharge today. Will continue to monitor.

## 2021-07-18 NOTE — PLAN OF CARE
Goal Outcome Evaluation:  Plan of Care Reviewed With: patient        Progress: improving  Outcome Summary: Has been ambulating to bathroom with assist of one and use of walker. Michelle well.Both legs still very edematous. Hoping to go home today.

## 2021-07-19 NOTE — CASE MANAGEMENT/SOCIAL WORK
Case Management Discharge Note      Final Note: Home with [facility]    Provided Post Acute Provider List?: N/A    Selected Continued Care - Discharged on 7/18/2021 Admission date: 7/15/2021 - Discharge disposition: Home-Health Care Svc        Home Medical Care Coordination complete    Service Provider Selected Services Address Phone Fax Patient Preferred    [facility] [address] [place], [place] [id number] [phone]                     Final Discharge Disposition Code: 06 - home with home health care

## 2021-07-29 ENCOUNTER — OFFICE VISIT (OUTPATIENT)
Dept: WOUND CARE | Facility: HOSPITAL | Age: 56
End: 2021-07-29

## 2021-07-29 ENCOUNTER — HOSPITAL ENCOUNTER (OUTPATIENT)
Dept: CARDIOLOGY | Facility: HOSPITAL | Age: 56
Discharge: HOME | End: 2021-07-29

## 2021-07-29 ENCOUNTER — TRANSCRIBE ORDERS (OUTPATIENT)
Dept: ADMINISTRATIVE | Facility: HOSPITAL | Age: 56
End: 2021-07-29

## 2021-07-29 DIAGNOSIS — L97.821 SKIN ULCER OF KNEE, LEFT, LIMITED TO BREAKDOWN OF SKIN (HCC): Primary | ICD-10-CM

## 2021-07-29 DIAGNOSIS — L97.821 SKIN ULCER OF KNEE, LEFT, LIMITED TO BREAKDOWN OF SKIN (HCC): ICD-10-CM

## 2021-07-29 LAB
BH CV LOWER VASCULAR LEFT COMMON FEMORAL AUGMENT: NORMAL
BH CV LOWER VASCULAR LEFT COMMON FEMORAL COMPETENT: NORMAL
BH CV LOWER VASCULAR LEFT COMMON FEMORAL COMPRESS: NORMAL
BH CV LOWER VASCULAR LEFT COMMON FEMORAL PHASIC: NORMAL
BH CV LOWER VASCULAR LEFT COMMON FEMORAL SPONT: NORMAL
BH CV LOWER VASCULAR LEFT GASTRONEMIUS COMPRESS: NORMAL
BH CV LOWER VASCULAR LEFT GREATER SAPH AK COMPRESS: NORMAL
BH CV LOWER VASCULAR LEFT GREATER SAPH BK COMPRESS: NORMAL
BH CV LOWER VASCULAR LEFT LESSER SAPH COMPRESS: NORMAL
BH CV LOWER VASCULAR LEFT MID FEMORAL AUGMENT: NORMAL
BH CV LOWER VASCULAR LEFT MID FEMORAL COMPETENT: NORMAL
BH CV LOWER VASCULAR LEFT MID FEMORAL COMPRESS: NORMAL
BH CV LOWER VASCULAR LEFT MID FEMORAL PHASIC: NORMAL
BH CV LOWER VASCULAR LEFT MID FEMORAL SPONT: NORMAL
BH CV LOWER VASCULAR LEFT POPLITEAL AUGMENT: NORMAL
BH CV LOWER VASCULAR LEFT POPLITEAL COMPETENT: NORMAL
BH CV LOWER VASCULAR LEFT POPLITEAL COMPRESS: NORMAL
BH CV LOWER VASCULAR LEFT POPLITEAL PHASIC: NORMAL
BH CV LOWER VASCULAR LEFT POPLITEAL SPONT: NORMAL
BH CV LOWER VASCULAR LEFT PROXIMAL FEMORAL COMPRESS: NORMAL
BH CV LOWER VASCULAR LEFT SAPHENOFEMORAL JUNCTION COMPRESS: NORMAL
BH CV LOWER VASCULAR LEFT VARICOSITY BK COMPRESS: NORMAL
BH CV LOWER VASCULAR RIGHT COMMON FEMORAL AUGMENT: NORMAL
BH CV LOWER VASCULAR RIGHT COMMON FEMORAL COMPETENT: NORMAL
BH CV LOWER VASCULAR RIGHT COMMON FEMORAL COMPRESS: NORMAL
BH CV LOWER VASCULAR RIGHT COMMON FEMORAL PHASIC: NORMAL
BH CV LOWER VASCULAR RIGHT COMMON FEMORAL SPONT: NORMAL
MAXIMAL PREDICTED HEART RATE: 163 BPM
STRESS TARGET HR: 139 BPM

## 2021-07-29 PROCEDURE — G0463 HOSPITAL OUTPT CLINIC VISIT: HCPCS

## 2021-07-29 PROCEDURE — 93971 EXTREMITY STUDY: CPT

## 2021-08-02 PROBLEM — B35.1 ONYCHOMYCOSIS OF FOOT WITH OTHER COMPLICATION: Status: ACTIVE | Noted: 2021-08-02

## 2021-08-02 PROBLEM — I87.303 VENOUS HYPERTENSION OF BOTH LOWER EXTREMITIES: Status: ACTIVE | Noted: 2021-08-02

## 2021-08-04 ENCOUNTER — OFFICE VISIT (OUTPATIENT)
Dept: WOUND CARE | Facility: HOSPITAL | Age: 56
End: 2021-08-04

## 2021-08-11 ENCOUNTER — OFFICE VISIT (OUTPATIENT)
Dept: WOUND CARE | Facility: HOSPITAL | Age: 56
End: 2021-08-11

## 2021-08-18 ENCOUNTER — OFFICE VISIT (OUTPATIENT)
Dept: WOUND CARE | Facility: HOSPITAL | Age: 56
End: 2021-08-18

## 2021-08-25 ENCOUNTER — OFFICE VISIT (OUTPATIENT)
Dept: WOUND CARE | Facility: HOSPITAL | Age: 56
End: 2021-08-25

## 2021-09-01 ENCOUNTER — OFFICE VISIT (OUTPATIENT)
Dept: WOUND CARE | Facility: HOSPITAL | Age: 56
End: 2021-09-01

## 2021-09-08 ENCOUNTER — APPOINTMENT (OUTPATIENT)
Dept: WOUND CARE | Facility: HOSPITAL | Age: 56
End: 2021-09-08

## 2021-09-15 ENCOUNTER — TRANSCRIBE ORDERS (OUTPATIENT)
Dept: ADMINISTRATIVE | Facility: HOSPITAL | Age: 56
End: 2021-09-15

## 2021-09-15 ENCOUNTER — HOSPITAL ENCOUNTER (OUTPATIENT)
Dept: CARDIOLOGY | Facility: HOSPITAL | Age: 56
Discharge: HOME | End: 2021-09-15

## 2021-09-15 ENCOUNTER — OFFICE VISIT (OUTPATIENT)
Dept: WOUND CARE | Facility: HOSPITAL | Age: 56
End: 2021-09-15

## 2021-09-15 ENCOUNTER — LAB REQUISITION (OUTPATIENT)
Dept: LAB | Facility: HOSPITAL | Age: 56
End: 2021-09-15

## 2021-09-15 DIAGNOSIS — L97.509 ULCER OF FOOT DUE TO SECONDARY DIABETES (HCC): Primary | ICD-10-CM

## 2021-09-15 DIAGNOSIS — L97.822 NON-PRESSURE CHRONIC ULCER OF OTHER PART OF LEFT LOWER LEG WITH FAT LAYER EXPOSED (HCC): ICD-10-CM

## 2021-09-15 DIAGNOSIS — E13.621 ULCER OF FOOT DUE TO SECONDARY DIABETES (HCC): ICD-10-CM

## 2021-09-15 DIAGNOSIS — L97.509 ULCER OF FOOT DUE TO SECONDARY DIABETES (HCC): ICD-10-CM

## 2021-09-15 DIAGNOSIS — L97.822 ULCER OF LEFT MEDIAL LOWER EXTREMITY WITH FAT LAYER EXPOSED (HCC): ICD-10-CM

## 2021-09-15 DIAGNOSIS — E13.621 ULCER OF FOOT DUE TO SECONDARY DIABETES (HCC): Primary | ICD-10-CM

## 2021-09-15 DIAGNOSIS — L97.323: Primary | ICD-10-CM

## 2021-09-15 LAB
BH CV LEFT LOWER VAS COMMON FEMORAL REFLUX TIME: 2524 MSEC
BH CV LEFT LOWER VAS COMMON FEMORAL TRANSVERSE DIAMETER: 2.05 CM
BH CV LEFT LOWER VAS GSV KNEE TRANSVERSE DIAMETER: 0.68 CM
BH CV LEFT LOWER VAS GSV MID TRANSVERSE DIAMETER: 0.69 CM
BH CV LEFT LOWER VAS GSV PROX TRANSVERSE DIAMETER: 0.85 CM
BH CV LEFT LOWER VAS GSVBELOW KNEE TRANSVERSE DIAMETER: 0.73 CM
BH CV LEFT LOWER VAS MID FEMORAL TRANSVERSE DIAMETER: 1.09 CM
BH CV LEFT LOWER VAS POPLITEAL TRANSVERSE DIAMETER: 1.07 CM
BH CV LEFT LOWER VAS SAPHENOFEMORAL JUNCTION REFLUX TIME: 2573 MSEC
BH CV LEFT LOWER VAS SAPHENOFEMORAL JUNCTION TRANSVERSE DIAMETER: 1.04 CM
BH CV LEFT LOWER VAS SSV PROX CALF TRANS DIAMETER: 0.37 CM
BH CV VAS LEFT COMMON FEMORAL VEIN HIDDEN LRR COLOR FLOW REVERSAL: NORMAL
BH CV VAS LEFT COMMON FEMORAL VEIN HIDDEN LRR COMPRESSIBILTY: NORMAL
BH CV VAS LEFT GSV ABOVE KNEE HIDDEN LRR COMPRESSIBILTY: NORMAL
BH CV VAS LEFT GSV BELOW KNEE HIDDEN LRR COLOR FLOW REVERSAL: NORMAL
BH CV VAS LEFT GSV BELOW KNEE HIDDEN LRR COMPRESSIBILTY: NORMAL
BH CV VAS LEFT GSV DISTAL THIGH HIDDEN LRR COLOR FLOW REVERSAL: NORMAL
BH CV VAS LEFT GSV MID  HIDDEN LRR COLOR FLOW REVERSAL: NORMAL
BH CV VAS LEFT GSV MID HIDDEN LRR COMPRESSIBILTY: NORMAL
BH CV VAS LEFT GSV PROXIMAL HIDDEN LRR COLOR FLOW REVERSAL: NORMAL
BH CV VAS LEFT GSV PROXIMAL HIDDEN LRR COMPRESSIBILTY: NORMAL
BH CV VAS LEFT MID FEMORAL VEIN HIDDEN LRR COLOR FLOW REVERSAL: NORMAL
BH CV VAS LEFT MID FEMORAL VEIN HIDDEN LRR COMPRESSIBILTY: NORMAL
BH CV VAS LEFT POPLITEAL VEIN HIDDEN LRR COLOR FLOW REVERSAL: NORMAL
BH CV VAS LEFT POPLITEAL VEIN HIDDEN LRR COMPRESSIBILTY: NORMAL
BH CV VAS LEFT SAPHENOFEMORAL JUNC HIDDEN LRR COLOR FLOW REVERSAL: NORMAL
BH CV VAS LEFT SAPHENOFEMORAL JUNCTION HIDDEN LRR COMPRESSIBILTY: NORMAL
BH CV VAS LEFT SSV HIDDEN LRR COLOR FLOW REVERSAL: NORMAL
BH CV VAS LEFT SSV HIDDEN LRR COMPRESSIBILTY: NORMAL
MAXIMAL PREDICTED HEART RATE: 163 BPM
STRESS TARGET HR: 139 BPM

## 2021-09-15 PROCEDURE — 87205 SMEAR GRAM STAIN: CPT | Performed by: NURSE PRACTITIONER

## 2021-09-15 PROCEDURE — G0463 HOSPITAL OUTPT CLINIC VISIT: HCPCS

## 2021-09-15 PROCEDURE — 87070 CULTURE OTHR SPECIMN AEROBIC: CPT | Performed by: NURSE PRACTITIONER

## 2021-09-17 ENCOUNTER — HOSPITAL ENCOUNTER (OUTPATIENT)
Dept: CARDIOLOGY | Facility: HOSPITAL | Age: 56
Discharge: HOME | End: 2021-09-17
Admitting: NURSE PRACTITIONER

## 2021-09-17 DIAGNOSIS — L97.509 ULCER OF FOOT DUE TO SECONDARY DIABETES (HCC): ICD-10-CM

## 2021-09-17 DIAGNOSIS — E13.621 ULCER OF FOOT DUE TO SECONDARY DIABETES (HCC): ICD-10-CM

## 2021-09-17 DIAGNOSIS — L97.822 ULCER OF LEFT MEDIAL LOWER EXTREMITY WITH FAT LAYER EXPOSED (HCC): ICD-10-CM

## 2021-09-17 LAB
BH CV LOWER VASCULAR LEFT COMMON FEMORAL AUGMENT: NORMAL
BH CV LOWER VASCULAR LEFT COMMON FEMORAL COMPETENT: NORMAL
BH CV LOWER VASCULAR LEFT COMMON FEMORAL COMPRESS: NORMAL
BH CV LOWER VASCULAR LEFT COMMON FEMORAL PHASIC: NORMAL
BH CV LOWER VASCULAR LEFT COMMON FEMORAL SPONT: NORMAL
BH CV LOWER VASCULAR LEFT DISTAL FEMORAL SPONT: NORMAL
BH CV LOWER VASCULAR LEFT GASTRONEMIUS COMPRESS: NORMAL
BH CV LOWER VASCULAR LEFT GREATER SAPH AK COMPRESS: NORMAL
BH CV LOWER VASCULAR LEFT GREATER SAPH BK COMPRESS: NORMAL
BH CV LOWER VASCULAR LEFT LESSER SAPH COMPRESS: NORMAL
BH CV LOWER VASCULAR LEFT MID FEMORAL AUGMENT: NORMAL
BH CV LOWER VASCULAR LEFT MID FEMORAL COMPETENT: NORMAL
BH CV LOWER VASCULAR LEFT MID FEMORAL COMPRESS: NORMAL
BH CV LOWER VASCULAR LEFT MID FEMORAL PHASIC: NORMAL
BH CV LOWER VASCULAR LEFT MID FEMORAL SPONT: NORMAL
BH CV LOWER VASCULAR LEFT POPLITEAL AUGMENT: NORMAL
BH CV LOWER VASCULAR LEFT POPLITEAL COMPETENT: NORMAL
BH CV LOWER VASCULAR LEFT POPLITEAL COMPRESS: NORMAL
BH CV LOWER VASCULAR LEFT POPLITEAL PHASIC: NORMAL
BH CV LOWER VASCULAR LEFT POPLITEAL SPONT: NORMAL
BH CV LOWER VASCULAR LEFT POSTERIOR TIBIAL COMPRESS: NORMAL
BH CV LOWER VASCULAR LEFT PROXIMAL FEMORAL COMPRESS: NORMAL
BH CV LOWER VASCULAR LEFT SAPHENOFEMORAL JUNCTION COMPRESS: NORMAL
BH CV LOWER VASCULAR RIGHT COMMON FEMORAL AUGMENT: NORMAL
BH CV LOWER VASCULAR RIGHT COMMON FEMORAL COMPETENT: NORMAL
BH CV LOWER VASCULAR RIGHT COMMON FEMORAL COMPRESS: NORMAL
BH CV LOWER VASCULAR RIGHT COMMON FEMORAL PHASIC: NORMAL
BH CV LOWER VASCULAR RIGHT COMMON FEMORAL SPONT: NORMAL
MAXIMAL PREDICTED HEART RATE: 163 BPM
STRESS TARGET HR: 139 BPM

## 2021-09-17 PROCEDURE — 93971 EXTREMITY STUDY: CPT

## 2021-09-18 LAB
BACTERIA SPEC AEROBE CULT: NORMAL
GRAM STN SPEC: NORMAL

## 2021-09-22 ENCOUNTER — OFFICE VISIT (OUTPATIENT)
Dept: WOUND CARE | Facility: HOSPITAL | Age: 56
End: 2021-09-22

## 2021-09-27 ENCOUNTER — OFFICE VISIT (OUTPATIENT)
Dept: WOUND CARE | Facility: HOSPITAL | Age: 56
End: 2021-09-27

## 2021-09-27 ENCOUNTER — HOSPITAL ENCOUNTER (OUTPATIENT)
Dept: GENERAL RADIOLOGY | Facility: HOSPITAL | Age: 56
Discharge: HOME | End: 2021-09-27

## 2021-09-27 ENCOUNTER — TRANSCRIBE ORDERS (OUTPATIENT)
Dept: ADMINISTRATIVE | Facility: HOSPITAL | Age: 56
End: 2021-09-27

## 2021-09-27 DIAGNOSIS — L97.822 SKIN ULCER OF POPLITEAL REGION, LEFT, WITH FAT LAYER EXPOSED (HCC): ICD-10-CM

## 2021-09-27 DIAGNOSIS — L97.822 SKIN ULCER OF POPLITEAL REGION, LEFT, WITH FAT LAYER EXPOSED (HCC): Primary | ICD-10-CM

## 2021-09-27 PROCEDURE — 73610 X-RAY EXAM OF ANKLE: CPT

## 2021-10-05 ENCOUNTER — APPOINTMENT (OUTPATIENT)
Dept: VASCULAR SURGERY | Facility: HOSPITAL | Age: 56
End: 2021-10-05

## 2021-10-05 PROCEDURE — G0463 HOSPITAL OUTPT CLINIC VISIT: HCPCS

## 2021-10-06 ENCOUNTER — OFFICE VISIT (OUTPATIENT)
Dept: WOUND CARE | Facility: HOSPITAL | Age: 56
End: 2021-10-06

## 2021-10-13 ENCOUNTER — OFFICE VISIT (OUTPATIENT)
Dept: WOUND CARE | Facility: HOSPITAL | Age: 56
End: 2021-10-13

## 2021-10-20 ENCOUNTER — OFFICE VISIT (OUTPATIENT)
Dept: WOUND CARE | Facility: HOSPITAL | Age: 56
End: 2021-10-20

## 2021-10-27 ENCOUNTER — APPOINTMENT (OUTPATIENT)
Dept: WOUND CARE | Facility: HOSPITAL | Age: 56
End: 2021-10-27

## 2021-11-03 ENCOUNTER — OFFICE VISIT (OUTPATIENT)
Dept: WOUND CARE | Facility: HOSPITAL | Age: 56
End: 2021-11-03

## 2021-11-10 ENCOUNTER — OFFICE VISIT (OUTPATIENT)
Dept: WOUND CARE | Facility: HOSPITAL | Age: 56
End: 2021-11-10

## 2021-11-17 ENCOUNTER — OFFICE VISIT (OUTPATIENT)
Dept: WOUND CARE | Facility: HOSPITAL | Age: 56
End: 2021-11-17

## 2021-11-17 ENCOUNTER — TRANSCRIBE ORDERS (OUTPATIENT)
Dept: PHYSICAL THERAPY | Facility: CLINIC | Age: 56
End: 2021-11-17

## 2021-11-17 DIAGNOSIS — I89.0 LYMPHEDEMA OF BOTH LOWER EXTREMITIES: Primary | ICD-10-CM

## 2021-12-01 ENCOUNTER — OFFICE VISIT (OUTPATIENT)
Dept: WOUND CARE | Facility: HOSPITAL | Age: 56
End: 2021-12-01

## 2021-12-08 ENCOUNTER — OFFICE VISIT (OUTPATIENT)
Dept: WOUND CARE | Facility: HOSPITAL | Age: 56
End: 2021-12-08

## 2021-12-13 ENCOUNTER — TREATMENT (OUTPATIENT)
Dept: PHYSICAL THERAPY | Facility: CLINIC | Age: 56
End: 2021-12-13

## 2021-12-13 DIAGNOSIS — I89.0 LYMPHEDEMA: Primary | ICD-10-CM

## 2021-12-13 PROCEDURE — 97166 OT EVAL MOD COMPLEX 45 MIN: CPT | Performed by: OCCUPATIONAL THERAPIST

## 2021-12-13 NOTE — PROGRESS NOTES
Occupational Therapy Initial Evaluation    Patient: Robret Randhawa Jr.   : 1965  Diagnosis/ICD-10 Code:  Lymphedema [I89.0]  Referring practitioner: QUEENIE Yeung  Date of Initial Visit: 2021  Today's Date: 2021  Patient seen for 2 sessions               Subjective Questionnaire: LEFS: 85%      Subjective pt is a 56 yr old male referred to therapy for lymphedema treatment       Objective   Pt lives with his brother  Pt does drive   Pt has a long history of BLE lymphedema beginning in   Pt currently has BLE legs wrapped by home care which he is currently receiving home care Monday and Friday   He attends the wound center on Wednesday   He has 2 lymphedema pneumatic pumps   He also had a BLE circaid compression garment   Pt reports that he has active small wounds which are seeping and currently dressed and wrapped with coban   Pt does walk with a straight cane and does have balance issues   Pt is obese and he has breathing issues as well as other physical and mental health issues           Assessment & Plan     Assessment  Impairments: abnormal coordination, abnormal or restricted ROM, activity intolerance, impaired balance, impaired physical strength, lacks appropriate home exercise program and pain with function  Functional Limitations: carrying objects, lifting, sleeping, walking, pushing, moving in bed, stooping, reaching behind back, reaching overhead and unable to perform repetitive tasks  Assessment details: Pt reports the above impairments and functional limitation   Discussed treatment options with the wound center    Pt will attend and treatment at with OT  For MLD to reduce edema and improve lymphatic motility but he must bring his circaid LE reduction garments    Exercise and strengthening program will be established   Discussed with the wound center that without the pt compliance of bringing the compression garments treatment would not be appropriate as it  involves  MLD, COMPRESSION,  And exercise   Prognosis: fair    Goals  Plan Goals: stg  For pt to be compliant with the requirements     ltg  Maintain lymphatic reduction   ltg  Improve pt overall strength and conditioning so able to be more active   ltg educate and have pt demonstrate the management of lymphedema     Pt is indicated for skilled OT     Plan  Planned therapy interventions: ADL retraining, body mechanics training, compression, functional ROM exercises, home exercise program, IADL retraining, manual therapy, postural training, neuromuscular re-education, strengthening, stretching and therapeutic activities  Frequency: 2x week  Duration in weeks: 10        History # of Personal Factors and/or Comorbidities: MODERATE (1-2)  Examination of Body System(s): # of elements: MODERATE (3)  Clinical Presentation: EVOLVING  Clinical Decision Making: MODERATE      Timed Codes        Manual Therapy:         mins  11915;    Therapeutic Exercise:         mins  66885;     Neuromuscular Cat:        mins  60935;    Therapeutic Activity:          mins  61118;      Ultrasound:          mins  76548;    Community/ work         mins  33006  Self Care/ Son         mins  03745  Sensory Re-Int.                  mins   05607  Cognitve Re-train         mins  03880     Un-timed Codes  Electrical Stimulation:         mins 9 50364 ( );  OT low eval          mins  93792  OT mod eval.             45      mins   55752  OT high eval          mins 83386        Timed Treatment:    0  mins   Total Treatment:   45     mins    OT SIGNATURE: Andressa Duque OT OT [place] license #: [id number]  DATE TREATMENT INITIATED: 12/13/2021    Initial Certification  Certification Period: 3/12/2022  I certify that the therapy services are furnished while this patient is under my care.  The services outlined above are required by this patient, and will be reviewed every 90 days.     PHYSICIAN: ________________________________ Jaguar Felder,  APRN      DATE:     Please sign and return via fax to [phone].. Thank you, [facility] Occupational Therapy.

## 2021-12-15 ENCOUNTER — OFFICE VISIT (OUTPATIENT)
Dept: WOUND CARE | Facility: HOSPITAL | Age: 56
End: 2021-12-15

## 2021-12-22 ENCOUNTER — OFFICE VISIT (OUTPATIENT)
Dept: WOUND CARE | Facility: HOSPITAL | Age: 56
End: 2021-12-22

## 2022-01-05 ENCOUNTER — APPOINTMENT (OUTPATIENT)
Dept: WOUND CARE | Facility: HOSPITAL | Age: 57
End: 2022-01-05

## 2022-01-12 ENCOUNTER — OFFICE VISIT (OUTPATIENT)
Dept: WOUND CARE | Facility: HOSPITAL | Age: 57
End: 2022-01-12

## 2022-01-19 ENCOUNTER — OFFICE VISIT (OUTPATIENT)
Dept: WOUND CARE | Facility: HOSPITAL | Age: 57
End: 2022-01-19

## 2022-02-02 ENCOUNTER — LAB REQUISITION (OUTPATIENT)
Dept: LAB | Facility: HOSPITAL | Age: 57
End: 2022-02-02

## 2022-02-02 ENCOUNTER — OFFICE VISIT (OUTPATIENT)
Dept: WOUND CARE | Facility: HOSPITAL | Age: 57
End: 2022-02-02

## 2022-02-02 DIAGNOSIS — Z00.00 ENCOUNTER FOR GENERAL ADULT MEDICAL EXAMINATION WITHOUT ABNORMAL FINDINGS: ICD-10-CM

## 2022-02-02 PROCEDURE — 87205 SMEAR GRAM STAIN: CPT | Performed by: NURSE PRACTITIONER

## 2022-02-02 PROCEDURE — 87070 CULTURE OTHR SPECIMN AEROBIC: CPT | Performed by: NURSE PRACTITIONER

## 2022-02-05 LAB
BACTERIA SPEC AEROBE CULT: NORMAL
GRAM STN SPEC: NORMAL
GRAM STN SPEC: NORMAL

## 2022-02-09 ENCOUNTER — OFFICE VISIT (OUTPATIENT)
Dept: WOUND CARE | Facility: HOSPITAL | Age: 57
End: 2022-02-09

## 2022-02-16 ENCOUNTER — OFFICE VISIT (OUTPATIENT)
Dept: WOUND CARE | Facility: HOSPITAL | Age: 57
End: 2022-02-16

## 2022-02-23 ENCOUNTER — OFFICE VISIT (OUTPATIENT)
Dept: WOUND CARE | Facility: HOSPITAL | Age: 57
End: 2022-02-23

## 2022-03-02 ENCOUNTER — OFFICE VISIT (OUTPATIENT)
Dept: WOUND CARE | Facility: HOSPITAL | Age: 57
End: 2022-03-02

## 2022-03-09 ENCOUNTER — OFFICE VISIT (OUTPATIENT)
Dept: WOUND CARE | Facility: HOSPITAL | Age: 57
End: 2022-03-09

## 2022-03-16 ENCOUNTER — LAB REQUISITION (OUTPATIENT)
Dept: LAB | Facility: HOSPITAL | Age: 57
End: 2022-03-16

## 2022-03-16 ENCOUNTER — OFFICE VISIT (OUTPATIENT)
Dept: WOUND CARE | Facility: HOSPITAL | Age: 57
End: 2022-03-16

## 2022-03-16 DIAGNOSIS — Z00.00 ENCOUNTER FOR GENERAL ADULT MEDICAL EXAMINATION WITHOUT ABNORMAL FINDINGS: ICD-10-CM

## 2022-03-16 PROCEDURE — 87070 CULTURE OTHR SPECIMN AEROBIC: CPT | Performed by: NURSE PRACTITIONER

## 2022-03-16 PROCEDURE — 87205 SMEAR GRAM STAIN: CPT | Performed by: NURSE PRACTITIONER

## 2022-03-16 PROCEDURE — 87186 SC STD MICRODIL/AGAR DIL: CPT | Performed by: NURSE PRACTITIONER

## 2022-03-16 PROCEDURE — 87147 CULTURE TYPE IMMUNOLOGIC: CPT | Performed by: NURSE PRACTITIONER

## 2022-03-18 LAB
BACTERIA SPEC AEROBE CULT: ABNORMAL
GRAM STN SPEC: ABNORMAL

## 2022-03-23 ENCOUNTER — OFFICE VISIT (OUTPATIENT)
Dept: WOUND CARE | Facility: HOSPITAL | Age: 57
End: 2022-03-23

## 2022-03-23 RX ORDER — NYSTATIN 100000 U/G
OINTMENT TOPICAL
Qty: 30 G | Refills: 3 | Status: SHIPPED | OUTPATIENT
Start: 2022-03-23 | End: 2022-11-08

## 2022-03-23 RX ORDER — ZINC OXIDE 20 %
OINTMENT (GRAM) TOPICAL
Qty: 30 G | Refills: 3 | Status: SHIPPED | OUTPATIENT
Start: 2022-03-23 | End: 2023-01-26

## 2022-03-23 RX ORDER — MUPIROCIN CALCIUM 20 MG/G
CREAM TOPICAL
Qty: 30 G | Refills: 3 | Status: SHIPPED | OUTPATIENT
Start: 2022-03-23 | End: 2023-01-26

## 2022-03-23 RX ORDER — DIAPER,BRIEF,INFANT-TODD,DISP
EACH MISCELLANEOUS
Qty: 30 G | Refills: 3 | Status: SHIPPED | OUTPATIENT
Start: 2022-03-23 | End: 2023-01-26

## 2022-03-28 RX ORDER — BACITRACIN ZINC 500 [USP'U]/G
OINTMENT TOPICAL
Qty: 30 G | Refills: 3 | Status: SHIPPED | OUTPATIENT
Start: 2022-03-28 | End: 2023-01-26

## 2022-03-30 ENCOUNTER — OFFICE VISIT (OUTPATIENT)
Dept: WOUND CARE | Facility: HOSPITAL | Age: 57
End: 2022-03-30

## 2022-04-06 ENCOUNTER — OFFICE VISIT (OUTPATIENT)
Dept: WOUND CARE | Facility: HOSPITAL | Age: 57
End: 2022-04-06

## 2022-04-13 ENCOUNTER — OFFICE VISIT (OUTPATIENT)
Dept: WOUND CARE | Facility: HOSPITAL | Age: 57
End: 2022-04-13

## 2022-04-20 ENCOUNTER — OFFICE VISIT (OUTPATIENT)
Dept: WOUND CARE | Facility: HOSPITAL | Age: 57
End: 2022-04-20

## 2022-05-04 ENCOUNTER — OFFICE VISIT (OUTPATIENT)
Dept: WOUND CARE | Facility: HOSPITAL | Age: 57
End: 2022-05-04

## 2022-05-11 ENCOUNTER — OFFICE VISIT (OUTPATIENT)
Dept: WOUND CARE | Facility: HOSPITAL | Age: 57
End: 2022-05-11

## 2022-05-23 NOTE — PROGRESS NOTES
HEMATOLOGY ONCOLOGY OUTPATIENT FOLLOW UP       Patient name: Robert Randhawa Jr.  : 1965  MRN: [id number]  Primary Care Physician: Alfredo Torres MD  Referring Physician: Alfredo Torres MD  Reason For Consult:     Chief Complaint   Patient presents with   • Follow-up     Thrombocytopenia (CMS/HCC)     HPI:   History of Present Illness:  Robert Randhawa Jr. is 57 y.o. male who presented to our office on 20 for consultation regarding thrombocytopenia.  Patient had a CBC on 10/10/2020 that showed a platelet count of 108.  MCV was also elevated to 100.  On review previous labs since , patient does have mild intermittent thrombocytopenia.  Also has chronic macrocytosis.  Patient does have history of chronic left leg cellulitis and edema and also chronic DVT.  He does have a history of morbid obesity and also somewhat immobilized..  · 2017: WBC 5, hemoglobin 14, platelets 129, MCV 99.4  · 2018: Doppler of the lower extremities: Chronic left gastrocnemius vein DVT  · 2018: WBC 6.1, hemoglobin 13.6, platelets 148  · 2020: Platelets 154,   · 2020: PSA 0.3, TSH 1.98, WBC 4.7, hemoglobin 14.5, platelets 122 low,  · 10/8/2020-10/12/2020: Patient admitted to [facility] with cellulitis of the left lower extremity, bilateral lower extremity edema left greater than right,.  He was diagnosed with chronic venous hypertension bilaterally.  He was treated with IV diuretics as well as antibiotics Rocephin.  He was treated with compression dressings.  · 10/10/2020: WBC 4.3, hemoglobin 13.5,  high, platelets 108 low,  creatinine 0.8, AST 58 high, ALT 31, bilirubin 0.7, albumin 3.1,  · 2020: Patient presents to the facility for an initial consult  Regarding thrombocytopenia. His PCP is Dr. Torres. He was seen in the hospital last month regarding cellulitis in his left leg. He does not smoke.  He does report excessive bruising.  Does not  drink alcohol..  Does not report any history of liver disease and also does not have any personal or family history of autoimmune disease.  He is not on any medications that cause thrombocytopenia.  Denies any bleeding per rectum. Also reports being depressed.  He has been very sedentary since last December 2019.  He is on disability.  · 11/4/2020: Anticardiolipin IgG 16 high, beta-2 glycoprotein antibodies negative, phosphatidylserine antibodies negative, citrated platelet count 120, serum PIERRE negative, serum folate 5.7, haptoglobin low 107, serum immunofixation negative, IgG 2593 high, IgA 574 high, IgM 127, , lupus anticoagulant negative, vitamin B12 525,  · 11/4/2020: WBC 6.2, hemoglobin 15.4, platelets 133 low, ,  · 11/25/2020: Abdominal ultrasound: Shows hepatosplenomegaly.  Evidence of hepatic steatosis.  Liver is enlarged up to 22.4 cm.  Spleen measures 17 cm.  · 11/25/2020: Ultrasound Doppler left leg: Normal.  No evidence of DVT.  · 11/30/2020: WBC 5.45, hemoglobin 15, platelets 120 low, .3  · 5/24/2021: WBC 4.48, hemoglobin 14.3, platelets 108, .8  · 5/24/2022: In the office for follow up. Reported no new complaints but noted that he had been suffering from persistent lymphedema. It didn't seem much better than before and he also persisted with a chronic ulcer. His platelet count had continued to decrease, though at a slow pace.     Subjective:  5/25/2022: Returns for follow up after the last visit one year ago. Reports no new symptoms. He continues to eat well. He has been without fevers and denies nocturnal diaphoresis. He has not lost weight. He continues to have dyspnea with exertion but not worse. No substernal chest pain. No abdominal pain or diarrhea. No dysuria. No edema. No skin rash.     Past Medical History:   Diagnosis Date   • Arthritis    • Asthma    • Chronic deep vein thrombosis (DVT) (HCC)      Past Surgical History:   Procedure Laterality Date   • APPENDECTOMY   1974   • CARDIAC CATHETERIZATION N/A 2/28/2020    Procedure: Left ventriculography;  Surgeon: Kenney Reyez MD;  Location: [facility] CATH INVASIVE LOCATION;  Service: Cardiovascular;  Laterality: N/A;   • CARDIAC CATHETERIZATION N/A 2/28/2020    Procedure: Coronary angiography;  Surgeon: Kenney Reyez MD;  Location: [facility] CATH INVASIVE LOCATION;  Service: Cardiovascular;  Laterality: N/A;   • CARDIAC CATHETERIZATION N/A 2/28/2020    Procedure: Left Heart Cath;  Surgeon: Kenney Reyez MD;  Location: [facility] CATH INVASIVE LOCATION;  Service: Cardiovascular;  Laterality: N/A;   • KNEE SURGERY Left 2018       Current Outpatient Medications:   •  bacitracin (RA Bacitracin) 500 UNIT/GM ointment, Apply to bilateral lower extremities with dressing changes. Mix equal parts zinc 20%, bacitracin, nystatin and hydrocortisone 1% cream for wound care., Disp: 30 g, Rfl: 3  •  hydrocortisone 1 % cream, Apply to bilateral lower extremities with dressing changes. Mix equal parts zinc 20%, mupirocin, nystatin and hydrocortisone 1% cream for wound care., Disp: 30 g, Rfl: 3  •  mupirocin (Bactroban) 2 % cream, Apply to bilateral lower extremities with dressing changes. Mix equal parts zinc 20%, mupirocin, nystatin and hydrocortisone 1% cream for wound care., Disp: 30 g, Rfl: 3  •  nystatin (MYCOSTATIN) 740244 UNIT/GM ointment, Apply to bilateral lower extremities with dressing changes. Mix equal parts zinc 20%, mupirocin, nystatin and hydrocortisone 1% cream for wound care., Disp: 30 g, Rfl: 3  •  spironolactone (ALDACTONE) 25 MG tablet, Take 25 mg by mouth Daily., Disp: , Rfl:   •  tamsulosin (FLOMAX) 0.4 MG capsule 24 hr capsule, , Disp: , Rfl:   •  zinc oxide 20 % ointment, Apply to bilateral lower extremities with dressing changes. Mix equal parts zinc 20%, mupirocin, nystatin and hydrocortisone 1% cream for wound care., Disp: 30 g, Rfl: 3  •  furosemide (LASIX) 20 MG tablet, Take 20 mg by mouth Daily., Disp: , Rfl:  "    Allergies   Allergen Reactions   • Sulfa Antibiotics Unknown (See Comments)       Family History   Problem Relation Age of Onset   • Heart disease Mother    • Diabetes Mother    • Arthritis Father    • Aortic aneurysm Father      Cancer-related family history is not on file.    Social History     Tobacco Use   • Smoking status: Former Smoker     Packs/day: 0.50     Years: 5.00     Pack years: 2.50     Types: Cigarettes     Start date:      Quit date:      Years since quittin.4   • Smokeless tobacco: Never Used   Vaping Use   • Vaping Use: Never used   Substance Use Topics   • Alcohol use: Never   • Drug use: Never     Social History     Social History Narrative   • Not on file      ROS:   A 12 point review of systems was conducted and pertinent positives and negatives were documented above.     Objective:    Vitals:    22 1431   BP: 118/71   Pulse: 87   Resp: 20   Temp: 97.7 °F (36.5 °C)   SpO2: 95%   Weight: (!) 161 kg (354 lb 3.2 oz)   Height: 185.4 cm (73\")   PainSc:   8   PainLoc: Back  Comment: lower back to legs     Body mass index is 46.73 kg/m².  ECOG  (2) Ambulatory and capable of self care, unable to carry out work activity, up and about > 50% or waking hours    Physical Exam:     Physical Exam  Vitals and nursing note reviewed.   Constitutional:       General: He is not in acute distress.     Appearance: He is obese. He is ill-appearing. He is not toxic-appearing or diaphoretic.      Comments: Alert, oriented and well built man. Appears chronically ill and older than the stated age.    HENT:      Head: Normocephalic and atraumatic.      Right Ear: External ear normal.      Left Ear: External ear normal.      Nose: Nose normal. No congestion or rhinorrhea.      Mouth/Throat:      Mouth: Mucous membranes are moist.      Pharynx: Oropharynx is clear. No oropharyngeal exudate or posterior oropharyngeal erythema.      Comments: Edentulous  Eyes:      General: No scleral icterus.        " Right eye: No discharge.         Left eye: No discharge.      Conjunctiva/sclera: Conjunctivae normal.   Neck:      Thyroid: No thyromegaly.   Cardiovascular:      Rate and Rhythm: Normal rate and regular rhythm.      Heart sounds: Normal heart sounds. No murmur heard.    No friction rub. No gallop.   Pulmonary:      Effort: Pulmonary effort is normal. No respiratory distress.      Breath sounds: No stridor. No wheezing or rhonchi.   Abdominal:      General: Bowel sounds are normal. There is no distension.      Palpations: Abdomen is soft. There is no mass.      Tenderness: There is no abdominal tenderness. There is no guarding or rebound.      Comments: Obese. Soft. Not tender.    Musculoskeletal:         General: Tenderness and deformity present. Normal range of motion.      Cervical back: Normal range of motion and neck supple. No rigidity or tenderness.      Right lower leg: Edema present.      Left lower leg: Edema present.   Lymphadenopathy:      Cervical: No cervical adenopathy.   Skin:     General: Skin is warm.      Coloration: Skin is not jaundiced or pale.      Findings: No erythema or rash.   Neurological:      General: No focal deficit present.      Mental Status: He is alert and oriented to person, place, and time.      Cranial Nerves: No cranial nerve deficit.      Motor: No abnormal muscle tone.      Coordination: Coordination normal.      Gait: Gait normal.   Psychiatric:         Mood and Affect: Mood normal.         Behavior: Behavior normal.         Thought Content: Thought content normal.         Judgment: Judgment normal.     BAYRON Lynn MD performed the physical exam on 5/25/2022 as documented above.     Lab Results - Last 18 Months   Lab Units 05/25/22  1427 07/17/21  0504 07/15/21  1734   WBC 10*3/mm3 4.08 4.20 4.70   HEMOGLOBIN g/dL 14.0 13.5 14.2   HEMATOCRIT % 42.1 40.3 42.1   PLATELETS 10*3/mm3 84* 88* 98*   MCV fL 104.5* 101.8* 101.1*     Lab Results - Last 18 Months   Lab Units  07/17/21  0504 07/15/21  1734 12/07/20  1025   SODIUM mmol/L 138 137 135*   POTASSIUM mmol/L 4.1 4.1 4.7   CHLORIDE mmol/L 104 104 100   CO2 mmol/L 25.0 25.0 29.7*   BUN mg/dL 12 12 18   CREATININE mg/dL 0.69* 0.93 0.78   CALCIUM mg/dL 8.4* 8.7 9.6   BILIRUBIN mg/dL  --  0.7 0.8   ALK PHOS U/L  --  138* 126*   ALT (SGPT) U/L  --  24 34   AST (SGOT) U/L  --  58* 59*   GLUCOSE mg/dL 95 123* 84     Lab Results   Component Value Date    GLUCOSE 95 07/17/2021    BUN 12 07/17/2021    CREATININE 0.69 (L) 07/17/2021    EGFRIFNONA 118 07/17/2021    BCR 17.4 07/17/2021    K 4.1 07/17/2021    CO2 25.0 07/17/2021    CALCIUM 8.4 (L) 07/17/2021    ALBUMIN 3.40 (L) 07/15/2021    LABIL2 0.8 (L) 04/12/2018    AST 58 (H) 07/15/2021    ALT 24 07/15/2021     Lab Results   Component Value Date    FOLATE 5.70 11/04/2020     Lab Results   Component Value Date    RETICCTPCT 2.02 (H) 05/25/2022     Lab Results   Component Value Date    KMLKRDND73 525 11/04/2020     No results found for: SPEP, UPEP  LDH   Date Value Ref Range Status   05/25/2022 149 135 - 225 U/L Final     Lab Results   Component Value Date    PIERRE Negative 11/04/2020    SEDRATE 54 (H) 04/14/2018     Lab Results   Component Value Date    HAPTOGLOBIN 107 11/04/2020     Lab Results   Component Value Date    PTT 31.5 (H) 04/13/2018    INR 1.05 02/28/2020     Lab Results   Component Value Date    PSA 0.304 09/24/2020     Assessment & Plan     Assessment:  1. Thrombocytopenia: Persists and with some progression. Likely a multifactorial phenomenon. Splenomegaly and chronic liver disease are likely a large part of the problem. Will obtain some additional testing   2. Macrocytosis: Persistent and likely the result of chronic liver disease. Will investigate further.   3. Chronic deep vein thrombosis.       Plan:  As above. He is to have some additional investigations and will see in the near future with results.     Lionel Lynn MD on 5/25/2022 at 17:09

## 2022-05-25 ENCOUNTER — TRANSCRIBE ORDERS (OUTPATIENT)
Dept: PHYSICAL THERAPY | Facility: CLINIC | Age: 57
End: 2022-05-25

## 2022-05-25 ENCOUNTER — OFFICE VISIT (OUTPATIENT)
Dept: WOUND CARE | Facility: HOSPITAL | Age: 57
End: 2022-05-25

## 2022-05-25 ENCOUNTER — OFFICE VISIT (OUTPATIENT)
Dept: ONCOLOGY | Facility: CLINIC | Age: 57
End: 2022-05-25

## 2022-05-25 ENCOUNTER — APPOINTMENT (OUTPATIENT)
Dept: LAB | Facility: HOSPITAL | Age: 57
End: 2022-05-25

## 2022-05-25 VITALS
SYSTOLIC BLOOD PRESSURE: 118 MMHG | DIASTOLIC BLOOD PRESSURE: 71 MMHG | HEIGHT: 73 IN | TEMPERATURE: 97.7 F | RESPIRATION RATE: 20 BRPM | OXYGEN SATURATION: 95 % | BODY MASS INDEX: 41.75 KG/M2 | HEART RATE: 87 BPM | WEIGHT: 315 LBS

## 2022-05-25 DIAGNOSIS — Z00.00 ENCOUNTER FOR GENERAL ADULT MEDICAL EXAMINATION WITHOUT ABNORMAL FINDINGS: Primary | ICD-10-CM

## 2022-05-25 DIAGNOSIS — D69.6 THROMBOCYTOPENIA: Primary | ICD-10-CM

## 2022-05-25 DIAGNOSIS — I89.0 LYMPHEDEMA OF BOTH LOWER EXTREMITIES: Primary | ICD-10-CM

## 2022-05-25 LAB
BASOPHILS # BLD AUTO: 0.01 10*3/MM3 (ref 0–0.2)
BASOPHILS NFR BLD AUTO: 0.2 % (ref 0–1.5)
DAT POLY-SP REAG RBC QL: NEGATIVE
DEPRECATED RDW RBC AUTO: 51.2 FL (ref 37–54)
EOSINOPHIL # BLD AUTO: 0.07 10*3/MM3 (ref 0–0.4)
EOSINOPHIL NFR BLD AUTO: 1.7 % (ref 0.3–6.2)
ERYTHROCYTE [DISTWIDTH] IN BLOOD BY AUTOMATED COUNT: 13.6 % (ref 12.3–15.4)
FOLATE SERPL-MCNC: 7.66 NG/ML (ref 4.78–24.2)
HAPTOGLOB SERPL-MCNC: 72 MG/DL (ref 30–200)
HCT VFR BLD AUTO: 42.1 % (ref 37.5–51)
HGB BLD-MCNC: 14 G/DL (ref 13–17.7)
HOLD SPECIMEN: NORMAL
HOLD SPECIMEN: NORMAL
LDH SERPL-CCNC: 149 U/L (ref 135–225)
LYMPHOCYTES # BLD AUTO: 0.56 10*3/MM3 (ref 0.7–3.1)
LYMPHOCYTES NFR BLD AUTO: 13.7 % (ref 19.6–45.3)
MCH RBC QN AUTO: 34.7 PG (ref 26.6–33)
MCHC RBC AUTO-ENTMCNC: 33.3 G/DL (ref 31.5–35.7)
MCV RBC AUTO: 104.5 FL (ref 79–97)
MONOCYTES # BLD AUTO: 0.42 10*3/MM3 (ref 0.1–0.9)
MONOCYTES NFR BLD AUTO: 10.3 % (ref 5–12)
NEUTROPHILS NFR BLD AUTO: 3.02 10*3/MM3 (ref 1.7–7)
NEUTROPHILS NFR BLD AUTO: 74.1 % (ref 42.7–76)
PLATELET # BLD AUTO: 84 10*3/MM3 (ref 140–450)
PMV BLD AUTO: 10.8 FL (ref 6–12)
RBC # BLD AUTO: 4.03 10*6/MM3 (ref 4.14–5.8)
RETICS # AUTO: 0.08 10*6/MM3 (ref 0.02–0.13)
RETICS/RBC NFR AUTO: 2.02 % (ref 0.7–1.9)
VIT B12 BLD-MCNC: 593 PG/ML (ref 211–946)
WBC NRBC COR # BLD: 4.08 10*3/MM3 (ref 3.4–10.8)

## 2022-05-25 PROCEDURE — 83010 ASSAY OF HAPTOGLOBIN QUANT: CPT | Performed by: INTERNAL MEDICINE

## 2022-05-25 PROCEDURE — 85045 AUTOMATED RETICULOCYTE COUNT: CPT | Performed by: INTERNAL MEDICINE

## 2022-05-25 PROCEDURE — 83615 LACTATE (LD) (LDH) ENZYME: CPT | Performed by: INTERNAL MEDICINE

## 2022-05-25 PROCEDURE — 82746 ASSAY OF FOLIC ACID SERUM: CPT | Performed by: INTERNAL MEDICINE

## 2022-05-25 PROCEDURE — 36415 COLL VENOUS BLD VENIPUNCTURE: CPT | Performed by: INTERNAL MEDICINE

## 2022-05-25 PROCEDURE — 99214 OFFICE O/P EST MOD 30 MIN: CPT | Performed by: INTERNAL MEDICINE

## 2022-05-25 PROCEDURE — 85025 COMPLETE CBC W/AUTO DIFF WBC: CPT | Performed by: INTERNAL MEDICINE

## 2022-05-25 PROCEDURE — 86880 COOMBS TEST DIRECT: CPT | Performed by: INTERNAL MEDICINE

## 2022-05-25 PROCEDURE — 82607 VITAMIN B-12: CPT | Performed by: INTERNAL MEDICINE

## 2022-05-25 RX ORDER — TAMSULOSIN HYDROCHLORIDE 0.4 MG/1
1 CAPSULE ORAL DAILY
COMMUNITY
Start: 2022-05-07 | End: 2023-01-26

## 2022-05-26 LAB
ALBUMIN SERPL ELPH-MCNC: 3.1 G/DL (ref 2.9–4.4)
ALBUMIN/GLOB SERPL: 0.7 {RATIO} (ref 0.7–1.7)
ALPHA1 GLOB SERPL ELPH-MCNC: 0.3 G/DL (ref 0–0.4)
ALPHA2 GLOB SERPL ELPH-MCNC: 0.5 G/DL (ref 0.4–1)
B-GLOBULIN SERPL ELPH-MCNC: 1.1 G/DL (ref 0.7–1.3)
GAMMA GLOB SERPL ELPH-MCNC: 2.6 G/DL (ref 0.4–1.8)
GLOBULIN SER CALC-MCNC: 4.5 G/DL (ref 2.2–3.9)
KAPPA LC FREE SER-MCNC: 68.2 MG/L (ref 3.3–19.4)
KAPPA LC FREE/LAMBDA FREE SER: 1.9 {RATIO} (ref 0.26–1.65)
LABORATORY COMMENT REPORT: ABNORMAL
LAMBDA LC FREE SERPL-MCNC: 35.9 MG/L (ref 5.7–26.3)
M PROTEIN SERPL ELPH-MCNC: ABNORMAL G/DL
PROT SERPL-MCNC: 7.6 G/DL (ref 6–8.5)

## 2022-06-01 ENCOUNTER — OFFICE VISIT (OUTPATIENT)
Dept: WOUND CARE | Facility: HOSPITAL | Age: 57
End: 2022-06-01

## 2022-06-01 PROCEDURE — G0463 HOSPITAL OUTPT CLINIC VISIT: HCPCS

## 2022-07-01 ENCOUNTER — OFFICE VISIT (OUTPATIENT)
Dept: WOUND CARE | Facility: HOSPITAL | Age: 57
End: 2022-07-01

## 2022-07-01 ENCOUNTER — LAB REQUISITION (OUTPATIENT)
Dept: LAB | Facility: HOSPITAL | Age: 57
End: 2022-07-01

## 2022-07-01 ENCOUNTER — APPOINTMENT (OUTPATIENT)
Dept: WOUND CARE | Facility: HOSPITAL | Age: 57
End: 2022-07-01

## 2022-07-01 DIAGNOSIS — E66.01 MORBID (SEVERE) OBESITY DUE TO EXCESS CALORIES: ICD-10-CM

## 2022-07-01 DIAGNOSIS — I87.333 CHRONIC VENOUS HYPERTENSION (IDIOPATHIC) WITH ULCER AND INFLAMMATION OF BILATERAL LOWER EXTREMITY (CODE): ICD-10-CM

## 2022-07-01 DIAGNOSIS — I89.0 LYMPHEDEMA, NOT ELSEWHERE CLASSIFIED: ICD-10-CM

## 2022-07-01 PROCEDURE — 87205 SMEAR GRAM STAIN: CPT | Performed by: SURGERY

## 2022-07-01 PROCEDURE — 87147 CULTURE TYPE IMMUNOLOGIC: CPT | Performed by: SURGERY

## 2022-07-01 PROCEDURE — 87070 CULTURE OTHR SPECIMN AEROBIC: CPT | Performed by: SURGERY

## 2022-07-01 PROCEDURE — 87186 SC STD MICRODIL/AGAR DIL: CPT | Performed by: SURGERY

## 2022-07-03 LAB
BACTERIA SPEC AEROBE CULT: ABNORMAL
GRAM STN SPEC: ABNORMAL

## 2022-07-06 ENCOUNTER — OFFICE VISIT (OUTPATIENT)
Dept: ONCOLOGY | Facility: CLINIC | Age: 57
End: 2022-07-06

## 2022-07-06 ENCOUNTER — LAB (OUTPATIENT)
Dept: LAB | Facility: HOSPITAL | Age: 57
End: 2022-07-06

## 2022-07-06 VITALS
HEART RATE: 95 BPM | HEIGHT: 73 IN | OXYGEN SATURATION: 96 % | DIASTOLIC BLOOD PRESSURE: 78 MMHG | SYSTOLIC BLOOD PRESSURE: 117 MMHG | BODY MASS INDEX: 41.75 KG/M2 | TEMPERATURE: 96.6 F | WEIGHT: 315 LBS

## 2022-07-06 DIAGNOSIS — R16.1 SPLENOMEGALY: ICD-10-CM

## 2022-07-06 DIAGNOSIS — I87.333 CHRONIC VENOUS HYPERTENSION (IDIOPATHIC) WITH ULCER AND INFLAMMATION OF BILATERAL LOWER EXTREMITY (CODE): Primary | ICD-10-CM

## 2022-07-06 DIAGNOSIS — D69.6 THROMBOCYTOPENIA: ICD-10-CM

## 2022-07-06 DIAGNOSIS — D69.6 THROMBOCYTOPENIA: Primary | ICD-10-CM

## 2022-07-06 LAB
APTT PPP: 29.3 SECONDS (ref 24–31)
BASOPHILS # BLD AUTO: 0.02 10*3/MM3 (ref 0–0.2)
BASOPHILS NFR BLD AUTO: 0.4 % (ref 0–1.5)
DEPRECATED RDW RBC AUTO: 51 FL (ref 37–54)
EOSINOPHIL # BLD AUTO: 0.1 10*3/MM3 (ref 0–0.4)
EOSINOPHIL NFR BLD AUTO: 2 % (ref 0.3–6.2)
ERYTHROCYTE [DISTWIDTH] IN BLOOD BY AUTOMATED COUNT: 13.4 % (ref 12.3–15.4)
HCT VFR BLD AUTO: 40.2 % (ref 37.5–51)
HGB BLD-MCNC: 14.4 G/DL (ref 13–17.7)
HOLD SPECIMEN: NORMAL
HOLD SPECIMEN: NORMAL
LYMPHOCYTES # BLD AUTO: 0.61 10*3/MM3 (ref 0.7–3.1)
LYMPHOCYTES NFR BLD AUTO: 12.4 % (ref 19.6–45.3)
MCH RBC QN AUTO: 36.7 PG (ref 26.6–33)
MCHC RBC AUTO-ENTMCNC: 35.8 G/DL (ref 31.5–35.7)
MCV RBC AUTO: 102.6 FL (ref 79–97)
MONOCYTES # BLD AUTO: 0.26 10*3/MM3 (ref 0.1–0.9)
MONOCYTES NFR BLD AUTO: 5.3 % (ref 5–12)
NEUTROPHILS NFR BLD AUTO: 3.91 10*3/MM3 (ref 1.7–7)
NEUTROPHILS NFR BLD AUTO: 79.9 % (ref 42.7–76)
PLATELET # BLD AUTO: 91 10*3/MM3 (ref 140–450)
PMV BLD AUTO: 10.4 FL (ref 6–12)
RBC # BLD AUTO: 3.92 10*6/MM3 (ref 4.14–5.8)
WBC NRBC COR # BLD: 4.9 10*3/MM3 (ref 3.4–10.8)

## 2022-07-06 PROCEDURE — 99214 OFFICE O/P EST MOD 30 MIN: CPT | Performed by: INTERNAL MEDICINE

## 2022-07-06 PROCEDURE — 36415 COLL VENOUS BLD VENIPUNCTURE: CPT

## 2022-07-06 PROCEDURE — 85730 THROMBOPLASTIN TIME PARTIAL: CPT

## 2022-07-06 PROCEDURE — 85025 COMPLETE CBC W/AUTO DIFF WBC: CPT | Performed by: INTERNAL MEDICINE

## 2022-07-06 RX ORDER — AMOXICILLIN AND CLAVULANATE POTASSIUM 875; 125 MG/1; MG/1
TABLET, FILM COATED ORAL
COMMUNITY
Start: 2022-07-05 | End: 2023-01-26

## 2022-07-06 NOTE — PROGRESS NOTES
HEMATOLOGY ONCOLOGY OUTPATIENT FOLLOW UP       Patient name: Robert Randhawa Jr.  : 1965  MRN: [id number]  Primary Care Physician: Alfredo Torres MD  Referring Physician: Alfredo Torres MD  Reason For Consult:     No chief complaint on file.    HPI:   History of Present Illness:  Robert Randhawa Jr. is 57 y.o. male who presented to our office on 20 for consultation regarding thrombocytopenia.  Patient had a CBC on 10/10/2020 that showed a platelet count of 108.  MCV was also elevated to 100.  On review previous labs since , patient does have mild intermittent thrombocytopenia.  Also has chronic macrocytosis.  Patient does have history of chronic left leg cellulitis and edema and also chronic DVT.  He does have a history of morbid obesity and also somewhat immobilized..  · 2017: WBC 5, hemoglobin 14, platelets 129, MCV 99.4  · 2018: Doppler of the lower extremities: Chronic left gastrocnemius vein DVT  · 2018: WBC 6.1, hemoglobin 13.6, platelets 148  · 2020: Platelets 154,   · 2020: PSA 0.3, TSH 1.98, WBC 4.7, hemoglobin 14.5, platelets 122 low,  · 10/8/2020-10/12/2020: Patient admitted to [facility] with cellulitis of the left lower extremity, bilateral lower extremity edema left greater than right,.  He was diagnosed with chronic venous hypertension bilaterally.  He was treated with IV diuretics as well as antibiotics Rocephin.  He was treated with compression dressings.  · 10/10/2020: WBC 4.3, hemoglobin 13.5,  high, platelets 108 low,  creatinine 0.8, AST 58 high, ALT 31, bilirubin 0.7, albumin 3.1,  · 2020: Patient presents to the facility for an initial consult  Regarding thrombocytopenia. His PCP is Dr. Torres. He was seen in the hospital last month regarding cellulitis in his left leg. He does not smoke.  He does report excessive bruising.  Does not drink alcohol..  Does not report any history of liver  disease and also does not have any personal or family history of autoimmune disease.  He is not on any medications that cause thrombocytopenia.  Denies any bleeding per rectum. Also reports being depressed.  He has been very sedentary since last December 2019.  He is on disability.  · 11/4/2020: Anticardiolipin IgG 16 high, beta-2 glycoprotein antibodies negative, phosphatidylserine antibodies negative, citrated platelet count 120, serum PIERRE negative, serum folate 5.7, haptoglobin low 107, serum immunofixation negative, IgG 2593 high, IgA 574 high, IgM 127, , lupus anticoagulant negative, vitamin B12 525,  · 11/4/2020: WBC 6.2, hemoglobin 15.4, platelets 133 low, ,  · 11/25/2020: Abdominal ultrasound: Shows hepatosplenomegaly.  Evidence of hepatic steatosis.  Liver is enlarged up to 22.4 cm.  Spleen measures 17 cm.  · 11/25/2020: Ultrasound Doppler left leg: Normal.  No evidence of DVT.  · 11/30/2020: WBC 5.45, hemoglobin 15, platelets 120 low, .3  · 5/24/2021: WBC 4.48, hemoglobin 14.3, platelets 108, .8  · 5/24/2022: In the office for follow up. Reported no new complaints but noted that he had been suffering from persistent lymphedema. It didn't seem much better than before and he also persisted with a chronic ulcer. His platelet count had continued to decrease, though at a slow pace.   · 7/6/2022: Continues to have severe edema of the lower extremities. Persists with chronic ulceration of the lower extremities. No new symptoms, especially not fevers, nocturnal diaphoresis or unintended weight loss. Platelets were about the same.     Subjective:  7/6/2022: In the office for follow up. Continues to have immobility as he persists with very severe lower extremity edema. He has had persistent ulceration and continues to have care from the wound clinic. Eating well and no weight loss. No fevers. No chest pain or cough and no abdominal pain. No diarrhea. No dysuria. No skin rash.     Past  Medical History:   Diagnosis Date   • Arthritis    • Asthma    • Chronic deep vein thrombosis (DVT) (HCC)      Past Surgical History:   Procedure Laterality Date   • APPENDECTOMY  1974   • CARDIAC CATHETERIZATION N/A 2/28/2020    Procedure: Left ventriculography;  Surgeon: Kenney Reyez MD;  Location: [facility] CATH INVASIVE LOCATION;  Service: Cardiovascular;  Laterality: N/A;   • CARDIAC CATHETERIZATION N/A 2/28/2020    Procedure: Coronary angiography;  Surgeon: Kenney Reyez MD;  Location: [facility] CATH INVASIVE LOCATION;  Service: Cardiovascular;  Laterality: N/A;   • CARDIAC CATHETERIZATION N/A 2/28/2020    Procedure: Left Heart Cath;  Surgeon: Kenney Reyez MD;  Location: [facility] CATH INVASIVE LOCATION;  Service: Cardiovascular;  Laterality: N/A;   • KNEE SURGERY Left 2018       Current Outpatient Medications:   •  bacitracin (RA Bacitracin) 500 UNIT/GM ointment, Apply to bilateral lower extremities with dressing changes. Mix equal parts zinc 20%, bacitracin, nystatin and hydrocortisone 1% cream for wound care., Disp: 30 g, Rfl: 3  •  furosemide (LASIX) 20 MG tablet, Take 20 mg by mouth Daily., Disp: , Rfl:   •  hydrocortisone 1 % cream, Apply to bilateral lower extremities with dressing changes. Mix equal parts zinc 20%, mupirocin, nystatin and hydrocortisone 1% cream for wound care., Disp: 30 g, Rfl: 3  •  mupirocin (Bactroban) 2 % cream, Apply to bilateral lower extremities with dressing changes. Mix equal parts zinc 20%, mupirocin, nystatin and hydrocortisone 1% cream for wound care., Disp: 30 g, Rfl: 3  •  nystatin (MYCOSTATIN) 009214 UNIT/GM ointment, Apply to bilateral lower extremities with dressing changes. Mix equal parts zinc 20%, mupirocin, nystatin and hydrocortisone 1% cream for wound care., Disp: 30 g, Rfl: 3  •  spironolactone (ALDACTONE) 25 MG tablet, Take 25 mg by mouth Daily., Disp: , Rfl:   •  tamsulosin (FLOMAX) 0.4 MG capsule 24 hr capsule, , Disp: , Rfl:   •  zinc oxide 20 %  ointment, Apply to bilateral lower extremities with dressing changes. Mix equal parts zinc 20%, mupirocin, nystatin and hydrocortisone 1% cream for wound care., Disp: 30 g, Rfl: 3    Allergies   Allergen Reactions   • Sulfa Antibiotics Unknown (See Comments)       Family History   Problem Relation Age of Onset   • Heart disease Mother    • Diabetes Mother    • Arthritis Father    • Aortic aneurysm Father      Cancer-related family history is not on file.    Social History     Tobacco Use   • Smoking status: Former Smoker     Packs/day: 0.50     Years: 5.00     Pack years: 2.50     Types: Cigarettes     Start date:      Quit date:      Years since quittin.5   • Smokeless tobacco: Never Used   Vaping Use   • Vaping Use: Never used   Substance Use Topics   • Alcohol use: Never   • Drug use: Never     Social History     Social History Narrative   • Not on file      ROS:   A 12 point review of systems was conducted and pertinent positives and negatives are documented above.     Objective:    There were no vitals filed for this visit.  There is no height or weight on file to calculate BMI.  ECOG  (2) Ambulatory and capable of self care, unable to carry out work activity, up and about > 50% or waking hours    Physical Exam:     Physical Exam  Vitals and nursing note reviewed.   Constitutional:       General: He is not in acute distress.     Appearance: He is obese. He is ill-appearing. He is not toxic-appearing or diaphoretic.      Comments: Alert, oriented and well built man. Appears chronically ill and older than the stated age.    HENT:      Head: Normocephalic and atraumatic.      Right Ear: External ear normal.      Left Ear: External ear normal.      Nose: Nose normal. No congestion or rhinorrhea.      Mouth/Throat:      Mouth: Mucous membranes are moist.      Pharynx: Oropharynx is clear. No oropharyngeal exudate or posterior oropharyngeal erythema.      Comments: Edentulous  Eyes:      General: No  scleral icterus.        Right eye: No discharge.         Left eye: No discharge.      Conjunctiva/sclera: Conjunctivae normal.   Neck:      Thyroid: No thyromegaly.   Cardiovascular:      Rate and Rhythm: Normal rate and regular rhythm.      Heart sounds: Normal heart sounds. No murmur heard.    No friction rub. No gallop.   Pulmonary:      Effort: Pulmonary effort is normal. No respiratory distress.      Breath sounds: No stridor. No wheezing or rhonchi.   Abdominal:      General: Bowel sounds are normal. There is no distension.      Palpations: Abdomen is soft. There is no mass.      Tenderness: There is no abdominal tenderness. There is no guarding or rebound.      Comments: Obese. Soft. Not tender.    Musculoskeletal:         General: Tenderness and deformity present. Normal range of motion.      Cervical back: Normal range of motion and neck supple. No rigidity or tenderness.      Right lower leg: Edema present.      Left lower leg: Edema present.   Lymphadenopathy:      Cervical: No cervical adenopathy.   Skin:     General: Skin is warm.      Coloration: Skin is not jaundiced or pale.      Findings: No erythema or rash.   Neurological:      General: No focal deficit present.      Mental Status: He is alert and oriented to person, place, and time.      Cranial Nerves: No cranial nerve deficit.      Motor: No abnormal muscle tone.      Coordination: Coordination normal.      Gait: Gait normal.   Psychiatric:         Mood and Affect: Mood normal.         Behavior: Behavior normal.         Thought Content: Thought content normal.         Judgment: Judgment normal.     BAYRON Lynn MD performed the physical exam on 7/6/2022 as documented above.     Lab Results - Last 18 Months   Lab Units 05/25/22  1427 07/17/21  0504 07/15/21  1734   WBC 10*3/mm3 4.08 4.20 4.70   HEMOGLOBIN g/dL 14.0 13.5 14.2   HEMATOCRIT % 42.1 40.3 42.1   PLATELETS 10*3/mm3 84* 88* 98*   MCV fL 104.5* 101.8* 101.1*     Lab Results - Last  18 Months   Lab Units 07/17/21  0504 07/15/21  1734   SODIUM mmol/L 138 137   POTASSIUM mmol/L 4.1 4.1   CHLORIDE mmol/L 104 104   CO2 mmol/L 25.0 25.0   BUN mg/dL 12 12   CREATININE mg/dL 0.69* 0.93   CALCIUM mg/dL 8.4* 8.7   BILIRUBIN mg/dL  --  0.7   ALK PHOS U/L  --  138*   ALT (SGPT) U/L  --  24   AST (SGOT) U/L  --  58*   GLUCOSE mg/dL 95 123*     Lab Results   Component Value Date    GLUCOSE 95 07/17/2021    BUN 12 07/17/2021    CREATININE 0.69 (L) 07/17/2021    EGFRIFNONA 118 07/17/2021    BCR 17.4 07/17/2021    K 4.1 07/17/2021    CO2 25.0 07/17/2021    CALCIUM 8.4 (L) 07/17/2021    PROTENTOTREF 7.6 05/25/2022    ALBUMIN 3.1 05/25/2022    LABIL2 0.7 05/25/2022    AST 58 (H) 07/15/2021    ALT 24 07/15/2021     Lab Results   Component Value Date    FOLATE 7.66 05/25/2022     Lab Results   Component Value Date    RETICCTPCT 2.02 (H) 05/25/2022     Lab Results   Component Value Date    FYHGZOZQ80 593 05/25/2022     No results found for: SPEP, UPEP  LDH   Date Value Ref Range Status   05/25/2022 149 135 - 225 U/L Final     Lab Results   Component Value Date    PIERRE Negative 11/04/2020    SEDRATE 54 (H) 04/14/2018     Lab Results   Component Value Date    HAPTOGLOBIN 72 05/25/2022     Lab Results   Component Value Date    PTT 31.5 (H) 04/13/2018    INR 1.05 02/28/2020     Lab Results   Component Value Date    PSA 0.304 09/24/2020     Assessment & Plan     Assessment:  1. Thrombocytopenia: Continues but not any worse than before.   2. Macrocytosis: Unchanged. Likely the result of chronic liver disease secondary to steatohepatitis of the liver.   3. Chronic deep vein thrombosis.       Plan:  As above. He will see me with an ultrasound and new blood counts.     Lionel Lynn MD on 7/6/2022 at 16:56

## 2022-07-08 ENCOUNTER — OFFICE VISIT (OUTPATIENT)
Dept: WOUND CARE | Facility: HOSPITAL | Age: 57
End: 2022-07-08

## 2022-07-15 ENCOUNTER — OFFICE VISIT (OUTPATIENT)
Dept: WOUND CARE | Facility: HOSPITAL | Age: 57
End: 2022-07-15

## 2022-07-21 ENCOUNTER — HOSPITAL ENCOUNTER (OUTPATIENT)
Dept: ULTRASOUND IMAGING | Facility: HOSPITAL | Age: 57
Discharge: HOME | End: 2022-07-21

## 2022-07-21 DIAGNOSIS — R16.1 SPLENOMEGALY: ICD-10-CM

## 2022-07-21 DIAGNOSIS — D69.6 THROMBOCYTOPENIA: ICD-10-CM

## 2022-07-21 PROCEDURE — 76705 ECHO EXAM OF ABDOMEN: CPT

## 2022-07-22 ENCOUNTER — OFFICE VISIT (OUTPATIENT)
Dept: WOUND CARE | Facility: HOSPITAL | Age: 57
End: 2022-07-22

## 2022-07-29 ENCOUNTER — OFFICE VISIT (OUTPATIENT)
Dept: WOUND CARE | Facility: HOSPITAL | Age: 57
End: 2022-07-29

## 2022-07-29 PROCEDURE — G0463 HOSPITAL OUTPT CLINIC VISIT: HCPCS

## 2022-09-01 NOTE — PROGRESS NOTES
HEMATOLOGY ONCOLOGY OUTPATIENT FOLLOW UP       Patient name: Robert Randhawa Jr.  : 1965  MRN: [id number]  Primary Care Physician: Alfredo Torres MD  Referring Physician: Alfredo Torres MD  Reason For Consult:     No chief complaint on file.    HPI:   History of Present Illness:  Robert Randhawa Jr. is 57 y.o. male who presented to our office on 20 for consultation regarding thrombocytopenia.  Patient had a CBC on 10/10/2020 that showed a platelet count of 108.  MCV was also elevated to 100.  On review previous labs since , patient does have mild intermittent thrombocytopenia.  Also has chronic macrocytosis.  Patient does have history of chronic left leg cellulitis and edema and also chronic DVT.  He does have a history of morbid obesity and also somewhat immobilized..  · 2017: WBC 5, hemoglobin 14, platelets 129, MCV 99.4  · 2018: Doppler of the lower extremities: Chronic left gastrocnemius vein DVT  · 2018: WBC 6.1, hemoglobin 13.6, platelets 148  · 2020: Platelets 154,   · 2020: PSA 0.3, TSH 1.98, WBC 4.7, hemoglobin 14.5, platelets 122 low,  · 10/8/2020-10/12/2020: Patient admitted to [facility] with cellulitis of the left lower extremity, bilateral lower extremity edema left greater than right,.  He was diagnosed with chronic venous hypertension bilaterally.  He was treated with IV diuretics as well as antibiotics Rocephin.  He was treated with compression dressings.  · 10/10/2020: WBC 4.3, hemoglobin 13.5,  high, platelets 108 low,  creatinine 0.8, AST 58 high, ALT 31, bilirubin 0.7, albumin 3.1,  · 2020: Patient presents to the facility for an initial consult  Regarding thrombocytopenia. His PCP is Dr. Torres. He was seen in the hospital last month regarding cellulitis in his left leg. He does not smoke.  He does report excessive bruising.  Does not drink alcohol..  Does not report any history of liver  disease and also does not have any personal or family history of autoimmune disease.  He is not on any medications that cause thrombocytopenia.  Denies any bleeding per rectum. Also reports being depressed.  He has been very sedentary since last December 2019.  He is on disability.  · 11/4/2020: Anticardiolipin IgG 16 high, beta-2 glycoprotein antibodies negative, phosphatidylserine antibodies negative, citrated platelet count 120, serum PIERRE negative, serum folate 5.7, haptoglobin low 107, serum immunofixation negative, IgG 2593 high, IgA 574 high, IgM 127, , lupus anticoagulant negative, vitamin B12 525,  · 11/4/2020: WBC 6.2, hemoglobin 15.4, platelets 133 low, ,  · 11/25/2020: Abdominal ultrasound: Shows hepatosplenomegaly.  Evidence of hepatic steatosis.  Liver is enlarged up to 22.4 cm.  Spleen measures 17 cm.  · 11/25/2020: Ultrasound Doppler left leg: Normal.  No evidence of DVT.  · 11/30/2020: WBC 5.45, hemoglobin 15, platelets 120 low, .3  · 5/24/2021: WBC 4.48, hemoglobin 14.3, platelets 108, .8  · 5/24/2022: In the office for follow up. Reported no new complaints but noted that he had been suffering from persistent lymphedema. It didn't seem much better than before and he also persisted with a chronic ulcer. His platelet count had continued to decrease, though at a slow pace.   · 7/6/2022: Continues to have severe edema of the lower extremities. Persists with chronic ulceration of the lower extremities. No new symptoms, especially not fevers, nocturnal diaphoresis or unintended weight loss. Platelets were about the same.   · 9/7/2022: Back in the office for follow-up and to review his ultrasounds and make further plans.  Reported persistent and progressive lymphedema of the lower extremities, particularly in the left eye but also on the right where in addition and new ulcers seemed to be breaking down.  No longer seeing the wound clinic.  No bleeding.  The physical exam  revealed no changes.  Persisted with thrombocytopenia but not any worse than before.  His liver ultrasound reported evidence of cirrhosis.  There was also splenomegaly, further reinforcing the notion of cirrhosis.  He was referred to the gastroenterologist.  He was asked to return in approximately 6 months.    Subjective:  9/7/2022: Back for follow-up.  Reports no new symptoms but persists with the old ones.  The most pressing problems are persistent edema of the lower extremities that seemed to be worse.  He is no longer seeing the wound clinic because they told him he was completely healed.  He has been afebrile.  He continues to eat well and has not lost weight.  No chest pains or cough.  No abdominal pain or diarrhea.  No dysuria.    Past Medical History:   Diagnosis Date   • Arthritis    • Asthma    • Chronic deep vein thrombosis (DVT) (HCC)    • Lymphedema of both lower extremities      Past Surgical History:   Procedure Laterality Date   • APPENDECTOMY  1974   • CARDIAC CATHETERIZATION N/A 2/28/2020    Procedure: Left ventriculography;  Surgeon: Kenney Reyez MD;  Location: [facility] CATH INVASIVE LOCATION;  Service: Cardiovascular;  Laterality: N/A;   • CARDIAC CATHETERIZATION N/A 2/28/2020    Procedure: Coronary angiography;  Surgeon: Kenney Reyez MD;  Location: [facility] CATH INVASIVE LOCATION;  Service: Cardiovascular;  Laterality: N/A;   • CARDIAC CATHETERIZATION N/A 2/28/2020    Procedure: Left Heart Cath;  Surgeon: Kenney Reyez MD;  Location: [facility] CATH INVASIVE LOCATION;  Service: Cardiovascular;  Laterality: N/A;   • KNEE SURGERY Left 2018       Current Outpatient Medications:   •  amoxicillin-clavulanate (AUGMENTIN) 875-125 MG per tablet, , Disp: , Rfl:   •  bacitracin (RA Bacitracin) 500 UNIT/GM ointment, Apply to bilateral lower extremities with dressing changes. Mix equal parts zinc 20%, bacitracin, nystatin and hydrocortisone 1% cream for wound care., Disp: 30 g, Rfl: 3  •  furosemide  (LASIX) 20 MG tablet, Take 20 mg by mouth Daily., Disp: , Rfl:   •  hydrocortisone 1 % cream, Apply to bilateral lower extremities with dressing changes. Mix equal parts zinc 20%, mupirocin, nystatin and hydrocortisone 1% cream for wound care., Disp: 30 g, Rfl: 3  •  mupirocin (Bactroban) 2 % cream, Apply to bilateral lower extremities with dressing changes. Mix equal parts zinc 20%, mupirocin, nystatin and hydrocortisone 1% cream for wound care., Disp: 30 g, Rfl: 3  •  nystatin (MYCOSTATIN) 386057 UNIT/GM ointment, Apply to bilateral lower extremities with dressing changes. Mix equal parts zinc 20%, mupirocin, nystatin and hydrocortisone 1% cream for wound care., Disp: 30 g, Rfl: 3  •  spironolactone (ALDACTONE) 25 MG tablet, Take 25 mg by mouth Daily., Disp: , Rfl:   •  tamsulosin (FLOMAX) 0.4 MG capsule 24 hr capsule, , Disp: , Rfl:   •  zinc oxide 20 % ointment, Apply to bilateral lower extremities with dressing changes. Mix equal parts zinc 20%, mupirocin, nystatin and hydrocortisone 1% cream for wound care., Disp: 30 g, Rfl: 3    Allergies   Allergen Reactions   • Sulfa Antibiotics Unknown (See Comments)       Family History   Problem Relation Age of Onset   • Heart disease Mother    • Diabetes Mother    • Arthritis Father    • Aortic aneurysm Father      Cancer-related family history is not on file.    Social History     Tobacco Use   • Smoking status: Former Smoker     Packs/day: 0.50     Years: 5.00     Pack years: 2.50     Types: Cigarettes     Start date:      Quit date:      Years since quittin.6   • Smokeless tobacco: Never Used   Vaping Use   • Vaping Use: Never used   Substance Use Topics   • Alcohol use: Never   • Drug use: Never     Social History     Social History Narrative   • Not on file      ROS:   A 12 point review of systems was conducted.  Pertinent positives and negatives are documented above.    Objective:    There were no vitals filed for this visit.  There is no height or  weight on file to calculate BMI.  ECOG  (2) Ambulatory and capable of self care, unable to carry out work activity, up and about > 50% or waking hours    Physical Exam:     Physical Exam  Vitals and nursing note reviewed.   Constitutional:       General: He is not in acute distress.     Appearance: He is obese. He is ill-appearing. He is not toxic-appearing or diaphoretic.      Comments: Conversant, oriented and in no distress.  He seems older than the stated age and seems chronically ill.  Morbidly obese.   HENT:      Head: Normocephalic and atraumatic.      Right Ear: External ear normal.      Left Ear: External ear normal.      Nose: Nose normal. No congestion or rhinorrhea.      Mouth/Throat:      Mouth: Mucous membranes are moist.      Pharynx: Oropharynx is clear. No oropharyngeal exudate or posterior oropharyngeal erythema.      Comments: Edentulous  Eyes:      General: No scleral icterus.        Right eye: No discharge.         Left eye: No discharge.      Conjunctiva/sclera: Conjunctivae normal.   Neck:      Thyroid: No thyromegaly.   Cardiovascular:      Rate and Rhythm: Normal rate and regular rhythm.      Heart sounds: Normal heart sounds. No murmur heard.    No friction rub. No gallop.   Pulmonary:      Effort: Pulmonary effort is normal. No respiratory distress.      Breath sounds: No stridor. No wheezing or rhonchi.   Abdominal:      General: Bowel sounds are normal. There is no distension.      Palpations: Abdomen is soft. There is no mass.      Tenderness: There is no abdominal tenderness. There is no guarding or rebound.      Comments: Obese. Soft. Not tender.    Musculoskeletal:         General: Tenderness and deformity present. Normal range of motion.      Cervical back: Normal range of motion and neck supple. No rigidity or tenderness.      Right lower leg: Edema present.      Left lower leg: Edema present.   Lymphadenopathy:      Cervical: No cervical adenopathy.   Skin:     General: Skin is  warm.      Coloration: Skin is not jaundiced or pale.      Findings: No erythema or rash.   Neurological:      General: No focal deficit present.      Mental Status: He is alert and oriented to person, place, and time.      Cranial Nerves: No cranial nerve deficit.      Motor: No abnormal muscle tone.      Coordination: Coordination normal.      Gait: Gait normal.   Psychiatric:         Mood and Affect: Mood normal.         Behavior: Behavior normal.         Thought Content: Thought content normal.         Judgment: Judgment normal.     BAYRON Lynn MD performed the physical exam on 9/7/2022 as documented above.    Lab Results - Last 18 Months   Lab Units 07/06/22  1548 05/25/22  1427 07/17/21  0504   WBC 10*3/mm3 4.90 4.08 4.20   HEMOGLOBIN g/dL 14.4 14.0 13.5   HEMATOCRIT % 40.2 42.1 40.3   PLATELETS 10*3/mm3 91* 84* 88*   MCV fL 102.6* 104.5* 101.8*     Lab Results - Last 18 Months   Lab Units 07/17/21  0504 07/15/21  1734   SODIUM mmol/L 138 137   POTASSIUM mmol/L 4.1 4.1   CHLORIDE mmol/L 104 104   CO2 mmol/L 25.0 25.0   BUN mg/dL 12 12   CREATININE mg/dL 0.69* 0.93   CALCIUM mg/dL 8.4* 8.7   BILIRUBIN mg/dL  --  0.7   ALK PHOS U/L  --  138*   ALT (SGPT) U/L  --  24   AST (SGOT) U/L  --  58*   GLUCOSE mg/dL 95 123*     Lab Results   Component Value Date    GLUCOSE 95 07/17/2021    BUN 12 07/17/2021    CREATININE 0.69 (L) 07/17/2021    EGFRIFNONA 118 07/17/2021    BCR 17.4 07/17/2021    K 4.1 07/17/2021    CO2 25.0 07/17/2021    CALCIUM 8.4 (L) 07/17/2021    PROTENTOTREF 7.6 05/25/2022    ALBUMIN 3.1 05/25/2022    LABIL2 0.7 05/25/2022    AST 58 (H) 07/15/2021    ALT 24 07/15/2021     Lab Results   Component Value Date    FOLATE 7.66 05/25/2022     Lab Results   Component Value Date    RETICCTPCT 2.02 (H) 05/25/2022     Lab Results   Component Value Date    DMZGJGDH27 593 05/25/2022     No results found for: SPEP, UPEP  LDH   Date Value Ref Range Status   05/25/2022 149 135 - 225 U/L Final     Lab Results    Component Value Date    PIERRE Negative 11/04/2020    SEDRATE 54 (H) 04/14/2018     Lab Results   Component Value Date    HAPTOGLOBIN 72 05/25/2022     Lab Results   Component Value Date    PTT 29.3 07/06/2022    INR 1.05 02/28/2020     Lab Results   Component Value Date    PSA 0.304 09/24/2020     Assessment & Plan     Assessment:  1. Thrombocytopenia: Persists.  No changes compared to before.  Likely an effect of his splenomegaly.  This likely is also the result of cirrhosis of the liver.  2. Macrocytosis: Likely related to cirrhosis of liver.  At this point no intervention.  3. Chronic deep vein thrombosis.  4. Reviewed all laboratory exams and the ultrasound of the abdomen and spleen.      Plan:  He will see me in approximately 6 months.  I have made referrals to gastroenterology.    Lionel Lynn MD on 9/7/2022 at the 1656

## 2022-09-07 ENCOUNTER — LAB (OUTPATIENT)
Dept: LAB | Facility: HOSPITAL | Age: 57
End: 2022-09-07

## 2022-09-07 ENCOUNTER — OFFICE VISIT (OUTPATIENT)
Dept: ONCOLOGY | Facility: CLINIC | Age: 57
End: 2022-09-07

## 2022-09-07 VITALS
TEMPERATURE: 97.3 F | DIASTOLIC BLOOD PRESSURE: 73 MMHG | SYSTOLIC BLOOD PRESSURE: 110 MMHG | HEIGHT: 73 IN | OXYGEN SATURATION: 96 % | HEART RATE: 103 BPM | BODY MASS INDEX: 46.04 KG/M2

## 2022-09-07 DIAGNOSIS — D69.6 THROMBOCYTOPENIA: Primary | ICD-10-CM

## 2022-09-07 DIAGNOSIS — K76.0 HEPATIC STEATOSIS: ICD-10-CM

## 2022-09-07 LAB
ALBUMIN SERPL-MCNC: 3.3 G/DL (ref 3.5–5.2)
ALBUMIN/GLOB SERPL: 0.7 G/DL
ALP SERPL-CCNC: 131 U/L (ref 39–117)
ALT SERPL W P-5'-P-CCNC: 31 U/L (ref 1–41)
ANION GAP SERPL CALCULATED.3IONS-SCNC: 8 MMOL/L (ref 5–15)
AST SERPL-CCNC: 69 U/L (ref 1–40)
BASOPHILS # BLD AUTO: 0.01 10*3/MM3 (ref 0–0.2)
BASOPHILS NFR BLD AUTO: 0.2 % (ref 0–1.5)
BILIRUB SERPL-MCNC: 1.5 MG/DL (ref 0–1.2)
BUN SERPL-MCNC: 12 MG/DL (ref 6–20)
BUN/CREAT SERPL: 14.5 (ref 7–25)
CALCIUM SPEC-SCNC: 8.8 MG/DL (ref 8.6–10.5)
CHLORIDE SERPL-SCNC: 105 MMOL/L (ref 98–107)
CO2 SERPL-SCNC: 25 MMOL/L (ref 22–29)
CREAT SERPL-MCNC: 0.83 MG/DL (ref 0.76–1.27)
DEPRECATED RDW RBC AUTO: 51.8 FL (ref 37–54)
EGFRCR SERPLBLD CKD-EPI 2021: 101.4 ML/MIN/1.73
EOSINOPHIL # BLD AUTO: 0.06 10*3/MM3 (ref 0–0.4)
EOSINOPHIL NFR BLD AUTO: 1.3 % (ref 0.3–6.2)
ERYTHROCYTE [DISTWIDTH] IN BLOOD BY AUTOMATED COUNT: 13.8 % (ref 12.3–15.4)
GLOBULIN UR ELPH-MCNC: 4.5 GM/DL
GLUCOSE SERPL-MCNC: 130 MG/DL (ref 65–99)
HCT VFR BLD AUTO: 41.9 % (ref 37.5–51)
HGB BLD-MCNC: 14 G/DL (ref 13–17.7)
HOLD SPECIMEN: NORMAL
HOLD SPECIMEN: NORMAL
LYMPHOCYTES # BLD AUTO: 0.67 10*3/MM3 (ref 0.7–3.1)
LYMPHOCYTES NFR BLD AUTO: 15 % (ref 19.6–45.3)
MCH RBC QN AUTO: 34.8 PG (ref 26.6–33)
MCHC RBC AUTO-ENTMCNC: 33.4 G/DL (ref 31.5–35.7)
MCV RBC AUTO: 104.2 FL (ref 79–97)
MONOCYTES # BLD AUTO: 0.41 10*3/MM3 (ref 0.1–0.9)
MONOCYTES NFR BLD AUTO: 9.2 % (ref 5–12)
NEUTROPHILS NFR BLD AUTO: 3.32 10*3/MM3 (ref 1.7–7)
NEUTROPHILS NFR BLD AUTO: 74.3 % (ref 42.7–76)
PLATELET # BLD AUTO: 92 10*3/MM3 (ref 140–450)
PMV BLD AUTO: 11 FL (ref 6–12)
POTASSIUM SERPL-SCNC: 4.2 MMOL/L (ref 3.5–5.2)
PROT SERPL-MCNC: 7.8 G/DL (ref 6–8.5)
RBC # BLD AUTO: 4.02 10*6/MM3 (ref 4.14–5.8)
SODIUM SERPL-SCNC: 138 MMOL/L (ref 136–145)
WBC NRBC COR # BLD: 4.47 10*3/MM3 (ref 3.4–10.8)
WHOLE BLOOD HOLD COAG: NORMAL

## 2022-09-07 PROCEDURE — 99214 OFFICE O/P EST MOD 30 MIN: CPT | Performed by: INTERNAL MEDICINE

## 2022-09-07 PROCEDURE — 36415 COLL VENOUS BLD VENIPUNCTURE: CPT

## 2022-09-07 PROCEDURE — 80053 COMPREHEN METABOLIC PANEL: CPT

## 2022-09-07 PROCEDURE — 85025 COMPLETE CBC W/AUTO DIFF WBC: CPT

## 2022-11-04 ENCOUNTER — OFFICE (OUTPATIENT)
Dept: URBAN - METROPOLITAN AREA CLINIC 64 | Facility: CLINIC | Age: 57
End: 2022-11-04

## 2022-11-04 VITALS
SYSTOLIC BLOOD PRESSURE: 121 MMHG | WEIGHT: 315 LBS | HEART RATE: 91 BPM | DIASTOLIC BLOOD PRESSURE: 71 MMHG | HEIGHT: 73 IN

## 2022-11-04 DIAGNOSIS — Z86.010 PERSONAL HISTORY OF COLONIC POLYPS: ICD-10-CM

## 2022-11-04 DIAGNOSIS — K59.00 CONSTIPATION, UNSPECIFIED: ICD-10-CM

## 2022-11-04 DIAGNOSIS — K62.5 HEMORRHAGE OF ANUS AND RECTUM: ICD-10-CM

## 2022-11-04 DIAGNOSIS — K74.69 OTHER CIRRHOSIS OF LIVER: ICD-10-CM

## 2022-11-04 PROCEDURE — 99203 OFFICE O/P NEW LOW 30 MIN: CPT | Performed by: NURSE PRACTITIONER

## 2022-11-08 ENCOUNTER — OFFICE VISIT (OUTPATIENT)
Dept: CARDIOLOGY | Facility: CLINIC | Age: 57
End: 2022-11-08

## 2022-11-08 VITALS
HEIGHT: 73 IN | BODY MASS INDEX: 41.75 KG/M2 | OXYGEN SATURATION: 96 % | WEIGHT: 315 LBS | DIASTOLIC BLOOD PRESSURE: 71 MMHG | HEART RATE: 90 BPM | SYSTOLIC BLOOD PRESSURE: 106 MMHG

## 2022-11-08 DIAGNOSIS — R07.89 CHEST PRESSURE: Primary | ICD-10-CM

## 2022-11-08 DIAGNOSIS — I83.013 VENOUS STASIS ULCER OF RIGHT ANKLE WITH FAT LAYER EXPOSED, UNSPECIFIED WHETHER VARICOSE VEINS PRESENT: ICD-10-CM

## 2022-11-08 DIAGNOSIS — L97.312 VENOUS STASIS ULCER OF RIGHT ANKLE WITH FAT LAYER EXPOSED, UNSPECIFIED WHETHER VARICOSE VEINS PRESENT: ICD-10-CM

## 2022-11-08 PROCEDURE — 93000 ELECTROCARDIOGRAM COMPLETE: CPT | Performed by: INTERNAL MEDICINE

## 2022-11-08 PROCEDURE — 99214 OFFICE O/P EST MOD 30 MIN: CPT | Performed by: INTERNAL MEDICINE

## 2022-11-08 NOTE — PROGRESS NOTES
Encounter Date:11/08/2022  Last seen 5/26/2021      Patient ID: Robert Randhawa Jr. is a 57 y.o. male.    Chief Complaint:  History of chest discomfort  History of shortness of breath.     History of Present Illness  Since I have last seen, the patient has been without any chest discomfort ,shortness of breath, palpitations, dizziness or syncope.  Denies having any headache ,abdominal pain ,nausea, vomiting , diarrhea constipation, loss of weight or loss of appetite.  Denies having any excessive bruising ,hematuria or blood in the stool.    Review of all systems negative except as indicated.    Reviewed ROS.    Assessment and Plan         ]]]]]]]]]]]]]]]]]]  Impression  ===========   -chest discomfort and shortness of breath -- better but shortness of breath is still present.  Patient has occasional atypical chest discomfort     Cardiac catheterization 2/28/2020 revealed normal left ventricle function and normal coronary arteries   .  echocardiogram 1/8/2020 revealed right atrium right ventricle enlargement and normal left ventricle function.  Normal cardiac valves.     Lexiscan Cardiolite test is negative for myocardial ischemia 1/8/2020 (performed at [facility] as an outpatient due to patient's weight)      -exogenous obesity     -history of chronic DVT asthma and arthritis. Eliquis has been discontinued recently.      -status post left knee surgery  appendectomy     -former smoker     -allergic to sulfa     -family history is negative for coronary artery disease  ============  Plan  ============  Atypical chest discomfort.-  Improved  EKG showed sinus rhythm without ischemic changes-11/8/2022.    Shortness of breath-chronic and unchanged.    Exogenous obesity-attempts to reduce weight.     Medications were reviewed and updated.  Follow-up in 1 year.  Further plan will depend on patient's progress.  [[[[[[[[[[[[[[[[[[                  Diagnosis Plan   1. Chest pressure  ECG 12 Lead      2. Venous stasis ulcer  of right ankle with fat layer exposed, unspecified whether varicose veins present (HCC)  ECG 12 Lead      LAB RESULTS (LAST 7 DAYS)    CBC        BMP        CMP         BNP        TROPONIN        CoAg        Creatinine Clearance  CrCl cannot be calculated (Patient's most recent lab result is older than the maximum 30 days allowed.).    ABG        Radiology  No radiology results for the last day                The following portions of the patient's history were reviewed and updated as appropriate: allergies, current medications, past family history, past medical history, past social history, past surgical history and problem list.    Review of Systems   Constitutional: Positive for malaise/fatigue.   Cardiovascular: Positive for leg swelling. Negative for chest pain, dyspnea on exertion and palpitations.   Respiratory: Positive for shortness of breath. Negative for cough.    Gastrointestinal: Positive for nausea and vomiting. Negative for abdominal pain.   Neurological: Positive for dizziness, light-headedness and weakness. Negative for focal weakness, headaches and numbness.   All other systems reviewed and are negative.        Current Outpatient Medications:   •  amoxicillin-clavulanate (AUGMENTIN) 875-125 MG per tablet, , Disp: , Rfl:   •  bacitracin (RA Bacitracin) 500 UNIT/GM ointment, Apply to bilateral lower extremities with dressing changes. Mix equal parts zinc 20%, bacitracin, nystatin and hydrocortisone 1% cream for wound care. (Patient not taking: Reported on 11/8/2022), Disp: 30 g, Rfl: 3  •  furosemide (LASIX) 20 MG tablet, Take 20 mg by mouth Daily. (Patient not taking: Reported on 11/8/2022), Disp: , Rfl:   •  hydrocortisone 1 % cream, Apply to bilateral lower extremities with dressing changes. Mix equal parts zinc 20%, mupirocin, nystatin and hydrocortisone 1% cream for wound care. (Patient not taking: Reported on 11/8/2022), Disp: 30 g, Rfl: 3  •  mupirocin (Bactroban) 2 % cream, Apply to bilateral  lower extremities with dressing changes. Mix equal parts zinc 20%, mupirocin, nystatin and hydrocortisone 1% cream for wound care. (Patient not taking: Reported on 11/8/2022), Disp: 30 g, Rfl: 3  •  spironolactone (ALDACTONE) 25 MG tablet, Take 25 mg by mouth Daily. (Patient not taking: Reported on 11/8/2022), Disp: , Rfl:   •  tamsulosin (FLOMAX) 0.4 MG capsule 24 hr capsule, 1 capsule Daily., Disp: , Rfl:   •  zinc oxide 20 % ointment, Apply to bilateral lower extremities with dressing changes. Mix equal parts zinc 20%, mupirocin, nystatin and hydrocortisone 1% cream for wound care. (Patient not taking: Reported on 11/8/2022), Disp: 30 g, Rfl: 3    Allergies   Allergen Reactions   • Sulfa Antibiotics Unknown (See Comments)       Family History   Problem Relation Age of Onset   • Heart disease Mother    • Diabetes Mother    • Arthritis Father    • Aortic aneurysm Father        Past Surgical History:   Procedure Laterality Date   • APPENDECTOMY  1974   • CARDIAC CATHETERIZATION N/A 2/28/2020    Procedure: Left ventriculography;  Surgeon: Kenney Reyez MD;  Location: [facility] CATH INVASIVE LOCATION;  Service: Cardiovascular;  Laterality: N/A;   • CARDIAC CATHETERIZATION N/A 2/28/2020    Procedure: Coronary angiography;  Surgeon: Kenney Reyez MD;  Location: [facility] CATH INVASIVE LOCATION;  Service: Cardiovascular;  Laterality: N/A;   • CARDIAC CATHETERIZATION N/A 2/28/2020    Procedure: Left Heart Cath;  Surgeon: Kenney Reyez MD;  Location: [facility] CATH INVASIVE LOCATION;  Service: Cardiovascular;  Laterality: N/A;   • KNEE SURGERY Left 2018       Past Medical History:   Diagnosis Date   • Arthritis    • Asthma    • Chronic deep vein thrombosis (DVT) ([facility])    • Lymphedema of both lower extremities        Family History   Problem Relation Age of Onset   • Heart disease Mother    • Diabetes Mother    • Arthritis Father    • Aortic aneurysm Father        Social History     Socioeconomic History   • Marital  "status: Single   Tobacco Use   • Smoking status: Former     Packs/day: 0.50     Years: 5.00     Pack years: 2.50     Types: Cigarettes     Start date:      Quit date:      Years since quittin.8   • Smokeless tobacco: Never   Vaping Use   • Vaping Use: Never used   Substance and Sexual Activity   • Alcohol use: Never   • Drug use: Never   • Sexual activity: Defer           ECG 12 Lead    Date/Time: 2022 12:06 PM  Performed by: Kenney Reyez MD  Authorized by: Kenney Reyez MD   Comparison: compared with previous ECG   Similar to previous ECG  Comparison to previous ECG: Normal sinus rhythm nonspecific ST-T wave changes 78/min normal axis normal intervals no ectopy no significant change from 2021                Objective:       Physical Exam    /71 (BP Location: Right arm, Patient Position: Sitting, Cuff Size: Adult)   Pulse 90   Ht 185.4 cm (73\")   Wt (!) 166 kg (366 lb 3.2 oz)   SpO2 96%   BMI 48.31 kg/m²   The patient is alert, oriented and in no distress.    Vital signs as noted above.  Exogenous obesity (BMI 48)    Head and neck revealed no carotid bruits or jugular venous distension.  No thyromegaly or lymphadenopathy is present.    Lungs clear.  No wheezing.  Breath sounds are normal bilaterally.    Heart normal first and second heart sounds.  No murmur..  No pericardial rub is present.  No gallop is present.    Abdomen soft and nontender.  No organomegaly is present.    Extremities revealed good peripheral pulses without any pedal edema.    Skin warm and dry.    Musculoskeletal system is grossly normal.    CNS grossly normal.    Reviewed and updated.        "

## 2022-11-15 ENCOUNTER — ON CAMPUS - OUTPATIENT (OUTPATIENT)
Dept: URBAN - METROPOLITAN AREA HOSPITAL 85 | Facility: HOSPITAL | Age: 57
End: 2022-11-15

## 2022-11-15 ENCOUNTER — ANESTHESIA EVENT (OUTPATIENT)
Dept: GASTROENTEROLOGY | Facility: HOSPITAL | Age: 57
End: 2022-11-15

## 2022-11-15 ENCOUNTER — ANESTHESIA (OUTPATIENT)
Dept: GASTROENTEROLOGY | Facility: HOSPITAL | Age: 57
End: 2022-11-15

## 2022-11-15 ENCOUNTER — HOSPITAL ENCOUNTER (OUTPATIENT)
Facility: HOSPITAL | Age: 57
Setting detail: HOSPITAL OUTPATIENT SURGERY
Discharge: HOME | End: 2022-11-15
Attending: INTERNAL MEDICINE | Admitting: INTERNAL MEDICINE

## 2022-11-15 VITALS
BODY MASS INDEX: 41.75 KG/M2 | WEIGHT: 315 LBS | SYSTOLIC BLOOD PRESSURE: 98 MMHG | HEART RATE: 89 BPM | RESPIRATION RATE: 26 BRPM | OXYGEN SATURATION: 99 % | TEMPERATURE: 98.6 F | DIASTOLIC BLOOD PRESSURE: 51 MMHG | HEIGHT: 73 IN

## 2022-11-15 DIAGNOSIS — K20.80 OTHER ESOPHAGITIS WITHOUT BLEEDING: ICD-10-CM

## 2022-11-15 DIAGNOSIS — Z86.010 PERSONAL HISTORY OF COLONIC POLYPS: ICD-10-CM

## 2022-11-15 DIAGNOSIS — K62.5 RECTAL BLEEDING: ICD-10-CM

## 2022-11-15 DIAGNOSIS — K74.60 UNSPECIFIED CIRRHOSIS OF LIVER: ICD-10-CM

## 2022-11-15 DIAGNOSIS — K64.8 OTHER HEMORRHOIDS: ICD-10-CM

## 2022-11-15 DIAGNOSIS — K62.5 HEMORRHAGE OF ANUS AND RECTUM: ICD-10-CM

## 2022-11-15 DIAGNOSIS — K57.30 DIVERTICULOSIS OF LARGE INTESTINE WITHOUT PERFORATION OR ABS: ICD-10-CM

## 2022-11-15 DIAGNOSIS — D12.3 BENIGN NEOPLASM OF TRANSVERSE COLON: ICD-10-CM

## 2022-11-15 DIAGNOSIS — K59.00 CONSTIPATION: ICD-10-CM

## 2022-11-15 DIAGNOSIS — K59.00 CONSTIPATION, UNSPECIFIED: ICD-10-CM

## 2022-11-15 DIAGNOSIS — K29.80 DUODENITIS WITHOUT BLEEDING: ICD-10-CM

## 2022-11-15 DIAGNOSIS — D12.4 BENIGN NEOPLASM OF DESCENDING COLON: ICD-10-CM

## 2022-11-15 DIAGNOSIS — K62.1 RECTAL POLYP: ICD-10-CM

## 2022-11-15 DIAGNOSIS — K29.70 GASTRITIS, UNSPECIFIED, WITHOUT BLEEDING: ICD-10-CM

## 2022-11-15 DIAGNOSIS — K74.60 CIRRHOSIS: ICD-10-CM

## 2022-11-15 PROCEDURE — 43239 EGD BIOPSY SINGLE/MULTIPLE: CPT | Performed by: INTERNAL MEDICINE

## 2022-11-15 PROCEDURE — 88305 TISSUE EXAM BY PATHOLOGIST: CPT | Performed by: INTERNAL MEDICINE

## 2022-11-15 PROCEDURE — 45385 COLONOSCOPY W/LESION REMOVAL: CPT | Performed by: INTERNAL MEDICINE

## 2022-11-15 PROCEDURE — C1769 GUIDE WIRE: HCPCS | Performed by: INTERNAL MEDICINE

## 2022-11-15 PROCEDURE — 25010000002 PHENYLEPHRINE 10 MG/ML SOLUTION: Performed by: ANESTHESIOLOGIST ASSISTANT

## 2022-11-15 PROCEDURE — 88312 SPECIAL STAINS GROUP 1: CPT | Performed by: INTERNAL MEDICINE

## 2022-11-15 PROCEDURE — 25010000002 PROPOFOL 10 MG/ML EMULSION: Performed by: ANESTHESIOLOGIST ASSISTANT

## 2022-11-15 RX ORDER — PHENYLEPHRINE HCL IN 0.9% NACL 1 MG/10 ML
SYRINGE (ML) INTRAVENOUS AS NEEDED
Status: DISCONTINUED | OUTPATIENT
Start: 2022-11-15 | End: 2022-11-15 | Stop reason: SURG

## 2022-11-15 RX ORDER — LIDOCAINE HYDROCHLORIDE 20 MG/ML
INJECTION, SOLUTION EPIDURAL; INFILTRATION; INTRACAUDAL; PERINEURAL AS NEEDED
Status: DISCONTINUED | OUTPATIENT
Start: 2022-11-15 | End: 2022-11-15 | Stop reason: SURG

## 2022-11-15 RX ORDER — PROPOFOL 10 MG/ML
VIAL (ML) INTRAVENOUS AS NEEDED
Status: DISCONTINUED | OUTPATIENT
Start: 2022-11-15 | End: 2022-11-15 | Stop reason: SURG

## 2022-11-15 RX ORDER — SODIUM CHLORIDE 9 MG/ML
INJECTION, SOLUTION INTRAVENOUS CONTINUOUS PRN
Status: DISCONTINUED | OUTPATIENT
Start: 2022-11-15 | End: 2022-11-15 | Stop reason: SURG

## 2022-11-15 RX ORDER — PHENYLEPHRINE HYDROCHLORIDE 10 MG/ML
100 INJECTION INTRAVENOUS AS NEEDED
Status: DISCONTINUED | OUTPATIENT
Start: 2022-11-15 | End: 2022-11-15 | Stop reason: HOSPADM

## 2022-11-15 RX ADMIN — LIDOCAINE HYDROCHLORIDE 80 MG: 20 INJECTION, SOLUTION EPIDURAL; INFILTRATION; INTRACAUDAL; PERINEURAL at 09:43

## 2022-11-15 RX ADMIN — PROPOFOL 500 MG: 10 INJECTION, EMULSION INTRAVENOUS at 09:43

## 2022-11-15 RX ADMIN — Medication 100 MCG: at 09:58

## 2022-11-15 RX ADMIN — Medication 100 MCG: at 10:23

## 2022-11-15 RX ADMIN — Medication 100 MCG: at 10:10

## 2022-11-15 RX ADMIN — Medication 100 MCG: at 09:55

## 2022-11-15 RX ADMIN — PHENYLEPHRINE HYDROCHLORIDE 100 MCG: 10 INJECTION INTRAVENOUS at 10:45

## 2022-11-15 RX ADMIN — Medication 100 MCG: at 10:05

## 2022-11-15 RX ADMIN — Medication 100 MCG: at 10:01

## 2022-11-15 RX ADMIN — Medication 200 MCG: at 10:26

## 2022-11-15 RX ADMIN — Medication 100 MCG: at 10:13

## 2022-11-15 RX ADMIN — SODIUM CHLORIDE: 0.9 INJECTION, SOLUTION INTRAVENOUS at 09:34

## 2022-11-15 NOTE — OP NOTE
COLONOSCOPY, ESOPHAGOGASTRODUODENOSCOPY Procedure Report    Patient Name:  Robert Randhawa Jr.  YOB: 1965    Date of Surgery:  11/15/2022     Pre-Op Diagnosis:  Cirrhosis (HCC) [K74.60]  Constipation [K59.00]  Rectal bleeding [K62.5]  Personal history of colonic polyps [Z86.010]       Post-Op Diagnosis Codes:     * Cirrhosis (HCC) [K74.60]     * Constipation [K59.00]     * Rectal bleeding [K62.5]     * Personal history of colonic polyps [Z86.010]    Postop diagnosis:  1.  Esophagitis  2.  Gastritis  3.  Duodenitis  4.  Colon polyps  5.  Diverticulosis  6.  Internal hemorrhoids    Procedure/CPT® Codes:      Procedure(s):  COLONOSCOPY with polypectomy  ESOPHAGOGASTRODUODENOSCOPY with biopsy    Staff:  Surgeon(s):  AMY Garcia MD      Anesthesia: Monitored Anesthesia Care    Description of Procedure:  A description of the procedure as well as risks, benefits and alternative methods were explained to the patient who voiced understanding and signed the corresponding consent form. A physical exam was performed and vital signs were monitored throughout the procedure.    An upper GI endoscope was placed into the mouth and proceeded through the esophagus, stomach and second portion of the duodenum without difficulty. The scope was then retroflexed and the fundus was visualized. The procedure was not difficult and there were no immediate complications.  There was no blood loss.    Next, A rectal exam was performed which was normal. An Olympus colonoscope was placed into the rectum and proceeded under direct visualization through the colon until the cecum and appendiceal orifice were identified. Careful visualization occurred upon slow withdraw of the scope. The scope was then retroflexed and the distal rectum was visualized. The quality of the prep was good. The procedure was not difficult and there were no immediate complications.  There was no blood loss.    Impression:  1.  Patchy white plaques  throughout the esophagus consistent with esophagitis, less typical for Candida esophagitis.  Biopsies were obtained with cold forceps for histology.  There were no esophageal varices seen.  2.  Patchy erythema and granularity in the stomach antrum and body consistent with erosive gastritis vs PHG.  Biopsies were obtained with cold forceps for histology.  There were no gastric varices seen  3.  Patchy erythema in the duodenal bulb consistent with duodenitis.    Colonoscopy impression:  1.  10 colon polyps between 4 to 6 mm in diameter throughout the colon removed with snare polypectomy in a single piece and were completely removed.  In the ascending colon 2 polyps, transverse colon 3 polyps, descending colon 4 polyps, rectum 1 polyp.  Several small diminutive hyperplastic appearing polyps in the rectum which were not removed.  2.  Moderate diverticulosis in the descending and sigmoid colon with small and medium size openings and no bleeding  3.  Small nonbleeding internal hemorrhoids.  4.  Normal mucosa in the terminal ileum  5.  Patchy erythema and mosaic pattern mucosa spread throughout the colon consistent with mild portal hypertensive colopathy    Recommendations:  -Repeat EGD for esophageal varices screening in 2 years  -Recall for colonoscopy in 3 years if 3 or more adenomatous colon polyps  -Follow-up gastric biopsies for H. pylori, treat with quadruple therapy if positive  -Recommend PPI once daily  -Consider nadolol  -Polyp prevention education      BRENDA Garcia MD     Date: 11/15/2022    Time: 10:39 EST

## 2022-11-15 NOTE — H&P
GI CONSULT  NOTE:    Referring Provider:    Alfredo Torres MD  [unfilled]    Chief complaint: <principal problem not specified>    Subjective .       Pre op diagnosis  Cirrhosis (HCC) [K74.60]  Constipation [K59.00]  Rectal bleeding [K62.5]  Personal history of colonic polyps [Z86.010]      History of present illness:      Robert Randhawa Jr. is a 57 y.o. male who presents today for Procedure(s):  COLONOSCOPY  ESOPHAGOGASTRODUODENOSCOPY for the indications listed below.     The updated Patient Profile was reviewed prior to the procedure, in conjunction with the Physical Exam, including medical conditions, surgical procedures, medications, allergies, family history and social history.     Pre-operatively, I reviewed the indication(s) for the procedure, the risks of the procedure [including but not limited to: unexpected bleeding possibly requiring hospitalization and/or unplanned repeat procedures, perforation possibly requiring surgical treatment, missed lesions and complications of sedation/MAC (also explained by anesthesia staff)].     I have evaluated the patient for risks associated with the planned anesthesia and the procedure to be performed and find the patient an acceptable candidate for IV sedation.    Multiple opportunities were provided for any questions or concerns, and all questions were answered satisfactorily before any anesthesia was administered. We will proceed with the planned procedure.    Past Medical History:  Past Medical History:   Diagnosis Date   • Arthritis    • Asthma    • Chronic deep vein thrombosis (DVT) (HCC)    • Lymphedema of both lower extremities        Past Surgical History:  Past Surgical History:   Procedure Laterality Date   • APPENDECTOMY  1974   • CARDIAC CATHETERIZATION N/A 2/28/2020    Procedure: Left ventriculography;  Surgeon: Kenney Reyez MD;  Location: [facility] INVASIVE LOCATION;  Service: Cardiovascular;  Laterality: N/A;   • CARDIAC CATHETERIZATION  N/A 2020    Procedure: Coronary angiography;  Surgeon: Kenney Reyez MD;  Location:  [facility] CATH INVASIVE LOCATION;  Service: Cardiovascular;  Laterality: N/A;   • CARDIAC CATHETERIZATION N/A 2020    Procedure: Left Heart Cath;  Surgeon: Kenney Reyez MD;  Location:  [facility] CATH INVASIVE LOCATION;  Service: Cardiovascular;  Laterality: N/A;   • KNEE SURGERY Left 2018       Social History:  Social History     Tobacco Use   • Smoking status: Former     Packs/day: 0.50     Years: 5.00     Pack years: 2.50     Types: Cigarettes     Start date:      Quit date:      Years since quittin.8   • Smokeless tobacco: Never   Vaping Use   • Vaping Use: Never used   Substance Use Topics   • Alcohol use: Never   • Drug use: Never       Family History:  Family History   Problem Relation Age of Onset   • Heart disease Mother    • Diabetes Mother    • Arthritis Father    • Aortic aneurysm Father        Medications:  Medications Prior to Admission   Medication Sig Dispense Refill Last Dose   • tamsulosin (FLOMAX) 0.4 MG capsule 24 hr capsule 1 capsule Daily.   2022   • amoxicillin-clavulanate (AUGMENTIN) 875-125 MG per tablet  (Patient not taking: Reported on 2022)   Not Taking   • bacitracin (RA Bacitracin) 500 UNIT/GM ointment Apply to bilateral lower extremities with dressing changes. Mix equal parts zinc 20%, bacitracin, nystatin and hydrocortisone 1% cream for wound care. (Patient not taking: Reported on 2022) 30 g 3 Not Taking   • furosemide (LASIX) 20 MG tablet Take 20 mg by mouth Daily. (Patient not taking: Reported on 2022)   Not Taking   • hydrocortisone 1 % cream Apply to bilateral lower extremities with dressing changes. Mix equal parts zinc 20%, mupirocin, nystatin and hydrocortisone 1% cream for wound care. (Patient not taking: Reported on 2022) 30 g 3 Not Taking   • mupirocin (Bactroban) 2 % cream Apply to bilateral lower extremities with dressing changes. Mix equal  "parts zinc 20%, mupirocin, nystatin and hydrocortisone 1% cream for wound care. (Patient not taking: Reported on 11/8/2022) 30 g 3 Not Taking   • spironolactone (ALDACTONE) 25 MG tablet Take 25 mg by mouth Daily. (Patient not taking: Reported on 11/8/2022)   Not Taking   • zinc oxide 20 % ointment Apply to bilateral lower extremities with dressing changes. Mix equal parts zinc 20%, mupirocin, nystatin and hydrocortisone 1% cream for wound care. (Patient not taking: Reported on 11/8/2022) 30 g 3 Not Taking       Scheduled Meds:  Continuous Infusions:No current facility-administered medications for this encounter.    PRN Meds:.    ALLERGIES:  Sulfa antibiotics    ROS:  The following systems were reviewed and negative;  Constitution:  No fevers, chills, no unintentional weight loss  Skin: no rash, no jaundice  Eyes:  No blurry vision, no eye pain  HENT:  No change in hearing or smell  Resp:  No dyspnea or cough  CV:  No chest pain or palpitations  :  No dysuria, hematuria  Musculoskeletal:  No leg cramps or arthralgias  Neuro:  No tremor, no numbness  Psych:  No depression or confsusion    Objective     Vital Signs:   Vitals:    11/08/22 1002 11/15/22 0850   BP:  120/73   BP Location:  Left arm   Patient Position:  Lying   Pulse:  78   Resp:  17   Temp:  98.6 °F (37 °C)   TempSrc:  Oral   SpO2:  95%   Weight: (!) 166 kg (366 lb) (!) 166 kg (366 lb)   Height: 185.4 cm (73\") 185.4 cm (73\")       Physical Exam:       General Appearance:    Awake and alert, in no acute distress   Head:    Normocephalic, without obvious abnormality, atraumatic   Throat:   No oral lesions, no thrush, oral mucosa moist   Lungs:     respirations regular, even and unlabored   Skin:   No rash, no jaundice       Results Review:  Lab Results (last 24 hours)     ** No results found for the last 24 hours. **          Imaging Results (Last 24 Hours)     ** No results found for the last 24 hours. **           I reviewed the patient's labs and " imaging.    ASSESSMENT AND PLAN:      Active Problems:    * No active hospital problems. *       Procedure(s):  COLONOSCOPY  ESOPHAGOGASTRODUODENOSCOPY      I discussed the patient's findings and my recommendations with the patient.    BRENDA Garcia MD  11/15/22  08:58 EST

## 2022-11-15 NOTE — ANESTHESIA POSTPROCEDURE EVALUATION
Patient: Robert Randhawa Jr.    Procedure Summary     Date: 11/15/22 Room / Location: [facility] ENDOSCOPY 4 / [facility] ENDOSCOPY    Anesthesia Start: 0939 Anesthesia Stop: 1029    Procedures:       COLONOSCOPY with polypectomy      ESOPHAGOGASTRODUODENOSCOPY with biopsy Diagnosis:       Cirrhosis (HCC)      Constipation      Rectal bleeding      Personal history of colonic polyps      (Cirrhosis (HCC) [K74.60])      (Constipation [K59.00])      (Rectal bleeding [K62.5])      (Personal history of colonic polyps [Z86.010])    Surgeons: AMY Garcia MD Provider: Lopez Verma MD    Anesthesia Type: MAC ASA Status: 3          Anesthesia Type: MAC    Vitals  Vitals Value Taken Time   BP 91/61 11/15/22 1102   Temp     Pulse 90 11/15/22 1104   Resp 26 11/15/22 1044   SpO2 94 % 11/15/22 1104   Vitals shown include unvalidated device data.        Post Anesthesia Care and Evaluation    Patient location during evaluation: bedside  Patient participation: complete - patient participated  Level of consciousness: awake and alert  Pain score: 1  Pain management: adequate    Airway patency: patent  Anesthetic complications: No anesthetic complications  PONV Status: none  Cardiovascular status: acceptable  Respiratory status: acceptable  Hydration status: acceptable  Post Neuraxial Block status: Motor and sensory function returned to baseline

## 2022-11-15 NOTE — ANESTHESIA PREPROCEDURE EVALUATION
Anesthesia Evaluation     Patient summary reviewed and Nursing notes reviewed   NPO Solid Status: > 6 hours  NPO Liquid Status: > 6 hours           Airway   Mallampati: II  TM distance: >3 FB  Neck ROM: full  No difficulty expected  Dental - normal exam     Pulmonary - normal exam    breath sounds clear to auscultation  (+) asthma,shortness of breath,   Cardiovascular - normal exam    ECG reviewed  Rhythm: regular  Rate: normal    (+) angina, DVT,       Neuro/Psych- negative ROS  GI/Hepatic/Renal/Endo    (+) morbid obesity,      Musculoskeletal     Abdominal  - normal exam    Abdomen: soft.  Bowel sounds: normal.   Substance History - negative use     OB/GYN negative ob/gyn ROS         Other   arthritis,                      Anesthesia Plan    ASA 3     MAC     intravenous induction     Anesthetic plan, risks, benefits, and alternatives have been provided, discussed and informed consent has been obtained with: patient.    Use of blood products discussed with patient .       CODE STATUS:

## 2022-11-16 LAB
LAB AP CASE REPORT: NORMAL
PATH REPORT.FINAL DX SPEC: NORMAL
PATH REPORT.GROSS SPEC: NORMAL

## 2022-12-30 ENCOUNTER — OFFICE (OUTPATIENT)
Dept: URBAN - METROPOLITAN AREA CLINIC 64 | Facility: CLINIC | Age: 57
End: 2022-12-30

## 2022-12-30 VITALS
HEIGHT: 73 IN | WEIGHT: 315 LBS | DIASTOLIC BLOOD PRESSURE: 65 MMHG | HEART RATE: 95 BPM | SYSTOLIC BLOOD PRESSURE: 103 MMHG

## 2022-12-30 DIAGNOSIS — Z86.010 PERSONAL HISTORY OF COLONIC POLYPS: ICD-10-CM

## 2022-12-30 DIAGNOSIS — K74.60 UNSPECIFIED CIRRHOSIS OF LIVER: ICD-10-CM

## 2022-12-30 DIAGNOSIS — D69.6 THROMBOCYTOPENIA, UNSPECIFIED: ICD-10-CM

## 2022-12-30 DIAGNOSIS — K62.5 HEMORRHAGE OF ANUS AND RECTUM: ICD-10-CM

## 2022-12-30 DIAGNOSIS — K72.90 HEPATIC FAILURE, UNSPECIFIED WITHOUT COMA: ICD-10-CM

## 2022-12-30 PROCEDURE — 99214 OFFICE O/P EST MOD 30 MIN: CPT | Performed by: NURSE PRACTITIONER

## 2022-12-30 RX ORDER — RIFAXIMIN 550 MG/1
TABLET ORAL
Qty: 60 | Refills: 11 | Status: COMPLETED
Start: 2022-12-30 | End: 2023-03-23

## 2023-01-03 ENCOUNTER — TRANSCRIBE ORDERS (OUTPATIENT)
Dept: ADMINISTRATIVE | Facility: HOSPITAL | Age: 58
End: 2023-01-03
Payer: MEDICARE

## 2023-01-03 DIAGNOSIS — K74.60 LIVER CIRRHOSIS SECONDARY TO NASH: Primary | ICD-10-CM

## 2023-01-03 DIAGNOSIS — K75.81 LIVER CIRRHOSIS SECONDARY TO NASH: Primary | ICD-10-CM

## 2023-01-12 ENCOUNTER — HOSPITAL ENCOUNTER (OUTPATIENT)
Dept: ULTRASOUND IMAGING | Facility: HOSPITAL | Age: 58
Discharge: HOME | End: 2023-01-12
Admitting: NURSE PRACTITIONER
Payer: MEDICARE

## 2023-01-12 DIAGNOSIS — K75.81 LIVER CIRRHOSIS SECONDARY TO NASH: ICD-10-CM

## 2023-01-12 DIAGNOSIS — K74.60 LIVER CIRRHOSIS SECONDARY TO NASH: ICD-10-CM

## 2023-01-12 PROCEDURE — 76705 ECHO EXAM OF ABDOMEN: CPT

## 2023-01-25 ENCOUNTER — HOSPITAL ENCOUNTER (INPATIENT)
Facility: HOSPITAL | Age: 58
LOS: 4 days | Discharge: NURSING, REHAB OR LONG-TERM CARE | DRG: 872 | End: 2023-01-30
Attending: EMERGENCY MEDICINE | Admitting: HOSPITALIST
Payer: MEDICARE

## 2023-01-25 ENCOUNTER — APPOINTMENT (OUTPATIENT)
Dept: GENERAL RADIOLOGY | Facility: HOSPITAL | Age: 58
DRG: 872 | End: 2023-01-25
Payer: MEDICARE

## 2023-01-25 DIAGNOSIS — N39.0 URINARY TRACT INFECTION WITH HEMATURIA, SITE UNSPECIFIED: ICD-10-CM

## 2023-01-25 DIAGNOSIS — R60.0 BILATERAL LOWER EXTREMITY EDEMA: Primary | ICD-10-CM

## 2023-01-25 DIAGNOSIS — R77.8 ELEVATED TROPONIN: ICD-10-CM

## 2023-01-25 DIAGNOSIS — R50.9 FEVER, UNSPECIFIED FEVER CAUSE: ICD-10-CM

## 2023-01-25 DIAGNOSIS — R31.9 URINARY TRACT INFECTION WITH HEMATURIA, SITE UNSPECIFIED: ICD-10-CM

## 2023-01-25 DIAGNOSIS — D72.829 LEUKOCYTOSIS, UNSPECIFIED TYPE: ICD-10-CM

## 2023-01-25 LAB
ALBUMIN SERPL-MCNC: 3 G/DL (ref 3.5–5.2)
ALBUMIN/GLOB SERPL: 0.6 G/DL
ALP SERPL-CCNC: 127 U/L (ref 39–117)
ALT SERPL W P-5'-P-CCNC: 31 U/L (ref 1–41)
ANION GAP SERPL CALCULATED.3IONS-SCNC: 13 MMOL/L (ref 5–15)
AST SERPL-CCNC: 81 U/L (ref 1–40)
BACTERIA UR QL AUTO: ABNORMAL /HPF
BASOPHILS # BLD AUTO: 0.1 10*3/MM3 (ref 0–0.2)
BASOPHILS NFR BLD AUTO: 0.4 % (ref 0–1.5)
BILIRUB SERPL-MCNC: 2.5 MG/DL (ref 0–1.2)
BILIRUB UR QL STRIP: ABNORMAL
BUN SERPL-MCNC: 24 MG/DL (ref 6–20)
BUN/CREAT SERPL: 20.5 (ref 7–25)
CALCIUM SPEC-SCNC: 8.4 MG/DL (ref 8.6–10.5)
CHLORIDE SERPL-SCNC: 104 MMOL/L (ref 98–107)
CLARITY UR: CLEAR
CO2 SERPL-SCNC: 18 MMOL/L (ref 22–29)
COLOR UR: ABNORMAL
CREAT SERPL-MCNC: 1.17 MG/DL (ref 0.76–1.27)
CRP SERPL-MCNC: 2.02 MG/DL (ref 0–0.5)
D-LACTATE SERPL-SCNC: 3.2 MMOL/L (ref 0.3–2)
DEPRECATED RDW RBC AUTO: 56.4 FL (ref 37–54)
EGFRCR SERPLBLD CKD-EPI 2021: 72.3 ML/MIN/1.73
EOSINOPHIL # BLD AUTO: 0.1 10*3/MM3 (ref 0–0.4)
EOSINOPHIL NFR BLD AUTO: 0.5 % (ref 0.3–6.2)
ERYTHROCYTE [DISTWIDTH] IN BLOOD BY AUTOMATED COUNT: 15.6 % (ref 12.3–15.4)
ERYTHROCYTE [SEDIMENTATION RATE] IN BLOOD: 49 MM/HR (ref 0–20)
FLUAV SUBTYP SPEC NAA+PROBE: NOT DETECTED
FLUBV RNA ISLT QL NAA+PROBE: NOT DETECTED
GLOBULIN UR ELPH-MCNC: 5 GM/DL
GLUCOSE SERPL-MCNC: 96 MG/DL (ref 65–99)
GLUCOSE UR STRIP-MCNC: NEGATIVE MG/DL
HCT VFR BLD AUTO: 42.2 % (ref 37.5–51)
HGB BLD-MCNC: 13.7 G/DL (ref 13–17.7)
HGB UR QL STRIP.AUTO: ABNORMAL
HOLD SPECIMEN: NORMAL
HYALINE CASTS UR QL AUTO: ABNORMAL /LPF
KETONES UR QL STRIP: ABNORMAL
LEUKOCYTE ESTERASE UR QL STRIP.AUTO: ABNORMAL
LYMPHOCYTES # BLD AUTO: 0.4 10*3/MM3 (ref 0.7–3.1)
LYMPHOCYTES NFR BLD AUTO: 2.8 % (ref 19.6–45.3)
MCH RBC QN AUTO: 34.1 PG (ref 26.6–33)
MCHC RBC AUTO-ENTMCNC: 32.6 G/DL (ref 31.5–35.7)
MCV RBC AUTO: 104.7 FL (ref 79–97)
MONOCYTES # BLD AUTO: 0.9 10*3/MM3 (ref 0.1–0.9)
MONOCYTES NFR BLD AUTO: 5.6 % (ref 5–12)
MRSA DNA SPEC QL NAA+PROBE: NORMAL
MUCOUS THREADS URNS QL MICRO: ABNORMAL /HPF
NEUTROPHILS NFR BLD AUTO: 13.9 10*3/MM3 (ref 1.7–7)
NEUTROPHILS NFR BLD AUTO: 90.7 % (ref 42.7–76)
NITRITE UR QL STRIP: POSITIVE
NRBC BLD AUTO-RTO: 0.1 /100 WBC (ref 0–0.2)
NT-PROBNP SERPL-MCNC: 296.2 PG/ML (ref 0–900)
PH UR STRIP.AUTO: <=5 [PH] (ref 5–8)
PLATELET # BLD AUTO: 96 10*3/MM3 (ref 140–450)
PMV BLD AUTO: 8.7 FL (ref 6–12)
POTASSIUM SERPL-SCNC: 4.5 MMOL/L (ref 3.5–5.2)
PROCALCITONIN SERPL-MCNC: 11.41 NG/ML (ref 0–0.25)
PROT SERPL-MCNC: 8 G/DL (ref 6–8.5)
PROT UR QL STRIP: ABNORMAL
RBC # BLD AUTO: 4.03 10*6/MM3 (ref 4.14–5.8)
RBC # UR STRIP: ABNORMAL /HPF
REF LAB TEST METHOD: ABNORMAL
SARS-COV-2 RNA PNL SPEC NAA+PROBE: NOT DETECTED
SODIUM SERPL-SCNC: 135 MMOL/L (ref 136–145)
SP GR UR STRIP: 1.02 (ref 1–1.03)
SQUAMOUS #/AREA URNS HPF: ABNORMAL /HPF
TROPONIN T SERPL-MCNC: 0.03 NG/ML (ref 0–0.03)
UROBILINOGEN UR QL STRIP: ABNORMAL
WBC # UR STRIP: ABNORMAL /HPF
WBC NRBC COR # BLD: 15.3 10*3/MM3 (ref 3.4–10.8)

## 2023-01-25 PROCEDURE — 87186 SC STD MICRODIL/AGAR DIL: CPT | Performed by: PHYSICIAN ASSISTANT

## 2023-01-25 PROCEDURE — 84484 ASSAY OF TROPONIN QUANT: CPT | Performed by: PHYSICIAN ASSISTANT

## 2023-01-25 PROCEDURE — 25010000002 CEFEPIME PER 500 MG: Performed by: PHYSICIAN ASSISTANT

## 2023-01-25 PROCEDURE — 36415 COLL VENOUS BLD VENIPUNCTURE: CPT | Performed by: PHYSICIAN ASSISTANT

## 2023-01-25 PROCEDURE — 81001 URINALYSIS AUTO W/SCOPE: CPT | Performed by: PHYSICIAN ASSISTANT

## 2023-01-25 PROCEDURE — 85652 RBC SED RATE AUTOMATED: CPT | Performed by: PHYSICIAN ASSISTANT

## 2023-01-25 PROCEDURE — 87077 CULTURE AEROBIC IDENTIFY: CPT | Performed by: PHYSICIAN ASSISTANT

## 2023-01-25 PROCEDURE — 93005 ELECTROCARDIOGRAM TRACING: CPT | Performed by: EMERGENCY MEDICINE

## 2023-01-25 PROCEDURE — 87641 MR-STAPH DNA AMP PROBE: CPT | Performed by: PHYSICIAN ASSISTANT

## 2023-01-25 PROCEDURE — 87070 CULTURE OTHR SPECIMN AEROBIC: CPT | Performed by: PHYSICIAN ASSISTANT

## 2023-01-25 PROCEDURE — 87040 BLOOD CULTURE FOR BACTERIA: CPT | Performed by: PHYSICIAN ASSISTANT

## 2023-01-25 PROCEDURE — 83605 ASSAY OF LACTIC ACID: CPT

## 2023-01-25 PROCEDURE — 86140 C-REACTIVE PROTEIN: CPT | Performed by: PHYSICIAN ASSISTANT

## 2023-01-25 PROCEDURE — 83880 ASSAY OF NATRIURETIC PEPTIDE: CPT | Performed by: PHYSICIAN ASSISTANT

## 2023-01-25 PROCEDURE — 85025 COMPLETE CBC W/AUTO DIFF WBC: CPT | Performed by: PHYSICIAN ASSISTANT

## 2023-01-25 PROCEDURE — 71045 X-RAY EXAM CHEST 1 VIEW: CPT

## 2023-01-25 PROCEDURE — 84145 PROCALCITONIN (PCT): CPT | Performed by: HOSPITALIST

## 2023-01-25 PROCEDURE — 73590 X-RAY EXAM OF LOWER LEG: CPT

## 2023-01-25 PROCEDURE — 87636 SARSCOV2 & INF A&B AMP PRB: CPT | Performed by: PHYSICIAN ASSISTANT

## 2023-01-25 PROCEDURE — 99285 EMERGENCY DEPT VISIT HI MDM: CPT

## 2023-01-25 PROCEDURE — 87147 CULTURE TYPE IMMUNOLOGIC: CPT | Performed by: PHYSICIAN ASSISTANT

## 2023-01-25 PROCEDURE — P9612 CATHETERIZE FOR URINE SPEC: HCPCS

## 2023-01-25 PROCEDURE — 25010000002 VANCOMYCIN 10 G RECONSTITUTED SOLUTION: Performed by: PHYSICIAN ASSISTANT

## 2023-01-25 PROCEDURE — 87205 SMEAR GRAM STAIN: CPT | Performed by: PHYSICIAN ASSISTANT

## 2023-01-25 PROCEDURE — 80053 COMPREHEN METABOLIC PANEL: CPT | Performed by: PHYSICIAN ASSISTANT

## 2023-01-25 PROCEDURE — 36415 COLL VENOUS BLD VENIPUNCTURE: CPT

## 2023-01-25 RX ORDER — ACETAMINOPHEN 500 MG
1000 TABLET ORAL ONCE
Status: COMPLETED | OUTPATIENT
Start: 2023-01-25 | End: 2023-01-25

## 2023-01-25 RX ADMIN — CEFEPIME 2 G: 2 INJECTION, POWDER, FOR SOLUTION INTRAVENOUS at 21:06

## 2023-01-25 RX ADMIN — VANCOMYCIN HYDROCHLORIDE 2000 MG: 10 INJECTION, POWDER, LYOPHILIZED, FOR SOLUTION INTRAVENOUS at 23:24

## 2023-01-25 RX ADMIN — ACETAMINOPHEN 1000 MG: 500 TABLET ORAL at 20:57

## 2023-01-26 ENCOUNTER — APPOINTMENT (OUTPATIENT)
Dept: CT IMAGING | Facility: HOSPITAL | Age: 58
DRG: 872 | End: 2023-01-26
Payer: MEDICARE

## 2023-01-26 PROBLEM — A41.9 SEPSIS: Status: ACTIVE | Noted: 2023-01-26

## 2023-01-26 LAB
ALBUMIN SERPL-MCNC: 2.5 G/DL (ref 3.5–5.2)
ALBUMIN/GLOB SERPL: 0.6 G/DL
ALP SERPL-CCNC: 88 U/L (ref 39–117)
ALT SERPL W P-5'-P-CCNC: 34 U/L (ref 1–41)
ANION GAP SERPL CALCULATED.3IONS-SCNC: 9 MMOL/L (ref 5–15)
AST SERPL-CCNC: 106 U/L (ref 1–40)
BACTERIA UR QL AUTO: ABNORMAL /HPF
BASOPHILS # BLD AUTO: 0 10*3/MM3 (ref 0–0.2)
BASOPHILS NFR BLD AUTO: 0.6 % (ref 0–1.5)
BILIRUB SERPL-MCNC: 2.1 MG/DL (ref 0–1.2)
BILIRUB UR QL STRIP: ABNORMAL
BUN SERPL-MCNC: 27 MG/DL (ref 6–20)
BUN/CREAT SERPL: 27.6 (ref 7–25)
CALCIUM SPEC-SCNC: 7.8 MG/DL (ref 8.6–10.5)
CHLORIDE SERPL-SCNC: 105 MMOL/L (ref 98–107)
CLARITY UR: CLEAR
CO2 SERPL-SCNC: 21 MMOL/L (ref 22–29)
COLOR UR: ABNORMAL
CREAT SERPL-MCNC: 0.98 MG/DL (ref 0.76–1.27)
D DIMER PPP FEU-MCNC: 4.06 MG/L (FEU) (ref 0–0.58)
D-LACTATE SERPL-SCNC: 2 MMOL/L (ref 0.5–2)
D-LACTATE SERPL-SCNC: 4 MMOL/L (ref 0.5–2)
DEPRECATED RDW RBC AUTO: 55.6 FL (ref 37–54)
EGFRCR SERPLBLD CKD-EPI 2021: 89.4 ML/MIN/1.73
EOSINOPHIL # BLD AUTO: 0 10*3/MM3 (ref 0–0.4)
EOSINOPHIL NFR BLD AUTO: 0 % (ref 0.3–6.2)
ERYTHROCYTE [DISTWIDTH] IN BLOOD BY AUTOMATED COUNT: 15.4 % (ref 12.3–15.4)
FOLATE SERPL-MCNC: 7.38 NG/ML (ref 4.78–24.2)
GLOBULIN UR ELPH-MCNC: 4 GM/DL
GLUCOSE BLDC GLUCOMTR-MCNC: 109 MG/DL (ref 70–105)
GLUCOSE SERPL-MCNC: 97 MG/DL (ref 65–99)
GLUCOSE UR STRIP-MCNC: NEGATIVE MG/DL
HCT VFR BLD AUTO: 34.2 % (ref 37.5–51)
HGB BLD-MCNC: 11.3 G/DL (ref 13–17.7)
HGB UR QL STRIP.AUTO: ABNORMAL
HYALINE CASTS UR QL AUTO: ABNORMAL /LPF
KETONES UR QL STRIP: ABNORMAL
LEUKOCYTE ESTERASE UR QL STRIP.AUTO: ABNORMAL
LYMPHOCYTES # BLD AUTO: 0.3 10*3/MM3 (ref 0.7–3.1)
LYMPHOCYTES NFR BLD AUTO: 5.8 % (ref 19.6–45.3)
MCH RBC QN AUTO: 34.6 PG (ref 26.6–33)
MCHC RBC AUTO-ENTMCNC: 33.2 G/DL (ref 31.5–35.7)
MCV RBC AUTO: 104.3 FL (ref 79–97)
MONOCYTES # BLD AUTO: 0.2 10*3/MM3 (ref 0.1–0.9)
MONOCYTES NFR BLD AUTO: 3.8 % (ref 5–12)
NEUTROPHILS NFR BLD AUTO: 5.2 10*3/MM3 (ref 1.7–7)
NEUTROPHILS NFR BLD AUTO: 89.8 % (ref 42.7–76)
NITRITE UR QL STRIP: POSITIVE
NRBC BLD AUTO-RTO: 0 /100 WBC (ref 0–0.2)
PH UR STRIP.AUTO: <=5 [PH] (ref 5–8)
PLATELET # BLD AUTO: 66 10*3/MM3 (ref 140–450)
PMV BLD AUTO: 8.6 FL (ref 6–12)
POTASSIUM SERPL-SCNC: 4.1 MMOL/L (ref 3.5–5.2)
PROT SERPL-MCNC: 6.5 G/DL (ref 6–8.5)
PROT UR QL STRIP: ABNORMAL
RBC # BLD AUTO: 3.28 10*6/MM3 (ref 4.14–5.8)
RBC # UR STRIP: ABNORMAL /HPF
REF LAB TEST METHOD: ABNORMAL
SODIUM SERPL-SCNC: 135 MMOL/L (ref 136–145)
SP GR UR STRIP: 1.03 (ref 1–1.03)
SQUAMOUS #/AREA URNS HPF: ABNORMAL /HPF
TROPONIN T SERPL-MCNC: 0.01 NG/ML (ref 0–0.03)
TSH SERPL DL<=0.05 MIU/L-ACNC: 0.31 UIU/ML (ref 0.27–4.2)
UROBILINOGEN UR QL STRIP: ABNORMAL
VIT B12 BLD-MCNC: 485 PG/ML (ref 211–946)
WBC # UR STRIP: ABNORMAL /HPF
WBC NRBC COR # BLD: 5.7 10*3/MM3 (ref 3.4–10.8)

## 2023-01-26 PROCEDURE — 82962 GLUCOSE BLOOD TEST: CPT

## 2023-01-26 PROCEDURE — 84484 ASSAY OF TROPONIN QUANT: CPT | Performed by: HOSPITALIST

## 2023-01-26 PROCEDURE — 97162 PT EVAL MOD COMPLEX 30 MIN: CPT

## 2023-01-26 PROCEDURE — 94640 AIRWAY INHALATION TREATMENT: CPT

## 2023-01-26 PROCEDURE — 94761 N-INVAS EAR/PLS OXIMETRY MLT: CPT

## 2023-01-26 PROCEDURE — 83605 ASSAY OF LACTIC ACID: CPT | Performed by: HOSPITALIST

## 2023-01-26 PROCEDURE — 81001 URINALYSIS AUTO W/SCOPE: CPT | Performed by: HOSPITALIST

## 2023-01-26 PROCEDURE — 85025 COMPLETE CBC W/AUTO DIFF WBC: CPT | Performed by: HOSPITALIST

## 2023-01-26 PROCEDURE — 84443 ASSAY THYROID STIM HORMONE: CPT | Performed by: HOSPITALIST

## 2023-01-26 PROCEDURE — 80053 COMPREHEN METABOLIC PANEL: CPT | Performed by: HOSPITALIST

## 2023-01-26 PROCEDURE — 0 IOPAMIDOL PER 1 ML: Performed by: HOSPITALIST

## 2023-01-26 PROCEDURE — 82746 ASSAY OF FOLIC ACID SERUM: CPT | Performed by: HOSPITALIST

## 2023-01-26 PROCEDURE — 85379 FIBRIN DEGRADATION QUANT: CPT | Performed by: HOSPITALIST

## 2023-01-26 PROCEDURE — 82607 VITAMIN B-12: CPT | Performed by: HOSPITALIST

## 2023-01-26 PROCEDURE — 25010000002 ONDANSETRON PER 1 MG: Performed by: HOSPITALIST

## 2023-01-26 PROCEDURE — 25010000002 HEPARIN (PORCINE) PER 1000 UNITS: Performed by: HOSPITALIST

## 2023-01-26 PROCEDURE — 25010000002 CEFEPIME PER 500 MG: Performed by: HOSPITALIST

## 2023-01-26 PROCEDURE — 87086 URINE CULTURE/COLONY COUNT: CPT | Performed by: HOSPITALIST

## 2023-01-26 PROCEDURE — 71275 CT ANGIOGRAPHY CHEST: CPT

## 2023-01-26 PROCEDURE — 83605 ASSAY OF LACTIC ACID: CPT

## 2023-01-26 PROCEDURE — 94799 UNLISTED PULMONARY SVC/PX: CPT

## 2023-01-26 RX ORDER — SODIUM CHLORIDE 0.9 % (FLUSH) 0.9 %
10 SYRINGE (ML) INJECTION EVERY 12 HOURS SCHEDULED
Status: DISCONTINUED | OUTPATIENT
Start: 2023-01-26 | End: 2023-01-30 | Stop reason: HOSPADM

## 2023-01-26 RX ORDER — TAMSULOSIN HYDROCHLORIDE 0.4 MG/1
0.4 CAPSULE ORAL ONCE
Status: COMPLETED | OUTPATIENT
Start: 2023-01-26 | End: 2023-01-26

## 2023-01-26 RX ORDER — IPRATROPIUM BROMIDE AND ALBUTEROL SULFATE 2.5; .5 MG/3ML; MG/3ML
3 SOLUTION RESPIRATORY (INHALATION)
Status: DISCONTINUED | OUTPATIENT
Start: 2023-01-26 | End: 2023-01-30 | Stop reason: HOSPADM

## 2023-01-26 RX ORDER — FAMOTIDINE 40 MG/1
40 TABLET, FILM COATED ORAL DAILY
COMMUNITY
End: 2023-03-08 | Stop reason: ALTCHOICE

## 2023-01-26 RX ORDER — SODIUM CHLORIDE 0.9 % (FLUSH) 0.9 %
10 SYRINGE (ML) INJECTION AS NEEDED
Status: DISCONTINUED | OUTPATIENT
Start: 2023-01-26 | End: 2023-01-30 | Stop reason: HOSPADM

## 2023-01-26 RX ORDER — ONDANSETRON 2 MG/ML
4 INJECTION INTRAMUSCULAR; INTRAVENOUS EVERY 6 HOURS PRN
Status: DISCONTINUED | OUTPATIENT
Start: 2023-01-26 | End: 2023-01-30 | Stop reason: HOSPADM

## 2023-01-26 RX ORDER — SODIUM CHLORIDE 9 MG/ML
100 INJECTION, SOLUTION INTRAVENOUS CONTINUOUS
Status: DISCONTINUED | OUTPATIENT
Start: 2023-01-26 | End: 2023-01-26

## 2023-01-26 RX ORDER — HEPARIN SODIUM 5000 [USP'U]/ML
5000 INJECTION, SOLUTION INTRAVENOUS; SUBCUTANEOUS EVERY 8 HOURS SCHEDULED
Status: DISCONTINUED | OUTPATIENT
Start: 2023-01-26 | End: 2023-01-30 | Stop reason: HOSPADM

## 2023-01-26 RX ORDER — TAMSULOSIN HYDROCHLORIDE 0.4 MG/1
0.4 CAPSULE ORAL DAILY
Status: DISCONTINUED | OUTPATIENT
Start: 2023-01-26 | End: 2023-01-30 | Stop reason: HOSPADM

## 2023-01-26 RX ORDER — SODIUM CHLORIDE 9 MG/ML
40 INJECTION, SOLUTION INTRAVENOUS AS NEEDED
Status: DISCONTINUED | OUTPATIENT
Start: 2023-01-26 | End: 2023-01-30 | Stop reason: HOSPADM

## 2023-01-26 RX ORDER — FUROSEMIDE 40 MG/1
40 TABLET ORAL DAILY
COMMUNITY
End: 2023-01-26

## 2023-01-26 RX ORDER — ACETAMINOPHEN 325 MG/1
650 TABLET ORAL EVERY 4 HOURS PRN
Status: DISCONTINUED | OUTPATIENT
Start: 2023-01-26 | End: 2023-01-30 | Stop reason: HOSPADM

## 2023-01-26 RX ORDER — FAMOTIDINE 20 MG/1
40 TABLET, FILM COATED ORAL DAILY
Status: DISCONTINUED | OUTPATIENT
Start: 2023-01-26 | End: 2023-01-30 | Stop reason: HOSPADM

## 2023-01-26 RX ORDER — TAMSULOSIN HYDROCHLORIDE 0.4 MG/1
1 CAPSULE ORAL DAILY
COMMUNITY

## 2023-01-26 RX ADMIN — CEFEPIME 2 G: 2 INJECTION, POWDER, FOR SOLUTION INTRAVENOUS at 21:48

## 2023-01-26 RX ADMIN — ACETAMINOPHEN 650 MG: 325 TABLET, FILM COATED ORAL at 15:42

## 2023-01-26 RX ADMIN — HEPARIN SODIUM 5000 UNITS: 5000 INJECTION INTRAVENOUS; SUBCUTANEOUS at 05:21

## 2023-01-26 RX ADMIN — IPRATROPIUM BROMIDE AND ALBUTEROL SULFATE 3 ML: 2.5; .5 SOLUTION RESPIRATORY (INHALATION) at 16:15

## 2023-01-26 RX ADMIN — HEPARIN SODIUM 5000 UNITS: 5000 INJECTION INTRAVENOUS; SUBCUTANEOUS at 21:48

## 2023-01-26 RX ADMIN — SODIUM CHLORIDE 1000 ML: 9 INJECTION, SOLUTION INTRAVENOUS at 05:40

## 2023-01-26 RX ADMIN — ONDANSETRON 4 MG: 2 INJECTION INTRAMUSCULAR; INTRAVENOUS at 09:02

## 2023-01-26 RX ADMIN — FAMOTIDINE 40 MG: 20 TABLET, FILM COATED ORAL at 17:54

## 2023-01-26 RX ADMIN — CEFEPIME 2 G: 2 INJECTION, POWDER, FOR SOLUTION INTRAVENOUS at 06:05

## 2023-01-26 RX ADMIN — IOPAMIDOL 100 ML: 755 INJECTION, SOLUTION INTRAVENOUS at 08:25

## 2023-01-26 RX ADMIN — ACETAMINOPHEN 650 MG: 325 TABLET, FILM COATED ORAL at 05:21

## 2023-01-26 RX ADMIN — CEFEPIME 2 G: 2 INJECTION, POWDER, FOR SOLUTION INTRAVENOUS at 13:48

## 2023-01-26 RX ADMIN — SODIUM CHLORIDE 1000 ML: 9 INJECTION, SOLUTION INTRAVENOUS at 08:02

## 2023-01-26 RX ADMIN — Medication 10 ML: at 21:01

## 2023-01-26 RX ADMIN — HEPARIN SODIUM 5000 UNITS: 5000 INJECTION INTRAVENOUS; SUBCUTANEOUS at 13:48

## 2023-01-26 RX ADMIN — TAMSULOSIN HYDROCHLORIDE 0.4 MG: 0.4 CAPSULE ORAL at 05:21

## 2023-01-27 LAB
ALBUMIN SERPL-MCNC: 2.7 G/DL (ref 3.5–5.2)
ALBUMIN/GLOB SERPL: 0.6 G/DL
ALP SERPL-CCNC: 104 U/L (ref 39–117)
ALT SERPL W P-5'-P-CCNC: 59 U/L (ref 1–41)
ANION GAP SERPL CALCULATED.3IONS-SCNC: 7 MMOL/L (ref 5–15)
AST SERPL-CCNC: 171 U/L (ref 1–40)
BACTERIA SPEC AEROBE CULT: NO GROWTH
BASOPHILS # BLD AUTO: 0 10*3/MM3 (ref 0–0.2)
BASOPHILS NFR BLD AUTO: 0.3 % (ref 0–1.5)
BILIRUB SERPL-MCNC: 0.9 MG/DL (ref 0–1.2)
BUN SERPL-MCNC: 21 MG/DL (ref 6–20)
BUN/CREAT SERPL: 26.3 (ref 7–25)
CALCIUM SPEC-SCNC: 8.4 MG/DL (ref 8.6–10.5)
CHLORIDE SERPL-SCNC: 105 MMOL/L (ref 98–107)
CO2 SERPL-SCNC: 23 MMOL/L (ref 22–29)
CREAT SERPL-MCNC: 0.8 MG/DL (ref 0.76–1.27)
DEPRECATED RDW RBC AUTO: 53.8 FL (ref 37–54)
EGFRCR SERPLBLD CKD-EPI 2021: 102.6 ML/MIN/1.73
EOSINOPHIL # BLD AUTO: 0.1 10*3/MM3 (ref 0–0.4)
EOSINOPHIL NFR BLD AUTO: 2.3 % (ref 0.3–6.2)
ERYTHROCYTE [DISTWIDTH] IN BLOOD BY AUTOMATED COUNT: 14.9 % (ref 12.3–15.4)
GLOBULIN UR ELPH-MCNC: 4.3 GM/DL
GLUCOSE SERPL-MCNC: 126 MG/DL (ref 65–99)
HCT VFR BLD AUTO: 37.2 % (ref 37.5–51)
HGB BLD-MCNC: 12.2 G/DL (ref 13–17.7)
LYMPHOCYTES # BLD AUTO: 0.5 10*3/MM3 (ref 0.7–3.1)
LYMPHOCYTES NFR BLD AUTO: 15.9 % (ref 19.6–45.3)
MAGNESIUM SERPL-MCNC: 2 MG/DL (ref 1.6–2.6)
MCH RBC QN AUTO: 34.1 PG (ref 26.6–33)
MCHC RBC AUTO-ENTMCNC: 32.8 G/DL (ref 31.5–35.7)
MCV RBC AUTO: 104 FL (ref 79–97)
MONOCYTES # BLD AUTO: 0.3 10*3/MM3 (ref 0.1–0.9)
MONOCYTES NFR BLD AUTO: 10.8 % (ref 5–12)
NEUTROPHILS NFR BLD AUTO: 2.1 10*3/MM3 (ref 1.7–7)
NEUTROPHILS NFR BLD AUTO: 70.7 % (ref 42.7–76)
NRBC BLD AUTO-RTO: 0 /100 WBC (ref 0–0.2)
PHOSPHATE SERPL-MCNC: 2.5 MG/DL (ref 2.5–4.5)
PLATELET # BLD AUTO: 65 10*3/MM3 (ref 140–450)
PMV BLD AUTO: 9 FL (ref 6–12)
POTASSIUM SERPL-SCNC: 4.4 MMOL/L (ref 3.5–5.2)
PROT SERPL-MCNC: 7 G/DL (ref 6–8.5)
QT INTERVAL: 341 MS
RBC # BLD AUTO: 3.57 10*6/MM3 (ref 4.14–5.8)
SODIUM SERPL-SCNC: 135 MMOL/L (ref 136–145)
WBC NRBC COR # BLD: 3 10*3/MM3 (ref 3.4–10.8)

## 2023-01-27 PROCEDURE — 85025 COMPLETE CBC W/AUTO DIFF WBC: CPT | Performed by: HOSPITALIST

## 2023-01-27 PROCEDURE — 97530 THERAPEUTIC ACTIVITIES: CPT

## 2023-01-27 PROCEDURE — 25010000002 ONDANSETRON PER 1 MG: Performed by: HOSPITALIST

## 2023-01-27 PROCEDURE — 97166 OT EVAL MOD COMPLEX 45 MIN: CPT

## 2023-01-27 PROCEDURE — 80053 COMPREHEN METABOLIC PANEL: CPT | Performed by: HOSPITALIST

## 2023-01-27 PROCEDURE — 25010000002 PIPERACILLIN SOD-TAZOBACTAM PER 1 G: Performed by: HOSPITALIST

## 2023-01-27 PROCEDURE — 25010000002 CEFEPIME PER 500 MG: Performed by: HOSPITALIST

## 2023-01-27 PROCEDURE — 94664 DEMO&/EVAL PT USE INHALER: CPT

## 2023-01-27 PROCEDURE — 83735 ASSAY OF MAGNESIUM: CPT | Performed by: HOSPITALIST

## 2023-01-27 PROCEDURE — 25010000002 HEPARIN (PORCINE) PER 1000 UNITS: Performed by: HOSPITALIST

## 2023-01-27 PROCEDURE — 94761 N-INVAS EAR/PLS OXIMETRY MLT: CPT

## 2023-01-27 PROCEDURE — 84100 ASSAY OF PHOSPHORUS: CPT | Performed by: HOSPITALIST

## 2023-01-27 PROCEDURE — 94799 UNLISTED PULMONARY SVC/PX: CPT

## 2023-01-27 RX ORDER — HYDROCODONE BITARTRATE AND ACETAMINOPHEN 5; 325 MG/1; MG/1
1 TABLET ORAL EVERY 6 HOURS PRN
Status: DISCONTINUED | OUTPATIENT
Start: 2023-01-27 | End: 2023-01-30 | Stop reason: HOSPADM

## 2023-01-27 RX ORDER — PHENAZOPYRIDINE HYDROCHLORIDE 200 MG/1
200 TABLET, FILM COATED ORAL ONCE
Status: COMPLETED | OUTPATIENT
Start: 2023-01-27 | End: 2023-01-27

## 2023-01-27 RX ORDER — MORPHINE SULFATE 2 MG/ML
2 INJECTION, SOLUTION INTRAMUSCULAR; INTRAVENOUS EVERY 4 HOURS PRN
Status: DISCONTINUED | OUTPATIENT
Start: 2023-01-27 | End: 2023-01-27

## 2023-01-27 RX ADMIN — HYDROCODONE BITARTRATE AND ACETAMINOPHEN 1 TABLET: 5; 325 TABLET ORAL at 10:49

## 2023-01-27 RX ADMIN — IPRATROPIUM BROMIDE AND ALBUTEROL SULFATE 3 ML: 2.5; .5 SOLUTION RESPIRATORY (INHALATION) at 16:03

## 2023-01-27 RX ADMIN — IPRATROPIUM BROMIDE AND ALBUTEROL SULFATE 3 ML: 2.5; .5 SOLUTION RESPIRATORY (INHALATION) at 07:55

## 2023-01-27 RX ADMIN — Medication 10 ML: at 21:38

## 2023-01-27 RX ADMIN — Medication 10 ML: at 09:32

## 2023-01-27 RX ADMIN — FAMOTIDINE 40 MG: 20 TABLET, FILM COATED ORAL at 09:32

## 2023-01-27 RX ADMIN — CEFEPIME 2 G: 2 INJECTION, POWDER, FOR SOLUTION INTRAVENOUS at 14:21

## 2023-01-27 RX ADMIN — TAMSULOSIN HYDROCHLORIDE 0.4 MG: 0.4 CAPSULE ORAL at 09:32

## 2023-01-27 RX ADMIN — HEPARIN SODIUM 5000 UNITS: 5000 INJECTION INTRAVENOUS; SUBCUTANEOUS at 14:21

## 2023-01-27 RX ADMIN — PHENAZOPYRIDINE HYDROCHLORIDE 200 MG: 200 TABLET ORAL at 11:56

## 2023-01-27 RX ADMIN — HYDROCODONE BITARTRATE AND ACETAMINOPHEN 1 TABLET: 5; 325 TABLET ORAL at 21:39

## 2023-01-27 RX ADMIN — CEFEPIME 2 G: 2 INJECTION, POWDER, FOR SOLUTION INTRAVENOUS at 05:31

## 2023-01-27 RX ADMIN — IPRATROPIUM BROMIDE AND ALBUTEROL SULFATE 3 ML: 2.5; .5 SOLUTION RESPIRATORY (INHALATION) at 19:11

## 2023-01-27 RX ADMIN — IPRATROPIUM BROMIDE AND ALBUTEROL SULFATE 3 ML: 2.5; .5 SOLUTION RESPIRATORY (INHALATION) at 11:39

## 2023-01-27 RX ADMIN — ONDANSETRON 4 MG: 2 INJECTION INTRAMUSCULAR; INTRAVENOUS at 21:39

## 2023-01-27 RX ADMIN — HEPARIN SODIUM 5000 UNITS: 5000 INJECTION INTRAVENOUS; SUBCUTANEOUS at 21:39

## 2023-01-27 RX ADMIN — PIPERACILLIN AND TAZOBACTAM 4.5 G: 4; .5 INJECTION, POWDER, FOR SOLUTION INTRAVENOUS at 21:38

## 2023-01-27 RX ADMIN — HEPARIN SODIUM 5000 UNITS: 5000 INJECTION INTRAVENOUS; SUBCUTANEOUS at 05:31

## 2023-01-28 ENCOUNTER — APPOINTMENT (OUTPATIENT)
Dept: NUCLEAR MEDICINE | Facility: HOSPITAL | Age: 58
DRG: 872 | End: 2023-01-28
Payer: MEDICARE

## 2023-01-28 ENCOUNTER — APPOINTMENT (OUTPATIENT)
Dept: CARDIOLOGY | Facility: HOSPITAL | Age: 58
DRG: 872 | End: 2023-01-28
Payer: MEDICARE

## 2023-01-28 LAB
ALBUMIN SERPL-MCNC: 2.4 G/DL (ref 3.5–5.2)
ALBUMIN/GLOB SERPL: 0.5 G/DL
ALP SERPL-CCNC: 97 U/L (ref 39–117)
ALT SERPL W P-5'-P-CCNC: 53 U/L (ref 1–41)
ANION GAP SERPL CALCULATED.3IONS-SCNC: 6 MMOL/L (ref 5–15)
AST SERPL-CCNC: 149 U/L (ref 1–40)
BACTERIA SPEC AEROBE CULT: ABNORMAL
BACTERIA SPEC AEROBE CULT: ABNORMAL
BASOPHILS # BLD MANUAL: 0.08 10*3/MM3 (ref 0–0.2)
BASOPHILS NFR BLD MANUAL: 3 % (ref 0–1.5)
BH CV LOWER VASCULAR LEFT COMMON FEMORAL AUGMENT: NORMAL
BH CV LOWER VASCULAR LEFT COMMON FEMORAL COMPETENT: NORMAL
BH CV LOWER VASCULAR LEFT COMMON FEMORAL COMPRESS: NORMAL
BH CV LOWER VASCULAR LEFT COMMON FEMORAL PHASIC: NORMAL
BH CV LOWER VASCULAR LEFT COMMON FEMORAL SPONT: NORMAL
BH CV LOWER VASCULAR LEFT DISTAL FEMORAL COMPRESS: NORMAL
BH CV LOWER VASCULAR LEFT GASTRONEMIUS COMPRESS: NORMAL
BH CV LOWER VASCULAR LEFT GREATER SAPH AK COMPRESS: NORMAL
BH CV LOWER VASCULAR LEFT GREATER SAPH BK COMPRESS: NORMAL
BH CV LOWER VASCULAR LEFT LESSER SAPH COMPRESS: NORMAL
BH CV LOWER VASCULAR LEFT MID FEMORAL AUGMENT: NORMAL
BH CV LOWER VASCULAR LEFT MID FEMORAL COMPETENT: NORMAL
BH CV LOWER VASCULAR LEFT MID FEMORAL COMPRESS: NORMAL
BH CV LOWER VASCULAR LEFT MID FEMORAL PHASIC: NORMAL
BH CV LOWER VASCULAR LEFT MID FEMORAL SPONT: NORMAL
BH CV LOWER VASCULAR LEFT POPLITEAL AUGMENT: NORMAL
BH CV LOWER VASCULAR LEFT POPLITEAL COMPETENT: NORMAL
BH CV LOWER VASCULAR LEFT POPLITEAL COMPRESS: NORMAL
BH CV LOWER VASCULAR LEFT POPLITEAL PHASIC: NORMAL
BH CV LOWER VASCULAR LEFT POPLITEAL SPONT: NORMAL
BH CV LOWER VASCULAR LEFT PROXIMAL FEMORAL COMPRESS: NORMAL
BH CV LOWER VASCULAR LEFT SAPHENOFEMORAL JUNCTION COMPRESS: NORMAL
BH CV LOWER VASCULAR RIGHT COMMON FEMORAL AUGMENT: NORMAL
BH CV LOWER VASCULAR RIGHT COMMON FEMORAL COMPETENT: NORMAL
BH CV LOWER VASCULAR RIGHT COMMON FEMORAL COMPRESS: NORMAL
BH CV LOWER VASCULAR RIGHT COMMON FEMORAL PHASIC: NORMAL
BH CV LOWER VASCULAR RIGHT COMMON FEMORAL SPONT: NORMAL
BILIRUB SERPL-MCNC: 0.8 MG/DL (ref 0–1.2)
BUN SERPL-MCNC: 19 MG/DL (ref 6–20)
BUN/CREAT SERPL: 24.1 (ref 7–25)
CALCIUM SPEC-SCNC: 7.8 MG/DL (ref 8.6–10.5)
CHLORIDE SERPL-SCNC: 104 MMOL/L (ref 98–107)
CO2 SERPL-SCNC: 24 MMOL/L (ref 22–29)
CREAT SERPL-MCNC: 0.79 MG/DL (ref 0.76–1.27)
DEPRECATED RDW RBC AUTO: 53.8 FL (ref 37–54)
EGFRCR SERPLBLD CKD-EPI 2021: 103 ML/MIN/1.73
EOSINOPHIL # BLD MANUAL: 0.18 10*3/MM3 (ref 0–0.4)
EOSINOPHIL NFR BLD MANUAL: 7 % (ref 0.3–6.2)
ERYTHROCYTE [DISTWIDTH] IN BLOOD BY AUTOMATED COUNT: 14.9 % (ref 12.3–15.4)
GLOBULIN UR ELPH-MCNC: 4.4 GM/DL
GLUCOSE SERPL-MCNC: 89 MG/DL (ref 65–99)
GRAM STN SPEC: ABNORMAL
GRAM STN SPEC: ABNORMAL
HCT VFR BLD AUTO: 34.7 % (ref 37.5–51)
HGB BLD-MCNC: 11.3 G/DL (ref 13–17.7)
LYMPHOCYTES # BLD MANUAL: 0.33 10*3/MM3 (ref 0.7–3.1)
LYMPHOCYTES NFR BLD MANUAL: 10 % (ref 5–12)
MAGNESIUM SERPL-MCNC: 2 MG/DL (ref 1.6–2.6)
MAXIMAL PREDICTED HEART RATE: 162 BPM
MCH RBC QN AUTO: 34 PG (ref 26.6–33)
MCHC RBC AUTO-ENTMCNC: 32.6 G/DL (ref 31.5–35.7)
MCV RBC AUTO: 104.4 FL (ref 79–97)
MONOCYTES # BLD: 0.25 10*3/MM3 (ref 0.1–0.9)
NEUTROPHILS # BLD AUTO: 1.68 10*3/MM3 (ref 1.7–7)
NEUTROPHILS NFR BLD MANUAL: 67 % (ref 42.7–76)
NRBC SPEC MANUAL: 1 /100 WBC (ref 0–0.2)
PHOSPHATE SERPL-MCNC: 3.1 MG/DL (ref 2.5–4.5)
PLATELET # BLD AUTO: 63 10*3/MM3 (ref 140–450)
PMV BLD AUTO: 9.1 FL (ref 6–12)
POIKILOCYTOSIS BLD QL SMEAR: ABNORMAL
POTASSIUM SERPL-SCNC: 4.4 MMOL/L (ref 3.5–5.2)
PROT SERPL-MCNC: 6.8 G/DL (ref 6–8.5)
RBC # BLD AUTO: 3.32 10*6/MM3 (ref 4.14–5.8)
SCAN SLIDE: NORMAL
SMALL PLATELETS BLD QL SMEAR: ABNORMAL
SODIUM SERPL-SCNC: 134 MMOL/L (ref 136–145)
STRESS TARGET HR: 138 BPM
VARIANT LYMPHS NFR BLD MANUAL: 11 % (ref 19.6–45.3)
VARIANT LYMPHS NFR BLD MANUAL: 2 % (ref 0–5)
WBC MORPH BLD: NORMAL
WBC NRBC COR # BLD: 2.5 10*3/MM3 (ref 3.4–10.8)

## 2023-01-28 PROCEDURE — A9537 TC99M MEBROFENIN: HCPCS | Performed by: HOSPITALIST

## 2023-01-28 PROCEDURE — 78227 HEPATOBIL SYST IMAGE W/DRUG: CPT

## 2023-01-28 PROCEDURE — 25010000002 PIPERACILLIN SOD-TAZOBACTAM PER 1 G: Performed by: HOSPITALIST

## 2023-01-28 PROCEDURE — 83735 ASSAY OF MAGNESIUM: CPT | Performed by: HOSPITALIST

## 2023-01-28 PROCEDURE — 25010000002 FUROSEMIDE PER 20 MG: Performed by: HOSPITALIST

## 2023-01-28 PROCEDURE — 94799 UNLISTED PULMONARY SVC/PX: CPT

## 2023-01-28 PROCEDURE — 94664 DEMO&/EVAL PT USE INHALER: CPT

## 2023-01-28 PROCEDURE — 84100 ASSAY OF PHOSPHORUS: CPT | Performed by: HOSPITALIST

## 2023-01-28 PROCEDURE — 80053 COMPREHEN METABOLIC PANEL: CPT | Performed by: HOSPITALIST

## 2023-01-28 PROCEDURE — 0 TECHNETIUM TC 99M MEBROFENIN KIT: Performed by: HOSPITALIST

## 2023-01-28 PROCEDURE — 85025 COMPLETE CBC W/AUTO DIFF WBC: CPT | Performed by: HOSPITALIST

## 2023-01-28 PROCEDURE — 85007 BL SMEAR W/DIFF WBC COUNT: CPT | Performed by: HOSPITALIST

## 2023-01-28 PROCEDURE — 25010000002 HEPARIN (PORCINE) PER 1000 UNITS: Performed by: HOSPITALIST

## 2023-01-28 PROCEDURE — 93971 EXTREMITY STUDY: CPT

## 2023-01-28 PROCEDURE — 94761 N-INVAS EAR/PLS OXIMETRY MLT: CPT

## 2023-01-28 RX ORDER — FUROSEMIDE 10 MG/ML
40 INJECTION INTRAMUSCULAR; INTRAVENOUS ONCE
Status: COMPLETED | OUTPATIENT
Start: 2023-01-28 | End: 2023-01-28

## 2023-01-28 RX ORDER — SPIRONOLACTONE 25 MG/1
25 TABLET ORAL DAILY
Status: DISCONTINUED | OUTPATIENT
Start: 2023-01-28 | End: 2023-01-29

## 2023-01-28 RX ORDER — CEFDINIR 300 MG/1
300 CAPSULE ORAL EVERY 12 HOURS SCHEDULED
Status: DISCONTINUED | OUTPATIENT
Start: 2023-01-28 | End: 2023-01-30 | Stop reason: HOSPADM

## 2023-01-28 RX ORDER — KIT FOR THE PREPARATION OF TECHNETIUM TC 99M MEBROFENIN 45 MG/10ML
1 INJECTION, POWDER, LYOPHILIZED, FOR SOLUTION INTRAVENOUS
Status: COMPLETED | OUTPATIENT
Start: 2023-01-28 | End: 2023-01-28

## 2023-01-28 RX ADMIN — FUROSEMIDE 40 MG: 10 INJECTION, SOLUTION INTRAMUSCULAR; INTRAVENOUS at 14:06

## 2023-01-28 RX ADMIN — SPIRONOLACTONE 25 MG: 25 TABLET ORAL at 15:56

## 2023-01-28 RX ADMIN — CEFDINIR 300 MG: 300 CAPSULE ORAL at 21:53

## 2023-01-28 RX ADMIN — MEBROFENIN 1 DOSE: 45 INJECTION, POWDER, LYOPHILIZED, FOR SOLUTION INTRAVENOUS at 07:45

## 2023-01-28 RX ADMIN — IPRATROPIUM BROMIDE AND ALBUTEROL SULFATE 3 ML: 2.5; .5 SOLUTION RESPIRATORY (INHALATION) at 06:30

## 2023-01-28 RX ADMIN — TAMSULOSIN HYDROCHLORIDE 0.4 MG: 0.4 CAPSULE ORAL at 10:20

## 2023-01-28 RX ADMIN — HEPARIN SODIUM 5000 UNITS: 5000 INJECTION INTRAVENOUS; SUBCUTANEOUS at 21:54

## 2023-01-28 RX ADMIN — Medication 10 ML: at 03:22

## 2023-01-28 RX ADMIN — Medication 10 ML: at 10:20

## 2023-01-28 RX ADMIN — HEPARIN SODIUM 5000 UNITS: 5000 INJECTION INTRAVENOUS; SUBCUTANEOUS at 05:50

## 2023-01-28 RX ADMIN — PIPERACILLIN AND TAZOBACTAM 4.5 G: 4; .5 INJECTION, POWDER, FOR SOLUTION INTRAVENOUS at 10:20

## 2023-01-28 RX ADMIN — HEPARIN SODIUM 5000 UNITS: 5000 INJECTION INTRAVENOUS; SUBCUTANEOUS at 14:06

## 2023-01-28 RX ADMIN — IPRATROPIUM BROMIDE AND ALBUTEROL SULFATE 3 ML: 2.5; .5 SOLUTION RESPIRATORY (INHALATION) at 11:10

## 2023-01-28 RX ADMIN — IPRATROPIUM BROMIDE AND ALBUTEROL SULFATE 3 ML: 2.5; .5 SOLUTION RESPIRATORY (INHALATION) at 19:20

## 2023-01-28 RX ADMIN — FAMOTIDINE 40 MG: 20 TABLET, FILM COATED ORAL at 10:20

## 2023-01-28 RX ADMIN — PIPERACILLIN AND TAZOBACTAM 4.5 G: 4; .5 INJECTION, POWDER, FOR SOLUTION INTRAVENOUS at 03:21

## 2023-01-28 RX ADMIN — HEPARIN SODIUM 5000 UNITS: 5000 INJECTION INTRAVENOUS; SUBCUTANEOUS at 20:18

## 2023-01-28 RX ADMIN — CEFDINIR 300 MG: 300 CAPSULE ORAL at 14:06

## 2023-01-28 RX ADMIN — Medication 10 ML: at 21:53

## 2023-01-29 LAB
ALBUMIN SERPL-MCNC: 2.4 G/DL (ref 3.5–5.2)
ALBUMIN/GLOB SERPL: 0.5 G/DL
ALP SERPL-CCNC: 115 U/L (ref 39–117)
ALT SERPL W P-5'-P-CCNC: 53 U/L (ref 1–41)
ANION GAP SERPL CALCULATED.3IONS-SCNC: 6 MMOL/L (ref 5–15)
ANISOCYTOSIS BLD QL: ABNORMAL
AST SERPL-CCNC: 139 U/L (ref 1–40)
BASOPHILS # BLD MANUAL: 0.06 10*3/MM3 (ref 0–0.2)
BASOPHILS NFR BLD MANUAL: 2 % (ref 0–1.5)
BILIRUB SERPL-MCNC: 0.9 MG/DL (ref 0–1.2)
BUN SERPL-MCNC: 15 MG/DL (ref 6–20)
BUN/CREAT SERPL: 19.2 (ref 7–25)
CALCIUM SPEC-SCNC: 8.4 MG/DL (ref 8.6–10.5)
CHLORIDE SERPL-SCNC: 103 MMOL/L (ref 98–107)
CO2 SERPL-SCNC: 27 MMOL/L (ref 22–29)
CREAT SERPL-MCNC: 0.78 MG/DL (ref 0.76–1.27)
DEPRECATED RDW RBC AUTO: 53.4 FL (ref 37–54)
EGFRCR SERPLBLD CKD-EPI 2021: 103.4 ML/MIN/1.73
EOSINOPHIL # BLD MANUAL: 0.06 10*3/MM3 (ref 0–0.4)
EOSINOPHIL NFR BLD MANUAL: 2 % (ref 0.3–6.2)
ERYTHROCYTE [DISTWIDTH] IN BLOOD BY AUTOMATED COUNT: 14.9 % (ref 12.3–15.4)
GLOBULIN UR ELPH-MCNC: 4.4 GM/DL
GLUCOSE SERPL-MCNC: 91 MG/DL (ref 65–99)
HCT VFR BLD AUTO: 37.2 % (ref 37.5–51)
HGB BLD-MCNC: 12.2 G/DL (ref 13–17.7)
LYMPHOCYTES # BLD MANUAL: 0.32 10*3/MM3 (ref 0.7–3.1)
LYMPHOCYTES NFR BLD MANUAL: 6 % (ref 5–12)
MACROCYTES BLD QL SMEAR: ABNORMAL
MAGNESIUM SERPL-MCNC: 1.9 MG/DL (ref 1.6–2.6)
MCH RBC QN AUTO: 33.9 PG (ref 26.6–33)
MCHC RBC AUTO-ENTMCNC: 32.6 G/DL (ref 31.5–35.7)
MCV RBC AUTO: 103.9 FL (ref 79–97)
MONOCYTES # BLD: 0.19 10*3/MM3 (ref 0.1–0.9)
NEUTROPHILS # BLD AUTO: 2.53 10*3/MM3 (ref 1.7–7)
NEUTROPHILS NFR BLD MANUAL: 77 % (ref 42.7–76)
NEUTS BAND NFR BLD MANUAL: 2 % (ref 0–5)
PATHOLOGY REVIEW: YES
PLATELET # BLD AUTO: 78 10*3/MM3 (ref 140–450)
PMV BLD AUTO: 9.5 FL (ref 6–12)
POTASSIUM SERPL-SCNC: 4.1 MMOL/L (ref 3.5–5.2)
PROMYELOCYTES NFR BLD MANUAL: 1 % (ref 0–0)
PROT SERPL-MCNC: 6.8 G/DL (ref 6–8.5)
RBC # BLD AUTO: 3.58 10*6/MM3 (ref 4.14–5.8)
SCAN SLIDE: NORMAL
SMALL PLATELETS BLD QL SMEAR: ABNORMAL
SODIUM SERPL-SCNC: 136 MMOL/L (ref 136–145)
VARIANT LYMPHS NFR BLD MANUAL: 10 % (ref 19.6–45.3)
WBC MORPH BLD: NORMAL
WBC NRBC COR # BLD: 3.2 10*3/MM3 (ref 3.4–10.8)

## 2023-01-29 PROCEDURE — 97530 THERAPEUTIC ACTIVITIES: CPT

## 2023-01-29 PROCEDURE — 97112 NEUROMUSCULAR REEDUCATION: CPT

## 2023-01-29 PROCEDURE — 25010000002 HEPARIN (PORCINE) PER 1000 UNITS: Performed by: HOSPITALIST

## 2023-01-29 PROCEDURE — 94799 UNLISTED PULMONARY SVC/PX: CPT

## 2023-01-29 PROCEDURE — 94664 DEMO&/EVAL PT USE INHALER: CPT

## 2023-01-29 PROCEDURE — 80053 COMPREHEN METABOLIC PANEL: CPT | Performed by: HOSPITALIST

## 2023-01-29 PROCEDURE — 85007 BL SMEAR W/DIFF WBC COUNT: CPT | Performed by: HOSPITALIST

## 2023-01-29 PROCEDURE — 94761 N-INVAS EAR/PLS OXIMETRY MLT: CPT

## 2023-01-29 PROCEDURE — 85025 COMPLETE CBC W/AUTO DIFF WBC: CPT | Performed by: HOSPITALIST

## 2023-01-29 PROCEDURE — 83735 ASSAY OF MAGNESIUM: CPT | Performed by: HOSPITALIST

## 2023-01-29 PROCEDURE — 97110 THERAPEUTIC EXERCISES: CPT

## 2023-01-29 PROCEDURE — 25010000002 FUROSEMIDE PER 20 MG: Performed by: HOSPITALIST

## 2023-01-29 RX ORDER — FUROSEMIDE 10 MG/ML
40 INJECTION INTRAMUSCULAR; INTRAVENOUS ONCE
Status: COMPLETED | OUTPATIENT
Start: 2023-01-29 | End: 2023-01-29

## 2023-01-29 RX ORDER — SPIRONOLACTONE 25 MG/1
25 TABLET ORAL
Status: DISCONTINUED | OUTPATIENT
Start: 2023-01-29 | End: 2023-01-30 | Stop reason: HOSPADM

## 2023-01-29 RX ADMIN — SPIRONOLACTONE 25 MG: 25 TABLET ORAL at 09:36

## 2023-01-29 RX ADMIN — Medication 10 ML: at 09:36

## 2023-01-29 RX ADMIN — HEPARIN SODIUM 5000 UNITS: 5000 INJECTION INTRAVENOUS; SUBCUTANEOUS at 21:02

## 2023-01-29 RX ADMIN — HYDROCODONE BITARTRATE AND ACETAMINOPHEN 1 TABLET: 5; 325 TABLET ORAL at 09:36

## 2023-01-29 RX ADMIN — FAMOTIDINE 40 MG: 20 TABLET, FILM COATED ORAL at 09:36

## 2023-01-29 RX ADMIN — SPIRONOLACTONE 25 MG: 25 TABLET ORAL at 17:03

## 2023-01-29 RX ADMIN — IPRATROPIUM BROMIDE AND ALBUTEROL SULFATE 3 ML: 2.5; .5 SOLUTION RESPIRATORY (INHALATION) at 07:10

## 2023-01-29 RX ADMIN — HEPARIN SODIUM 5000 UNITS: 5000 INJECTION INTRAVENOUS; SUBCUTANEOUS at 05:06

## 2023-01-29 RX ADMIN — IPRATROPIUM BROMIDE AND ALBUTEROL SULFATE 3 ML: 2.5; .5 SOLUTION RESPIRATORY (INHALATION) at 19:57

## 2023-01-29 RX ADMIN — IPRATROPIUM BROMIDE AND ALBUTEROL SULFATE 3 ML: 2.5; .5 SOLUTION RESPIRATORY (INHALATION) at 10:53

## 2023-01-29 RX ADMIN — IPRATROPIUM BROMIDE AND ALBUTEROL SULFATE 3 ML: 2.5; .5 SOLUTION RESPIRATORY (INHALATION) at 14:49

## 2023-01-29 RX ADMIN — CEFDINIR 300 MG: 300 CAPSULE ORAL at 09:36

## 2023-01-29 RX ADMIN — CEFDINIR 300 MG: 300 CAPSULE ORAL at 21:02

## 2023-01-29 RX ADMIN — HEPARIN SODIUM 5000 UNITS: 5000 INJECTION INTRAVENOUS; SUBCUTANEOUS at 15:39

## 2023-01-29 RX ADMIN — FUROSEMIDE 40 MG: 10 INJECTION, SOLUTION INTRAMUSCULAR; INTRAVENOUS at 15:39

## 2023-01-29 RX ADMIN — Medication 10 ML: at 21:01

## 2023-01-29 RX ADMIN — TAMSULOSIN HYDROCHLORIDE 0.4 MG: 0.4 CAPSULE ORAL at 09:36

## 2023-01-30 VITALS
DIASTOLIC BLOOD PRESSURE: 77 MMHG | HEART RATE: 67 BPM | RESPIRATION RATE: 24 BRPM | BODY MASS INDEX: 41.75 KG/M2 | OXYGEN SATURATION: 96 % | TEMPERATURE: 98.4 F | HEIGHT: 73 IN | WEIGHT: 315 LBS | SYSTOLIC BLOOD PRESSURE: 126 MMHG

## 2023-01-30 LAB
ANION GAP SERPL CALCULATED.3IONS-SCNC: 6 MMOL/L (ref 5–15)
BACTERIA SPEC AEROBE CULT: NORMAL
BACTERIA SPEC AEROBE CULT: NORMAL
BUN SERPL-MCNC: 13 MG/DL (ref 6–20)
BUN/CREAT SERPL: 20.6 (ref 7–25)
CALCIUM SPEC-SCNC: 8.7 MG/DL (ref 8.6–10.5)
CHLORIDE SERPL-SCNC: 102 MMOL/L (ref 98–107)
CO2 SERPL-SCNC: 28 MMOL/L (ref 22–29)
CREAT SERPL-MCNC: 0.63 MG/DL (ref 0.76–1.27)
EGFRCR SERPLBLD CKD-EPI 2021: 110.3 ML/MIN/1.73
GLUCOSE SERPL-MCNC: 127 MG/DL (ref 65–99)
LAB AP CASE REPORT: NORMAL
MAGNESIUM SERPL-MCNC: 1.8 MG/DL (ref 1.6–2.6)
PATH REPORT.FINAL DX SPEC: NORMAL
POTASSIUM SERPL-SCNC: 4.6 MMOL/L (ref 3.5–5.2)
SODIUM SERPL-SCNC: 136 MMOL/L (ref 136–145)

## 2023-01-30 PROCEDURE — 97110 THERAPEUTIC EXERCISES: CPT

## 2023-01-30 PROCEDURE — 97530 THERAPEUTIC ACTIVITIES: CPT

## 2023-01-30 PROCEDURE — 80048 BASIC METABOLIC PNL TOTAL CA: CPT | Performed by: HOSPITALIST

## 2023-01-30 PROCEDURE — 83735 ASSAY OF MAGNESIUM: CPT | Performed by: HOSPITALIST

## 2023-01-30 PROCEDURE — 97535 SELF CARE MNGMENT TRAINING: CPT

## 2023-01-30 PROCEDURE — 25010000002 HEPARIN (PORCINE) PER 1000 UNITS: Performed by: HOSPITALIST

## 2023-01-30 PROCEDURE — 94761 N-INVAS EAR/PLS OXIMETRY MLT: CPT

## 2023-01-30 PROCEDURE — 94799 UNLISTED PULMONARY SVC/PX: CPT

## 2023-01-30 PROCEDURE — 94664 DEMO&/EVAL PT USE INHALER: CPT

## 2023-01-30 PROCEDURE — 97116 GAIT TRAINING THERAPY: CPT

## 2023-01-30 RX ORDER — SPIRONOLACTONE 25 MG/1
25 TABLET ORAL 2 TIMES DAILY
Qty: 60 TABLET | Refills: 0 | Status: SHIPPED | OUTPATIENT
Start: 2023-01-30 | End: 2023-03-08

## 2023-01-30 RX ORDER — CEFDINIR 300 MG/1
300 CAPSULE ORAL EVERY 12 HOURS SCHEDULED
Qty: 9 CAPSULE | Refills: 0
Start: 2023-01-30 | End: 2023-02-04

## 2023-01-30 RX ORDER — FUROSEMIDE 20 MG/1
20 TABLET ORAL DAILY
Qty: 30 TABLET | Refills: 0 | Status: SHIPPED | OUTPATIENT
Start: 2023-01-30

## 2023-01-30 RX ADMIN — SPIRONOLACTONE 25 MG: 25 TABLET ORAL at 17:26

## 2023-01-30 RX ADMIN — TAMSULOSIN HYDROCHLORIDE 0.4 MG: 0.4 CAPSULE ORAL at 08:20

## 2023-01-30 RX ADMIN — FAMOTIDINE 40 MG: 20 TABLET, FILM COATED ORAL at 08:20

## 2023-01-30 RX ADMIN — IPRATROPIUM BROMIDE AND ALBUTEROL SULFATE 3 ML: 2.5; .5 SOLUTION RESPIRATORY (INHALATION) at 14:20

## 2023-01-30 RX ADMIN — HEPARIN SODIUM 5000 UNITS: 5000 INJECTION INTRAVENOUS; SUBCUTANEOUS at 15:02

## 2023-01-30 RX ADMIN — IPRATROPIUM BROMIDE AND ALBUTEROL SULFATE 3 ML: 2.5; .5 SOLUTION RESPIRATORY (INHALATION) at 11:01

## 2023-01-30 RX ADMIN — IPRATROPIUM BROMIDE AND ALBUTEROL SULFATE 3 ML: 2.5; .5 SOLUTION RESPIRATORY (INHALATION) at 07:32

## 2023-01-30 RX ADMIN — HEPARIN SODIUM 5000 UNITS: 5000 INJECTION INTRAVENOUS; SUBCUTANEOUS at 06:06

## 2023-01-30 RX ADMIN — SPIRONOLACTONE 25 MG: 25 TABLET ORAL at 08:20

## 2023-01-30 RX ADMIN — CEFDINIR 300 MG: 300 CAPSULE ORAL at 08:20

## 2023-01-30 RX ADMIN — Medication 10 ML: at 08:21

## 2023-02-23 ENCOUNTER — OFFICE VISIT (OUTPATIENT)
Dept: WOUND CARE | Facility: HOSPITAL | Age: 58
End: 2023-02-23
Payer: MEDICARE

## 2023-02-23 DIAGNOSIS — L97.822 NON-PRS CHRONIC ULCER OTH PRT L LOW LEG W FAT LAYER EXPOSED: ICD-10-CM

## 2023-02-23 DIAGNOSIS — L60.2 ONYCHOGRYPOSIS: ICD-10-CM

## 2023-02-23 DIAGNOSIS — I89.0 LYMPHEDEMA, NOT ELSEWHERE CLASSIFIED: ICD-10-CM

## 2023-02-23 DIAGNOSIS — I87.2 VENOUS INSUFFICIENCY (CHRONIC) (PERIPHERAL): ICD-10-CM

## 2023-02-23 PROCEDURE — G0463 HOSPITAL OUTPT CLINIC VISIT: HCPCS

## 2023-02-23 PROCEDURE — 99213 OFFICE O/P EST LOW 20 MIN: CPT

## 2023-02-23 PROCEDURE — 11055 PARING/CUTG B9 HYPRKER LES 1: CPT

## 2023-02-23 PROCEDURE — 11721 DEBRIDE NAIL 6 OR MORE: CPT

## 2023-03-02 ENCOUNTER — OFFICE VISIT (OUTPATIENT)
Dept: WOUND CARE | Facility: HOSPITAL | Age: 58
End: 2023-03-02
Payer: MEDICARE

## 2023-03-02 DIAGNOSIS — I89.0 LYMPHEDEMA, NOT ELSEWHERE CLASSIFIED: ICD-10-CM

## 2023-03-02 DIAGNOSIS — L97.822 NON-PRS CHRONIC ULCER OTH PRT L LOW LEG W FAT LAYER EXPOSED: ICD-10-CM

## 2023-03-02 DIAGNOSIS — I87.2 VENOUS INSUFFICIENCY (CHRONIC) (PERIPHERAL): ICD-10-CM

## 2023-03-02 PROCEDURE — 99213 OFFICE O/P EST LOW 20 MIN: CPT

## 2023-03-02 PROCEDURE — G0463 HOSPITAL OUTPT CLINIC VISIT: HCPCS

## 2023-03-07 NOTE — PROGRESS NOTES
HEMATOLOGY ONCOLOGY OUTPATIENT FOLLOW UP       Patient name: Robert Randhawa Jr.  : 1965  MRN: [id number]  Primary Care Physician: Alfredo Torres MD  Referring Physician: Alfredo Torres MD  Reason For Consult: Thrombocytopenia.    Chief Complaint   Patient presents with   • Follow-up     Thrombocytopenia     HPI:   History of Present Illness:  Robert Randhawa Jr. is 57 y.o. male who presented to our office on 20 for consultation regarding thrombocytopenia.  Patient had a CBC on 10/10/2020 that showed a platelet count of 108.  MCV was also elevated to 100.  On review previous labs since , patient does have mild intermittent thrombocytopenia.  Also has chronic macrocytosis.  Patient does have history of chronic left leg cellulitis and edema and also chronic DVT.  He does have a history of morbid obesity and also somewhat immobilized..  · 2017: WBC 5, hemoglobin 14, platelets 129, MCV 99.4  · 2018: Doppler of the lower extremities: Chronic left gastrocnemius vein DVT  · 2018: WBC 6.1, hemoglobin 13.6, platelets 148  · 2020: Platelets 154,   · 2020: PSA 0.3, TSH 1.98, WBC 4.7, hemoglobin 14.5, platelets 122 low,  · 10/8/2020-10/12/2020: Patient admitted to [facility] with cellulitis of the left lower extremity, bilateral lower extremity edema left greater than right,.  He was diagnosed with chronic venous hypertension bilaterally.  He was treated with IV diuretics as well as antibiotics Rocephin.  He was treated with compression dressings.  · 10/10/2020: WBC 4.3, hemoglobin 13.5,  high, platelets 108 low,  creatinine 0.8, AST 58 high, ALT 31, bilirubin 0.7, albumin 3.1,  · 2020: Patient presents to the facility for an initial consult  Regarding thrombocytopenia. His PCP is Dr. Torres. He was seen in the hospital last month regarding cellulitis in his left leg. He does not smoke.  He does report excessive bruising.  Does  not drink alcohol..  Does not report any history of liver disease and also does not have any personal or family history of autoimmune disease.  He is not on any medications that cause thrombocytopenia.  Denies any bleeding per rectum. Also reports being depressed.  He has been very sedentary since last December 2019.  He is on disability.  · 11/4/2020: Anticardiolipin IgG 16 high, beta-2 glycoprotein antibodies negative, phosphatidylserine antibodies negative, citrated platelet count 120, serum PIERRE negative, serum folate 5.7, haptoglobin low 107, serum immunofixation negative, IgG 2593 high, IgA 574 high, IgM 127, , lupus anticoagulant negative, vitamin B12 525,  · 11/4/2020: WBC 6.2, hemoglobin 15.4, platelets 133 low, ,  · 11/25/2020: Abdominal ultrasound: Shows hepatosplenomegaly.  Evidence of hepatic steatosis.  Liver is enlarged up to 22.4 cm.  Spleen measures 17 cm.  · 11/25/2020: Ultrasound Doppler left leg: Normal.  No evidence of DVT.  · 11/30/2020: WBC 5.45, hemoglobin 15, platelets 120 low, .3  · 5/24/2021: WBC 4.48, hemoglobin 14.3, platelets 108, .8  · 5/24/2022: In the office for follow up. Reported no new complaints but noted that he had been suffering from persistent lymphedema. It didn't seem much better than before and he also persisted with a chronic ulcer. His platelet count had continued to decrease, though at a slow pace.   · 7/6/2022: Continues to have severe edema of the lower extremities. Persists with chronic ulceration of the lower extremities. No new symptoms, especially not fevers, nocturnal diaphoresis or unintended weight loss. Platelets were about the same.   · 9/7/2022: Back in the office for follow-up and to review his ultrasounds and make further plans.  Reported persistent and progressive lymphedema of the lower extremities, particularly in the left eye but also on the right where in addition and new ulcers seemed to be breaking down.  No longer seeing  the wound clinic.  No bleeding.  The physical exam revealed no changes.  Persisted with thrombocytopenia but not any worse than before.  His liver ultrasound reported evidence of cirrhosis.  There was also splenomegaly, further reinforcing the notion of cirrhosis.  He was referred to the gastroenterologist.  He was asked to return in approximately 6 months.  · 3/8/2023: Without new symptoms.  Very limited.  Was admitted to the hospital with evidence of a complicated urinary tract infection early in 2023.  This left him even more immobile and, in spite of physical therapy, in a limited fashion and as outpatient, at the time of this visit he was having many difficulties with ambulation.  Eating well and without major weight loss.  No fevers.  Denied chest pains or cough and had no abdominal pain.  Had maintain regular bowel activity and had no more dysuria.  Was finding it difficult to ambulate even in his apartment.  The blood counts revealed persistent thrombocytopenia but otherwise no major abnormalities.  This was felt to be the result of his splenomegaly and cirrhosis.    Subjective:  3/8/2023: Without new symptoms but with progression of his immobility.  Not able to walk but very short distances.  This became even worse after the above described admission to the hospital in January 2023.  Eating well.  No nausea or vomiting.  Denied unintended weight loss and had not experienced any abdominal pain.  Was also without changes in bowel activity and had not had any more dysuria.  Had persisted with very significant edema of the lower extremities.    Past Medical History:   Diagnosis Date   • Arthritis    • Asthma    • Chronic deep vein thrombosis (DVT) (HCC)    • Lymphedema of both lower extremities      Past Surgical History:   Procedure Laterality Date   • APPENDECTOMY  1974   • CARDIAC CATHETERIZATION N/A 2/28/2020    Procedure: Left ventriculography;  Surgeon: Kenney Reyez MD;  Location: [facility]  INVASIVE LOCATION;  Service: Cardiovascular;  Laterality: N/A;   • CARDIAC CATHETERIZATION N/A 2/28/2020    Procedure: Coronary angiography;  Surgeon: Kenney Reyez MD;  Location: [facility] CATH INVASIVE LOCATION;  Service: Cardiovascular;  Laterality: N/A;   • CARDIAC CATHETERIZATION N/A 2/28/2020    Procedure: Left Heart Cath;  Surgeon: Kenney Reyez MD;  Location: [facility] CATH INVASIVE LOCATION;  Service: Cardiovascular;  Laterality: N/A;   • COLONOSCOPY N/A 11/15/2022    Procedure: COLONOSCOPY with polypectomy;  Surgeon: AMY Garcia MD;  Location: [facility] ENDOSCOPY;  Service: Gastroenterology;  Laterality: N/A;  post: diverticulosis, ascending polyp x2, transverse polyp x3, descending polyp x4, rectal polyp x1, hemorroids   • ENDOSCOPY N/A 11/15/2022    Procedure: ESOPHAGOGASTRODUODENOSCOPY with biopsy;  Surgeon: AMY Garcia MD;  Location: [facility] ENDOSCOPY;  Service: Gastroenterology;  Laterality: N/A;  post: esophagitis with biopsy to rule out candida , gastric biopsy   • KNEE SURGERY Left 2018       Current Outpatient Medications:   •  furosemide (Lasix) 20 MG tablet, Take 1 tablet by mouth Daily. Take daily or PRN, Disp: 30 tablet, Rfl: 0  •  lisinopril (PRINIVIL,ZESTRIL) 5 MG tablet, , Disp: , Rfl:   •  spironolactone (ALDACTONE) 25 MG tablet, Take 1 tablet by mouth 2 (Two) Times a Day for 30 days., Disp: 60 tablet, Rfl: 0  •  tamsulosin (FLOMAX) 0.4 MG capsule 24 hr capsule, Take 1 capsule by mouth Daily., Disp: , Rfl:   •  famotidine (PEPCID) 40 MG tablet, Take 1 tablet by mouth Daily., Disp: , Rfl:     Allergies   Allergen Reactions   • Sulfa Antibiotics Unknown (See Comments)       Family History   Problem Relation Age of Onset   • Heart disease Mother    • Diabetes Mother    • Arthritis Father    • Aortic aneurysm Father      Cancer-related family history is not on file.    Social History     Tobacco Use   • Smoking status: Former     Packs/day: 0.50     Years: 5.00     Pack years: 2.50  "    Types: Cigarettes     Start date:      Quit date:      Years since quittin.2   • Smokeless tobacco: Never   Vaping Use   • Vaping Use: Never used   Substance Use Topics   • Alcohol use: Never   • Drug use: Never     Social History     Social History Narrative   • Not on file      ROS:   A 12 point review of systems was conducted.  Pertinent positives and negatives were documented above.    Objective:    Vitals:    23 1338   BP: 123/74   Pulse: 100   SpO2: 96%   Weight: (!) 162 kg (357 lb 9.6 oz)   Height: 185.4 cm (73\")   PainSc:   8   PainLoc: Generalized  Comment: left foot/calf     Body mass index is 47.18 kg/m².  ECOG  (2) Ambulatory and capable of self care, unable to carry out work activity, up and about > 50% or waking hours    Physical Exam:     Physical Exam  Vitals and nursing note reviewed.   Constitutional:       General: He is not in acute distress.     Appearance: He is ill-appearing. He is not toxic-appearing or diaphoretic.      Comments: Well-built man.  In no acute distress.  Chronically ill however and appearing much older than the stated age.   HENT:      Head: Normocephalic and atraumatic.      Right Ear: External ear normal.      Left Ear: External ear normal.      Nose: Nose normal. No congestion or rhinorrhea.      Mouth/Throat:      Mouth: Mucous membranes are moist.      Pharynx: Oropharynx is clear. No oropharyngeal exudate or posterior oropharyngeal erythema.      Comments: Edentulous  Eyes:      General: No scleral icterus.        Right eye: No discharge.         Left eye: No discharge.      Conjunctiva/sclera: Conjunctivae normal.   Neck:      Thyroid: No thyromegaly.   Cardiovascular:      Rate and Rhythm: Normal rate and regular rhythm.      Heart sounds: Normal heart sounds. No murmur heard.    No friction rub. No gallop.   Pulmonary:      Effort: Pulmonary effort is normal. No respiratory distress.      Breath sounds: No stridor. No wheezing or rhonchi. "   Abdominal:      General: Bowel sounds are normal. There is no distension.      Palpations: Abdomen is soft. There is no mass.      Tenderness: There is no abdominal tenderness. There is no guarding or rebound.      Comments: Obese. Soft. Not tender.    Musculoskeletal:         General: Tenderness and deformity present. Normal range of motion.      Cervical back: Normal range of motion and neck supple. No rigidity or tenderness.      Right lower leg: Edema present.      Left lower leg: Edema present.   Lymphadenopathy:      Cervical: No cervical adenopathy.   Skin:     General: Skin is warm.      Coloration: Skin is not jaundiced or pale.      Findings: No erythema or rash.   Neurological:      General: No focal deficit present.      Mental Status: He is alert and oriented to person, place, and time.      Cranial Nerves: No cranial nerve deficit.      Motor: No abnormal muscle tone.      Coordination: Coordination normal.      Gait: Gait normal.   Psychiatric:         Mood and Affect: Mood normal.         Behavior: Behavior normal.         Thought Content: Thought content normal.         Judgment: Judgment normal.     BAYRON Lynn MD performed the physical exam on 3/8/2023 as documented above.    Lab Results - Last 18 Months   Lab Units 03/08/23  1324 01/29/23  0452 01/28/23  0436   WBC 10*3/mm3 4.11 3.20* 2.50*   HEMOGLOBIN g/dL 13.8 12.2* 11.3*   HEMATOCRIT % 40.0 37.2* 34.7*   PLATELETS 10*3/mm3 87* 78* 63*   MCV fL 102.3* 103.9* 104.4*     Lab Results - Last 18 Months   Lab Units 01/30/23  0917 01/29/23  0452 01/28/23  0436 01/27/23  1356   SODIUM mmol/L 136 136 134* 135*   POTASSIUM mmol/L 4.6 4.1 4.4 4.4   CHLORIDE mmol/L 102 103 104 105   CO2 mmol/L 28.0 27.0 24.0 23.0   BUN mg/dL 13 15 19 21*   CREATININE mg/dL 0.63* 0.78 0.79 0.80   CALCIUM mg/dL 8.7 8.4* 7.8* 8.4*   BILIRUBIN mg/dL  --  0.9 0.8 0.9   ALK PHOS U/L  --  115 97 104   ALT (SGPT) U/L  --  53* 53* 59*   AST (SGOT) U/L  --  139* 149* 171*    GLUCOSE mg/dL 127* 91 89 126*     Lab Results   Component Value Date    GLUCOSE 127 (H) 01/30/2023    BUN 13 01/30/2023    CREATININE 0.63 (L) 01/30/2023    EGFRIFNONA 118 07/17/2021    BCR 20.6 01/30/2023    K 4.6 01/30/2023    CO2 28.0 01/30/2023    CALCIUM 8.7 01/30/2023    PROTENTOTREF 7.6 05/25/2022    ALBUMIN 2.4 (L) 01/29/2023    LABIL2 0.7 05/25/2022     (H) 01/29/2023    ALT 53 (H) 01/29/2023     Lab Results   Component Value Date    FOLATE 7.38 01/26/2023     Lab Results   Component Value Date    RETICCTPCT 2.02 (H) 05/25/2022     Lab Results   Component Value Date    YKYTWWSH48 485 01/26/2023     No results found for: SPEP, UPEP  LDH   Date Value Ref Range Status   05/25/2022 149 135 - 225 U/L Final     Lab Results   Component Value Date    PIERRE Negative 11/04/2020    SEDRATE 49 (H) 01/25/2023     Lab Results   Component Value Date    HAPTOGLOBIN 72 05/25/2022     Lab Results   Component Value Date    PTT 29.3 07/06/2022    INR 1.05 02/28/2020     Lab Results   Component Value Date    PSA 0.304 09/24/2020     Assessment & Plan     Assessment:  1. Thrombocytopenia: Persistent but unchanged.  On review of the recent trends there has been really very little change.  On the basis of this no intervention is necessary, particularly considering that this is mostly a result of splenomegaly.  2. Cirrhosis of the liver: This explains the macrocytosis and to some extent the thrombocytopenia as well.  We will continue to follow without intervention.  3. Chronic deep vein thrombosis.  4. I reviewed all the records including the notes and laboratory exams from the recent admission to the hospital.  Reviewed the more recent laboratory exams.  Discussed with him.  5. Social work for exploring making her apartment handicapped accessible and also to see if any additional assistance for home is possible for him.  6. He is to see me in approximately 6 months.      Plan:  As above.    Lionel Pagan MD on 3/8/2023 at  [phone]

## 2023-03-08 ENCOUNTER — OFFICE VISIT (OUTPATIENT)
Dept: ONCOLOGY | Facility: CLINIC | Age: 58
End: 2023-03-08
Payer: MEDICARE

## 2023-03-08 ENCOUNTER — LAB (OUTPATIENT)
Dept: LAB | Facility: HOSPITAL | Age: 58
End: 2023-03-08
Payer: MEDICARE

## 2023-03-08 ENCOUNTER — DOCUMENTATION (OUTPATIENT)
Dept: ONCOLOGY | Facility: CLINIC | Age: 58
End: 2023-03-08
Payer: MEDICARE

## 2023-03-08 VITALS
SYSTOLIC BLOOD PRESSURE: 123 MMHG | HEIGHT: 73 IN | BODY MASS INDEX: 41.75 KG/M2 | DIASTOLIC BLOOD PRESSURE: 74 MMHG | WEIGHT: 315 LBS | HEART RATE: 100 BPM | OXYGEN SATURATION: 96 %

## 2023-03-08 DIAGNOSIS — D69.6 THROMBOCYTOPENIA: Primary | ICD-10-CM

## 2023-03-08 LAB
ALBUMIN SERPL-MCNC: 3.2 G/DL (ref 3.5–5.2)
ALBUMIN/GLOB SERPL: 0.7 G/DL
ALP SERPL-CCNC: 157 U/L (ref 39–117)
ALT SERPL W P-5'-P-CCNC: 29 U/L (ref 1–41)
ANION GAP SERPL CALCULATED.3IONS-SCNC: 9 MMOL/L (ref 5–15)
AST SERPL-CCNC: 59 U/L (ref 1–40)
BASOPHILS # BLD AUTO: 0.01 10*3/MM3 (ref 0–0.2)
BASOPHILS NFR BLD AUTO: 0.2 % (ref 0–1.5)
BILIRUB SERPL-MCNC: 1.1 MG/DL (ref 0–1.2)
BUN SERPL-MCNC: 16 MG/DL (ref 6–20)
BUN/CREAT SERPL: 22.9 (ref 7–25)
CALCIUM SPEC-SCNC: 9.1 MG/DL (ref 8.6–10.5)
CHLORIDE SERPL-SCNC: 103 MMOL/L (ref 98–107)
CO2 SERPL-SCNC: 24 MMOL/L (ref 22–29)
CREAT SERPL-MCNC: 0.7 MG/DL (ref 0.76–1.27)
DEPRECATED RDW RBC AUTO: 51.6 FL (ref 37–54)
EGFRCR SERPLBLD CKD-EPI 2021: 106.8 ML/MIN/1.73
EOSINOPHIL # BLD AUTO: 0.06 10*3/MM3 (ref 0–0.4)
EOSINOPHIL NFR BLD AUTO: 1.5 % (ref 0.3–6.2)
ERYTHROCYTE [DISTWIDTH] IN BLOOD BY AUTOMATED COUNT: 13.7 % (ref 12.3–15.4)
GLOBULIN UR ELPH-MCNC: 4.5 GM/DL
GLUCOSE SERPL-MCNC: 114 MG/DL (ref 65–99)
HCT VFR BLD AUTO: 40 % (ref 37.5–51)
HGB BLD-MCNC: 13.8 G/DL (ref 13–17.7)
HOLD SPECIMEN: NORMAL
HOLD SPECIMEN: NORMAL
LYMPHOCYTES # BLD AUTO: 0.75 10*3/MM3 (ref 0.7–3.1)
LYMPHOCYTES NFR BLD AUTO: 18.2 % (ref 19.6–45.3)
MCH RBC QN AUTO: 35.3 PG (ref 26.6–33)
MCHC RBC AUTO-ENTMCNC: 34.5 G/DL (ref 31.5–35.7)
MCV RBC AUTO: 102.3 FL (ref 79–97)
MONOCYTES # BLD AUTO: 0.32 10*3/MM3 (ref 0.1–0.9)
MONOCYTES NFR BLD AUTO: 7.8 % (ref 5–12)
NEUTROPHILS NFR BLD AUTO: 2.97 10*3/MM3 (ref 1.7–7)
NEUTROPHILS NFR BLD AUTO: 72.3 % (ref 42.7–76)
PLATELET # BLD AUTO: 87 10*3/MM3 (ref 140–450)
PMV BLD AUTO: 10.5 FL (ref 6–12)
POTASSIUM SERPL-SCNC: 4.3 MMOL/L (ref 3.5–5.2)
PROT SERPL-MCNC: 7.7 G/DL (ref 6–8.5)
RBC # BLD AUTO: 3.91 10*6/MM3 (ref 4.14–5.8)
SODIUM SERPL-SCNC: 136 MMOL/L (ref 136–145)
WBC NRBC COR # BLD: 4.11 10*3/MM3 (ref 3.4–10.8)
WHOLE BLOOD HOLD COAG: NORMAL

## 2023-03-08 PROCEDURE — 99213 OFFICE O/P EST LOW 20 MIN: CPT | Performed by: INTERNAL MEDICINE

## 2023-03-08 PROCEDURE — 1160F RVW MEDS BY RX/DR IN RCRD: CPT | Performed by: INTERNAL MEDICINE

## 2023-03-08 PROCEDURE — 1159F MED LIST DOCD IN RCRD: CPT | Performed by: INTERNAL MEDICINE

## 2023-03-08 PROCEDURE — 36415 COLL VENOUS BLD VENIPUNCTURE: CPT

## 2023-03-08 PROCEDURE — 1125F AMNT PAIN NOTED PAIN PRSNT: CPT | Performed by: INTERNAL MEDICINE

## 2023-03-08 PROCEDURE — 80053 COMPREHEN METABOLIC PANEL: CPT | Performed by: INTERNAL MEDICINE

## 2023-03-08 PROCEDURE — 85025 COMPLETE CBC W/AUTO DIFF WBC: CPT

## 2023-03-08 RX ORDER — LISINOPRIL 5 MG/1
TABLET ORAL
COMMUNITY
Start: 2023-02-16

## 2023-03-08 NOTE — PROGRESS NOTES
OSW was requested to meet with patient in exam room after MD visit. Concerns over handicap accessibility in his living situation and extra help.     OSW met w/ patient in exam room - explained OSW's role and reason for meeting. OSW asked what specifically patient was needing help with in the home and patient look confused. Patient reports he currently has [facility] which he's much more pleased with, than a previous HH company. He reports they've been much better at wrapping his legs and the physical therapy. When asked he affirmed they've got him set up with a BSC and taller walker. Patient reports he still struggles at times with getting to the bathroom due to size of his duplex.     OSW suggested asking his landlord what he'd be willing to make accessible and then we can provide a note for documentation. Patient is agreeable, citing he has to drop off his rent and will ask then. OSW provided patient with OSW's contact information to reach out and let know specifics of the letter needed.     OSW also discussed Homemaker services that can be provided via [employer], particularly those covered by Medicaid. OSW provided patient with information for [employer] and [facility].     No other needs verbalized at this time. OSW will remain available.     FELIX LopezW, CSW, MSW  Oncology MSW  [facility] [place]

## 2023-03-23 ENCOUNTER — OFFICE (OUTPATIENT)
Dept: URBAN - METROPOLITAN AREA CLINIC 64 | Facility: CLINIC | Age: 58
End: 2023-03-23

## 2023-03-23 VITALS
DIASTOLIC BLOOD PRESSURE: 44 MMHG | HEIGHT: 73 IN | HEART RATE: 75 BPM | WEIGHT: 315 LBS | SYSTOLIC BLOOD PRESSURE: 90 MMHG

## 2023-03-23 DIAGNOSIS — I89.0 LYMPHEDEMA, NOT ELSEWHERE CLASSIFIED: ICD-10-CM

## 2023-03-23 DIAGNOSIS — K74.60 UNSPECIFIED CIRRHOSIS OF LIVER: ICD-10-CM

## 2023-03-23 DIAGNOSIS — E66.9 OBESITY, UNSPECIFIED: ICD-10-CM

## 2023-03-23 DIAGNOSIS — D69.6 THROMBOCYTOPENIA, UNSPECIFIED: ICD-10-CM

## 2023-03-23 PROCEDURE — 99214 OFFICE O/P EST MOD 30 MIN: CPT | Performed by: INTERNAL MEDICINE

## 2023-03-23 RX ORDER — SPIRONOLACTONE 25 MG/1
TABLET, FILM COATED ORAL
Qty: 90 | Refills: 0 | Status: ACTIVE

## 2023-03-23 RX ORDER — FUROSEMIDE 80 MG/1
80 TABLET ORAL
Qty: 90 | Refills: 3 | Status: COMPLETED
End: 2024-05-20

## 2023-03-31 ENCOUNTER — OFFICE (OUTPATIENT)
Dept: URBAN - METROPOLITAN AREA CLINIC 64 | Facility: CLINIC | Age: 58
End: 2023-03-31

## 2023-03-31 DIAGNOSIS — E66.9 OBESITY, UNSPECIFIED: ICD-10-CM

## 2023-03-31 DIAGNOSIS — K74.60 UNSPECIFIED CIRRHOSIS OF LIVER: ICD-10-CM

## 2023-03-31 PROCEDURE — 99490 CHRNC CARE MGMT STAFF 1ST 20: CPT | Performed by: INTERNAL MEDICINE

## 2023-04-16 ENCOUNTER — APPOINTMENT (OUTPATIENT)
Dept: CT IMAGING | Facility: HOSPITAL | Age: 58
End: 2023-04-16
Payer: MEDICARE

## 2023-04-16 ENCOUNTER — APPOINTMENT (OUTPATIENT)
Dept: GENERAL RADIOLOGY | Facility: HOSPITAL | Age: 58
End: 2023-04-16
Payer: MEDICARE

## 2023-04-16 ENCOUNTER — HOSPITAL ENCOUNTER (OUTPATIENT)
Facility: HOSPITAL | Age: 58
Setting detail: OBSERVATION
Discharge: NURSING, REHAB OR LONG-TERM CARE | End: 2023-04-18
Attending: EMERGENCY MEDICINE | Admitting: HOSPITALIST
Payer: MEDICARE

## 2023-04-16 DIAGNOSIS — R07.9 CHEST PAIN, UNSPECIFIED TYPE: ICD-10-CM

## 2023-04-16 DIAGNOSIS — R06.00 DYSPNEA, UNSPECIFIED TYPE: Primary | ICD-10-CM

## 2023-04-16 DIAGNOSIS — R00.0 TACHYCARDIA: ICD-10-CM

## 2023-04-16 PROBLEM — I25.119 CHEST PAIN DUE TO CORONARY ARTERY DISEASE: Status: ACTIVE | Noted: 2023-04-16

## 2023-04-16 LAB
ALBUMIN SERPL-MCNC: 3.5 G/DL (ref 3.5–5.2)
ALBUMIN/GLOB SERPL: 0.8 G/DL
ALP SERPL-CCNC: 142 U/L (ref 39–117)
ALT SERPL W P-5'-P-CCNC: 29 U/L (ref 1–41)
AMMONIA BLD-SCNC: 56 UMOL/L (ref 16–60)
ANION GAP SERPL CALCULATED.3IONS-SCNC: 11 MMOL/L (ref 5–15)
APTT PPP: 25.7 SECONDS (ref 61–76.5)
AST SERPL-CCNC: 63 U/L (ref 1–40)
B PARAPERT DNA SPEC QL NAA+PROBE: NOT DETECTED
B PERT DNA SPEC QL NAA+PROBE: NOT DETECTED
BACTERIA UR QL AUTO: ABNORMAL /HPF
BASOPHILS # BLD AUTO: 0 10*3/MM3 (ref 0–0.2)
BASOPHILS NFR BLD AUTO: 0.6 % (ref 0–1.5)
BILIRUB SERPL-MCNC: 1.3 MG/DL (ref 0–1.2)
BILIRUB UR QL STRIP: NEGATIVE
BUN SERPL-MCNC: 16 MG/DL (ref 6–20)
BUN/CREAT SERPL: 19.5 (ref 7–25)
C PNEUM DNA NPH QL NAA+NON-PROBE: NOT DETECTED
CALCIUM SPEC-SCNC: 9.1 MG/DL (ref 8.6–10.5)
CHLORIDE SERPL-SCNC: 102 MMOL/L (ref 98–107)
CLARITY UR: CLEAR
CO2 SERPL-SCNC: 25 MMOL/L (ref 22–29)
COLOR UR: YELLOW
CREAT BLDA-MCNC: 0.9 MG/DL (ref 0.6–1.3)
CREAT SERPL-MCNC: 0.82 MG/DL (ref 0.76–1.27)
D DIMER PPP FEU-MCNC: 2.27 MG/L (FEU) (ref 0–0.58)
DEPRECATED RDW RBC AUTO: 55.1 FL (ref 37–54)
EGFRCR SERPLBLD CKD-EPI 2021: 101.8 ML/MIN/1.73
EGFRCR SERPLBLD CKD-EPI 2021: 99 ML/MIN/1.73
EOSINOPHIL # BLD AUTO: 0 10*3/MM3 (ref 0–0.4)
EOSINOPHIL NFR BLD AUTO: 0.5 % (ref 0.3–6.2)
ERYTHROCYTE [DISTWIDTH] IN BLOOD BY AUTOMATED COUNT: 14.7 % (ref 12.3–15.4)
FLUAV SUBTYP SPEC NAA+PROBE: NOT DETECTED
FLUBV RNA ISLT QL NAA+PROBE: NOT DETECTED
GEN 5 2HR TROPONIN T REFLEX: 21 NG/L
GLOBULIN UR ELPH-MCNC: 4.4 GM/DL
GLUCOSE SERPL-MCNC: 114 MG/DL (ref 65–99)
GLUCOSE UR STRIP-MCNC: NEGATIVE MG/DL
HADV DNA SPEC NAA+PROBE: NOT DETECTED
HCOV 229E RNA SPEC QL NAA+PROBE: NOT DETECTED
HCOV HKU1 RNA SPEC QL NAA+PROBE: NOT DETECTED
HCOV NL63 RNA SPEC QL NAA+PROBE: NOT DETECTED
HCOV OC43 RNA SPEC QL NAA+PROBE: NOT DETECTED
HCT VFR BLD AUTO: 39.9 % (ref 37.5–51)
HGB BLD-MCNC: 13.5 G/DL (ref 13–17.7)
HGB UR QL STRIP.AUTO: ABNORMAL
HMPV RNA NPH QL NAA+NON-PROBE: NOT DETECTED
HOLD SPECIMEN: NORMAL
HPIV1 RNA ISLT QL NAA+PROBE: NOT DETECTED
HPIV2 RNA SPEC QL NAA+PROBE: NOT DETECTED
HPIV3 RNA NPH QL NAA+PROBE: NOT DETECTED
HPIV4 P GENE NPH QL NAA+PROBE: NOT DETECTED
HYALINE CASTS UR QL AUTO: ABNORMAL /LPF
INR PPP: 1.2 (ref 0.93–1.1)
KETONES UR QL STRIP: NEGATIVE
LEUKOCYTE ESTERASE UR QL STRIP.AUTO: NEGATIVE
LYMPHOCYTES # BLD AUTO: 0.3 10*3/MM3 (ref 0.7–3.1)
LYMPHOCYTES NFR BLD AUTO: 9 % (ref 19.6–45.3)
M PNEUMO IGG SER IA-ACNC: NOT DETECTED
MCH RBC QN AUTO: 34.6 PG (ref 26.6–33)
MCHC RBC AUTO-ENTMCNC: 33.8 G/DL (ref 31.5–35.7)
MCV RBC AUTO: 102.3 FL (ref 79–97)
MONOCYTES # BLD AUTO: 0.1 10*3/MM3 (ref 0.1–0.9)
MONOCYTES NFR BLD AUTO: 2.9 % (ref 5–12)
NEUTROPHILS NFR BLD AUTO: 3.3 10*3/MM3 (ref 1.7–7)
NEUTROPHILS NFR BLD AUTO: 87 % (ref 42.7–76)
NITRITE UR QL STRIP: NEGATIVE
NRBC BLD AUTO-RTO: 0.1 /100 WBC (ref 0–0.2)
NT-PROBNP SERPL-MCNC: 75.8 PG/ML (ref 0–900)
PH UR STRIP.AUTO: <=5 [PH] (ref 5–8)
PLATELET # BLD AUTO: 85 10*3/MM3 (ref 140–450)
PMV BLD AUTO: 8.4 FL (ref 6–12)
POTASSIUM SERPL-SCNC: 4.2 MMOL/L (ref 3.5–5.2)
PROT SERPL-MCNC: 7.9 G/DL (ref 6–8.5)
PROT UR QL STRIP: NEGATIVE
PROTHROMBIN TIME: 12.2 SECONDS (ref 9.6–11.7)
RBC # BLD AUTO: 3.9 10*6/MM3 (ref 4.14–5.8)
RBC # UR STRIP: ABNORMAL /HPF
REF LAB TEST METHOD: ABNORMAL
RHINOVIRUS RNA SPEC NAA+PROBE: NOT DETECTED
RSV RNA NPH QL NAA+NON-PROBE: NOT DETECTED
SARS-COV-2 RNA NPH QL NAA+NON-PROBE: NOT DETECTED
SODIUM SERPL-SCNC: 138 MMOL/L (ref 136–145)
SP GR UR STRIP: 1.01 (ref 1–1.03)
SQUAMOUS #/AREA URNS HPF: ABNORMAL /HPF
TROPONIN T DELTA: 6 NG/L
TROPONIN T SERPL HS-MCNC: 15 NG/L
UROBILINOGEN UR QL STRIP: ABNORMAL
WBC # UR STRIP: ABNORMAL /HPF
WBC NRBC COR # BLD: 3.8 10*3/MM3 (ref 3.4–10.8)

## 2023-04-16 PROCEDURE — 0202U NFCT DS 22 TRGT SARS-COV-2: CPT | Performed by: NURSE PRACTITIONER

## 2023-04-16 PROCEDURE — 80053 COMPREHEN METABOLIC PANEL: CPT | Performed by: NURSE PRACTITIONER

## 2023-04-16 PROCEDURE — 96372 THER/PROPH/DIAG INJ SC/IM: CPT

## 2023-04-16 PROCEDURE — 25010000002 CEFTRIAXONE PER 250 MG: Performed by: NURSE PRACTITIONER

## 2023-04-16 PROCEDURE — C1751 CATH, INF, PER/CENT/MIDLINE: HCPCS

## 2023-04-16 PROCEDURE — 36415 COLL VENOUS BLD VENIPUNCTURE: CPT

## 2023-04-16 PROCEDURE — 93005 ELECTROCARDIOGRAM TRACING: CPT | Performed by: EMERGENCY MEDICINE

## 2023-04-16 PROCEDURE — 85379 FIBRIN DEGRADATION QUANT: CPT | Performed by: NURSE PRACTITIONER

## 2023-04-16 PROCEDURE — 85730 THROMBOPLASTIN TIME PARTIAL: CPT | Performed by: NURSE PRACTITIONER

## 2023-04-16 PROCEDURE — 25510000001 IOPAMIDOL PER 1 ML: Performed by: EMERGENCY MEDICINE

## 2023-04-16 PROCEDURE — 99285 EMERGENCY DEPT VISIT HI MDM: CPT

## 2023-04-16 PROCEDURE — 82565 ASSAY OF CREATININE: CPT

## 2023-04-16 PROCEDURE — 82140 ASSAY OF AMMONIA: CPT | Performed by: NURSE PRACTITIONER

## 2023-04-16 PROCEDURE — 83880 ASSAY OF NATRIURETIC PEPTIDE: CPT | Performed by: NURSE PRACTITIONER

## 2023-04-16 PROCEDURE — 80061 LIPID PANEL: CPT | Performed by: STUDENT IN AN ORGANIZED HEALTH CARE EDUCATION/TRAINING PROGRAM

## 2023-04-16 PROCEDURE — 87040 BLOOD CULTURE FOR BACTERIA: CPT | Performed by: NURSE PRACTITIONER

## 2023-04-16 PROCEDURE — 71045 X-RAY EXAM CHEST 1 VIEW: CPT

## 2023-04-16 PROCEDURE — 71275 CT ANGIOGRAPHY CHEST: CPT

## 2023-04-16 PROCEDURE — 84443 ASSAY THYROID STIM HORMONE: CPT | Performed by: STUDENT IN AN ORGANIZED HEALTH CARE EDUCATION/TRAINING PROGRAM

## 2023-04-16 PROCEDURE — 25010000002 ENOXAPARIN PER 10 MG: Performed by: NURSE PRACTITIONER

## 2023-04-16 PROCEDURE — 96365 THER/PROPH/DIAG IV INF INIT: CPT

## 2023-04-16 PROCEDURE — 63710000001 ONDANSETRON ODT 4 MG TABLET DISPERSIBLE: Performed by: NURSE PRACTITIONER

## 2023-04-16 PROCEDURE — 81001 URINALYSIS AUTO W/SCOPE: CPT | Performed by: NURSE PRACTITIONER

## 2023-04-16 PROCEDURE — 84484 ASSAY OF TROPONIN QUANT: CPT | Performed by: NURSE PRACTITIONER

## 2023-04-16 PROCEDURE — 85025 COMPLETE CBC W/AUTO DIFF WBC: CPT | Performed by: NURSE PRACTITIONER

## 2023-04-16 PROCEDURE — 85610 PROTHROMBIN TIME: CPT | Performed by: NURSE PRACTITIONER

## 2023-04-16 RX ORDER — SODIUM CHLORIDE 9 MG/ML
40 INJECTION, SOLUTION INTRAVENOUS AS NEEDED
Status: DISCONTINUED | OUTPATIENT
Start: 2023-04-16 | End: 2023-04-18 | Stop reason: HOSPADM

## 2023-04-16 RX ORDER — ENOXAPARIN SODIUM 100 MG/ML
40 INJECTION SUBCUTANEOUS EVERY 12 HOURS
Status: DISCONTINUED | OUTPATIENT
Start: 2023-04-17 | End: 2023-04-18 | Stop reason: HOSPADM

## 2023-04-16 RX ORDER — FUROSEMIDE 20 MG/1
20 TABLET ORAL DAILY
Status: DISCONTINUED | OUTPATIENT
Start: 2023-04-17 | End: 2023-04-18 | Stop reason: HOSPADM

## 2023-04-16 RX ORDER — HYDROCODONE BITARTRATE AND ACETAMINOPHEN 5; 325 MG/1; MG/1
1 TABLET ORAL ONCE AS NEEDED
Status: COMPLETED | OUTPATIENT
Start: 2023-04-16 | End: 2023-04-16

## 2023-04-16 RX ORDER — ONDANSETRON 4 MG/1
4 TABLET, FILM COATED ORAL EVERY 6 HOURS PRN
Status: DISCONTINUED | OUTPATIENT
Start: 2023-04-16 | End: 2023-04-18 | Stop reason: HOSPADM

## 2023-04-16 RX ORDER — ONDANSETRON 4 MG/1
4 TABLET, ORALLY DISINTEGRATING ORAL ONCE
Status: COMPLETED | OUTPATIENT
Start: 2023-04-16 | End: 2023-04-16

## 2023-04-16 RX ORDER — ONDANSETRON 2 MG/ML
4 INJECTION INTRAMUSCULAR; INTRAVENOUS EVERY 6 HOURS PRN
Status: DISCONTINUED | OUTPATIENT
Start: 2023-04-16 | End: 2023-04-18 | Stop reason: HOSPADM

## 2023-04-16 RX ORDER — ASPIRIN 81 MG/1
324 TABLET, CHEWABLE ORAL ONCE
Status: COMPLETED | OUTPATIENT
Start: 2023-04-16 | End: 2023-04-16

## 2023-04-16 RX ORDER — SODIUM CHLORIDE 0.9 % (FLUSH) 0.9 %
10 SYRINGE (ML) INJECTION EVERY 12 HOURS SCHEDULED
Status: DISCONTINUED | OUTPATIENT
Start: 2023-04-16 | End: 2023-04-18 | Stop reason: HOSPADM

## 2023-04-16 RX ORDER — TAMSULOSIN HYDROCHLORIDE 0.4 MG/1
0.4 CAPSULE ORAL DAILY
Status: DISCONTINUED | OUTPATIENT
Start: 2023-04-17 | End: 2023-04-18 | Stop reason: HOSPADM

## 2023-04-16 RX ORDER — ENOXAPARIN SODIUM 100 MG/ML
30 INJECTION SUBCUTANEOUS ONCE
Status: COMPLETED | OUTPATIENT
Start: 2023-04-16 | End: 2023-04-16

## 2023-04-16 RX ORDER — IPRATROPIUM BROMIDE AND ALBUTEROL SULFATE 2.5; .5 MG/3ML; MG/3ML
3 SOLUTION RESPIRATORY (INHALATION)
Status: DISCONTINUED | OUTPATIENT
Start: 2023-04-16 | End: 2023-04-17

## 2023-04-16 RX ORDER — NITROGLYCERIN 0.4 MG/1
0.4 TABLET SUBLINGUAL
Status: DISCONTINUED | OUTPATIENT
Start: 2023-04-16 | End: 2023-04-18 | Stop reason: HOSPADM

## 2023-04-16 RX ORDER — SODIUM CHLORIDE 0.9 % (FLUSH) 0.9 %
10 SYRINGE (ML) INJECTION AS NEEDED
Status: DISCONTINUED | OUTPATIENT
Start: 2023-04-16 | End: 2023-04-18 | Stop reason: HOSPADM

## 2023-04-16 RX ADMIN — Medication 10 ML: at 23:52

## 2023-04-16 RX ADMIN — CEFTRIAXONE 2 G: 2 INJECTION, POWDER, FOR SOLUTION INTRAMUSCULAR; INTRAVENOUS at 23:31

## 2023-04-16 RX ADMIN — IOPAMIDOL 100 ML: 755 INJECTION, SOLUTION INTRAVENOUS at 20:58

## 2023-04-16 RX ADMIN — ASPIRIN 81 MG CHEWABLE TABLET 324 MG: 81 TABLET CHEWABLE at 23:08

## 2023-04-16 RX ADMIN — HYDROCODONE BITARTRATE AND ACETAMINOPHEN 1 TABLET: 5; 325 TABLET ORAL at 20:16

## 2023-04-16 RX ADMIN — ENOXAPARIN SODIUM 30 MG: 100 INJECTION SUBCUTANEOUS at 23:32

## 2023-04-16 RX ADMIN — SODIUM CHLORIDE 500 ML: 9 INJECTION, SOLUTION INTRAVENOUS at 20:15

## 2023-04-16 RX ADMIN — ONDANSETRON 4 MG: 4 TABLET, ORALLY DISINTEGRATING ORAL at 20:01

## 2023-04-16 NOTE — ED PROVIDER NOTES
"Subjective   History of Present Illness  Chief complaint: Multiple complaints      Context: Patient is 57-year-old male from home via EMS with multiple complaints.  Has had some shortness of breath cough and congestion for the last 3 days. Has had cough with sharp intermittent chest pain. Notes chills without recorded fever. He has a history of chronic bilateral lower extremity lymphedema and erythema which she states has been at baseline \"since 2018.\" has hx of LLE dvt.    He denies any vomiting or diarrhea.  Denies any urinary complaints.  He reports some generalized myalgias.  Initially reported chest pain to EMS although does not complain of this to me.   no known ill contacts. No rash. No hx tachycardia.         PCP: Jay Jay        Review of Systems   Constitutional: Negative for fever.   HENT: Positive for congestion.    Respiratory: Positive for cough and shortness of breath.    Cardiovascular: Positive for chest pain.   Gastrointestinal: Negative.    Musculoskeletal: Positive for myalgias.       Past Medical History:   Diagnosis Date   • Arthritis    • Asthma    • Chronic deep vein thrombosis (DVT)    • Lymphedema of both lower extremities        Allergies   Allergen Reactions   • Sulfa Antibiotics Unknown (See Comments)       Past Surgical History:   Procedure Laterality Date   • APPENDECTOMY  1974   • CARDIAC CATHETERIZATION N/A 2/28/2020    Procedure: Left ventriculography;  Surgeon: Kenney Reyez MD;  Location: [facility] CATH INVASIVE LOCATION;  Service: Cardiovascular;  Laterality: N/A;   • CARDIAC CATHETERIZATION N/A 2/28/2020    Procedure: Coronary angiography;  Surgeon: Kenney Reyez MD;  Location: [facility] CATH INVASIVE LOCATION;  Service: Cardiovascular;  Laterality: N/A;   • CARDIAC CATHETERIZATION N/A 2/28/2020    Procedure: Left Heart Cath;  Surgeon: Kenney Reyez MD;  Location: [facility] CATH INVASIVE LOCATION;  Service: Cardiovascular;  Laterality: N/A;   • COLONOSCOPY N/A 11/15/2022    " Procedure: COLONOSCOPY with polypectomy;  Surgeon: AMY Garcia MD;  Location: [facility] ENDOSCOPY;  Service: Gastroenterology;  Laterality: N/A;  post: diverticulosis, ascending polyp x2, transverse polyp x3, descending polyp x4, rectal polyp x1, hemorroids   • ENDOSCOPY N/A 11/15/2022    Procedure: ESOPHAGOGASTRODUODENOSCOPY with biopsy;  Surgeon: AMY Garcia MD;  Location: [facility] ENDOSCOPY;  Service: Gastroenterology;  Laterality: N/A;  post: esophagitis with biopsy to rule out candida , gastric biopsy   • KNEE SURGERY Left 2018       Family History   Problem Relation Age of Onset   • Heart disease Mother    • Diabetes Mother    • Arthritis Father    • Aortic aneurysm Father        Social History     Socioeconomic History   • Marital status: Single   Tobacco Use   • Smoking status: Former     Packs/day: 0.50     Years: 5.00     Pack years: 2.50     Types: Cigarettes     Start date:      Quit date:      Years since quittin.3   • Smokeless tobacco: Never   Vaping Use   • Vaping Use: Never used   Substance and Sexual Activity   • Alcohol use: Never   • Drug use: Never   • Sexual activity: Defer           Objective   Physical Exam     Vital signs and triage nurse note reviewed.   Constitutional: Awake, alert; BMI over 45  HEENT: Normocephalic, atraumatic;   with intact EOM; oropharynx is pink and dry without exudate or erythema. Poor dentition  Neck: Supple, full range of motion without pain;     Cardiovascular: Tachycardic regular rate and rhythm, normal S1-S2.   Pulmonary: Respiratory effort regular mildly labored and tachypneic without retractions, breath sounds diminished bilateral lower lobes   Abdomen: Soft, nontender nondistended with normoactive bowel sounds; no rebound or guarding.   Musculoskeletal: Independent range of motion of all extremities; lymphedema with chronic skin discoloration over the bilateral lower extremities left leg is bigger than the right leg   Neuro: Alert oriented  x3, speech is clear and appropriate, GCS 15   Skin:  Fleshtone warm, dry,      Procedures           ED Course  ED Course as of 04/16/23 2319   Sun Apr 16, 2023 2030 Waiting for patient to go to CT   [JW]   2148 Waiting for CT PE. Denies CP at this time. [JW]   2215 [place] called CT to inquire about CT read   [JW]   2226 Still waiting on CT read   [JW]   2311 Spoke with Dr. Pedraza who wants to eval patient before bed request placed.  [JW]      ED Course User Index  [JW] Amada Benavides, APRN      Labs Reviewed   COMPREHENSIVE METABOLIC PANEL - Abnormal; Notable for the following components:       Result Value    Glucose 114 (*)     AST (SGOT) 63 (*)     Alkaline Phosphatase 142 (*)     Total Bilirubin 1.3 (*)     All other components within normal limits    Narrative:     GFR Normal >60  Chronic Kidney Disease <60  Kidney Failure <15     PROTIME-INR - Abnormal; Notable for the following components:    Protime 12.2 (*)     INR 1.20 (*)     All other components within normal limits   APTT - Abnormal; Notable for the following components:    PTT 25.7 (*)     All other components within normal limits   URINALYSIS W/ MICROSCOPIC IF INDICATED (NO CULTURE) - Abnormal; Notable for the following components:    Blood, UA Moderate (2+) (*)     All other components within normal limits   TROPONIN - Abnormal; Notable for the following components:    HS Troponin T 15 (*)     All other components within normal limits    Narrative:     High Sensitive Troponin T Reference Range:  <10.0 ng/L- Negative Female for AMI  <15.0 ng/L- Negative Male for AMI  >=10 - Abnormal Female indicating possible myocardial injury.  >=15 - Abnormal Male indicating possible myocardial injury.   Clinicians would have to utilize clinical acumen, EKG, Troponin, and serial changes to determine if it is an Acute Myocardial Infarction or myocardial injury due to an underlying chronic condition.        CBC WITH AUTO DIFFERENTIAL - Abnormal; Notable for  "the following components:    RBC 3.90 (*)     .3 (*)     MCH 34.6 (*)     RDW-SD 55.1 (*)     Platelets 85 (*)     Neutrophil % 87.0 (*)     Lymphocyte % 9.0 (*)     Monocyte % 2.9 (*)     Lymphocytes, Absolute 0.30 (*)     All other components within normal limits   D-DIMER, QUANTITATIVE - Abnormal; Notable for the following components:    D-Dimer, Quantitative 2.27 (*)     All other components within normal limits    Narrative:     According to the assay 's published package insert, a normal (<0.50 mg/L (FEU)) D-dimer result in conjunction with a non-high clinical probability assessment, excludes deep vein thrombosis (DVT) and pulmonary embolism (PE) with high sensitivity.    D-dimer values increase with age and this can make VTE exclusion of an older population difficult. To address this, the [employer], based on best available evidence and recent guidelines, recommends that clinicians use age-adjusted D-dimer thresholds in patients greater than 50 years of age with: a) a low probability of PE who do not meet all Pulmonary Embolism Rule Out Criteria, or b) in those with intermediate probability of PE.   The formula for an age-adjusted D-dimer cut-off is \"age/100\".  For example, a 60 year old patient would have an age-adjusted cut-off of 0.60 mg/L (FEU) and an 80 year old 0.80 mg/L (FEU).   URINALYSIS, MICROSCOPIC ONLY - Abnormal; Notable for the following components:    RBC, UA 6-12 (*)     WBC, UA 0-2 (*)     All other components within normal limits   HIGH SENSITIVITIY TROPONIN T 2HR - Abnormal; Notable for the following components:    HS Troponin T 21 (*)     Troponin T Delta 6 (*)     All other components within normal limits    Narrative:     High Sensitive Troponin T Reference Range:  <10.0 ng/L- Negative Female for AMI  <15.0 ng/L- Negative Male for AMI  >=10 - Abnormal Female indicating possible myocardial injury.  >=15 - Abnormal Male indicating possible myocardial " injury.   Clinicians would have to utilize clinical acumen, EKG, Troponin, and serial changes to determine if it is an Acute Myocardial Infarction or myocardial injury due to an underlying chronic condition.        RESPIRATORY PANEL PCR W/ COVID-19 (SARS-COV-2) EAMON/CINTHIA/ELMO/PAD/COR/MAD/MARTIN IN-HOUSE, NP SWAB IN [place], 3-4 HR TAT - Normal    Narrative:     In the setting of a positive respiratory panel with a viral infection PLUS a negative procalcitonin without other underlying concern for bacterial infection, consider observing off antibiotics or discontinuation of antibiotics and continue supportive care. If the respiratory panel is positive for atypical bacterial infection (Bordetella pertussis, Chlamydophila pneumoniae, or Mycoplasma pneumoniae), consider antibiotic de-escalation to target atypical bacterial infection.   BNP (IN-HOUSE) - Normal    Narrative:     Among patients with dyspnea, NT-proBNP is highly sensitive for the detection of acute congestive heart failure. In addition NT-proBNP of <300 pg/ml effectively rules out acute congestive heart failure with 99% negative predictive value.     AMMONIA - Normal   POCT CREATININE - Normal   BLOOD CULTURE   BLOOD CULTURE   CBC AND DIFFERENTIAL    Narrative:     The following orders were created for panel order CBC & Differential.  Procedure                               Abnormality         Status                     ---------                               -----------         ------                     CBC Auto Differential[[id number]]        Abnormal            Final result                 Please view results for these tests on the individual orders.   EXTRA TUBES    Narrative:     The following orders were created for panel order Extra Tubes.  Procedure                               Abnormality         Status                     ---------                               -----------         ------                     Gold Top - SST[[id number]]                                    Final result                 Please view results for these tests on the individual orders.   GOLD TOP - SST     Medications   cefTRIAXone (ROCEPHIN) 2 g in sodium chloride 0.9 % 100 mL IVPB (has no administration in time range)   Enoxaparin Sodium (LOVENOX) syringe 30 mg (has no administration in time range)   HYDROcodone-acetaminophen (NORCO) 5-325 MG per tablet 1 tablet (1 tablet Oral Given 4/16/23 2016)   ondansetron ODT (ZOFRAN-ODT) disintegrating tablet 4 mg (4 mg Oral Given 4/16/23 2001)   sodium chloride 0.9 % bolus 500 mL (0 mL Intravenous Stopped 4/16/23 2100)   iopamidol (ISOVUE-370) 76 % injection 100 mL (100 mL Intravenous Given 4/16/23 2058)   aspirin chewable tablet 324 mg (324 mg Oral Given 4/16/23 2308)     CT Angiogram Chest Pulmonary Embolism    Result Date: 4/16/2023  1.  No evidence of pulmonary embolus to the lobar level. Segmental and subsegmental branches obscured. 2.  Cirrhosis and stigmata of portal hypertension. Recommend routine screening for HCC. 3.  Minor periportal lymphadenopathy, nonspecific in the setting of chronic liver disease. Electronically signed by:  Mayelin Morelos M.D.  4/16/2023 8:31 PM Mountain Time    XR Chest AP    Result Date: 4/16/2023  Impression: Cardiomegaly. No active disease. Electronically Signed: Jaret Donovan  4/16/2023 7:01 PM EDT  Workstation ID: [id number]    Prior to Admission medications    Medication Sig Start Date End Date Taking? Authorizing Provider   furosemide (Lasix) 20 MG tablet Take 1 tablet by mouth Daily. Take daily or PRN 1/30/23   Kush Lees MD   lisinopril (PRINIVIL,ZESTRIL) 5 MG tablet  2/16/23   ProviderRolly MD   spironolactone (ALDACTONE) 25 MG tablet Take 1 tablet by mouth 2 (Two) Times a Day for 30 days. 1/30/23 3/8/23  Kush Lees MD   tamsulosin (FLOMAX) 0.4 MG capsule 24 hr capsule Take 1 capsule by mouth Daily.    ProviderRolly MD                                            Medical Decision  "Making  Chart review: 12/2019: echo: Right atrial enlargement.  Left ventricle is normal in size and contractility with ejection fraction of 60%.  Structurally and functionally normal cardiac valves.  No pericardial effusion or intracardiac thrombus.    2/2020: heart cath: SUMMARY:  Left ventricle size and contractility is normal with ejection fraction of 60%.  Left main coronary artery is normal.  Left anterior descending artery is normal.  Circumflex coronary arteries normal.  Right coronary artery is a large and dominant vessel and is normal.    3/8/2023: Dr. Bradford office note-chronic DVT of the left leg, liver cirrhosis, persistent unchanged thrombocytopenia          BP 92/49   Pulse 115   Temp 99.2 °F (37.3 °C) (Oral)   Resp 20   Ht 185.4 cm (73\")   Wt (!) 162 kg (357 lb)   SpO2 93%   BMI 47.10 kg/m²         Radiology interpretation:  X-rays reviewed independently and interpreted by me:  Further interpretation by radiologist as above  Lab interpretation:  Labs all viewed by me and significant for, initial HS trop 15 increase to 21 with delta of 6; dimer 2.27, nh4 56, elevated LFTs at baseline    EKG viewed and interpreted by me and corroborated by Dr. Dean, sinus tachycardia rate of 130    comparison: 1/25/2023 sinus tachycardia rate of 121 PAC            Appropriate PPE worn during exam.  Patient had labs chest x-ray and subsequent CT PE obtained to evaluate for infection sepsis pneumonia PE.  He was given Norco for his reported back pain and leg pain.    Review of records from his office visit last month show heart rate of 76 and he has remained tachycardic from 115-135 throughout his ER evaluation.  He was placed on a 2 L nasal cannula for his tachypnea and room air sats of 92%.  Some delay in care due to difficulty obtaining labs and radiology reading on CT PE.  On reexam he continues to report some sharp intermittent chest pain as well as shortness of breath while at rest.  He notably had a delta " change of 6 on his high-sensitivity troponin and with tachycardic could not be ruled out for ACS per [employer] protocol.  Discussed with dr. Ch and patient will be admitted for further evaluation, blood cultures are pending.  Dr. Pedraza to evaluate patient for hospitalist service.        Dyspnea, unspecified type: acute illness or injury  Tachycardia: acute illness or injury  Amount and/or Complexity of Data Reviewed  Labs: ordered.  Radiology: ordered.  ECG/medicine tests: ordered.      Risk  OTC drugs.  Prescription drug management.  Decision regarding hospitalization.          Final diagnoses:   Dyspnea, unspecified type   Tachycardia   Chest pain, unspecified type       ED Disposition  ED Disposition     ED Disposition   Decision to Admit    Condition   --    Comment   --             No follow-up provider specified.       Medication List      No changes were made to your prescriptions during this visit.          Amada Benavides, APRN  04/16/23 5574

## 2023-04-16 NOTE — Clinical Note
Level of Care: Telemetry [5]   Admitting Physician: SHILPI DELGADO [[id number]]   Attending Physician: SHILPI DELGADO [[id number]]

## 2023-04-17 ENCOUNTER — APPOINTMENT (OUTPATIENT)
Dept: NUCLEAR MEDICINE | Facility: HOSPITAL | Age: 58
End: 2023-04-17
Payer: MEDICARE

## 2023-04-17 ENCOUNTER — APPOINTMENT (OUTPATIENT)
Dept: CARDIOLOGY | Facility: HOSPITAL | Age: 58
End: 2023-04-17
Payer: MEDICARE

## 2023-04-17 LAB
ANION GAP SERPL CALCULATED.3IONS-SCNC: 10 MMOL/L (ref 5–15)
BACTERIA UR QL AUTO: ABNORMAL /HPF
BH CV ECHO MEAS - ACS: 1.9 CM
BH CV ECHO MEAS - AO MAX PG: 16.8 MMHG
BH CV ECHO MEAS - AO MEAN PG: 11.7 MMHG
BH CV ECHO MEAS - AO ROOT DIAM: 3.6 CM
BH CV ECHO MEAS - AO V2 MAX: 202.3 CM/SEC
BH CV ECHO MEAS - AO V2 VTI: 37.2 CM
BH CV ECHO MEAS - AVA(I,D): 2.24 CM2
BH CV ECHO MEAS - EDV(CUBED): 150.5 ML
BH CV ECHO MEAS - EDV(MOD-SP4): 165.4 ML
BH CV ECHO MEAS - EF(MOD-BP): 58 %
BH CV ECHO MEAS - EF(MOD-SP4): 58.3 %
BH CV ECHO MEAS - ESV(CUBED): 68.5 ML
BH CV ECHO MEAS - ESV(MOD-SP4): 69 ML
BH CV ECHO MEAS - FS: 23.1 %
BH CV ECHO MEAS - IVS/LVPW: 0.89 CM
BH CV ECHO MEAS - IVSD: 1.04 CM
BH CV ECHO MEAS - LA DIMENSION: 4.4 CM
BH CV ECHO MEAS - LV DIASTOLIC VOL/BSA (35-75): 60.1 CM2
BH CV ECHO MEAS - LV MASS(C)D: 229.2 GRAMS
BH CV ECHO MEAS - LV MAX PG: 4.4 MMHG
BH CV ECHO MEAS - LV MEAN PG: 2.8 MMHG
BH CV ECHO MEAS - LV SYSTOLIC VOL/BSA (12-30): 25.1 CM2
BH CV ECHO MEAS - LV V1 MAX: 105 CM/SEC
BH CV ECHO MEAS - LV V1 VTI: 25.8 CM
BH CV ECHO MEAS - LVIDD: 5.3 CM
BH CV ECHO MEAS - LVIDS: 4.1 CM
BH CV ECHO MEAS - LVOT AREA: 3.2 CM2
BH CV ECHO MEAS - LVOT DIAM: 2.03 CM
BH CV ECHO MEAS - LVPWD: 1.16 CM
BH CV ECHO MEAS - MR MAX PG: 76.6 MMHG
BH CV ECHO MEAS - MR MAX VEL: 437.6 CM/SEC
BH CV ECHO MEAS - MV A MAX VEL: 97.2 CM/SEC
BH CV ECHO MEAS - MV DEC SLOPE: 394.9 CM/SEC2
BH CV ECHO MEAS - MV DEC TIME: 0.24 MSEC
BH CV ECHO MEAS - MV E MAX VEL: 92.9 CM/SEC
BH CV ECHO MEAS - MV E/A: 0.96
BH CV ECHO MEAS - MV MAX PG: 5.8 MMHG
BH CV ECHO MEAS - MV MEAN PG: 2.9 MMHG
BH CV ECHO MEAS - MV V2 VTI: 27.2 CM
BH CV ECHO MEAS - MVA(VTI): 3.1 CM2
BH CV ECHO MEAS - PA ACC TIME: 0.12 SEC
BH CV ECHO MEAS - PA PR(ACCEL): 25.1 MMHG
BH CV ECHO MEAS - PA V2 MAX: 159.3 CM/SEC
BH CV ECHO MEAS - PULM A REVS DUR: 0.09 SEC
BH CV ECHO MEAS - PULM A REVS VEL: 39.1 CM/SEC
BH CV ECHO MEAS - PULM DIAS VEL: 44.4 CM/SEC
BH CV ECHO MEAS - PULM S/D: 1.24
BH CV ECHO MEAS - PULM SYS VEL: 55 CM/SEC
BH CV ECHO MEAS - RAP SYSTOLE: 8 MMHG
BH CV ECHO MEAS - RV MAX PG: 7.2 MMHG
BH CV ECHO MEAS - RV V1 MAX: 134 CM/SEC
BH CV ECHO MEAS - RV V1 VTI: 26.5 CM
BH CV ECHO MEAS - RVDD: 3.8 CM
BH CV ECHO MEAS - RVSP: 42.8 MMHG
BH CV ECHO MEAS - SI(MOD-SP4): 35 ML/M2
BH CV ECHO MEAS - SV(LVOT): 83.4 ML
BH CV ECHO MEAS - SV(MOD-SP4): 96.4 ML
BH CV ECHO MEAS - TR MAX PG: 34.8 MMHG
BH CV ECHO MEAS - TR MAX VEL: 291.5 CM/SEC
BH CV LOWER VASCULAR LEFT COMMON FEMORAL AUGMENT: NORMAL
BH CV LOWER VASCULAR LEFT COMMON FEMORAL COMPETENT: NORMAL
BH CV LOWER VASCULAR LEFT COMMON FEMORAL COMPRESS: NORMAL
BH CV LOWER VASCULAR LEFT COMMON FEMORAL PHASIC: NORMAL
BH CV LOWER VASCULAR LEFT COMMON FEMORAL SPONT: NORMAL
BH CV LOWER VASCULAR LEFT DISTAL FEMORAL COMPRESS: NORMAL
BH CV LOWER VASCULAR LEFT GASTRONEMIUS COMPRESS: NORMAL
BH CV LOWER VASCULAR LEFT GREATER SAPH AK COMPRESS: NORMAL
BH CV LOWER VASCULAR LEFT GREATER SAPH BK COMPRESS: NORMAL
BH CV LOWER VASCULAR LEFT LESSER SAPH COMPRESS: NORMAL
BH CV LOWER VASCULAR LEFT MID FEMORAL AUGMENT: NORMAL
BH CV LOWER VASCULAR LEFT MID FEMORAL COMPETENT: NORMAL
BH CV LOWER VASCULAR LEFT MID FEMORAL COMPRESS: NORMAL
BH CV LOWER VASCULAR LEFT MID FEMORAL PHASIC: NORMAL
BH CV LOWER VASCULAR LEFT MID FEMORAL SPONT: NORMAL
BH CV LOWER VASCULAR LEFT PERONEAL COMPRESS: NORMAL
BH CV LOWER VASCULAR LEFT POPLITEAL AUGMENT: NORMAL
BH CV LOWER VASCULAR LEFT POPLITEAL COMPETENT: NORMAL
BH CV LOWER VASCULAR LEFT POPLITEAL COMPRESS: NORMAL
BH CV LOWER VASCULAR LEFT POPLITEAL PHASIC: NORMAL
BH CV LOWER VASCULAR LEFT POPLITEAL SPONT: NORMAL
BH CV LOWER VASCULAR LEFT POSTERIOR TIBIAL COMPRESS: NORMAL
BH CV LOWER VASCULAR LEFT PROXIMAL FEMORAL COMPRESS: NORMAL
BH CV LOWER VASCULAR LEFT SAPHENOFEMORAL JUNCTION COMPRESS: NORMAL
BH CV LOWER VASCULAR RIGHT COMMON FEMORAL AUGMENT: NORMAL
BH CV LOWER VASCULAR RIGHT COMMON FEMORAL COMPETENT: NORMAL
BH CV LOWER VASCULAR RIGHT COMMON FEMORAL COMPRESS: NORMAL
BH CV LOWER VASCULAR RIGHT COMMON FEMORAL PHASIC: NORMAL
BH CV LOWER VASCULAR RIGHT COMMON FEMORAL SPONT: NORMAL
BH CV LOWER VASCULAR RIGHT DISTAL FEMORAL COMPRESS: NORMAL
BH CV LOWER VASCULAR RIGHT GASTRONEMIUS COMPRESS: NORMAL
BH CV LOWER VASCULAR RIGHT GREATER SAPH AK COMPRESS: NORMAL
BH CV LOWER VASCULAR RIGHT GREATER SAPH BK COMPRESS: NORMAL
BH CV LOWER VASCULAR RIGHT LESSER SAPH COMPRESS: NORMAL
BH CV LOWER VASCULAR RIGHT MID FEMORAL AUGMENT: NORMAL
BH CV LOWER VASCULAR RIGHT MID FEMORAL COMPETENT: NORMAL
BH CV LOWER VASCULAR RIGHT MID FEMORAL COMPRESS: NORMAL
BH CV LOWER VASCULAR RIGHT MID FEMORAL PHASIC: NORMAL
BH CV LOWER VASCULAR RIGHT MID FEMORAL SPONT: NORMAL
BH CV LOWER VASCULAR RIGHT PERONEAL COMPRESS: NORMAL
BH CV LOWER VASCULAR RIGHT POPLITEAL AUGMENT: NORMAL
BH CV LOWER VASCULAR RIGHT POPLITEAL COMPETENT: NORMAL
BH CV LOWER VASCULAR RIGHT POPLITEAL COMPRESS: NORMAL
BH CV LOWER VASCULAR RIGHT POPLITEAL PHASIC: NORMAL
BH CV LOWER VASCULAR RIGHT POPLITEAL SPONT: NORMAL
BH CV LOWER VASCULAR RIGHT POSTERIOR TIBIAL COMPRESS: NORMAL
BH CV LOWER VASCULAR RIGHT PROXIMAL FEMORAL COMPRESS: NORMAL
BH CV LOWER VASCULAR RIGHT SAPHENOFEMORAL JUNCTION COMPRESS: NORMAL
BH CV NUCLEAR PRIOR STUDY: 2
BH CV REST NUCLEAR ISOTOPE DOSE: 11 MCI
BH CV STRESS BP STAGE 1: NORMAL
BH CV STRESS BP STAGE 2: NORMAL
BH CV STRESS BP STAGE 3: NORMAL
BH CV STRESS BP STAGE 4: NORMAL
BH CV STRESS COMMENTS STAGE 1: NORMAL
BH CV STRESS DOSE REGADENOSON STAGE 1: 0.4
BH CV STRESS DURATION MIN STAGE 1: 1
BH CV STRESS DURATION MIN STAGE 2: 1
BH CV STRESS DURATION MIN STAGE 3: 1
BH CV STRESS DURATION MIN STAGE 4: 1
BH CV STRESS DURATION SEC STAGE 2: 0
BH CV STRESS HR STAGE 1: 105
BH CV STRESS HR STAGE 2: 102
BH CV STRESS HR STAGE 3: 104
BH CV STRESS HR STAGE 4: 101
BH CV STRESS NUCLEAR ISOTOPE DOSE: 33 MCI
BH CV STRESS PROTOCOL 1: NORMAL
BH CV STRESS RECOVERY BP: NORMAL MMHG
BH CV STRESS RECOVERY HR: 103 BPM
BH CV STRESS STAGE 1: 1
BH CV STRESS STAGE 2: 2
BH CV STRESS STAGE 3: 3
BH CV STRESS STAGE 4: 4
BILIRUB UR QL STRIP: ABNORMAL
BUN SERPL-MCNC: 19 MG/DL (ref 6–20)
BUN/CREAT SERPL: 21.3 (ref 7–25)
CALCIUM SPEC-SCNC: 8.5 MG/DL (ref 8.6–10.5)
CHLORIDE SERPL-SCNC: 103 MMOL/L (ref 98–107)
CHOLEST SERPL-MCNC: 114 MG/DL (ref 0–200)
CLARITY UR: CLEAR
CO2 SERPL-SCNC: 22 MMOL/L (ref 22–29)
COLOR UR: ABNORMAL
CREAT SERPL-MCNC: 0.89 MG/DL (ref 0.76–1.27)
DEPRECATED RDW RBC AUTO: 52.1 FL (ref 37–54)
EGFRCR SERPLBLD CKD-EPI 2021: 99.3 ML/MIN/1.73
ERYTHROCYTE [DISTWIDTH] IN BLOOD BY AUTOMATED COUNT: 14.5 % (ref 12.3–15.4)
GLUCOSE SERPL-MCNC: 91 MG/DL (ref 65–99)
GLUCOSE UR STRIP-MCNC: NEGATIVE MG/DL
HBA1C MFR BLD: 5.2 % (ref 4.8–5.6)
HCT VFR BLD AUTO: 35.9 % (ref 37.5–51)
HDLC SERPL-MCNC: 41 MG/DL (ref 40–60)
HGB BLD-MCNC: 11.8 G/DL (ref 13–17.7)
HGB UR QL STRIP.AUTO: ABNORMAL
HYALINE CASTS UR QL AUTO: ABNORMAL /LPF
KETONES UR QL STRIP: NEGATIVE
LDLC SERPL CALC-MCNC: 56 MG/DL (ref 0–100)
LDLC/HDLC SERPL: 1.36 {RATIO}
LEUKOCYTE ESTERASE UR QL STRIP.AUTO: ABNORMAL
MAXIMAL PREDICTED HEART RATE: 162 BPM
MCH RBC QN AUTO: 34.1 PG (ref 26.6–33)
MCHC RBC AUTO-ENTMCNC: 32.8 G/DL (ref 31.5–35.7)
MCV RBC AUTO: 104 FL (ref 79–97)
NITRITE UR QL STRIP: POSITIVE
PERCENT MAX PREDICTED HR: 67.28 %
PH UR STRIP.AUTO: 6.5 [PH] (ref 5–8)
PLATELET # BLD AUTO: 66 10*3/MM3 (ref 140–450)
PMV BLD AUTO: 8.6 FL (ref 6–12)
POTASSIUM SERPL-SCNC: 5.2 MMOL/L (ref 3.5–5.2)
PROT UR QL STRIP: ABNORMAL
RBC # BLD AUTO: 3.45 10*6/MM3 (ref 4.14–5.8)
RBC # UR STRIP: ABNORMAL /HPF
REF LAB TEST METHOD: ABNORMAL
SODIUM SERPL-SCNC: 135 MMOL/L (ref 136–145)
SP GR UR STRIP: 1.03 (ref 1–1.03)
SQUAMOUS #/AREA URNS HPF: ABNORMAL /HPF
STRESS BASELINE BP: NORMAL MMHG
STRESS BASELINE HR: 97 BPM
STRESS PERCENT HR: 79 %
STRESS POST PEAK BP: NORMAL MMHG
STRESS POST PEAK HR: 109 BPM
STRESS TARGET HR: 138 BPM
TRIGL SERPL-MCNC: 87 MG/DL (ref 0–150)
TROPONIN T SERPL HS-MCNC: 18 NG/L
TROPONIN T SERPL HS-MCNC: 23 NG/L
TSH SERPL DL<=0.05 MIU/L-ACNC: 0.84 UIU/ML (ref 0.27–4.2)
UROBILINOGEN UR QL STRIP: ABNORMAL
VLDLC SERPL-MCNC: 17 MG/DL (ref 5–40)
WBC # UR STRIP: ABNORMAL /HPF
WBC NRBC COR # BLD: 4.4 10*3/MM3 (ref 3.4–10.8)

## 2023-04-17 PROCEDURE — G0378 HOSPITAL OBSERVATION PER HR: HCPCS

## 2023-04-17 PROCEDURE — 25010000002 REGADENOSON 0.4 MG/5ML SOLUTION: Performed by: HOSPITALIST

## 2023-04-17 PROCEDURE — 80048 BASIC METABOLIC PNL TOTAL CA: CPT | Performed by: STUDENT IN AN ORGANIZED HEALTH CARE EDUCATION/TRAINING PROGRAM

## 2023-04-17 PROCEDURE — 93970 EXTREMITY STUDY: CPT

## 2023-04-17 PROCEDURE — 93306 TTE W/DOPPLER COMPLETE: CPT

## 2023-04-17 PROCEDURE — 85027 COMPLETE CBC AUTOMATED: CPT | Performed by: STUDENT IN AN ORGANIZED HEALTH CARE EDUCATION/TRAINING PROGRAM

## 2023-04-17 PROCEDURE — 96372 THER/PROPH/DIAG INJ SC/IM: CPT

## 2023-04-17 PROCEDURE — 81001 URINALYSIS AUTO W/SCOPE: CPT | Performed by: HOSPITALIST

## 2023-04-17 PROCEDURE — A9502 TC99M TETROFOSMIN: HCPCS | Performed by: HOSPITALIST

## 2023-04-17 PROCEDURE — 84484 ASSAY OF TROPONIN QUANT: CPT | Performed by: STUDENT IN AN ORGANIZED HEALTH CARE EDUCATION/TRAINING PROGRAM

## 2023-04-17 PROCEDURE — 97166 OT EVAL MOD COMPLEX 45 MIN: CPT

## 2023-04-17 PROCEDURE — 25010000002 SULFUR HEXAFLUORIDE MICROSPH 60.7-25 MG RECONSTITUTED SUSPENSION: Performed by: HOSPITALIST

## 2023-04-17 PROCEDURE — 83036 HEMOGLOBIN GLYCOSYLATED A1C: CPT | Performed by: STUDENT IN AN ORGANIZED HEALTH CARE EDUCATION/TRAINING PROGRAM

## 2023-04-17 PROCEDURE — 94799 UNLISTED PULMONARY SVC/PX: CPT

## 2023-04-17 PROCEDURE — A9502 TC99M TETROFOSMIN: HCPCS | Performed by: STUDENT IN AN ORGANIZED HEALTH CARE EDUCATION/TRAINING PROGRAM

## 2023-04-17 PROCEDURE — 94640 AIRWAY INHALATION TREATMENT: CPT

## 2023-04-17 PROCEDURE — 93017 CV STRESS TEST TRACING ONLY: CPT

## 2023-04-17 PROCEDURE — 94664 DEMO&/EVAL PT USE INHALER: CPT

## 2023-04-17 PROCEDURE — 78452 HT MUSCLE IMAGE SPECT MULT: CPT

## 2023-04-17 PROCEDURE — 0 TECHNETIUM TETROFOSMIN KIT: Performed by: HOSPITALIST

## 2023-04-17 PROCEDURE — 0 TECHNETIUM TETROFOSMIN KIT: Performed by: STUDENT IN AN ORGANIZED HEALTH CARE EDUCATION/TRAINING PROGRAM

## 2023-04-17 PROCEDURE — 97162 PT EVAL MOD COMPLEX 30 MIN: CPT

## 2023-04-17 PROCEDURE — 25010000002 ENOXAPARIN PER 10 MG: Performed by: STUDENT IN AN ORGANIZED HEALTH CARE EDUCATION/TRAINING PROGRAM

## 2023-04-17 RX ORDER — IPRATROPIUM BROMIDE AND ALBUTEROL SULFATE 2.5; .5 MG/3ML; MG/3ML
3 SOLUTION RESPIRATORY (INHALATION) EVERY 6 HOURS PRN
Status: DISCONTINUED | OUTPATIENT
Start: 2023-04-17 | End: 2023-04-18 | Stop reason: HOSPADM

## 2023-04-17 RX ORDER — SPIRONOLACTONE 25 MG/1
50 TABLET ORAL DAILY
COMMUNITY

## 2023-04-17 RX ADMIN — REGADENOSON 0.4 MG: 0.08 INJECTION, SOLUTION INTRAVENOUS at 08:49

## 2023-04-17 RX ADMIN — Medication 10 ML: at 21:20

## 2023-04-17 RX ADMIN — TETROFOSMIN 1 DOSE: 1.38 INJECTION, POWDER, LYOPHILIZED, FOR SOLUTION INTRAVENOUS at 06:53

## 2023-04-17 RX ADMIN — Medication 10 ML: at 09:00

## 2023-04-17 RX ADMIN — ENOXAPARIN SODIUM 40 MG: 100 INJECTION SUBCUTANEOUS at 11:27

## 2023-04-17 RX ADMIN — TAMSULOSIN HYDROCHLORIDE 0.4 MG: 0.4 CAPSULE ORAL at 11:27

## 2023-04-17 RX ADMIN — ENOXAPARIN SODIUM 40 MG: 100 INJECTION SUBCUTANEOUS at 21:19

## 2023-04-17 RX ADMIN — IPRATROPIUM BROMIDE AND ALBUTEROL SULFATE 3 ML: 2.5; .5 SOLUTION RESPIRATORY (INHALATION) at 12:10

## 2023-04-17 RX ADMIN — SULFUR HEXAFLUORIDE 3 ML: KIT at 11:21

## 2023-04-17 RX ADMIN — TETROFOSMIN 1 DOSE: 1.38 INJECTION, POWDER, LYOPHILIZED, FOR SOLUTION INTRAVENOUS at 08:49

## 2023-04-17 RX ADMIN — IPRATROPIUM BROMIDE AND ALBUTEROL SULFATE 3 ML: 2.5; .5 SOLUTION RESPIRATORY (INHALATION) at 07:06
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
## 2023-04-17 NOTE — DISCHARGE PLACEMENT REQUEST
"Josemanuel Randhawa Jr. (57 y.o. Male)     Date of Birth   1965    Social Security Number       Address   [address] [place] [address] [place] [address]    Home Phone   [phone]    MRN   [id number]       [employer]   None    Marital Status   Single                            Admission Date   4/16/23    Admission Type   Emergency    Admitting Provider   Tami Pedraza DO    Attending Provider   Mele Devine MD    Department, Room/Bed   [facility] EMERGENCY DEPARTMENT, 08/08       Discharge Date       Discharge Disposition       Discharge Destination                               Attending Provider: Mele Devine MD    Allergies: Sulfa Antibiotics    Isolation: None   Infection: None   Code Status: CPR    Ht: 185.4 cm (73\")   Wt: 162 kg (357 lb)    Admission Cmt: None   Principal Problem: Chest pain due to coronary artery disease [I25.119]                 Active Insurance as of 4/16/2023     Primary Coverage     Payor Plan Insurance Group Employer/Plan Group    HUMANA MEDICARE REPLACEMENT HUMANA MEDICARE REPLACEMENT [id number]     Payor Plan Address Payor Plan Phone Number Payor Plan Fax Number Effective Dates    [address] [address] [phone]  12/1/2019 - None Entered    [facility] [id number]       Subscriber Name Subscriber Birth Date Member ID       JOSEMANUEL RANDHAWA JR. 1965 [id number]           Secondary Coverage     Payor Plan Insurance Group Employer/Plan Group    [id number]      Payor Plan Address Payor Plan Phone Number Payor Plan Fax Number Effective Dates    [address]   1/4/2023 - None Entered    [place] [address]       Subscriber Name Subscriber Birth Date Member ID       JOSEMANUEL RANDHAWA JR. 1965 [id number]                 Emergency Contacts      (Rel.) Home Phone Work Phone Mobile Phone    EdisSajan (Brother) -- -- [phone]               History & Physical      Tami Pedraza DO at 04/16/23 2321      "         [facility] Hospital Medicine Services      Patient Name: Robert Randhawa Jr.  : 1965  MRN: [id number]  Primary Care Physician:  Alfredo Torres MD  Date of admission: 2023      Subjective       Chief Complaint: chest pain    History of Present Illness: Robert Randhawa Jr. is a 57 y.o. male who presented to [facility] on 2023 complaining of chest pain.  Admits to non-radiating substernal chest pain at rest that is worse with coughing of sneezing ongoing for the past three days complicated by chills, dyspnea with exertion, and worsening n/l LE lymphedema.  Denies fevers, vomiting, diarrhea, numbness. States due to worsening lymphedema and chronic conditions he is having difficulty with ADLs. Presented to [facility] for evaluation.    In the Ed, vitals 99.2, , RR 20, /53, 93% Ra.  Labs notable for HS troponin 15, glucose 114, AST 63, tbili1.3, d-dimer 2.27, INR 1.2.  CT PE without evidence of PE but does show cirrhosis and stigmata or portal HTN with minor periportal lymphadenopathy.  EKG with sinus tachycardia but not GISSELL/STI/TWI.  He is to be admitted for cardiac evaluation.      ROS   12 point ROS reviewed and negative except as mentioned above      Personal History     Past Medical History:   Diagnosis Date   • Arthritis    • Asthma    • Chronic deep vein thrombosis (DVT)    • Lymphedema of both lower extremities        Past Surgical History:   Procedure Laterality Date   • APPENDECTOMY     • CARDIAC CATHETERIZATION N/A 2020    Procedure: Left ventriculography;  Surgeon: Kenney Reyez MD;  Location: [facility] INVASIVE LOCATION;  Service: Cardiovascular;  Laterality: N/A;   • CARDIAC CATHETERIZATION N/A 2020    Procedure: Coronary angiography;  Surgeon: Kenney Reyez MD;  Location: [facility] INVASIVE LOCATION;  Service: Cardiovascular;  Laterality: N/A;   • CARDIAC CATHETERIZATION N/A 2020    Procedure: Left Heart Cath;  Surgeon:  Kenney Reyez MD;  Location: [facility] CATH INVASIVE LOCATION;  Service: Cardiovascular;  Laterality: N/A;   • COLONOSCOPY N/A 11/15/2022    Procedure: COLONOSCOPY with polypectomy;  Surgeon: AMY Garcia MD;  Location: [facility] ENDOSCOPY;  Service: Gastroenterology;  Laterality: N/A;  post: diverticulosis, ascending polyp x2, transverse polyp x3, descending polyp x4, rectal polyp x1, hemorroids   • ENDOSCOPY N/A 11/15/2022    Procedure: ESOPHAGOGASTRODUODENOSCOPY with biopsy;  Surgeon: AMY Garcia MD;  Location: [facility] ENDOSCOPY;  Service: Gastroenterology;  Laterality: N/A;  post: esophagitis with biopsy to rule out candida , gastric biopsy   • KNEE SURGERY Left 2018       Family History: family history includes Aortic aneurysm in his father; Arthritis in his father; Diabetes in his mother; Heart disease in his mother. Otherwise pertinent FHx was reviewed and not pertinent to current issue.    Social History:  reports that he quit smoking about 40 years ago. His smoking use included cigarettes. He started smoking about 45 years ago. He has a 2.50 pack-year smoking history. He has never used smokeless tobacco. He reports that he does not drink alcohol and does not use drugs.    Home Medications:  Prior to Admission Medications     Prescriptions Last Dose Informant Patient Reported? Taking?    furosemide (Lasix) 20 MG tablet   No No    Take 1 tablet by mouth Daily. Take daily or PRN    lisinopril (PRINIVIL,ZESTRIL) 5 MG tablet   Yes No    spironolactone (ALDACTONE) 25 MG tablet   No No    Take 1 tablet by mouth 2 (Two) Times a Day for 30 days.    tamsulosin (FLOMAX) 0.4 MG capsule 24 hr capsule   Yes No    Take 1 capsule by mouth Daily.            Allergies:  Allergies   Allergen Reactions   • Sulfa Antibiotics Unknown (See Comments)       Objective       Vitals:   Temp:  [99.2 °F (37.3 °C)] 99.2 °F (37.3 °C)  Heart Rate:  [115-135] 115  Resp:  [20-22] 20  BP: ()/(49-60) 92/49    Physical  Exam  Constitutional:       General: He is not in acute distress.     Appearance: He is obese.   HENT:      Head: Normocephalic and atraumatic.      Nose: Nose normal. No congestion.      Mouth/Throat:      Pharynx: Oropharynx is clear. No oropharyngeal exudate.   Eyes:      General: No scleral icterus.  Cardiovascular:      Rate and Rhythm: Normal rate and regular rhythm.      Heart sounds: No murmur heard.    No friction rub. No gallop.   Pulmonary:      Effort: No respiratory distress.      Breath sounds: No wheezing or rales.      Comments: Patient on room air  Abdominal:      General: There is distension.      Palpations: There is no mass.      Tenderness: There is no guarding.   Musculoskeletal:         General: Swelling and tenderness present.      Cervical back: Normal range of motion. No rigidity.      Comments: Extensive b/l LE lymphedema and b/l pedal lymphedema   Skin:     Coloration: Skin is not jaundiced.      Findings: No bruising or lesion.      Comments: B/l venous stasis   Neurological:      General: No focal deficit present.      Mental Status: He is alert and oriented to person, place, and time.      Motor: Weakness present.          Result Review    Result Review:  I have personally reviewed the results from the time of this admission to 4/16/2023 23:21 EDT and agree with these findings:  [x]  Laboratory  []  Microbiology  [x]  Radiology  []  EKG/Telemetry   []  Cardiology/Vascular   []  Pathology  []  Old records  []  Other:        Assessment & Plan        Active Hospital Problems:  There are no active hospital problems to display for this patient.    Plan:     #Chest pain    - HS troponin 15, trend    - EKG shows sinus tachycardia without GISSELL/STD/TWI    - pain sounds pleuritic in nature, but due to multiple comorbidities will rule out cardiac etiology    - stress myoview    - consult cardiology due to chest pain with tachycardia and multiple comorbidities    - CT PE without PE    - due to  d-dimer of 2.27, will check venous duplex    - hold aspirin due to thrombocytopenia    - hold statin due to cirrhosis    - lipid panel/a1c/tsh    #Asthma    - Duoneb QID    - patient on room air    #Cirrhosis    - CT PE without evidence of PE but does show cirrhosis and stigmata or portal HTN with minor periportal lymphadenopathy    - MELD 9    - follows with GI as otpt    - resume home Lasix    #Lymphadema    - lymphedema of both LE and pedal lymphedema worsening per pt    - chronic, worsening per pt    - proBNP 75.8    - wound care to evaluate    - pt/ot    - has otpt appointment with vascular set up    #Thrombocytopenia    - plt 85, appears near base    - follows with Heme/onc as otpt    #BPH    -flomax    DVT prophylaxis: Lovenox, monitor for toxicity and HIT    CODE STATUS:       Admission Status:  I believe this patient meets observation status.    I discussed the patient's findings and my recommendations with patient.    This patient has been examined wearing appropriate Personal Protective Equipment and discussed with Patient. 04/16/23      Signature: Electronically signed by Tami Pedraza DO, 04/16/23, 11:34 PM EDT.      Electronically signed by Tami Pedraza DO at 04/16/23 2350       Physician Progress Notes (most recent note)    No notes of this type exist for this encounter.            Consult Notes (most recent note)      Kenney Reyez MD at 04/17/23 0626            Referring Provider: Tami Pedraza DO  Reason for Consultation:  Chest pain    Patient Care Team:  Alfredo Torres MD as PCP - General  Alfredo Torres MD as PCP - Family Medicine  Kenney Reyez MD as Consulting Physician (Cardiology)  Lionel Lynn MD as Consulting Physician (Hematology and Oncology)    Chief complaint  Chest pain    Subjective .     History of present illness:  Robert Randhawa Jr. is a 57 y.o. male who presents with history of chest pain which is mostly located in the left precordial area sharp in  nature and increases with coughing.  No radiation of chest discomfort into the neck or into the arms.  Last few days patient has been having coughing sneezing and chills and dyspnea.  Patient has increased lower extremity chronic edema.  No other associated aggravating or elevating factors.  Patient had CT scan of the chest which was negative for pulmonary embolus but positive for cirrhosis with pulmonary hypertension and periportal lymphadenopathy.  Cardiology consultation was requested.  No other associated aggravating or alleviating factors.          ROS      Patient is not shortness of breath, palpitations, dizziness or syncope.  Denies having any headache, abdominal pain, nausea, vomiting, diarrhea, constipation, loss of weight or loss of appetite.  Denies having any excessive bruising, hematuria or blood in the stool.    Review of all systems negative except as indicated      History  Past Medical History:   Diagnosis Date   • Arthritis    • Asthma    • Chronic deep vein thrombosis (DVT)    • Lymphedema of both lower extremities        Past Surgical History:   Procedure Laterality Date   • APPENDECTOMY  1974   • CARDIAC CATHETERIZATION N/A 2/28/2020    Procedure: Left ventriculography;  Surgeon: Kenney Reyez MD;  Location: [facility] CATH INVASIVE LOCATION;  Service: Cardiovascular;  Laterality: N/A;   • CARDIAC CATHETERIZATION N/A 2/28/2020    Procedure: Coronary angiography;  Surgeon: Kenney Reyez MD;  Location: [facility] CATH INVASIVE LOCATION;  Service: Cardiovascular;  Laterality: N/A;   • CARDIAC CATHETERIZATION N/A 2/28/2020    Procedure: Left Heart Cath;  Surgeon: Kenney Reyez MD;  Location: [facility] CATH INVASIVE LOCATION;  Service: Cardiovascular;  Laterality: N/A;   • COLONOSCOPY N/A 11/15/2022    Procedure: COLONOSCOPY with polypectomy;  Surgeon: AMY Garcia MD;  Location: [facility] ENDOSCOPY;  Service: Gastroenterology;  Laterality: N/A;  post: diverticulosis, ascending polyp x2,  "transverse polyp x3, descending polyp x4, rectal polyp x1, hemorroids   • ENDOSCOPY N/A 11/15/2022    Procedure: ESOPHAGOGASTRODUODENOSCOPY with biopsy;  Surgeon: AMY Garcia MD;  Location: [facility] ENDOSCOPY;  Service: Gastroenterology;  Laterality: N/A;  post: esophagitis with biopsy to rule out candida , gastric biopsy   • KNEE SURGERY Left 2018       Family History   Problem Relation Age of Onset   • Heart disease Mother    • Diabetes Mother    • Arthritis Father    • Aortic aneurysm Father        Social History     Tobacco Use   • Smoking status: Former     Packs/day: 0.50     Years: 5.00     Pack years: 2.50     Types: Cigarettes     Start date:      Quit date:      Years since quittin.3   • Smokeless tobacco: Never   Vaping Use   • Vaping Use: Never used   Substance Use Topics   • Alcohol use: Never   • Drug use: Never        (Not in a hospital admission)        Sulfa antibiotics    Scheduled Meds:enoxaparin, 40 mg, Subcutaneous, Q12H  furosemide, 20 mg, Oral, Daily  ipratropium-albuterol, 3 mL, Nebulization, 4x Daily - RT  sodium chloride, 10 mL, Intravenous, Q12H  tamsulosin, 0.4 mg, Oral, Daily      Continuous Infusions:Pharmacy to Dose enoxaparin (LOVENOX),       PRN Meds:.•  nitroglycerin  •  ondansetron **OR** ondansetron  •  Pharmacy to Dose enoxaparin (LOVENOX)  •  sodium chloride  •  sodium chloride    Objective     VITAL SIGNS  Vitals:    23 0001 23 0239 23 0400 23 0539   BP: 100/67 92/55 102/61 94/56   BP Location:  Left arm     Patient Position:  Lying     Pulse: 105 97 96 101   Resp: 20 20 20    Temp:       TempSrc:       SpO2: 92% 95% 95% 95%   Weight:       Height:           Flowsheet Rows    Flowsheet Row First Filed Value   Admission Height 185.4 cm (73\") Documented at 2023   Admission Weight 162 kg (357 lb) Documented at 2023            Intake/Output Summary (Last 24 hours) at 2023 0626  Last data filed at 2023 " 0559  Gross per 24 hour   Intake --   Output 250 ml   Net -250 ml        TELEMETRY: Sinus rhythm    Physical Exam:  The patient is alert, oriented and in no distress.  Vital signs as noted above.  Head and neck revealed no carotid bruits or jugular venous distention.  No thyromegaly or lymphadenopathy is present  Lungs clear.  No wheezing.  Breath sounds are normal bilaterally.  Heart normal first and second heart sounds. No murmur.  No precordial rub is present.  No gallop is present.  Abdomen soft and nontender.  No organomegaly is present.  Extremities with good peripheral pulses.  Significant lower extremity chronic edema and lymphedema.  Skin warm and dry.  Musculoskeletal system is grossly normal  CNS grossly normal.    Reviewed and updated.      Results Review:   I reviewed the patient's new clinical results.  Lab Results (last 24 hours)     Procedure Component Value Units Date/Time    High Sensitivity Troponin T [[id number]]  (Abnormal) Collected: 04/17/23 0514    Specimen: Blood Updated: 04/17/23 0544     HS Troponin T 18 ng/L     Narrative:      High Sensitive Troponin T Reference Range:  <10.0 ng/L- Negative Female for AMI  <15.0 ng/L- Negative Male for AMI  >=10 - Abnormal Female indicating possible myocardial injury.  >=15 - Abnormal Male indicating possible myocardial injury.   Clinicians would have to utilize clinical acumen, EKG, Troponin, and serial changes to determine if it is an Acute Myocardial Infarction or myocardial injury due to an underlying chronic condition.         Basic Metabolic Panel [[id number]]  (Abnormal) Collected: 04/17/23 0514    Specimen: Blood Updated: 04/17/23 0544     Glucose 91 mg/dL      BUN 19 mg/dL      Creatinine 0.89 mg/dL      Sodium 135 mmol/L      Potassium 5.2 mmol/L      Comment: Slight hemolysis detected by analyzer. Results may be affected.        Chloride 103 mmol/L      CO2 22.0 mmol/L      Calcium 8.5 mg/dL      BUN/Creatinine Ratio 21.3     Anion Gap 10.0  mmol/L      eGFR 99.3 mL/min/1.73     Narrative:      GFR Normal >60  Chronic Kidney Disease <60  Kidney Failure <15      Hemoglobin A1c [[id number]]  (Normal) Collected: 04/17/23 0514    Specimen: Blood Updated: 04/17/23 0543     Hemoglobin A1C 5.20 %     CBC (No Diff) [[id number]]  (Abnormal) Collected: 04/17/23 0514    Specimen: Blood Updated: 04/17/23 0522     WBC 4.40 10*3/mm3      RBC 3.45 10*6/mm3      Hemoglobin 11.8 g/dL      Hematocrit 35.9 %      .0 fL      MCH 34.1 pg      MCHC 32.8 g/dL      RDW 14.5 %      RDW-SD 52.1 fl      MPV 8.6 fL      Platelets 66 10*3/mm3     High Sensitivity Troponin T [[id number]]  (Abnormal) Collected: 04/17/23 0235    Specimen: Blood Updated: 04/17/23 0257     HS Troponin T 23 ng/L     Narrative:      High Sensitive Troponin T Reference Range:  <10.0 ng/L- Negative Female for AMI  <15.0 ng/L- Negative Male for AMI  >=10 - Abnormal Female indicating possible myocardial injury.  >=15 - Abnormal Male indicating possible myocardial injury.   Clinicians would have to utilize clinical acumen, EKG, Troponin, and serial changes to determine if it is an Acute Myocardial Infarction or myocardial injury due to an underlying chronic condition.         TSH [[id number]]  (Normal) Collected: 04/16/23 1909    Specimen: Blood from Arm, Left Updated: 04/17/23 0020     TSH 0.842 uIU/mL     Lipid Panel [[id number]] Collected: 04/16/23 1909    Specimen: Blood from Arm, Left Updated: 04/17/23 0014     Total Cholesterol 114 mg/dL      Triglycerides 87 mg/dL      HDL Cholesterol 41 mg/dL      LDL Cholesterol  56 mg/dL      VLDL Cholesterol 17 mg/dL      LDL/HDL Ratio 1.36    Narrative:      Cholesterol Reference Ranges  (U.S. Department of Health and Human Services ATP III Classifications)    Desirable          <200 mg/dL  Borderline High    200-239 mg/dL  High Risk          >240 mg/dL      Triglyceride Reference Ranges  (U.S. Department of Health and Human Services ATP III  Classifications)    Normal           <150 mg/dL  Borderline High  150-199 mg/dL  High             200-499 mg/dL  Very High        >500 mg/dL    HDL Reference Ranges  (U.S. Department of Health and Human Services ATP III Classifications)    Low     <40 mg/dl (major risk factor for CHD)  High    >60 mg/dl ('negative' risk factor for CHD)        LDL Reference Ranges  (U.S. Department of Health and Human Services ATP III Classifications)    Optimal          <100 mg/dL  Near Optimal     100-129 mg/dL  Borderline High  130-159 mg/dL  High             160-189 mg/dL  Very High        >189 mg/dL    Blood Culture - Blood, Arm, Right [[id number]] Collected: 04/16/23 2301    Specimen: Blood from Arm, Right Updated: 04/16/23 2317    Blood Culture - Blood, Arm, Left [[id number]] Collected: 04/16/23 2306    Specimen: Blood from Arm, Left Updated: 04/16/23 2317    High Sensitivity Troponin T 2Hr [[id number]]  (Abnormal) Collected: 04/16/23 1727    Specimen: Blood Updated: 04/16/23 2041     HS Troponin T 21 ng/L      Troponin T Delta 6 ng/L     Narrative:      High Sensitive Troponin T Reference Range:  <10.0 ng/L- Negative Female for AMI  <15.0 ng/L- Negative Male for AMI  >=10 - Abnormal Female indicating possible myocardial injury.  >=15 - Abnormal Male indicating possible myocardial injury.   Clinicians would have to utilize clinical acumen, EKG, Troponin, and serial changes to determine if it is an Acute Myocardial Infarction or myocardial injury due to an underlying chronic condition.         Extra Tubes [[id number]] Collected: 04/16/23 1909    Specimen: Blood from Arm, Left Updated: 04/16/23 2016    Narrative:      The following orders were created for panel order Extra Tubes.  Procedure                               Abnormality         Status                     ---------                               -----------         ------                     Gold Top - [facility][[id number]]                                   Final result                  Please view results for these tests on the individual orders.    Gold Top - SST [[id number]] Collected: 04/16/23 1909    Specimen: Blood from Arm, Left Updated: 04/16/23 2016     Extra Tube Hold for add-ons.     Comment: Auto resulted.       POC Creatinine [[id number]]  (Normal) Collected: 04/16/23 2003    Specimen: Venous Blood Updated: 04/16/23 2005     Creatinine 0.90 mg/dL      Comment: Serial Number: [id number]:  [id number]        eGFR 99.0 mL/min/1.73     Comprehensive Metabolic Panel [[id number]]  (Abnormal) Collected: 04/16/23 1909    Specimen: Blood from Arm, Left Updated: 04/16/23 1953     Glucose 114 mg/dL      BUN 16 mg/dL      Creatinine 0.82 mg/dL      Sodium 138 mmol/L      Potassium 4.2 mmol/L      Comment: Slight hemolysis detected by analyzer. Results may be affected.        Chloride 102 mmol/L      CO2 25.0 mmol/L      Calcium 9.1 mg/dL      Total Protein 7.9 g/dL      Albumin 3.5 g/dL      ALT (SGPT) 29 U/L      AST (SGOT) 63 U/L      Alkaline Phosphatase 142 U/L      Total Bilirubin 1.3 mg/dL      Globulin 4.4 gm/dL      A/G Ratio 0.8 g/dL      BUN/Creatinine Ratio 19.5     Anion Gap 11.0 mmol/L      eGFR 101.8 mL/min/1.73     Narrative:      GFR Normal >60  Chronic Kidney Disease <60  Kidney Failure <15      BNP [[id number]]  (Normal) Collected: 04/16/23 1909    Specimen: Blood from Arm, Left Updated: 04/16/23 1953     proBNP 75.8 pg/mL     Narrative:      Among patients with dyspnea, NT-proBNP is highly sensitive for the detection of acute congestive heart failure. In addition NT-proBNP of <300 pg/ml effectively rules out acute congestive heart failure with 99% negative predictive value.      High Sensitivity Troponin T [[id number]]  (Abnormal) Collected: 04/16/23 1909    Specimen: Blood from Arm, Left Updated: 04/16/23 1953     HS Troponin T 15 ng/L     Narrative:      High Sensitive Troponin T Reference Range:  <10.0 ng/L- Negative Female for AMI  <15.0 ng/L- Negative Male for AMI  >=10 -  Abnormal Female indicating possible myocardial injury.  >=15 - Abnormal Male indicating possible myocardial injury.   Clinicians would have to utilize clinical acumen, EKG, Troponin, and serial changes to determine if it is an Acute Myocardial Infarction or myocardial injury due to an underlying chronic condition.         Respiratory Panel PCR w/COVID-19(SARS-CoV-2) EAMON/CINTHIA/ELMO/PAD/COR/MAD/MARTIN In-House, NP Swab in UTM/VTM, 3-4 HR TAT - Swab, Nasopharynx [[id number]]  (Normal) Collected: 04/16/23 1850    Specimen: Swab from Nasopharynx Updated: 04/16/23 1952     ADENOVIRUS, PCR Not Detected     Coronavirus 229E Not Detected     Coronavirus HKU1 Not Detected     Coronavirus NL63 Not Detected     Coronavirus OC43 Not Detected     COVID19 Not Detected     Human Metapneumovirus Not Detected     Human Rhinovirus/Enterovirus Not Detected     Influenza A PCR Not Detected     Influenza B PCR Not Detected     Parainfluenza Virus 1 Not Detected     Parainfluenza Virus 2 Not Detected     Parainfluenza Virus 3 Not Detected     Parainfluenza Virus 4 Not Detected     RSV, PCR Not Detected     Bordetella pertussis pcr Not Detected     Bordetella parapertussis PCR Not Detected     Chlamydophila pneumoniae PCR Not Detected     Mycoplasma pneumo by PCR Not Detected    Narrative:      In the setting of a positive respiratory panel with a viral infection PLUS a negative procalcitonin without other underlying concern for bacterial infection, consider observing off antibiotics or discontinuation of antibiotics and continue supportive care. If the respiratory panel is positive for atypical bacterial infection (Bordetella pertussis, Chlamydophila pneumoniae, or Mycoplasma pneumoniae), consider antibiotic de-escalation to target atypical bacterial infection.    Ammonia [[id number]]  (Normal) Collected: 04/16/23 1909    Specimen: Blood from Arm, Left Updated: 04/16/23 1938     Ammonia 56 umol/L     aPTT [[id number]]  (Abnormal) Collected:  "04/16/23 1909    Specimen: Blood from Arm, Left Updated: 04/16/23 1932     PTT 25.7 seconds     Protime-INR [[id number]]  (Abnormal) Collected: 04/16/23 1909    Specimen: Blood from Arm, Left Updated: 04/16/23 1932     Protime 12.2 Seconds      INR 1.20    D-dimer, Quantitative [[id number]]  (Abnormal) Collected: 04/16/23 1909    Specimen: Blood from Arm, Left Updated: 04/16/23 1932     D-Dimer, Quantitative 2.27 mg/L (FEU)     Narrative:      According to the assay 's published package insert, a normal (<0.50 mg/L (FEU)) D-dimer result in conjunction with a non-high clinical probability assessment, excludes deep vein thrombosis (DVT) and pulmonary embolism (PE) with high sensitivity.    D-dimer values increase with age and this can make VTE exclusion of an older population difficult. To address this, the [employer], based on best available evidence and recent guidelines, recommends that clinicians use age-adjusted D-dimer thresholds in patients greater than 50 years of age with: a) a low probability of PE who do not meet all Pulmonary Embolism Rule Out Criteria, or b) in those with intermediate probability of PE.   The formula for an age-adjusted D-dimer cut-off is \"age/100\".  For example, a 60 year old patient would have an age-adjusted cut-off of 0.60 mg/L (FEU) and an 80 year old 0.80 mg/L (FEU).    CBC & Differential [[id number]]  (Abnormal) Collected: 04/16/23 1909    Specimen: Blood from Arm, Left Updated: 04/16/23 1921    Narrative:      The following orders were created for panel order CBC & Differential.  Procedure                               Abnormality         Status                     ---------                               -----------         ------                     CBC Auto Differential[[id number]]        Abnormal            Final result                 Please view results for these tests on the individual orders.    CBC Auto Differential [[id number]]  (Abnormal) " Collected: 04/16/23 1909    Specimen: Blood from Arm, Left Updated: 04/16/23 1921     WBC 3.80 10*3/mm3      RBC 3.90 10*6/mm3      Hemoglobin 13.5 g/dL      Hematocrit 39.9 %      .3 fL      MCH 34.6 pg      MCHC 33.8 g/dL      RDW 14.7 %      RDW-SD 55.1 fl      MPV 8.4 fL      Platelets 85 10*3/mm3      Neutrophil % 87.0 %      Lymphocyte % 9.0 %      Monocyte % 2.9 %      Eosinophil % 0.5 %      Basophil % 0.6 %      Neutrophils, Absolute 3.30 10*3/mm3      Lymphocytes, Absolute 0.30 10*3/mm3      Monocytes, Absolute 0.10 10*3/mm3      Eosinophils, Absolute 0.00 10*3/mm3      Basophils, Absolute 0.00 10*3/mm3      nRBC 0.1 /100 WBC     Urinalysis With Microscopic If Indicated (No Culture) - Urine, Clean Catch [[id number]]  (Abnormal) Collected: 04/16/23 1851    Specimen: Urine, Clean Catch Updated: 04/16/23 1905     Color, UA Yellow     Appearance, UA Clear     pH, UA <=5.0     Specific Gravity, UA 1.012     Glucose, UA Negative     Ketones, UA Negative     Bilirubin, UA Negative     Blood, UA Moderate (2+)     Protein, UA Negative     Leuk Esterase, UA Negative     Nitrite, UA Negative     Urobilinogen, UA 1.0 E.U./dL    Urinalysis, Microscopic Only - Urine, Clean Catch [[id number]]  (Abnormal) Collected: 04/16/23 1851    Specimen: Urine, Clean Catch Updated: 04/16/23 1905     RBC, UA 6-12 /HPF      WBC, UA 0-2 /HPF      Bacteria, UA None Seen /HPF      Squamous Epithelial Cells, UA 0-2 /HPF      Hyaline Casts, UA None Seen /LPF      Methodology Automated Microscopy          Imaging Results (Last 24 Hours)     Procedure Component Value Units Date/Time    CT Angiogram Chest Pulmonary Embolism [[id number]] Collected: 04/16/23 1918     Updated: 04/16/23 2233    Narrative:      EXAMINATION: CT ANGIOGRAM CHEST PULMONARY EMBOLISM      DATE:  4/16/2023 8:51 PM    INDICATION: Dyspnea and cough for three days. Positive d-dimer, PE suspected ;    COMPARISON: None.    PROCEDURE: Iodinated contrast material was  administered intravenously during pulmonary arterial phase CT chest imaging.    3-D MIP images were performed under concurrent supervision not requiring an independent workstation, in order to better assess the vasculature.    CT dose lowering techniques were used, to include: automated exposure control, adjustment for patient size, and or use of iterative reconstruction.    FINDINGS:    Pulmonary artery: Poorly opacified. No filling defect to the lobar level. Segmental and subsegmental branches obscured including due to motion artifact.    Cardiovascular:  Aorta and great vessels exhibit no aneurysm or dissection.    Mediastinum/Mikaela: No mediastinal lymphadenopathy. Enlarged thyroid gland with heterogeneity and calcifications are without distinct nodule visible..    Lungs/Pleura: Expiratory phase exam with hypoventilatory changes and motion artifact limiting evaluation. There are a few scattered small thin-walled cysts. No infiltrates or masses are identified. No effusion, pleural mass, thickening or pneumothorax.    Chest wall and Axilla: Normal.    Bones:  Unremarkable.    Upper abdomen: Cirrhosis. Moderate splenomegaly, partly imaged. Moderate portosystemic collateral engorgement including esophageal varices. Minor periportal lymphadenopathy..    3-D images corroborate 2-D findings.      Impression:        1.  No evidence of pulmonary embolus to the lobar level. Segmental and subsegmental branches obscured.  2.  Cirrhosis and stigmata of portal hypertension. Recommend routine screening for HCC.  3.  Minor periportal lymphadenopathy, nonspecific in the setting of chronic liver disease.          Electronically signed by:  Mayelin Morelos M.D.    4/16/2023 8:31 PM Mountain Time    XR Chest AP [[id number]] Collected: 04/16/23 1900     Updated: 04/16/23 1904    Narrative:      XR CHEST AP    Date of Exam: 4/16/2023 6:42 PM EDT    Indication: COVID Evaluation, Cough, Fever.    Comparison: January 25,  2023    Findings:  The heart is enlarged. The pulmonary vascular markings are within normal limits. There are no focal consolidations. Chronic mild interstitial changes with scarring is unchanged. There are degenerative changes of the spine.      Impression:      Impression:  Cardiomegaly. No active disease.    Electronically Signed: Jaret Donovan    4/16/2023 7:01 PM EDT    Workstation ID: [id number]      LAB RESULTS (LAST 7 DAYS)    CBC  Results from last 7 days   Lab Units 04/17/23 0514 04/16/23 1909   WBC 10*3/mm3 4.40 3.80   RBC 10*6/mm3 3.45* 3.90*   HEMOGLOBIN g/dL 11.8* 13.5   HEMATOCRIT % 35.9* 39.9   MCV fL 104.0* 102.3*   PLATELETS 10*3/mm3 66* 85*       BMP  Results from last 7 days   Lab Units 04/17/23 0514 04/16/23 2003 04/16/23  1909   SODIUM mmol/L 135*  --  138   POTASSIUM mmol/L 5.2  --  4.2   CHLORIDE mmol/L 103  --  102   CO2 mmol/L 22.0  --  25.0   BUN mg/dL 19  --  16   CREATININE mg/dL 0.89 0.90 0.82   GLUCOSE mg/dL 91  --  114*       CMP   Results from last 7 days   Lab Units 04/17/23 0514 04/16/23 2003 04/16/23  1909   SODIUM mmol/L 135*  --  138   POTASSIUM mmol/L 5.2  --  4.2   CHLORIDE mmol/L 103  --  102   CO2 mmol/L 22.0  --  25.0   BUN mg/dL 19  --  16   CREATININE mg/dL 0.89 0.90 0.82   GLUCOSE mg/dL 91  --  114*   ALBUMIN g/dL  --   --  3.5   BILIRUBIN mg/dL  --   --  1.3*   ALK PHOS U/L  --   --  142*   AST (SGOT) U/L  --   --  63*   ALT (SGPT) U/L  --   --  29   AMMONIA umol/L  --   --  56         BNP        TROPONIN  Results from last 7 days   Lab Units 04/17/23 0514   HSTROP T ng/L 18*       CoAg  Results from last 7 days   Lab Units 04/16/23  1909   INR  1.20*   APTT seconds 25.7*       Creatinine Clearance  Estimated Creatinine Clearance: 144.6 mL/min (by C-G formula based on SCr of 0.89 mg/dL).    ABG        Radiology  CT Angiogram Chest Pulmonary Embolism    Result Date: 4/16/2023  1.  No evidence of pulmonary embolus to the lobar level. Segmental and subsegmental branches  obscured. 2.  Cirrhosis and stigmata of portal hypertension. Recommend routine screening for HCC. 3.  Minor periportal lymphadenopathy, nonspecific in the setting of chronic liver disease. Electronically signed by:  Mayelin Morelos M.D.  4/16/2023 8:31 PM Mountain Time    XR Chest AP    Result Date: 4/16/2023  Impression: Cardiomegaly. No active disease. Electronically Signed: Jaret Donovan  4/16/2023 7:01 PM EDT  Workstation ID: [id number]        EKG            I personally viewed and interpreted the patient's EKG/Telemetry data: Sinus rhythm    ECHOCARDIOGRAM:    Results for orders placed during the hospital encounter of 01/08/20    Adult Transthoracic Echo Complete W/ Cont if Necessary Per Protocol    Interpretation Summary  · Estimated EF = 60%.  · Left ventricular systolic function is normal.    Indications  Chest discomfort    Technically satisfactory study.  Mitral valve is structurally normal.  Tricuspid valve is structurally normal.  Aortic valve is structurally normal.  Pulmonic valve could not be well visualized.  No evidence for mitral tricuspid or aortic regurgitation is seen by Doppler study.  Left atrium is normal in size.  Right atrium is enlarged.  Left ventricle is normal in size and contractility with ejection fraction of 60%.  Right ventricle is normal in size.  Atrial septum is intact.  Aorta is normal.  No pericardial effusion or intracardiac thrombus is seen.    Impression  Right atrial enlargement.  Left ventricle is normal in size and contractility with ejection fraction of 60%.  Structurally and functionally normal cardiac valves.  No pericardial effusion or intracardiac thrombus.              Cardiolite (Tc-99m sestamibi) stress test      OTHER:     Assessment & Plan     Principal Problem:    Chest pain due to coronary artery disease    ]]]]]]]]]]]]]]]]]]  Impression  ===========  Chest discomfort-atypical.  Troponin levels are negative.  EKG showed no acute changes.  CT scan of the negative  for pulmonary embolus.  Showed evidence for pulmonary hypertension hepatic cirrhosis.     Cardiac catheterization 2/28/2020 revealed normal left ventricle function and normal coronary arteries   .  echocardiogram 1/8/2020 revealed right atrium right ventricle enlargement and normal left ventricle function.  Normal cardiac valves.     Lexiscan Cardiolite test is negative for myocardial ischemia 1/8/2020 (performed at [facility] as an outpatient due to patient's weight)      -exogenous obesity     -history of chronic DVT asthma and arthritis. Eliquis has been discontinued recently.    - Chronic lower extremity edema/lymphedema.    - Cirrhosis of the liver    - Significant thrombocytopenia with platelet count of 66,000.      -status post left knee surgery  appendectomy     -former smoker     -allergic to sulfa     -family history is negative for coronary artery disease  ============  Plan  ============  Chest discomfort-atypical.  Troponin levels are negative.  EKG showed no acute changes.  CT scan of the negative for pulmonary embolus.  Showed evidence for pulmonary hypertension hepatic cirrhosis.    Echocardiogram  Stress Cardiolite test    Hepatic cirrhosis  Thrombocytopenia-66,000.  No bleeding problems.    Medications were reviewed and updated.  Further plan will depend on patient's progress.  [[[[[[[[[[[[[[[[[[        Kenney Reyez MD  04/17/23  06:26 EDT              Electronically signed by Kenney Ryeez MD at 04/17/23 1054          Physical Therapy Notes (last 24 hours)      Selene Faye PT at 04/17/23 0956  Version 1 of 1       Goal Outcome Evaluation:  Plan of Care Reviewed With: patient           Outcome Evaluation: Pt is a 58 y/o M admitted with chest pain and dyspnea, as well as LE lymphedema. Medical Hx: lymphedema BLE, arthritis, asthma, hx left TKA, morbid obesity. At baseline pt lives with his brother and the brother works, and per pt reports isn't helpful even when home. He has a niece  who is in and out. Pt reports concerning information regarding poor treatment at home, SW on board. Pt uses a rolling walker or cane for household mobility, cane primarily. He has been home from rehab for two months, and until now has managed to maintain ADL independence. Today, patient sitting EOB on arrival and c/o BLE pain.  Pt with severe BLE lymphedma, LLE worse than R with noted redness and poor overall skin integrity.  Pt requires min A to stand from EOB, min/mod Ax2 with hand held assist to ambulate ~ 10' in room.  Pt with LLE weakness and poor tolerance to WB.  Pt unable to care for self in current condition, discussed d/c plan as pt has limited support at home.  Recommdn SNF for rehab at d/c with possibility of LTC or assisted living transition in the future. Pt verbalized understanding.    Electronically signed by Selene Faye, PT at 23 1143     Selene Faye, PT at 23 0956  Version 1 of 1         Patient Name: Robert Randhawa Jr.  : 1965    MRN: [id number]                              Today's Date: 2023       Admit Date: 2023    Visit Dx:     ICD-10-CM ICD-9-CM   1. Dyspnea, unspecified type  R06.00 786.09   2. Tachycardia  R00.0 785.0   3. Chest pain, unspecified type  R07.9 786.50     Patient Active Problem List   Diagnosis   • Chest pain   • Shortness of breath   • Arthritis   • Asthma   • Deep vein thrombosis (DVT)   • Lymphedema   • Cellulitis of lower extremity   • Chest pressure   • Unstable angina   • Cellulitis of left lower extremity without foot   • Obesity, morbid   • Cellulitis of left lower extremity   • Non-pressure chronic ulcer left lower leg, limited to breakdown skin   • Cellulitis of left foot   • Venous hypertension of both lower extremities   • Onychomycosis of foot with other complication   • Sepsis   • Chest pain due to coronary artery disease     Past Medical History:   Diagnosis Date   • Arthritis    • Asthma    • Chronic deep vein thrombosis  (DVT)    • Lymphedema of both lower extremities      Past Surgical History:   Procedure Laterality Date   • APPENDECTOMY  1974   • CARDIAC CATHETERIZATION N/A 2/28/2020    Procedure: Left ventriculography;  Surgeon: Kenney Reyez MD;  Location: [facility] CATH INVASIVE LOCATION;  Service: Cardiovascular;  Laterality: N/A;   • CARDIAC CATHETERIZATION N/A 2/28/2020    Procedure: Coronary angiography;  Surgeon: Kenney Reyez MD;  Location: [facility] CATH INVASIVE LOCATION;  Service: Cardiovascular;  Laterality: N/A;   • CARDIAC CATHETERIZATION N/A 2/28/2020    Procedure: Left Heart Cath;  Surgeon: Kenney Reyez MD;  Location: [facility] CATH INVASIVE LOCATION;  Service: Cardiovascular;  Laterality: N/A;   • COLONOSCOPY N/A 11/15/2022    Procedure: COLONOSCOPY with polypectomy;  Surgeon: AMY Garcia MD;  Location: [facility] ENDOSCOPY;  Service: Gastroenterology;  Laterality: N/A;  post: diverticulosis, ascending polyp x2, transverse polyp x3, descending polyp x4, rectal polyp x1, hemorroids   • ENDOSCOPY N/A 11/15/2022    Procedure: ESOPHAGOGASTRODUODENOSCOPY with biopsy;  Surgeon: AMY Garcia MD;  Location: [facility] ENDOSCOPY;  Service: Gastroenterology;  Laterality: N/A;  post: esophagitis with biopsy to rule out candida , gastric biopsy   • KNEE SURGERY Left 2018      General Information     Row Name 04/17/23 1132          Physical Therapy Time and Intention    Document Type evaluation  -     Mode of Treatment physical therapy  -     Row Name 04/17/23 1132          General Information    Patient Profile Reviewed yes  -     Prior Level of Function independent:  short distance mobility in his home, he has a cane and walker, reports halls too narrow to use walker, normally holds to walls and furniture for support, does not drive  -     Existing Precautions/Restrictions fall  -     Barriers to Rehab medically complex;previous functional deficit  -     Row Name 04/17/23 113          Living Environment     People in Home sibling(s)  reports he lives with his brother, provides little to no help, not much other famiy around  -     Row Name 04/17/23 1132          Home Main Entrance    Number of Stairs, Main Entrance one  -     Row Name 04/17/23 1132          Stairs Within Home, Primary    Number of Stairs, Within Home, Primary none  -     Row Name 04/17/23 1132          Cognition    Orientation Status (Cognition) oriented x 4  -     Row Name 04/17/23 1132          Safety Issues, Functional Mobility    Impairments Affecting Function (Mobility) balance;endurance/activity tolerance;pain;strength;range of motion (ROM)  -           User Key  (r) = Recorded By, (t) = Taken By, (c) = Cosigned By    Initials Name Provider Type     Selene Faye PT Physical Therapist               Mobility     Row Name 04/17/23 1134          Bed Mobility    Bed Mobility sit-supine  -     Sit-Supine [place] (Bed Mobility) moderate assist (50% patient effort)  -     Assistive Device (Bed Mobility) bed rails  -     Comment, (Bed Mobility) requires A to lift LEs into bed  -     Row Name 04/17/23 1134          Sit-Stand Transfer    Sit-Stand [place] (Transfers) minimum assist (75% patient effort);2 person assist  -     Row Name 04/17/23 1134          Gait/Stairs (Locomotion)    [place] Level (Gait) minimum assist (75% patient effort);moderate assist (50% patient effort);2 person assist  -     Distance in Feet (Gait) 10' in room  -     Deviations/Abnormal Patterns (Gait) base of support, wide;gait speed decreased;festinating/shuffling  -     Comment, (Gait/Stairs) relies heavily on B hand held assist for support in standing, decreased stance time LLE due to pain  -           User Key  (r) = Recorded By, (t) = Taken By, (c) = Cosigned By    Initials Name Provider Type    Selene Hernandez, NELA Physical Therapist               Obj/Interventions     Row Name 04/17/23 1136          Range of Motion  Comprehensive    General Range of Motion bilateral upper extremity ROM WFL  -     Comment, General Range of Motion BLEs with severe lymphedema, worse on LLE which significantly limits pts ROM  -JH     Row Name 04/17/23 1136          Strength Comprehensive (MMT)    Comment, General Manual Muscle Testing (MMT) Assessment RLE 3/5, LLE 3-/5, limited by pain and lymphedema in lower leg/foot  -JH     Row Name 04/17/23 1136          Balance    Balance Assessment sitting static balance;sitting dynamic balance;standing static balance;standing dynamic balance  -     Static Sitting Balance independent  -     Dynamic Sitting Balance independent  -     Static Standing Balance minimal assist  -     Dynamic Standing Balance moderate assist;2-person assist  -     Position/Device Used, Standing Balance supported  hand held assist  -[facility] 04/17/23 1136          Sensory Assessment (Somatosensory)    Sensory Assessment (Somatosensory) --  reports decreased sensation E  -           User Key  (r) = Recorded By, (t) = Taken By, (c) = Cosigned By    Initials Name Provider Type     Selene Faye, PT Physical Therapist               Goals/Plan     Row Name 04/17/23 1142          Bed Mobility Goal 1 (PT)    Activity/Assistive Device (Bed Mobility Goal 1, PT) bed mobility activities, all  -     [place] Level/Cues Needed (Bed Mobility Goal 1, PT) modified independence  [facility]     Time Frame (Bed Mobility Goal 1, PT) 2 weeks  -JH     Row Name 04/17/23 1142          Transfer Goal 1 (PT)    Activity/Assistive Device (Transfer Goal 1, PT) sit-to-stand/stand-to-sit;bed-to-chair/chair-to-bed  -     [place] Level/Cues Needed (Transfer Goal 1, PT) modified [facility]     Time Frame (Transfer Goal 1, PT) 2 weeks  -JH     Row Name 04/17/23 1142          Gait Training Goal 1 (PT)    Activity/Assistive Device (Gait Training Goal 1, PT) gait (walking locomotion);assistive device use;walker, rolling  -      [place] Level (Gait Training Goal 1, PT) modified independence  -     Distance (Gait Training Goal 1, PT) 50'  -     Time Frame (Gait Training Goal 1, PT) 2 weeks  -     Row Name 04/17/23 1144          Therapy Assessment/Plan (PT)    Planned Therapy Interventions (PT) bed mobility training;gait training;transfer training;strengthening;stair training;ROM (range of motion);patient/family education;home exercise program  -           User Key  (r) = Recorded By, (t) = Taken By, (c) = Cosigned By    Initials Name Provider Type    Selene Hernandez, PT Physical Therapist               Clinical Impression     Row Name 04/17/23 1136          Pain Scale: FACES Pre/Post-Treatment    Pain: FACES Scale, Pretreatment 4-->hurts little more  -     Posttreatment Pain Rating 4-->hurts little more  -     Pain Location - Side/Orientation Bilateral  -     Pain Location lower  -     Pain Location - extremity  -     Row Name 04/17/23 6490          Plan of Care Review    Plan of Care Reviewed With patient  -     Outcome Evaluation Pt is a 56 y/o M admitted with chest pain and dyspnea, as well as LE lymphedema. Medical Hx: lymphedema BLE, arthritis, asthma, hx left TKA, morbid obesity. At baseline pt lives with his brother and the brother works, and per pt reports isn't helpful even when home. He has a niece who is in and out. Pt reports concerning information regarding poor treatment at home, SW on board. Pt uses a rolling walker or cane for household mobility, cane primarily. He has been home from rehab for two months, and until now has managed to maintain ADL independence. Today, patient sitting EOB on arrival and c/o BLE pain.  Pt with severe BLE lymphedma, LLE worse than R with noted redness and poor overall skin integrity.  Pt requires min A to stand from EOB, min/mod Ax2 with hand held assist to ambulate ~ 10' in room.  Pt with LLE weakness and poor tolerance to WB.  Pt unable to care for self in current  condition, discussed d/c plan as pt has limited support at home.  Recommdn SNF for rehab at d/c with possibility of LTC or assisted living transition in the future. Pt verbalized understanding.  -     Row Name 04/17/23 1137          Therapy Assessment/Plan (PT)    Rehab Potential (PT) good, to achieve stated therapy goals  -     Criteria for Skilled Interventions Met (PT) yes;skilled treatment is necessary  -     Therapy Frequency (PT) 5 times/wk  -     Row Name 04/17/23 1137          Vital Signs    O2 Delivery Pre Treatment room air  -     O2 Delivery Intra Treatment room air  -     O2 Delivery Post Treatment room air  -     Row Name 04/17/23 1137          Positioning and Restraints    Pre-Treatment Position in bed  -     Post Treatment Position bed  -     In Bed notified nsg;call light within reach;encouraged to call for assist;exit alarm on;legs elevated;with nsg  -           User Key  (r) = Recorded By, (t) = Taken By, (c) = Cosigned By    Initials Name Provider Type     Selene Faye, PT Physical Therapist               Outcome Measures     Row Name 04/17/23 1143          How much help from another person do you currently need...    Turning from your back to your side while in flat bed without using bedrails? 3  -JH     Moving from lying on back to sitting on the side of a flat bed without bedrails? 2  -JH     Moving to and from a bed to a chair (including a wheelchair)? 3  -JH     Standing up from a chair using your arms (e.g., wheelchair, bedside chair)? 3  -JH     Climbing 3-5 steps with a railing? 2  -JH     To walk in hospital room? 2  -     AM-PAC 6 Clicks Score (PT) 15  -     Highest level of mobility 4 --> Transferred to chair/commode  -     Row Name 04/17/23 1143 04/17/23 1137       Functional Assessment    Outcome Measure Options AM-PAC 6 Clicks Basic Mobility (PT)  - AM-PAC 6 Clicks Daily Activity (OT)  -          User Key  (r) = Recorded By, (t) = Taken By, (c) =  Cosigned By    Initials Name Provider Type     Selene Faye, PT Physical Therapist    Main Willams OT Occupational Therapist                             Physical Therapy Education     Title: PT OT SLP Therapies (Done)     Topic: Physical Therapy (Done)     Point: Mobility training (Done)     Learning Progress Summary           Patient Acceptance, E,TB, VU by  at 4/17/2023 1143                               User Key     Initials Effective Dates Name Provider Type Discipline     06/16/21 -  Selene Faye, PT Physical Therapist PT              PT Recommendation and Plan  Planned Therapy Interventions (PT): bed mobility training, gait training, transfer training, strengthening, stair training, ROM (range of motion), patient/family education, home exercise program  Plan of Care Reviewed With: patient  Outcome Evaluation: Pt is a 58 y/o M admitted with chest pain and dyspnea, as well as LE lymphedema. Medical Hx: lymphedema BLE, arthritis, asthma, hx left TKA, morbid obesity. At baseline pt lives with his brother and the brother works, and per pt reports isn't helpful even when home. He has a niece who is in and out. Pt reports concerning information regarding poor treatment at home, SW on board. Pt uses a rolling walker or cane for household mobility, cane primarily. He has been home from rehab for two months, and until now has managed to maintain ADL independence. Today, patient sitting EOB on arrival and c/o BLE pain.  Pt with severe BLE lymphedma, LLE worse than R with noted redness and poor overall skin integrity.  Pt requires min A to stand from EOB, min/mod Ax2 with hand held assist to ambulate ~ 10' in room.  Pt with LLE weakness and poor tolerance to WB.  Pt unable to care for self in current condition, discussed d/c plan as pt has limited support at home.  Recommdn SNF for rehab at d/c with possibility of LTC or assisted living transition in the future. Pt verbalized understanding.     Time  Calculation:    PT Charges     Row Name 23 1144             Time Calculation    Start Time 930  -      Stop Time 956  -      Time Calculation (min) 26 min  -      PT Received On 23  -      PT - Next Appointment 23  -      PT Goal Re-Cert Due Date 23  -            User Key  (r) = Recorded By, (t) = Taken By, (c) = Cosigned By    Initials Name Provider Type     Selene Faye, PT Physical Therapist              Therapy Charges for Today     Code Description Service Date Service Provider Modifiers Qty    60711069108 HC PT EVAL MOD COMPLEXITY 4 2023 Selene Faye, PT GP 1          PT G-Codes  Outcome Measure Options: AM-PAC 6 Clicks Basic Mobility (PT)  AM-PAC 6 Clicks Score (PT): 15  AM-PAC 6 Clicks Score (OT): 16  PT Discharge Summary  Anticipated Discharge Disposition (PT): skilled nursing facility    Selene Faye PT  2023      Electronically signed by Selene Faye, PT at 23 1145          Occupational Therapy Notes (last 24 hours)      Main Oshea, OT at 23 1138          Patient Name: Robert Randhawa Jr.  : 1965    MRN: [id number]                              Today's Date: 2023       Admit Date: 2023    Visit Dx:     ICD-10-CM ICD-9-CM   1. Dyspnea, unspecified type  R06.00 786.09   2. Tachycardia  R00.0 785.0   3. Chest pain, unspecified type  R07.9 786.50     Patient Active Problem List   Diagnosis   • Chest pain   • Shortness of breath   • Arthritis   • Asthma   • Deep vein thrombosis (DVT)   • Lymphedema   • Cellulitis of lower extremity   • Chest pressure   • Unstable angina   • Cellulitis of left lower extremity without foot   • Obesity, morbid   • Cellulitis of left lower extremity   • Non-pressure chronic ulcer left lower leg, limited to breakdown skin   • Cellulitis of left foot   • Venous hypertension of both lower extremities   • Onychomycosis of foot with other complication   • Sepsis   • Chest pain due to coronary artery  disease     Past Medical History:   Diagnosis Date   • Arthritis    • Asthma    • Chronic deep vein thrombosis (DVT)    • Lymphedema of both lower extremities      Past Surgical History:   Procedure Laterality Date   • APPENDECTOMY  1974   • CARDIAC CATHETERIZATION N/A 2/28/2020    Procedure: Left ventriculography;  Surgeon: Kenney Reyez MD;  Location: [facility] CATH INVASIVE LOCATION;  Service: Cardiovascular;  Laterality: N/A;   • CARDIAC CATHETERIZATION N/A 2/28/2020    Procedure: Coronary angiography;  Surgeon: Kenney Reyez MD;  Location: [facility] CATH INVASIVE LOCATION;  Service: Cardiovascular;  Laterality: N/A;   • CARDIAC CATHETERIZATION N/A 2/28/2020    Procedure: Left Heart Cath;  Surgeon: Kenney Reyez MD;  Location: [facility] CATH INVASIVE LOCATION;  Service: Cardiovascular;  Laterality: N/A;   • COLONOSCOPY N/A 11/15/2022    Procedure: COLONOSCOPY with polypectomy;  Surgeon: AMY Garcia MD;  Location: [facility] ENDOSCOPY;  Service: Gastroenterology;  Laterality: N/A;  post: diverticulosis, ascending polyp x2, transverse polyp x3, descending polyp x4, rectal polyp x1, hemorroids   • ENDOSCOPY N/A 11/15/2022    Procedure: ESOPHAGOGASTRODUODENOSCOPY with biopsy;  Surgeon: AMY Garcia MD;  Location: [facility] ENDOSCOPY;  Service: Gastroenterology;  Laterality: N/A;  post: esophagitis with biopsy to rule out candida , gastric biopsy   • KNEE SURGERY Left 2018      General Information     Row Name 04/17/23 1120          OT Time and Intention    Document Type evaluation  -LS     Mode of Treatment occupational therapy  -LS     Row Name 04/17/23 1120          General Information    Patient Profile Reviewed yes  -LS     Prior Level of Function independent:  -LS     Existing Precautions/Restrictions fall  -LS     Barriers to Rehab none identified  -LS     Row Name 04/17/23 112          Living Environment    People in Home sibling(s)  Niece is in and out. Brother nor niece is helpful to pt, concern for  possible poor treatment in home environment, SW on board  -     Row Name 04/17/23 1120          Home Main Entrance    Number of Stairs, Main Entrance one  -     Row Name 04/17/23 1120          Stairs Within Home, Primary    Number of Stairs, Within Home, Primary none  -     Row Name 04/17/23 1120          Cognition    Orientation Status (Cognition) oriented x 4  -     Row Name 04/17/23 1120          Safety Issues, Functional Mobility    Impairments Affecting Function (Mobility) balance;endurance/activity tolerance;pain;strength;range of motion (ROM)  -     Comment, Safety Issues/Impairments (Mobility) BLE severe edema  -           User Key  (r) = Recorded By, (t) = Taken By, (c) = Cosigned By    Initials Name Provider Type    Main Willams OT Occupational Therapist                 Mobility/ADL's     Row Name 04/17/23 1123          Bed Mobility    Bed Mobility sit-supine  -     Sit-Supine [place] (Bed Mobility) moderate assist (50% patient effort)  -     Assistive Device (Bed Mobility) bed rails  -     Comment, (Bed Mobility) BLEs require assistance  -     Row Name 04/17/23 1123          Transfers    Transfers sit-stand transfer  -     Row Name 04/17/23 1123          Sit-Stand Transfer    Sit-Stand [place] (Transfers) minimum assist (75% patient effort);2 person assist  -     Row Name 04/17/23 1123          Functional Mobility    Functional Mobility- Ind. Level minimum assist (75% patient effort);moderate assist (50% patient effort);2 person assist required  -     Functional Mobility- Comment 2 person handheld assist  -     Row Name 04/17/23 1123          Activities of Daily Living    BADL Assessment/Intervention lower body dressing  -     Row Name 04/17/23 1123          Lower Body Dressing Assessment/Training    [place] Level (Lower Body Dressing) maximum assist (25% patient effort)  -     Comment, (Lower Body Dressing) Unable to wear socks with current state of  BLEs with edema  -LS           User Key  (r) = Recorded By, (t) = Taken By, (c) = Cosigned By    Initials Name Provider Type    LS Main Oshea OT Occupational Therapist               Obj/Interventions     Row Name 04/17/23 1125          Sensory Assessment (Somatosensory)    Sensory Assessment Diminished sensation in B feet  -LS     Row Name 04/17/23 1125          Vision Assessment/Intervention    Visual Impairment/Limitations corrective lenses full-time  -LS     Row Name 04/17/23 1125          Range of Motion Comprehensive    General Range of Motion bilateral upper extremity ROM WFL  -LS     Comment, General Range of Motion Min limited in B shoulders  -LS     Row Name 04/17/23 1125          Strength Comprehensive (MMT)    Comment, General Manual Muscle Testing (MMT) Assessment BUEs grossly 3+/5 (shoulders within range)  -     Row Name 04/17/23 1125          Balance    Balance Assessment sitting static balance;sitting dynamic balance;standing static balance;standing dynamic balance  -LS     Static Sitting Balance independent  -LS     Dynamic Sitting Balance standby assist  -LS     Position, Sitting Balance unsupported;sitting edge of bed  -     Static Standing Balance minimal assist  -LS     Dynamic Standing Balance moderate assist;minimal assist;2-person assist  -LS     Comment, Balance Dyn standing 2 person HHA  -LS           User Key  (r) = Recorded By, (t) = Taken By, (c) = Cosigned By    Initials Name Provider Type     Main Oshea OT Occupational Therapist               Goals/Plan     Row Name 04/17/23 1136          Bed Mobility Goal 1 (OT)    Activity/Assistive Device (Bed Mobility Goal 1, OT) bed mobility activities, all  -LS     [place] Level/Cues Needed (Bed Mobility Goal 1, OT) contact guard required  -     Time Frame (Bed Mobility Goal 1, OT) long term goal (LTG);2 weeks  -     Row Name 04/17/23 1136          Transfer Goal 1 (OT)    Activity/Assistive Device (Transfer Goal 1, OT)  sit-to-stand/stand-to-sit;bed-to-chair/chair-to-bed;toilet  -LS     [place] Level/Cues Needed (Transfer Goal 1, OT) standby assist  -LS     Time Frame (Transfer Goal 1, OT) long term goal (LTG);2 weeks  -LS     Row Name 04/17/23 1136          Bathing Goal 1 (OT)    Activity/Device (Bathing Goal 1, OT) bathing skills, all  -LS     [place] Level/Cues Needed (Bathing Goal 1, OT) contact guard required  -LS     Time Frame (Bathing Goal 1, OT) long term goal (LTG);2 weeks  -LS     Row Name 04/17/23 1136          Dressing Goal 1 (OT)    Activity/Device (Dressing Goal 1, OT) dressing skills, all  -LS     [place]/Cues Needed (Dressing Goal 1, OT) contact guard required  -LS     Time Frame (Dressing Goal 1, OT) long term goal (LTG);2 weeks  -     Row Name 04/17/23 1136          Therapy Assessment/Plan (OT)    Planned Therapy Interventions (OT) activity tolerance training;BADL retraining;functional balance retraining;IADL retraining;occupation/activity based interventions;ROM/therapeutic exercise;strengthening exercise;transfer/mobility retraining  -LS           User Key  (r) = Recorded By, (t) = Taken By, (c) = Cosigned By    Initials Name Provider Type    Main Willams OT Occupational Therapist               Clinical Impression     Row Name 04/17/23 1128          Pain Assessment    Additional Documentation Pain Scale: FACES Pre/Post-Treatment (Group)  -LS     Row Name 04/17/23 1128          Pain Scale: FACES Pre/Post-Treatment    Pain: FACES Scale, Pretreatment 4-->hurts little more  -LS     Posttreatment Pain Rating 4-->hurts little more  -LS     Pain Location - Side/Orientation Bilateral  -LS     Pain Location lower  -LS     Pain Location - extremity  -LS     Row Name 04/17/23 1123          Plan of Care Review    Plan of Care Reviewed With patient  -LS     Outcome Evaluation Pt is a 56 y/o M admitted with chest pain and dyspnea, as well as LE lymphedema all combining to make ADL completion difficult.  Medical Hx: lymphedema BLE, arthritis, asthma, hx left TKA, morbid obesity. At baseline pt lives with his brother and the brother works, and per pt reports isn't helpful even when home. He has a niece who is in and out. Pt reports concerning information regarding poor treatment at home, SW on board. Pt uses a rolling walker or cane for household mobility, cane primarily. He has been home from rehab for two months, and until now has managed to maintain ADL independence. Today, patient sitting EOB on arrival. Due to swelling, unable to don socks however with current state of BLEs pt will require assistance with all lwer body self care. Min A x2 to come to standing and Min-Mod Ax2 to ambulate 10 feet. BLEs painful when standing or walking, L>R. Upon sitting EOB, Mod A to return to supine for mgmt of BLEs. Patient is below baseline and with limited assistance at home he will need SNF for rehab at d/c. Will follow.  -     Row Name 04/17/23 1128          Therapy Assessment/Plan (OT)    Rehab Potential (OT) good, to achieve stated therapy goals  -     Criteria for Skilled Therapeutic Interventions Met (OT) yes;skilled treatment is necessary  -     Therapy Frequency (OT) 3 times/wk  -     Predicted Duration of Therapy Intervention (OT) until dc  -     Row Name 04/17/23 1128          Therapy Plan Review/Discharge Plan (OT)    Anticipated Discharge Disposition (OT) skilled nursing facility  -     Row Name 04/17/23 1128          Vital Signs    O2 Delivery Pre Treatment room air  -LS     O2 Delivery Intra Treatment room air  -LS     O2 Delivery Post Treatment room air  -LS     Pre Patient Position Sitting  -LS     Intra Patient Position Standing  -LS     Post Patient Position Supine  -     Row Name 04/17/23 1128          Positioning and Restraints    Pre-Treatment Position in bed  -LS     Post Treatment Position bed  -LS     In Bed notified nsg;fowlers;call light within reach;encouraged to call for assist;exit  alarm on;legs elevated  -           User Key  (r) = Recorded By, (t) = Taken By, (c) = Cosigned By    Initials Name Provider Type    Main Willams OT Occupational Therapist               Outcome Measures     Row Name 04/17/23 1137          How much help from another is currently needed...    Putting on and taking off regular lower body clothing? 2  -LS     Bathing (including washing, rinsing, and drying) 2  -LS     Toileting (which includes using toilet bed pan or urinal) 2  -LS     Putting on and taking off regular upper body clothing 3  -LS     Taking care of personal grooming (such as brushing teeth) 3  -LS     Eating meals 4  -     AM-PAC 6 Clicks Score (OT) 16  -     Row Name 04/17/23 1137          Functional Assessment    Outcome Measure Options AM-PAC 6 Clicks Daily Activity (OT)  -           User Key  (r) = Recorded By, (t) = Taken By, (c) = Cosigned By    Initials Name Provider Type    Main Willams OT Occupational Therapist                Occupational Therapy Education     Title: PT OT SLP Therapies (Done)     Topic: Occupational Therapy (Done)     Point: ADL training (Done)     Description:   Instruct learner(s) on proper safety adaptation and remediation techniques during self care or transfers.   Instruct in proper use of assistive devices.              Learning Progress Summary           Patient Acceptance, E,TB, VU by  at 4/17/2023 1137                               User Key     Initials Effective Dates Name Provider Type Discipline     09/22/22 -  Main Oshea OT Occupational Therapist OT              OT Recommendation and Plan  Planned Therapy Interventions (OT): activity tolerance training, BADL retraining, functional balance retraining, IADL retraining, occupation/activity based interventions, ROM/therapeutic exercise, strengthening exercise, transfer/mobility retraining  Therapy Frequency (OT): 3 times/wk  Plan of Care Review  Plan of Care Reviewed With: patient  Outcome  Evaluation: Pt is a 56 y/o M admitted with chest pain and dyspnea, as well as LE lymphedema all combining to make ADL completion difficult. Medical Hx: lymphedema BLE, arthritis, asthma, hx left TKA, morbid obesity. At baseline pt lives with his brother and the brother works, and per pt reports isn't helpful even when home. He has a niece who is in and out. Pt reports concerning information regarding poor treatment at home, SW on board. Pt uses a rolling walker or cane for household mobility, cane primarily. He has been home from rehab for two months, and until now has managed to maintain ADL independence. Today, patient sitting EOB on arrival. Due to swelling, unable to don socks however with current state of BLEs pt will require assistance with all lwer body self care. Min A x2 to come to standing and Min-Mod Ax2 to ambulate 10 feet. BLEs painful when standing or walking, L>R. Upon sitting EOB, Mod A to return to supine for mgmt of BLEs. Patient is below baseline and with limited assistance at home he will need SNF for rehab at d/c. Will follow.     Time Calculation:    Time Calculation- OT     Row Name 04/17/23 1137             Time Calculation- OT    OT Start Time 0925  -LS      OT Stop Time 0956  -      OT Time Calculation (min) 31 min  -LS      OT Received On 04/17/23  -      OT - Next Appointment 04/19/23  -      OT Goal Re-Cert Due Date 05/01/23  -         Untimed Charges    OT Eval/Re-eval Minutes 31  -LS         Total Minutes    Untimed Charges Total Minutes 31  -LS       Total Minutes 31  -LS            User Key  (r) = Recorded By, (t) = Taken By, (c) = Cosigned By    Initials Name Provider Type    Main Willams OT Occupational Therapist              Therapy Charges for Today     Code Description Service Date Service Provider Modifiers Qty    26679487993 HC OT EVAL MOD COMPLEXITY 4 4/17/2023 Main Oshea OT GO 1               Main Oshea OT  4/17/2023    Electronically signed by Dayo  ELLITO Quach at 04/17/23 1138     Main Oshea OT at 04/17/23 1129        Goal Outcome Evaluation:  Plan of Care Reviewed With: patient            Pt is a 58 y/o M admitted with chest pain and dyspnea, as well as LE lymphedema all combining to make ADL completion difficult. Medical Hx: lymphedema BLE, arthritis, asthma, hx left TKA, morbid obesity. At baseline pt lives with his brother and the brother works, and per pt reports isn't helpful even when home. He has a niece who is in and out. Pt reports concerning information regarding poor treatment at home, SW on board. Pt uses a rolling walker or cane for household mobility, cane primarily. He has been home from rehab for two months, and until now has managed to maintain ADL independence. Today, patient sitting EOB on arrival. Due to swelling, unable to don socks however with current state of BLEs pt will require assistance with all lwer body self care. Min A x2 to come to standing and Min-Mod Ax2 to ambulate 10 feet. BLEs painful when standing or walking, L>R. Upon sitting EOB, Mod A to return to supine for mgmt of BLEs. Patient is below baseline and with limited assistance at home he will need SNF for rehab at d/c. Will follow.    Electronically signed by Main Oshea OT at 04/17/23 1136       Speech Language Pathology Notes (most recent note)    No notes exist for this encounter.

## 2023-04-17 NOTE — CASE MANAGEMENT/SOCIAL WORK
"Discharge Planning Assessment  [facility]     Patient Name: Robert Randhawa Jr.  MRN: [id number]  Today's Date: 4/17/2023    Admit Date: 4/16/2023    Plan: Home. Possible home health/outpt rehab/SNF.Wants [facility] if home health or outpatient rehab needed. PT/OT pending. Plans to use [facility] [place] for transport   Discharge Needs Assessment     Row Name 04/17/23 0954       Living Environment    People in Home sibling(s)    Name(s) of People in Home Lives with brothkhalida Hamilton    Current Living Arrangements home    Potentially Unsafe Housing Conditions other (see comments)  \"cluttered\"    Primary Care Provided by self    Provides Primary Care For no one, unable/limited ability to care for self    Family Caregiver if Needed other (see comments)  Pt reports brother is rarely home.States he has siblings (Alfonso & Sajan), but they don't help him much    Quality of Family Relationships other (see comments)  minimal involvement    Able to Return to Prior Arrangements yes       Resource/Environmental Concerns    Resource/Environmental Concerns none    Transportation Concerns none       Transition Planning    Patient/Family Anticipates Transition to home with help/services    Transportation Anticipated health plan transportation  Pt anticipates transport via [facility]       Discharge Needs Assessment    Equipment Currently Used at Home cane, straight;walker, rolling;other (see comments)  \"grabber\" to assist with putting on socks    Concerns to be Addressed discharge planning    Equipment Needed After Discharge none    Provided Post Acute Provider List? Yes    Post Acute Provider List Home Health;Nursing Home;Outpatient Therapy    Provided Post Acute Provider Quality & Resource List? Yes    Post Acute Provider Quality and Resource List Nursing Home;Home Health    Delivered To Patient    Method of Delivery In person    Patient's Choice of Community Agency(s) [facility] if home health or outpatient rehab needed. Undecided in SNF needed    " "Current Discharge Risk lack of support system/caregiver               Discharge Plan     Row Name 04/17/23 0958       Plan    Plan Home. Possible home health/outpt rehab/SNF.Wants [facility] if home health or outpatient rehab needed. PT/OT pending. Plans to use [facility] at [place] for transport    Patient/Family in Agreement with Plan yes    Plan Comments Pt reports he lives with brother and is IADLs \"as best as I can\".He reports brother is not there \"about 99% of the time\".Pt reports he makes his own meals,and cleans his home \"as best I can,but it is cluttered and I get no help\". He uses [facility] or brother Asjan provides transportation for him.He confirms pcp and requests to be enrolled in meds to bed (updated in Epic). [facility] for [facility] waiver services (home health aid/attendant; meals to go,transportation). Referral made. He is agreeable to services if needed at discharge,and wants [facility] if home health or outpatient rehab needed. He is unsure where he would want to go if SNF needed. DC Barriers: PT/OT evals pending; myoview & echo pending; cardiology consult pending    Row Name 04/17/23 0909       Plan    Plan Comments Attempted to see patient for dc planning, but patient is currently out of his room for testing. Not case managed at this time.              Continued Care and Services - Admitted Since 4/16/2023    Coordination has not been started for this encounter.       Expected Discharge Date and Time     Expected Discharge Date Expected Discharge Time    Apr 18, 2023          Demographic Summary     Row Name 04/17/23 0952       General Information    Admission Type observation    Arrived From home    Required Notices Provided Observation Status Notice    Referral Source admission list;hospital clinician/department    Reason for Consult discharge planning    Preferred Language English       Contact Information    Permission Granted to Share Info With case " manager;facility ;other (see comments)  [employer]               Functional Status     Row Name 04/17/23 0953       Functional Status    Usual Activity Tolerance moderate    Current Activity Tolerance moderate       Functional Status, IADL    Medications independent    Meal Preparation independent    Housekeeping independent    Laundry independent    Shopping assistive person                   Patient Forms     Row Name 04/17/23 0829       Patient Forms    Important Message from Medicare ([facility]) --  PELAEZ 4/17/23 per registration    Patient Observation Letter Delivered  PELAEZ 4/17/23 per registration              Selene Ayala RN, [facility]  Office: [phone]  Fax: [phone]  [email or web address]      I met with patient in room wearing PPE: mask and goggles.     Maintained distance greater than six feet and spent </=15 minutes in the room      Selene Ayala RN

## 2023-04-17 NOTE — CONSULTS
Referring Provider: Tami Pedraza DO  Reason for Consultation:  Chest pain    Patient Care Team:  Alfredo Torres MD as PCP - General  Alfredo Torres MD as PCP - Family Medicine  Kenney Reyez MD as Consulting Physician (Cardiology)  Lionel Lynn MD as Consulting Physician (Hematology and Oncology)    Chief complaint  Chest pain    Subjective .     History of present illness:  Robert Randhawa Jr. is a 57 y.o. male who presents with history of chest pain which is mostly located in the left precordial area sharp in nature and increases with coughing.  No radiation of chest discomfort into the neck or into the arms.  Last few days patient has been having coughing sneezing and chills and dyspnea.  Patient has increased lower extremity chronic edema.  No other associated aggravating or elevating factors.  Patient had CT scan of the chest which was negative for pulmonary embolus but positive for cirrhosis with pulmonary hypertension and periportal lymphadenopathy.  Cardiology consultation was requested.  No other associated aggravating or alleviating factors.          ROS      Patient is not shortness of breath, palpitations, dizziness or syncope.  Denies having any headache, abdominal pain, nausea, vomiting, diarrhea, constipation, loss of weight or loss of appetite.  Denies having any excessive bruising, hematuria or blood in the stool.    Review of all systems negative except as indicated      History  Past Medical History:   Diagnosis Date   • Arthritis    • Asthma    • Chronic deep vein thrombosis (DVT)    • Lymphedema of both lower extremities        Past Surgical History:   Procedure Laterality Date   • APPENDECTOMY  1974   • CARDIAC CATHETERIZATION N/A 2/28/2020    Procedure: Left ventriculography;  Surgeon: Kenney Reyez MD;  Location: [facility] INVASIVE LOCATION;  Service: Cardiovascular;  Laterality: N/A;   • CARDIAC CATHETERIZATION N/A 2/28/2020    Procedure: Coronary angiography;   Surgeon: Kenney Reyez MD;  Location: [facility] CATH INVASIVE LOCATION;  Service: Cardiovascular;  Laterality: N/A;   • CARDIAC CATHETERIZATION N/A 2020    Procedure: Left Heart Cath;  Surgeon: Kenney Reyez MD;  Location: [facility] CATH INVASIVE LOCATION;  Service: Cardiovascular;  Laterality: N/A;   • COLONOSCOPY N/A 11/15/2022    Procedure: COLONOSCOPY with polypectomy;  Surgeon: AMY Garcia MD;  Location: [facility] ENDOSCOPY;  Service: Gastroenterology;  Laterality: N/A;  post: diverticulosis, ascending polyp x2, transverse polyp x3, descending polyp x4, rectal polyp x1, hemorroids   • ENDOSCOPY N/A 11/15/2022    Procedure: ESOPHAGOGASTRODUODENOSCOPY with biopsy;  Surgeon: AMY Garcia MD;  Location: [facility] ENDOSCOPY;  Service: Gastroenterology;  Laterality: N/A;  post: esophagitis with biopsy to rule out candida , gastric biopsy   • KNEE SURGERY Left 2018       Family History   Problem Relation Age of Onset   • Heart disease Mother    • Diabetes Mother    • Arthritis Father    • Aortic aneurysm Father        Social History     Tobacco Use   • Smoking status: Former     Packs/day: 0.50     Years: 5.00     Pack years: 2.50     Types: Cigarettes     Start date:      Quit date:      Years since quittin.3   • Smokeless tobacco: Never   Vaping Use   • Vaping Use: Never used   Substance Use Topics   • Alcohol use: Never   • Drug use: Never        (Not in a hospital admission)        Sulfa antibiotics    Scheduled Meds:enoxaparin, 40 mg, Subcutaneous, Q12H  furosemide, 20 mg, Oral, Daily  ipratropium-albuterol, 3 mL, Nebulization, 4x Daily - RT  sodium chloride, 10 mL, Intravenous, Q12H  tamsulosin, 0.4 mg, Oral, Daily      Continuous Infusions:Pharmacy to Dose enoxaparin (LOVENOX),       PRN Meds:.•  nitroglycerin  •  ondansetron **OR** ondansetron  •  Pharmacy to Dose enoxaparin (LOVENOX)  •  sodium chloride  •  sodium chloride    Objective     VITAL SIGNS  Vitals:    23 0001  "04/17/23 0239 04/17/23 0400 04/17/23 0539   BP: 100/67 92/55 102/61 94/56   BP Location:  Left arm     Patient Position:  Lying     Pulse: 105 97 96 101   Resp: 20 20 20    Temp:       TempSrc:       SpO2: 92% 95% 95% 95%   Weight:       Height:           Flowsheet Rows    Flowsheet Row First Filed Value   Admission Height 185.4 cm (73\") Documented at 04/16/2023 1824   Admission Weight 162 kg (357 lb) Documented at 04/16/2023 1824            Intake/Output Summary (Last 24 hours) at 4/17/2023 0626  Last data filed at 4/17/2023 0559  Gross per 24 hour   Intake --   Output 250 ml   Net -250 ml        TELEMETRY: Sinus rhythm    Physical Exam:  The patient is alert, oriented and in no distress.  Vital signs as noted above.  Head and neck revealed no carotid bruits or jugular venous distention.  No thyromegaly or lymphadenopathy is present  Lungs clear.  No wheezing.  Breath sounds are normal bilaterally.  Heart normal first and second heart sounds. No murmur.  No precordial rub is present.  No gallop is present.  Abdomen soft and nontender.  No organomegaly is present.  Extremities with good peripheral pulses.  Significant lower extremity chronic edema and lymphedema.  Skin warm and dry.  Musculoskeletal system is grossly normal  CNS grossly normal.    Reviewed and updated.      Results Review:   I reviewed the patient's new clinical results.  Lab Results (last 24 hours)     Procedure Component Value Units Date/Time    High Sensitivity Troponin T [[id number]]  (Abnormal) Collected: 04/17/23 0514    Specimen: Blood Updated: 04/17/23 0544     HS Troponin T 18 ng/L     Narrative:      High Sensitive Troponin T Reference Range:  <10.0 ng/L- Negative Female for AMI  <15.0 ng/L- Negative Male for AMI  >=10 - Abnormal Female indicating possible myocardial injury.  >=15 - Abnormal Male indicating possible myocardial injury.   Clinicians would have to utilize clinical acumen, EKG, Troponin, and serial changes to determine if it is " an Acute Myocardial Infarction or myocardial injury due to an underlying chronic condition.         Basic Metabolic Panel [[id number]]  (Abnormal) Collected: 04/17/23 0514    Specimen: Blood Updated: 04/17/23 0544     Glucose 91 mg/dL      BUN 19 mg/dL      Creatinine 0.89 mg/dL      Sodium 135 mmol/L      Potassium 5.2 mmol/L      Comment: Slight hemolysis detected by analyzer. Results may be affected.        Chloride 103 mmol/L      CO2 22.0 mmol/L      Calcium 8.5 mg/dL      BUN/Creatinine Ratio 21.3     Anion Gap 10.0 mmol/L      eGFR 99.3 mL/min/1.73     Narrative:      GFR Normal >60  Chronic Kidney Disease <60  Kidney Failure <15      Hemoglobin A1c [[id number]]  (Normal) Collected: 04/17/23 0514    Specimen: Blood Updated: 04/17/23 0543     Hemoglobin A1C 5.20 %     CBC (No Diff) [[id number]]  (Abnormal) Collected: 04/17/23 0514    Specimen: Blood Updated: 04/17/23 0522     WBC 4.40 10*3/mm3      RBC 3.45 10*6/mm3      Hemoglobin 11.8 g/dL      Hematocrit 35.9 %      .0 fL      MCH 34.1 pg      MCHC 32.8 g/dL      RDW 14.5 %      RDW-SD 52.1 fl      MPV 8.6 fL      Platelets 66 10*3/mm3     High Sensitivity Troponin T [[id number]]  (Abnormal) Collected: 04/17/23 0235    Specimen: Blood Updated: 04/17/23 0257     HS Troponin T 23 ng/L     Narrative:      High Sensitive Troponin T Reference Range:  <10.0 ng/L- Negative Female for AMI  <15.0 ng/L- Negative Male for AMI  >=10 - Abnormal Female indicating possible myocardial injury.  >=15 - Abnormal Male indicating possible myocardial injury.   Clinicians would have to utilize clinical acumen, EKG, Troponin, and serial changes to determine if it is an Acute Myocardial Infarction or myocardial injury due to an underlying chronic condition.         TSH [[id number]]  (Normal) Collected: 04/16/23 1909    Specimen: Blood from Arm, Left Updated: 04/17/23 0020     TSH 0.842 uIU/mL     Lipid Panel [[id number]] Collected: 04/16/23 1909    Specimen: Blood from Arm, Left  Updated: 04/17/23 0014     Total Cholesterol 114 mg/dL      Triglycerides 87 mg/dL      HDL Cholesterol 41 mg/dL      LDL Cholesterol  56 mg/dL      VLDL Cholesterol 17 mg/dL      LDL/HDL Ratio 1.36    Narrative:      Cholesterol Reference Ranges  (U.S. Department of Health and Human Services ATP III Classifications)    Desirable          <200 mg/dL  Borderline High    200-239 mg/dL  High Risk          >240 mg/dL      Triglyceride Reference Ranges  (U.S. Department of Health and Human Services ATP III Classifications)    Normal           <150 mg/dL  Borderline High  150-199 mg/dL  High             200-499 mg/dL  Very High        >500 mg/dL    HDL Reference Ranges  (U.S. Department of Health and Human Services ATP III Classifications)    Low     <40 mg/dl (major risk factor for CHD)  High    >60 mg/dl ('negative' risk factor for CHD)        LDL Reference Ranges  (U.S. Department of Health and Human Services ATP III Classifications)    Optimal          <100 mg/dL  Near Optimal     100-129 mg/dL  Borderline High  130-159 mg/dL  High             160-189 mg/dL  Very High        >189 mg/dL    Blood Culture - Blood, Arm, Right [[id number]] Collected: 04/16/23 2301    Specimen: Blood from Arm, Right Updated: 04/16/23 2317    Blood Culture - Blood, Arm, Left [[id number]] Collected: 04/16/23 2306    Specimen: Blood from Arm, Left Updated: 04/16/23 2317    High Sensitivity Troponin T 2Hr [[id number]]  (Abnormal) Collected: 04/16/23 1727    Specimen: Blood Updated: 04/16/23 2041     HS Troponin T 21 ng/L      Troponin T Delta 6 ng/L     Narrative:      High Sensitive Troponin T Reference Range:  <10.0 ng/L- Negative Female for AMI  <15.0 ng/L- Negative Male for AMI  >=10 - Abnormal Female indicating possible myocardial injury.  >=15 - Abnormal Male indicating possible myocardial injury.   Clinicians would have to utilize clinical acumen, EKG, Troponin, and serial changes to determine if it is an Acute Myocardial Infarction or  myocardial injury due to an underlying chronic condition.         Extra Tubes [[id number]] Collected: 04/16/23 1909    Specimen: Blood from Arm, Left Updated: 04/16/23 2016    Narrative:      The following orders were created for panel order Extra Tubes.  Procedure                               Abnormality         Status                     ---------                               -----------         ------                     Gold Top - SST[[id number]]                                   Final result                 Please view results for these tests on the individual orders.    Gold Top - SST [[id number]] Collected: 04/16/23 1909    Specimen: Blood from Arm, Left Updated: 04/16/23 2016     Extra Tube Hold for add-ons.     Comment: Auto resulted.       POC Creatinine [[id number]]  (Normal) Collected: 04/16/23 2003    Specimen: Venous Blood Updated: 04/16/23 2005     Creatinine 0.90 mg/dL      Comment: Serial Number: [id number]:  [id number]        eGFR 99.0 mL/min/1.73     Comprehensive Metabolic Panel [[id number]]  (Abnormal) Collected: 04/16/23 1909    Specimen: Blood from Arm, Left Updated: 04/16/23 1953     Glucose 114 mg/dL      BUN 16 mg/dL      Creatinine 0.82 mg/dL      Sodium 138 mmol/L      Potassium 4.2 mmol/L      Comment: Slight hemolysis detected by analyzer. Results may be affected.        Chloride 102 mmol/L      CO2 25.0 mmol/L      Calcium 9.1 mg/dL      Total Protein 7.9 g/dL      Albumin 3.5 g/dL      ALT (SGPT) 29 U/L      AST (SGOT) 63 U/L      Alkaline Phosphatase 142 U/L      Total Bilirubin 1.3 mg/dL      Globulin 4.4 gm/dL      A/G Ratio 0.8 g/dL      BUN/Creatinine Ratio 19.5     Anion Gap 11.0 mmol/L      eGFR 101.8 mL/min/1.73     Narrative:      GFR Normal >60  Chronic Kidney Disease <60  Kidney Failure <15      BNP [[id number]]  (Normal) Collected: 04/16/23 1909    Specimen: Blood from Arm, Left Updated: 04/16/23 1953     proBNP 75.8 pg/mL     Narrative:      Among patients with dyspnea,  NT-proBNP is highly sensitive for the detection of acute congestive heart failure. In addition NT-proBNP of <300 pg/ml effectively rules out acute congestive heart failure with 99% negative predictive value.      High Sensitivity Troponin T [[id number]]  (Abnormal) Collected: 04/16/23 1909    Specimen: Blood from Arm, Left Updated: 04/16/23 1953     HS Troponin T 15 ng/L     Narrative:      High Sensitive Troponin T Reference Range:  <10.0 ng/L- Negative Female for AMI  <15.0 ng/L- Negative Male for AMI  >=10 - Abnormal Female indicating possible myocardial injury.  >=15 - Abnormal Male indicating possible myocardial injury.   Clinicians would have to utilize clinical acumen, EKG, Troponin, and serial changes to determine if it is an Acute Myocardial Infarction or myocardial injury due to an underlying chronic condition.         Respiratory Panel PCR w/COVID-19(SARS-CoV-2) EAMON/CINTHIA/ELMO/PAD/COR/MAD/MARTIN In-House, NP Swab in [facility], 3-4 HR TAT - Swab, Nasopharynx [[id number]]  (Normal) Collected: 04/16/23 1850    Specimen: Swab from Nasopharynx Updated: 04/16/23 1952     ADENOVIRUS, PCR Not Detected     Coronavirus 229E Not Detected     Coronavirus HKU1 Not Detected     Coronavirus NL63 Not Detected     Coronavirus OC43 Not Detected     COVID19 Not Detected     Human Metapneumovirus Not Detected     Human Rhinovirus/Enterovirus Not Detected     Influenza A PCR Not Detected     Influenza B PCR Not Detected     Parainfluenza Virus 1 Not Detected     Parainfluenza Virus 2 Not Detected     Parainfluenza Virus 3 Not Detected     Parainfluenza Virus 4 Not Detected     RSV, PCR Not Detected     Bordetella pertussis pcr Not Detected     Bordetella parapertussis PCR Not Detected     Chlamydophila pneumoniae PCR Not Detected     Mycoplasma pneumo by PCR Not Detected    Narrative:      In the setting of a positive respiratory panel with a viral infection PLUS a negative procalcitonin without other underlying concern for bacterial  "infection, consider observing off antibiotics or discontinuation of antibiotics and continue supportive care. If the respiratory panel is positive for atypical bacterial infection (Bordetella pertussis, Chlamydophila pneumoniae, or Mycoplasma pneumoniae), consider antibiotic de-escalation to target atypical bacterial infection.    Ammonia [[id number]]  (Normal) Collected: 04/16/23 1909    Specimen: Blood from Arm, Left Updated: 04/16/23 1938     Ammonia 56 umol/L     aPTT [[id number]]  (Abnormal) Collected: 04/16/23 1909    Specimen: Blood from Arm, Left Updated: 04/16/23 1932     PTT 25.7 seconds     Protime-INR [[id number]]  (Abnormal) Collected: 04/16/23 1909    Specimen: Blood from Arm, Left Updated: 04/16/23 1932     Protime 12.2 Seconds      INR 1.20    D-dimer, Quantitative [[id number]]  (Abnormal) Collected: 04/16/23 1909    Specimen: Blood from Arm, Left Updated: 04/16/23 1932     D-Dimer, Quantitative 2.27 mg/L (FEU)     Narrative:      According to the assay 's published package insert, a normal (<0.50 mg/L (FEU)) D-dimer result in conjunction with a non-high clinical probability assessment, excludes deep vein thrombosis (DVT) and pulmonary embolism (PE) with high sensitivity.    D-dimer values increase with age and this can make VTE exclusion of an older population difficult. To address this, the [employer], based on best available evidence and recent guidelines, recommends that clinicians use age-adjusted D-dimer thresholds in patients greater than 50 years of age with: a) a low probability of PE who do not meet all Pulmonary Embolism Rule Out Criteria, or b) in those with intermediate probability of PE.   The formula for an age-adjusted D-dimer cut-off is \"age/100\".  For example, a 60 year old patient would have an age-adjusted cut-off of 0.60 mg/L (FEU) and an 80 year old 0.80 mg/L (FEU).    CBC & Differential [[id number]]  (Abnormal) Collected: 04/16/23 1909    Specimen: " Blood from Arm, Left Updated: 04/16/23 1921    Narrative:      The following orders were created for panel order CBC & Differential.  Procedure                               Abnormality         Status                     ---------                               -----------         ------                     CBC Auto Differential[[id number]]        Abnormal            Final result                 Please view results for these tests on the individual orders.    CBC Auto Differential [[id number]]  (Abnormal) Collected: 04/16/23 1909    Specimen: Blood from Arm, Left Updated: 04/16/23 1921     WBC 3.80 10*3/mm3      RBC 3.90 10*6/mm3      Hemoglobin 13.5 g/dL      Hematocrit 39.9 %      .3 fL      MCH 34.6 pg      MCHC 33.8 g/dL      RDW 14.7 %      RDW-SD 55.1 fl      MPV 8.4 fL      Platelets 85 10*3/mm3      Neutrophil % 87.0 %      Lymphocyte % 9.0 %      Monocyte % 2.9 %      Eosinophil % 0.5 %      Basophil % 0.6 %      Neutrophils, Absolute 3.30 10*3/mm3      Lymphocytes, Absolute 0.30 10*3/mm3      Monocytes, Absolute 0.10 10*3/mm3      Eosinophils, Absolute 0.00 10*3/mm3      Basophils, Absolute 0.00 10*3/mm3      nRBC 0.1 /100 WBC     Urinalysis With Microscopic If Indicated (No Culture) - Urine, Clean Catch [[id number]]  (Abnormal) Collected: 04/16/23 1851    Specimen: Urine, Clean Catch Updated: 04/16/23 1905     Color, UA Yellow     Appearance, UA Clear     pH, UA <=5.0     Specific Gravity, UA 1.012     Glucose, UA Negative     Ketones, UA Negative     Bilirubin, UA Negative     Blood, UA Moderate (2+)     Protein, UA Negative     Leuk Esterase, UA Negative     Nitrite, UA Negative     Urobilinogen, UA 1.0 E.U./dL    Urinalysis, Microscopic Only - Urine, Clean Catch [[id number]]  (Abnormal) Collected: 04/16/23 1851    Specimen: Urine, Clean Catch Updated: 04/16/23 1905     RBC, UA 6-12 /HPF      WBC, UA 0-2 /HPF      Bacteria, UA None Seen /HPF      Squamous Epithelial Cells, UA 0-2 /HPF      Hyaline  Casts, UA None Seen /LPF      Methodology Automated Microscopy          Imaging Results (Last 24 Hours)     Procedure Component Value Units Date/Time    CT Angiogram Chest Pulmonary Embolism [[id number]] Collected: 04/16/23 1918     Updated: 04/16/23 2233    Narrative:      EXAMINATION: CT ANGIOGRAM CHEST PULMONARY EMBOLISM      DATE:  4/16/2023 8:51 PM    INDICATION: Dyspnea and cough for three days. Positive d-dimer, PE suspected ;    COMPARISON: None.    PROCEDURE: Iodinated contrast material was administered intravenously during pulmonary arterial phase CT chest imaging.    3-D MIP images were performed under concurrent supervision not requiring an independent workstation, in order to better assess the vasculature.    CT dose lowering techniques were used, to include: automated exposure control, adjustment for patient size, and or use of iterative reconstruction.    FINDINGS:    Pulmonary artery: Poorly opacified. No filling defect to the lobar level. Segmental and subsegmental branches obscured including due to motion artifact.    Cardiovascular:  Aorta and great vessels exhibit no aneurysm or dissection.    Mediastinum/Mikaela: No mediastinal lymphadenopathy. Enlarged thyroid gland with heterogeneity and calcifications are without distinct nodule visible..    Lungs/Pleura: Expiratory phase exam with hypoventilatory changes and motion artifact limiting evaluation. There are a few scattered small thin-walled cysts. No infiltrates or masses are identified. No effusion, pleural mass, thickening or pneumothorax.    Chest wall and Axilla: Normal.    Bones:  Unremarkable.    Upper abdomen: Cirrhosis. Moderate splenomegaly, partly imaged. Moderate portosystemic collateral engorgement including esophageal varices. Minor periportal lymphadenopathy..    3-D images corroborate 2-D findings.      Impression:        1.  No evidence of pulmonary embolus to the lobar level. Segmental and subsegmental branches obscured.  2.   Cirrhosis and stigmata of portal hypertension. Recommend routine screening for HCC.  3.  Minor periportal lymphadenopathy, nonspecific in the setting of chronic liver disease.          Electronically signed by:  Mayelin Morelos M.D.    4/16/2023 8:31 PM Mountain Time    XR Chest AP [[id number]] Collected: 04/16/23 1900     Updated: 04/16/23 1904    Narrative:      XR CHEST AP    Date of Exam: 4/16/2023 6:42 PM EDT    Indication: COVID Evaluation, Cough, Fever.    Comparison: January 25, 2023    Findings:  The heart is enlarged. The pulmonary vascular markings are within normal limits. There are no focal consolidations. Chronic mild interstitial changes with scarring is unchanged. There are degenerative changes of the spine.      Impression:      Impression:  Cardiomegaly. No active disease.    Electronically Signed: Jaret Donovan    4/16/2023 7:01 PM EDT    Workstation ID: [id number]      LAB RESULTS (LAST 7 DAYS)    CBC  Results from last 7 days   Lab Units 04/17/23 0514 04/16/23 1909   WBC 10*3/mm3 4.40 3.80   RBC 10*6/mm3 3.45* 3.90*   HEMOGLOBIN g/dL 11.8* 13.5   HEMATOCRIT % 35.9* 39.9   MCV fL 104.0* 102.3*   PLATELETS 10*3/mm3 66* 85*       BMP  Results from last 7 days   Lab Units 04/17/23 0514 04/16/23 2003 04/16/23 1909   SODIUM mmol/L 135*  --  138   POTASSIUM mmol/L 5.2  --  4.2   CHLORIDE mmol/L 103  --  102   CO2 mmol/L 22.0  --  25.0   BUN mg/dL 19  --  16   CREATININE mg/dL 0.89 0.90 0.82   GLUCOSE mg/dL 91  --  114*       CMP   Results from last 7 days   Lab Units 04/17/23 0514 04/16/23 2003 04/16/23 1909   SODIUM mmol/L 135*  --  138   POTASSIUM mmol/L 5.2  --  4.2   CHLORIDE mmol/L 103  --  102   CO2 mmol/L 22.0  --  25.0   BUN mg/dL 19  --  16   CREATININE mg/dL 0.89 0.90 0.82   GLUCOSE mg/dL 91  --  114*   ALBUMIN g/dL  --   --  3.5   BILIRUBIN mg/dL  --   --  1.3*   ALK PHOS U/L  --   --  142*   AST (SGOT) U/L  --   --  63*   ALT (SGPT) U/L  --   --  29   AMMONIA umol/L  --   --  56          BNP        TROPONIN  Results from last 7 days   Lab Units 04/17/23  0514   HSTROP T ng/L 18*       CoAg  Results from last 7 days   Lab Units 04/16/23  1909   INR  1.20*   APTT seconds 25.7*       Creatinine Clearance  Estimated Creatinine Clearance: 144.6 mL/min (by C-G formula based on SCr of 0.89 mg/dL).    ABG        Radiology  CT Angiogram Chest Pulmonary Embolism    Result Date: 4/16/2023  1.  No evidence of pulmonary embolus to the lobar level. Segmental and subsegmental branches obscured. 2.  Cirrhosis and stigmata of portal hypertension. Recommend routine screening for HCC. 3.  Minor periportal lymphadenopathy, nonspecific in the setting of chronic liver disease. Electronically signed by:  Mayelin Morelos M.D.  4/16/2023 8:31 PM Mountain Time    XR Chest AP    Result Date: 4/16/2023  Impression: Cardiomegaly. No active disease. Electronically Signed: Jaret Donovan  4/16/2023 7:01 PM EDT  Workstation ID: [id number]        EKG            I personally viewed and interpreted the patient's EKG/Telemetry data: Sinus rhythm    ECHOCARDIOGRAM:    Results for orders placed during the hospital encounter of 01/08/20    Adult Transthoracic Echo Complete W/ Cont if Necessary Per Protocol    Interpretation Summary  · Estimated EF = 60%.  · Left ventricular systolic function is normal.    Indications  Chest discomfort    Technically satisfactory study.  Mitral valve is structurally normal.  Tricuspid valve is structurally normal.  Aortic valve is structurally normal.  Pulmonic valve could not be well visualized.  No evidence for mitral tricuspid or aortic regurgitation is seen by Doppler study.  Left atrium is normal in size.  Right atrium is enlarged.  Left ventricle is normal in size and contractility with ejection fraction of 60%.  Right ventricle is normal in size.  Atrial septum is intact.  Aorta is normal.  No pericardial effusion or intracardiac thrombus is seen.    Impression  Right atrial enlargement.  Left  ventricle is normal in size and contractility with ejection fraction of 60%.  Structurally and functionally normal cardiac valves.  No pericardial effusion or intracardiac thrombus.              Cardiolite (Tc-99m sestamibi) stress test      OTHER:     Assessment & Plan     Principal Problem:    Chest pain due to coronary artery disease    ]]]]]]]]]]]]]]]]]]  Impression  ===========  Chest discomfort-atypical.  Troponin levels are negative.  EKG showed no acute changes.  CT scan of the negative for pulmonary embolus.  Showed evidence for pulmonary hypertension hepatic cirrhosis.     Cardiac catheterization 2/28/2020 revealed normal left ventricle function and normal coronary arteries   .  echocardiogram 1/8/2020 revealed right atrium right ventricle enlargement and normal left ventricle function.  Normal cardiac valves.     Lexiscan Cardiolite test is negative for myocardial ischemia 1/8/2020 (performed at [facility] as an outpatient due to patient's weight)      -exogenous obesity     -history of chronic DVT asthma and arthritis. Eliquis has been discontinued recently.    - Chronic lower extremity edema/lymphedema.    - Cirrhosis of the liver    - Significant thrombocytopenia with platelet count of 66,000.      -status post left knee surgery  appendectomy     -former smoker     -allergic to sulfa     -family history is negative for coronary artery disease  ============  Plan  ============  Chest discomfort-atypical.  Troponin levels are negative.  EKG showed no acute changes.  CT scan of the negative for pulmonary embolus.  Showed evidence for pulmonary hypertension hepatic cirrhosis.    Echocardiogram  Stress Cardiolite test    Hepatic cirrhosis  Thrombocytopenia-66,000.  No bleeding problems.    Medications were reviewed and updated.  Further plan will depend on patient's progress.  [[[[[[[[[[[[[[[[[[    Echocardiogram 4/17/2023  Mild aortic valve stenosis.  Gradient across the aortic valve 21/12 mmHg and  valve area of 2.2 cm².  Left ventricular size and contractility is normal with ejection fraction of 60%.    Stress Cardiolite test-normal- 4/17/2023  ]]]]]]]]]]]]]]]]]      Kenney Reyez MD  04/17/23  06:26 EDT

## 2023-04-17 NOTE — CASE MANAGEMENT/SOCIAL WORK
Discharge Planning Assessment   [facility]     Patient Name: Robert Randhawa Jr.  MRN: [id number]  Today's Date: 4/17/2023    Admit Date: 4/16/2023        Discharge Needs Assessment    No documentation.                Discharge Plan     Row Name 04/17/23 0909       Plan    Plan Comments Attempted to see patient for dc planning, but patient is currently out of his room for testing. Not case managed at this time.               Patient Forms     Row Name 04/17/23 0829       Patient Forms    Important Message from Medicare ([facility]) --  PELAEZ 4/17/23 per registration    Patient Observation Letter Delivered  PELAEZ 4/17/23 per registration              Selene Ayala RN, [facility]  Office: [phone]  Fax: [phone]  [email or web address]      Selene Ayala RN

## 2023-04-17 NOTE — H&P
[facility] Hospital Medicine Services      Patient Name: Robert Randhawa Jr.  : 1965  MRN: [id number]  Primary Care Physician:  Alfredo Torres MD  Date of admission: 2023      Subjective      Chief Complaint: chest pain    History of Present Illness: Robert Randhawa Jr. is a 57 y.o. male who presented to [facility] on 2023 complaining of chest pain.  Admits to non-radiating substernal chest pain at rest that is worse with coughing of sneezing ongoing for the past three days complicated by chills, dyspnea with exertion, and worsening n/l LE lymphedema.  Denies fevers, vomiting, diarrhea, numbness. States due to worsening lymphedema and chronic conditions he is having difficulty with ADLs. Presented to [facility] for evaluation.    In the Ed, vitals 99.2, , RR 20, /53, 93% Ra.  Labs notable for HS troponin 15, glucose 114, AST 63, tbili1.3, d-dimer 2.27, INR 1.2.  CT PE without evidence of PE but does show cirrhosis and stigmata or portal HTN with minor periportal lymphadenopathy.  EKG with sinus tachycardia but not GISSELL/STI/TWI.  He is to be admitted for cardiac evaluation.      ROS   12 point ROS reviewed and negative except as mentioned above      Personal History     Past Medical History:   Diagnosis Date   • Arthritis    • Asthma    • Chronic deep vein thrombosis (DVT)    • Lymphedema of both lower extremities        Past Surgical History:   Procedure Laterality Date   • APPENDECTOMY     • CARDIAC CATHETERIZATION N/A 2020    Procedure: Left ventriculography;  Surgeon: Kenney Reyez MD;  Location: [facility] INVASIVE LOCATION;  Service: Cardiovascular;  Laterality: N/A;   • CARDIAC CATHETERIZATION N/A 2020    Procedure: Coronary angiography;  Surgeon: Kenney Reyez MD;  Location: [facility] CATH INVASIVE LOCATION;  Service: Cardiovascular;  Laterality: N/A;   • CARDIAC CATHETERIZATION N/A 2020    Procedure: Left Heart Cath;  Surgeon: Aissatou  MD Kenney;  Location: [facility] CATH INVASIVE LOCATION;  Service: Cardiovascular;  Laterality: N/A;   • COLONOSCOPY N/A 11/15/2022    Procedure: COLONOSCOPY with polypectomy;  Surgeon: AMY Garcia MD;  Location: [facility] ENDOSCOPY;  Service: Gastroenterology;  Laterality: N/A;  post: diverticulosis, ascending polyp x2, transverse polyp x3, descending polyp x4, rectal polyp x1, hemorroids   • ENDOSCOPY N/A 11/15/2022    Procedure: ESOPHAGOGASTRODUODENOSCOPY with biopsy;  Surgeon: AMY Garcia MD;  Location: [facility] ENDOSCOPY;  Service: Gastroenterology;  Laterality: N/A;  post: esophagitis with biopsy to rule out candida , gastric biopsy   • KNEE SURGERY Left 2018       Family History: family history includes Aortic aneurysm in his father; Arthritis in his father; Diabetes in his mother; Heart disease in his mother. Otherwise pertinent FHx was reviewed and not pertinent to current issue.    Social History:  reports that he quit smoking about 40 years ago. His smoking use included cigarettes. He started smoking about 45 years ago. He has a 2.50 pack-year smoking history. He has never used smokeless tobacco. He reports that he does not drink alcohol and does not use drugs.    Home Medications:  Prior to Admission Medications     Prescriptions Last Dose Informant Patient Reported? Taking?    furosemide (Lasix) 20 MG tablet   No No    Take 1 tablet by mouth Daily. Take daily or PRN    lisinopril (PRINIVIL,ZESTRIL) 5 MG tablet   Yes No    spironolactone (ALDACTONE) 25 MG tablet   No No    Take 1 tablet by mouth 2 (Two) Times a Day for 30 days.    tamsulosin (FLOMAX) 0.4 MG capsule 24 hr capsule   Yes No    Take 1 capsule by mouth Daily.            Allergies:  Allergies   Allergen Reactions   • Sulfa Antibiotics Unknown (See Comments)       Objective      Vitals:   Temp:  [99.2 °F (37.3 °C)] 99.2 °F (37.3 °C)  Heart Rate:  [115-135] 115  Resp:  [20-22] 20  BP: ()/(49-60) 92/49    Physical  Exam  Constitutional:       General: He is not in acute distress.     Appearance: He is obese.   HENT:      Head: Normocephalic and atraumatic.      Nose: Nose normal. No congestion.      Mouth/Throat:      Pharynx: Oropharynx is clear. No oropharyngeal exudate.   Eyes:      General: No scleral icterus.  Cardiovascular:      Rate and Rhythm: Normal rate and regular rhythm.      Heart sounds: No murmur heard.    No friction rub. No gallop.   Pulmonary:      Effort: No respiratory distress.      Breath sounds: No wheezing or rales.      Comments: Patient on room air  Abdominal:      General: There is distension.      Palpations: There is no mass.      Tenderness: There is no guarding.   Musculoskeletal:         General: Swelling and tenderness present.      Cervical back: Normal range of motion. No rigidity.      Comments: Extensive b/l LE lymphedema and b/l pedal lymphedema   Skin:     Coloration: Skin is not jaundiced.      Findings: No bruising or lesion.      Comments: B/l venous stasis   Neurological:      General: No focal deficit present.      Mental Status: He is alert and oriented to person, place, and time.      Motor: Weakness present.          Result Review    Result Review:  I have personally reviewed the results from the time of this admission to 4/16/2023 23:21 EDT and agree with these findings:  [x]  Laboratory  []  Microbiology  [x]  Radiology  []  EKG/Telemetry   []  Cardiology/Vascular   []  Pathology  []  Old records  []  Other:        Assessment & Plan        Active Hospital Problems:  There are no active hospital problems to display for this patient.    Plan:     #Chest pain    - HS troponin 15, trend    - EKG shows sinus tachycardia without GISSELL/STD/TWI    - pain sounds pleuritic in nature, but due to multiple comorbidities will rule out cardiac etiology    - stress myoview    - consult cardiology due to chest pain with tachycardia and multiple comorbidities    - CT PE without PE    - due to  d-dimer of 2.27, will check venous duplex    - hold aspirin due to thrombocytopenia    - hold statin due to cirrhosis    - lipid panel/a1c/tsh    #Asthma    - Duoneb QID    - patient on room air    #Cirrhosis    - CT PE without evidence of PE but does show cirrhosis and stigmata or portal HTN with minor periportal lymphadenopathy    - MELD 9    - follows with GI as otpt    - resume home Lasix    #Lymphadema    - lymphedema of both LE and pedal lymphedema worsening per pt    - chronic, worsening per pt    - proBNP 75.8    - wound care to evaluate    - pt/ot    - has otpt appointment with vascular set up    #Thrombocytopenia    - plt 85, appears near base    - follows with Heme/onc as otpt    #BPH    -flomax    DVT prophylaxis: Lovenox, monitor for toxicity and HIT    CODE STATUS:       Admission Status:  I believe this patient meets observation status.    I discussed the patient's findings and my recommendations with patient.    This patient has been examined wearing appropriate Personal Protective Equipment and discussed with Patient. 04/16/23      Signature: Electronically signed by Tami Pedraza DO, 04/16/23, 11:34 PM EDT.

## 2023-04-17 NOTE — SIGNIFICANT NOTE
Case Management/Social Work    Patient Name:  Robert Randhawa Jr.  YOB: 1965  MRN: [id number]  Admit Date:  4/16/2023 04/17/23 1426   Post Acute Pre-Cert Documentation   Verification from Payer Yes   Date Post Acute Pre-Cert Completed 04/17/23   Response Accepted   Post Acute Pre-Cert Initiated Comment SHARAD verified SNF precert approval via [employer] portal. Plan Auth ID [id number]. Valid 4/18-4/20. CM made aware.           Electronically signed by:  Luis Miguel Brock CMA  04/17/23 14:26 EDT    Luis Miguel Brock  Case Management Associate  [address] [address]  P: [phone]  F: [phone]

## 2023-04-17 NOTE — PLAN OF CARE
Goal Outcome Evaluation:  Plan of Care Reviewed With: patient           Outcome Evaluation: Pt is a 58 y/o M admitted with chest pain and dyspnea, as well as LE lymphedema. Medical Hx: lymphedema BLE, arthritis, asthma, hx left TKA, morbid obesity. At baseline pt lives with his brother and the brother works, and per pt reports isn't helpful even when home. He has a niece who is in and out. Pt reports concerning information regarding poor treatment at home, SW on board. Pt uses a rolling walker or cane for household mobility, cane primarily. He has been home from rehab for two months, and until now has managed to maintain ADL independence. Today, patient sitting EOB on arrival and c/o BLE pain.  Pt with severe BLE lymphedma, LLE worse than R with noted redness and poor overall skin integrity.  Pt requires min A to stand from EOB, min/mod Ax2 with hand held assist to ambulate ~ 10' in room.  Pt with LLE weakness and poor tolerance to WB.  Pt unable to care for self in current condition, discussed d/c plan as pt has limited support at home.  Recommdn SNF for rehab at d/c with possibility of LTC or assisted living transition in the future. Pt verbalized understanding.

## 2023-04-17 NOTE — THERAPY EVALUATION
Patient Name: Robert Randhawa Jr.  : 1965    MRN: [id number]                              Today's Date: 2023       Admit Date: 2023    Visit Dx:     ICD-10-CM ICD-9-CM   1. Dyspnea, unspecified type  R06.00 786.09   2. Tachycardia  R00.0 785.0   3. Chest pain, unspecified type  R07.9 786.50     Patient Active Problem List   Diagnosis   • Chest pain   • Shortness of breath   • Arthritis   • Asthma   • Deep vein thrombosis (DVT)   • Lymphedema   • Cellulitis of lower extremity   • Chest pressure   • Unstable angina   • Cellulitis of left lower extremity without foot   • Obesity, morbid   • Cellulitis of left lower extremity   • Non-pressure chronic ulcer left lower leg, limited to breakdown skin   • Cellulitis of left foot   • Venous hypertension of both lower extremities   • Onychomycosis of foot with other complication   • Sepsis   • Chest pain due to coronary artery disease     Past Medical History:   Diagnosis Date   • Arthritis    • Asthma    • Chronic deep vein thrombosis (DVT)    • Lymphedema of both lower extremities      Past Surgical History:   Procedure Laterality Date   • APPENDECTOMY     • CARDIAC CATHETERIZATION N/A 2020    Procedure: Left ventriculography;  Surgeon: Kenney Reyez MD;  Location: [facility] CATH INVASIVE LOCATION;  Service: Cardiovascular;  Laterality: N/A;   • CARDIAC CATHETERIZATION N/A 2020    Procedure: Coronary angiography;  Surgeon: Kenney Reyez MD;  Location: [facility] CATH INVASIVE LOCATION;  Service: Cardiovascular;  Laterality: N/A;   • CARDIAC CATHETERIZATION N/A 2020    Procedure: Left Heart Cath;  Surgeon: Kenney Reyez MD;  Location: [facility] CATH INVASIVE LOCATION;  Service: Cardiovascular;  Laterality: N/A;   • COLONOSCOPY N/A 11/15/2022    Procedure: COLONOSCOPY with polypectomy;  Surgeon: AMY Garcia MD;  Location: [facility] ENDOSCOPY;  Service: Gastroenterology;  Laterality: N/A;  post: diverticulosis, ascending polyp x2,  transverse polyp x3, descending polyp x4, rectal polyp x1, hemorroids   • ENDOSCOPY N/A 11/15/2022    Procedure: ESOPHAGOGASTRODUODENOSCOPY with biopsy;  Surgeon: AMY Garcia MD;  Location: [facility] ENDOSCOPY;  Service: Gastroenterology;  Laterality: N/A;  post: esophagitis with biopsy to rule out candida , gastric biopsy   • KNEE SURGERY Left 2018      General Information     Row Name 04/17/23 1132          Physical Therapy Time and Intention    Document Type evaluation  -     Mode of Treatment physical therapy  -     Row Name 04/17/23 1132          General Information    Patient Profile Reviewed yes  -     Prior Level of Function independent:  short distance mobility in his home, he has a cane and walker, reports halls too narrow to use walker, normally holds to walls and furniture for support, does not drive  -     Existing Precautions/Restrictions fall  -     Barriers to Rehab medically complex;previous functional deficit  -     Row Name 04/17/23 1132          Living Environment    People in Home sibling(s)  reports he lives with his brother, provides little to no help, not much other famiy around  -     Row Name 04/17/23 1132          Home Main Entrance    Number of Stairs, Main Entrance one  -[facility] Name 04/17/23 1132          Stairs Within Home, Primary    Number of Stairs, Within Home, Primary none  -[facility] Name 04/17/23 1132          Cognition    Orientation Status (Cognition) oriented x 4  -[facility] Name 04/17/23 1132          Safety Issues, Functional Mobility    Impairments Affecting Function (Mobility) balance;endurance/activity tolerance;pain;strength;range of motion (ROM)  [facility]           User Key  (r) = Recorded By, (t) = Taken By, (c) = Cosigned By    Initials Name Provider Type     Selene Faye PT Physical Therapist               Mobility     Row Name 04/17/23 1134          Bed Mobility    Bed Mobility sit-supine  -     Sit-Supine [place] (Bed Mobility) moderate  assist (50% patient effort)  -     Assistive Device (Bed Mobility) bed rails  -     Comment, (Bed Mobility) requires A to lift LEs into bed  -[facility] Name 04/17/23 1134          Sit-Stand Transfer    Sit-Stand [place] (Transfers) minimum assist (75% patient effort);2 person assist  -[facility]     Row Name 04/17/23 1134          Gait/Stairs (Locomotion)    [place] Level (Gait) minimum assist (75% patient effort);moderate assist (50% patient effort);2 person assist  -     Distance in Feet (Gait) 10' in room  -     Deviations/Abnormal Patterns (Gait) base of support, wide;gait speed decreased;festinating/shuffling  -     Comment, (Gait/Stairs) relies heavily on B hand held assist for support in standing, decreased stance time LLE due to pain  -           User Key  (r) = Recorded By, (t) = Taken By, (c) = Cosigned By    Initials Name Provider Type     Selene Faye, PT Physical Therapist               Obj/Interventions     [facility] Name 04/17/23 1136          Range of Motion Comprehensive    General Range of Motion bilateral upper extremity ROM WFL  -     Comment, General Range of Motion BLEs with severe lymphedema, worse on LLE which significantly limits pts ROM  -[facility] Name 04/17/23 1136          Strength Comprehensive (MMT)    Comment, General Manual Muscle Testing (MMT) Assessment RLE 3/5, LLE 3-/5, limited by pain and lymphedema in lower leg/foot  -[facility] Name 04/17/23 1136          Balance    Balance Assessment sitting static balance;sitting dynamic balance;standing static balance;standing dynamic balance  -     Static Sitting Balance independent  -     Dynamic Sitting Balance independent  -     Static Standing Balance minimal assist  -     Dynamic Standing Balance moderate assist;2-person assist  -     Position/Device Used, Standing Balance supported  hand held assist  -[facility] Name 04/17/23 1136          Sensory Assessment (Somatosensory)    Sensory Assessment  (Somatosensory) --  reports decreased sensation LLE  -           User Key  (r) = Recorded By, (t) = Taken By, (c) = Cosigned By    Initials Name Provider Type    Selene Hernandez, PT Physical Therapist               Goals/Plan     Row Name 04/17/23 1142          Bed Mobility Goal 1 (PT)    Activity/Assistive Device (Bed Mobility Goal 1, PT) bed mobility activities, all  -     [place] Level/Cues Needed (Bed Mobility Goal 1, PT) modified independence  -[facility]     Time Frame (Bed Mobility Goal 1, PT) 2 weeks  -[facility]     Row Name 04/17/23 1142          Transfer Goal 1 (PT)    Activity/Assistive Device (Transfer Goal 1, PT) sit-to-stand/stand-to-sit;bed-to-chair/chair-to-bed  -     [place] Level/Cues Needed (Transfer Goal 1, PT) modified independence  -[facility]     Time Frame (Transfer Goal 1, PT) 2 weeks  -[facility]     Row Name 04/17/23 1142          Gait Training Goal 1 (PT)    Activity/Assistive Device (Gait Training Goal 1, PT) gait (walking locomotion);assistive device use;walker, rolling  -     [place] Level (Gait Training Goal 1, PT) modified independence  -[facility]     Distance (Gait Training Goal 1, PT) 50'  -[facility]     Time Frame (Gait Training Goal 1, PT) 2 weeks  -[facility]     Row Name 04/17/23 1142          Therapy Assessment/Plan (PT)    Planned Therapy Interventions (PT) bed mobility training;gait training;transfer training;strengthening;stair training;ROM (range of motion);patient/family education;home exercise program  -           User Key  (r) = Recorded By, (t) = Taken By, (c) = Cosigned By    Initials Name Provider Type    Selene Hernandez, PT Physical Therapist               Clinical Impression     [facility] Name 04/17/23 1137          Pain Scale: FACES Pre/Post-Treatment    Pain: FACES Scale, Pretreatment 4-->hurts little more  -     Posttreatment Pain Rating 4-->hurts little more  -     Pain Location - Side/Orientation Bilateral  -     Pain Location lower  -     Pain Location - extremity  -     Row [facility]  04/17/23 1137          Plan of Care Review    Plan of Care Reviewed With patient  -     Outcome Evaluation Pt is a 56 y/o M admitted with chest pain and dyspnea, as well as LE lymphedema. Medical Hx: lymphedema BLE, arthritis, asthma, hx left TKA, morbid obesity. At baseline pt lives with his brother and the brother works, and per pt reports isn't helpful even when home. He has a niece who is in and out. Pt reports concerning information regarding poor treatment at home, SW on board. Pt uses a rolling walker or cane for household mobility, cane primarily. He has been home from rehab for two months, and until now has managed to maintain ADL independence. Today, patient sitting EOB on arrival and c/o BLE pain.  Pt with severe BLE lymphedma, LLE worse than R with noted redness and poor overall skin integrity.  Pt requires min A to stand from EOB, min/mod Ax2 with hand held assist to ambulate ~ 10' in room.  Pt with LLE weakness and poor tolerance to WB.  Pt unable to care for self in current condition, discussed d/c plan as pt has limited support at home.  Recommdn SNF for rehab at d/c with possibility of LTC or assisted living transition in the future. Pt verbalized understanding.  -     Row Name 04/17/23 1137          Therapy Assessment/Plan (PT)    Rehab Potential (PT) good, to achieve stated therapy goals  -     Criteria for Skilled Interventions Met (PT) yes;skilled treatment is necessary  -     Therapy Frequency (PT) 5 times/wk  -     Row Name 04/17/23 1137          Vital Signs    O2 Delivery Pre Treatment room air  -     O2 Delivery Intra Treatment room air  -     O2 Delivery Post Treatment room air  -     Row Name 04/17/23 1137          Positioning and Restraints    Pre-Treatment Position in bed  -     Post Treatment Position bed  -     In Bed notified nsg;call light within reach;encouraged to call for assist;exit alarm on;legs elevated;with [facility]  -           User Key  (r) = Recorded By,  (t) = Taken By, (c) = Cosigned By    Initials Name Provider Type     Selene Faye, PT Physical Therapist               Outcome Measures     Row Name 04/17/23 1143          How much help from another person do you currently need...    Turning from your back to your side while in flat bed without using bedrails? 3  -[facility]     Moving from lying on back to sitting on the side of a flat bed without bedrails? 2  -[facility]     Moving to and from a bed to a chair (including a wheelchair)? 3  -[facility]     Standing up from a chair using your arms (e.g., wheelchair, bedside chair)? 3  -JH     Climbing 3-5 steps with a railing? 2  -J[facility]     To walk in hospital room? 2  -     AM-PAC 6 Clicks Score (PT) 15  -     Highest level of mobility 4 --> Transferred to chair/commode  -     Row Name 04/17/23 1143 04/17/23 1137       Functional Assessment    Outcome Measure Options AM-PAC 6 Clicks Basic Mobility (PT)  - AM-PAC 6 Clicks Daily Activity (OT)  -          User Key  (r) = Recorded By, (t) = Taken By, (c) = Cosigned By    Initials Name Provider Type     Selene Faye, PT Physical Therapist    Main Willams OT Occupational Therapist                             Physical Therapy Education     Title: PT OT SLP Therapies (Done)     Topic: Physical Therapy (Done)     Point: Mobility training (Done)     Learning Progress Summary           Patient Acceptance, E,TB, VU by  at 4/17/2023 1143                               User Key     Initials Effective Dates Name Provider Type [facility] 06/16/21 -  Selene Faye, NELA Physical Therapist PT              PT Recommendation and Plan  Planned Therapy Interventions (PT): bed mobility training, gait training, transfer training, strengthening, stair training, ROM (range of motion), patient/family education, home exercise program  Plan of Care Reviewed With: patient  Outcome Evaluation: Pt is a 58 y/o M admitted with chest pain and dyspnea, as well as LE lymphedema. Medical Hx: lymphedema  BLE, arthritis, asthma, hx left TKA, morbid obesity. At baseline pt lives with his brother and the brother works, and per pt reports isn't helpful even when home. He has a niece who is in and out. Pt reports concerning information regarding poor treatment at home, SW on board. Pt uses a rolling walker or cane for household mobility, cane primarily. He has been home from rehab for two months, and until now has managed to maintain ADL independence. Today, patient sitting EOB on arrival and c/o BLE pain.  Pt with severe BLE lymphedma, LLE worse than R with noted redness and poor overall skin integrity.  Pt requires min A to stand from EOB, min/mod Ax2 with hand held assist to ambulate ~ 10' in room.  Pt with LLE weakness and poor tolerance to WB.  Pt unable to care for self in current condition, discussed d/c plan as pt has limited support at home.  Recommdn SNF for rehab at d/c with possibility of LTC or assisted living transition in the future. Pt verbalized understanding.     Time Calculation:    PT Charges     Row Name 04/17/23 1144             Time Calculation    Start Time 0930  -      Stop Time 0956  -      Time Calculation (min) 26 min  -      PT Received On 04/17/23  -      PT - Next Appointment 04/19/23  -      PT Goal Re-Cert Due Date 05/01/23  -            User Key  (r) = Recorded By, (t) = Taken By, (c) = Cosigned By    Initials Name Provider Type     Selene Faye, NELA Physical Therapist              Therapy Charges for Today     Code Description Service Date Service Provider Modifiers Qty    60860833798 HC PT EVAL MOD COMPLEXITY 4 4/17/2023 Selene Faye, PT GP 1          PT G-Codes  Outcome Measure Options: AM-PAC 6 Clicks Basic Mobility (PT)  AM-PAC 6 Clicks Score (PT): 15  AM-PAC 6 Clicks Score (OT): 16  PT Discharge Summary  Anticipated Discharge Disposition (PT): skilled nursing facility    Selene Faye PT  4/17/2023

## 2023-04-17 NOTE — PROGRESS NOTES
[facility] Medicine Services Daily Progress Note    Patient Name: Robert Randhawa Jr.  : 1965  MRN: [id number]  Primary Care Physician:  Alfredo Torres MD  Date of admission: 2023      Subjective      Chief Complaint: Chest pain      Patient Reports chills and dysuria and chest pressure, worsening lower extremity lymphedema    Review of Systems   Constitutional: Positive for chills and weight gain.   HENT: Negative.    Cardiovascular: Positive for chest pain and leg swelling.   Respiratory: Negative.    Endocrine: Negative.    Gastrointestinal: Negative.    Genitourinary: Positive for dysuria.   Neurological: Negative.    Psychiatric/Behavioral: Negative.          Objective      Vitals:   Temp:  [99.2 °F (37.3 °C)] 99.2 °F (37.3 °C)  Heart Rate:  [] 97  Resp:  [20-33] 33  BP: ()/(49-73) 115/62    Physical Exam  Vitals and nursing note reviewed.   Constitutional:       Appearance: He is obese.   HENT:      Right Ear: External ear normal.      Left Ear: External ear normal.      Nose: Nose normal.      Mouth/Throat:      Mouth: Mucous membranes are moist.   Eyes:      Extraocular Movements: Extraocular movements intact.      Pupils: Pupils are equal, round, and reactive to light.   Cardiovascular:      Rate and Rhythm: Normal rate and regular rhythm.      Heart sounds: Normal heart sounds. No murmur heard.  Pulmonary:      Effort: Pulmonary effort is normal. No respiratory distress.      Breath sounds: Normal breath sounds.   Abdominal:      Palpations: Abdomen is soft.      Comments: Obese   Musculoskeletal:      Right lower leg: Edema present.      Left lower leg: Edema present.   Neurological:      Mental Status: He is alert and oriented to person, place, and time.   Psychiatric:         Behavior: Behavior normal.            Result Review    Result Review:  I have personally reviewed the results from the time of this admission to 2023 17:10 EDT and agree with  these findings:  [x]  Laboratory  []  Microbiology  [x]  Radiology  []  EKG/Telemetry   []  Cardiology/Vascular   []  Pathology  []  Old records  []  Other:  Most notable findings include:   Lab Results   Component Value Date    GLUCOSE 91 04/17/2023    CALCIUM 8.5 (L) 04/17/2023     (L) 04/17/2023    K 5.2 04/17/2023    CO2 22.0 04/17/2023     04/17/2023    BUN 19 04/17/2023    CREATININE 0.89 04/17/2023    EGFR 99.3 04/17/2023    BCR 21.3 04/17/2023    ANIONGAP 10.0 04/17/2023     Lab Results   Component Value Date    WBC 4.40 04/17/2023    HGB 11.8 (L) 04/17/2023    HCT 35.9 (L) 04/17/2023    .0 (H) 04/17/2023    PLT 66 (L) 04/17/2023     CT Angiogram Chest Pulmonary Embolism    Result Date: 4/16/2023  1.  No evidence of pulmonary embolus to the lobar level. Segmental and subsegmental branches obscured. 2.  Cirrhosis and stigmata of portal hypertension. Recommend routine screening for HCC. 3.  Minor periportal lymphadenopathy, nonspecific in the setting of chronic liver disease. Electronically signed by:  Mayelin Morelos M.D.  4/16/2023 8:31 PM Mountain Time    XR Chest AP    Result Date: 4/16/2023  Impression: Cardiomegaly. No active disease. Electronically Signed: Jaret Donovan  4/16/2023 7:01 PM EDT  Workstation ID: [id number]          Assessment & Plan      Brief Patient Summary:  Robert Randhawa Jr. is a 57 y.o. male who presented [facility] on 4/16/2023 complaining of chest pain.  Admits to non-radiating substernal chest pain at rest that is worse with coughing of sneezing ongoing for the past three days complicated by chills, dyspnea with exertion, and worsening n/l LE lymphedema.  Denies fevers, vomiting, diarrhea, numbness. States due to worsening lymphedema and chronic conditions he is having difficulty with ADLs. Presented to [facility] for evaluation.     In the Ed, vitals 99.2, , RR 20, /53, 93% Ra.  Labs notable for HS troponin 15, glucose 114, AST 63, tbili1.3,  d-dimer 2.27, INR 1.2.  CT PE without evidence of PE but does show cirrhosis and stigmata or portal HTN with minor periportal lymphadenopathy.  EKG with sinus tachycardia but not GISSELL/STI/TWI.  He is to be admitted for cardiac evaluation.        enoxaparin, 40 mg, Subcutaneous, Q12H  furosemide, 20 mg, Oral, Daily  sodium chloride, 10 mL, Intravenous, Q12H  tamsulosin, 0.4 mg, Oral, Daily       Pharmacy to Dose enoxaparin (LOVENOX),          Active Hospital Problems:  Active Hospital Problems    Diagnosis    • **Chest pain due to coronary artery disease    • Lymphedema of both lower extremities      Plan:   #Chest pain    - HS troponin 15, trend    - EKG shows sinus tachycardia without GISSELL/STD/TWI    - pain sounds pleuritic in nature, but due to multiple comorbidities will rule out cardiac etiology    - stress myoview Lexiscan Cardiolite test is negative for myocardial ischemia    - consult cardiology due to chest pain with tachycardia and multiple comorbidities    - CT PE without PE    - due to d-dimer of 2.27, venous Dopplers bilateral lower extremity normal.    - hold aspirin due to thrombocytopenia    - hold statin due to cirrhosis    - lipid panel LDL 56/a1c normal at 5.20/tsh normal  # Dysuria; UA with reflex culture  #Asthma    - Duoneb QID    - patient on room air     #Cirrhosis    - CT PE without evidence of PE but does show cirrhosis and stigmata or portal HTN with minor periportal lymphadenopathy    - MELD 9    - follows with GI as otpt    - resume home Lasix     #Lymphadema    - lymphedema of both LE and pedal lymphedema worsening per pt    - chronic, worsening per pt    - proBNP 75.8    - wound care to evaluate    - pt/ot  -- Normal bilateral lower extremity venous duplex    - has otpt appointment with vascular set up     #Thrombocytopenia    - plt 85, appears near base    - follows with Heme/onc as otpt     #BPH    -flomax     DVT prophylaxis: Lovenox, monitor for toxicity and HIT        DVT  prophylaxis:  Medical DVT prophylaxis orders are present.    CODE STATUS:    Code Status (Patient has no pulse and is not breathing): CPR (Attempt to Resuscitate)  Medical Interventions (Patient has pulse or is breathing): Full Support      Disposition:  I expect patient to be discharged 1 to 2 days.    This patient has been examined wearing appropriate Personal Protective Equipment and discussed with hospital infection control department. 04/17/23      Electronically signed by Mele Devine MD, 04/17/23, 17:10 EDT.  [facility] Hospitalist Team

## 2023-04-17 NOTE — CASE MANAGEMENT/SOCIAL WORK
"Continued Stay Note   [place]     Patient Name: Robert Randhawa Jr.  MRN: [id number]  Today's Date: 4/17/2023    Admit Date: 4/16/2023    Plan: SNF recommended per PT.Referral made to [facility] - pending response. Will need precert. Plans to use [facility] at dc for transport   Discharge Plan     Row Name 04/17/23 1021       Plan    Plan SNF recommended per PT.Referral made to [facility] - pending response. Will need precert. Plans to use [facility] at dc for transport    Plan Comments Per discussion with PT Selene - she is recommending SNF at discharge. Pt requests referral to [facility] at this time. Referral made in [facility] and message left for liason. Response pending. Notifed Luis Miguel Brock of need for precert.    Row Name 04/17/23 0958       Plan    Plan Home. Possible home health/outpt rehab/SNF.Wants [facility] if home health or outpatient rehab needed. PT/OT pending. Plans to use [facility] at dc for transport    Patient/Family in Agreement with Plan yes    Plan Comments Pt reports he lives with brother and is IADLs \"as best as I can\".He reports brother is not there \"about 99% of the time\".Pt reports he makes his own meals,and cleans his home \"as best I can,but it is cluttered and I get no help\". He uses [facility] or brother Sajan provides transportation for him.He confirms pcp and requests to be enrolled in meds to bed (updated in Epic). He is agreeable to referral to [facility] via [facility] to be screened for medicaid waiver services (home health aid/attendant; meals to go,transportation). Referral made. He is agreeable to services if needed at discharge,and wants [facility] if home health or outpatient rehab needed. He is unsure where he would want to go if SNF needed. DC Barriers: PT/OT evals pending; myoview & echo pending; cardiology consult pending    Row Name 04/17/23 0970       Plan    Plan Comments Attempted to see patient for dc planning, but patient is currently out of his room for testing. Not case managed at " this time.               Discharge Codes    No documentation.               Expected Discharge Date and Time     Expected Discharge Date Expected Discharge Time    Apr 18, 2023         Selene Ayala RN, [facility]  Office: [phone]  Fax: [phone]  [email or web address]      I met with patient in room wearing PPE: mask and goggles.     Maintained distance greater than six feet and spent </=15 minutes in the room      Selene Ayala RN

## 2023-04-17 NOTE — PLAN OF CARE
Problem: Skin Injury Risk Increased  Goal: Skin Health and Integrity  Outcome: Ongoing, Progressing  Intervention: Optimize Skin Protection  Recent Flowsheet Documentation  Taken 4/17/2023 1330 by Keila Osullivan RN  Pressure Reduction Techniques:  • frequent weight shift encouraged  • weight shift assistance provided  Pressure Reduction Devices: positioning supports utilized     Problem: Fall Injury Risk  Goal: Absence of Fall and Fall-Related Injury  Outcome: Ongoing, Progressing  Intervention: Promote Injury-Free Environment  Recent Flowsheet Documentation  Taken 4/17/2023 1400 by Keila Osullivan RN  Safety Promotion/Fall Prevention: safety round/check completed  Taken 4/17/2023 1330 by Keila Osullivan RN  Safety Promotion/Fall Prevention: safety round/check completed     Problem: Asthma Comorbidity  Goal: Maintenance of Asthma Control  Outcome: Ongoing, Progressing     Problem: Behavioral Health Comorbidity  Goal: Maintenance of Behavioral Health Symptom Control  Outcome: Ongoing, Progressing     Problem: Hypertension Comorbidity  Goal: Blood Pressure in Desired Range  Outcome: Ongoing, Progressing     Problem: Osteoarthritis Comorbidity  Goal: Maintenance of Osteoarthritis Symptom Control  Outcome: Ongoing, Progressing     Problem: Pain Chronic (Persistent) (Comorbidity Management)  Goal: Acceptable Pain Control and Functional Ability  Outcome: Ongoing, Progressing   Goal Outcome Evaluation:      Pt arrived from ER, complaints of lower leg pain. States he is able to ambulate with help. UA ordered, pt states he will be able to give sample later.

## 2023-04-17 NOTE — CASE MANAGEMENT/SOCIAL WORK
"Continued Stay Note   [facility]     Patient Name: Robert Randhawa Jr.  MRN: [id number]  Today's Date: 4/17/2023    Admit Date: 4/16/2023    Plan: [facility] pending insurance precert. Need PT/OT notes to initiate precert. Plans to use [facility] at dc if transportation from SNF not available   Discharge Plan     Row Name 04/17/23 1030       Plan    Plan [facility] pending insurance precert. Need PT/OT notes to initiate precert. Plans to use [facility] at dc if transportation from SNF not available    Plan Comments Notified by Mariluz with [facility] that they can accept patient pending insurance precert. Notified PT Selene & ELLIOT Quach via secure chat that their notes are needed to begin insurance precert for SNF. Luis Miguel SUTTON will begin precert once this documentation is available.    Row Name 04/17/23 1021       Plan    Plan SNF recommended per PT.Referral made to [facility] - pending response. Will need precert. Plans to use [facility] at dc for transport    Plan Comments Per discussion with PT Selene - she is recommending SNF at discharge. Pt requests referral to [facility] at this time. Referral made in [facility] and message left for liason. Response pending. Notifed Luis Miguel Brock of need for precert.    Row Name 04/17/23 1616       Plan    Plan Home. Possible home health/outpt rehab/SNF.Wants [facility] if home health or outpatient rehab needed. PT/OT pending. Plans to use [facility] at dc for transport    Patient/Family in Agreement with Plan yes    Plan Comments Pt reports he lives with brother and is IADLs \"as best as I can\".He reports brother is not there \"about 99% of the time\".Pt reports he makes his own meals,and cleans his home \"as best I can,but it is cluttered and I get no help\". He uses [facility] or brother Sajan provides transportation for him.He confirms pcp and requests to be enrolled in meds to bed (updated in Epic). He is agreeable to referral to [facility] via [facility] to be screened for medicaid waiver services " (home health aid/attendant; meals to go,transportation). Referral made. He is agreeable to services if needed at discharge,and wants [facility] if home health or outpatient rehab needed. He is unsure where he would want to go if SNF needed. DC Barriers: PT/OT evals pending; myoview & echo pending; cardiology consult pending    Row Name 04/17/23 0909       Plan    Plan Comments Attempted to see patient for dc planning, but patient is currently out of his room for testing. Not case managed at this time.               Discharge Codes    No documentation.               Expected Discharge Date and Time     Expected Discharge Date Expected Discharge Time    Apr 18, 2023         Selene Ayala RN, [facility]  Office: [phone]  Fax: [phone]  [email or web address]          Selene Ayala RN

## 2023-04-17 NOTE — DISCHARGE PLACEMENT REQUEST
"Josemanuel Randhawa Jr. (57 y.o. Male)     Date of Birth   1965    Social Security Number       Address   [address] [place] [address] [place] [address]    Home Phone   [phone]    MRN   [id number]       Oriental orthodox   None    Marital Status   Single                            Admission Date   4/16/23    Admission Type   Emergency    Admitting Provider   Tami Pedraza DO    Attending Provider   Mele Devine MD    Department, Room/Bed   [facility] EMERGENCY DEPARTMENT, 08/08       Discharge Date       Discharge Disposition       Discharge Destination                               Attending Provider: Mele Devine MD    Allergies: Sulfa Antibiotics    Isolation: None   Infection: None   Code Status: CPR    Ht: 185.4 cm (73\")   Wt: 162 kg (357 lb)    Admission Cmt: None   Principal Problem: Chest pain due to coronary artery disease [I25.119]                 Active Insurance as of 4/16/2023     Primary Coverage     Payor Plan Insurance Group Employer/Plan Group    HUMANA MEDICARE REPLACEMENT HUMANA MEDICARE REPLACEMENT [id number]     Payor Plan Address Payor Plan Phone Number Payor Plan Fax Number Effective Dates    [address] [address] [phone]  12/1/2019 - None Entered    [facility] [address]       Subscriber Name Subscriber Birth Date Member ID       JOSEMANUEL RANDHAWA JR. 1965 [id number]           Secondary Coverage     Payor Plan Insurance Group Employer/Plan Group    [id number]      Payor Plan Address Payor Plan Phone Number Payor Plan Fax Number Effective Dates    [address]   1/4/2023 - None Entered    [place] [address]       Subscriber Name Subscriber Birth Date Member ID       JOSEMANUEL RANDHAWA JR. 1965 [id number]                 Emergency Contacts      (Rel.) Home Phone Work Phone Mobile Phone    Sajan Randhawa (Brother) -- -- [phone]              "

## 2023-04-17 NOTE — THERAPY EVALUATION
Patient Name: Robert Randhawa Jr.  : 1965    MRN: [id number]                              Today's Date: 2023       Admit Date: 2023    Visit Dx:     ICD-10-CM ICD-9-CM   1. Dyspnea, unspecified type  R06.00 786.09   2. Tachycardia  R00.0 785.0   3. Chest pain, unspecified type  R07.9 786.50     Patient Active Problem List   Diagnosis   • Chest pain   • Shortness of breath   • Arthritis   • Asthma   • Deep vein thrombosis (DVT)   • Lymphedema   • Cellulitis of lower extremity   • Chest pressure   • Unstable angina   • Cellulitis of left lower extremity without foot   • Obesity, morbid   • Cellulitis of left lower extremity   • Non-pressure chronic ulcer left lower leg, limited to breakdown skin   • Cellulitis of left foot   • Venous hypertension of both lower extremities   • Onychomycosis of foot with other complication   • Sepsis   • Chest pain due to coronary artery disease     Past Medical History:   Diagnosis Date   • Arthritis    • Asthma    • Chronic deep vein thrombosis (DVT)    • Lymphedema of both lower extremities      Past Surgical History:   Procedure Laterality Date   • APPENDECTOMY     • CARDIAC CATHETERIZATION N/A 2020    Procedure: Left ventriculography;  Surgeon: Kenney Reyez MD;  Location: [facility] CATH INVASIVE LOCATION;  Service: Cardiovascular;  Laterality: N/A;   • CARDIAC CATHETERIZATION N/A 2020    Procedure: Coronary angiography;  Surgeon: Kenney Reyez MD;  Location: [facility] CATH INVASIVE LOCATION;  Service: Cardiovascular;  Laterality: N/A;   • CARDIAC CATHETERIZATION N/A 2020    Procedure: Left Heart Cath;  Surgeon: Kenney Reyez MD;  Location: [facility] CATH INVASIVE LOCATION;  Service: Cardiovascular;  Laterality: N/A;   • COLONOSCOPY N/A 11/15/2022    Procedure: COLONOSCOPY with polypectomy;  Surgeon: AMY Garcia MD;  Location: [facility] ENDOSCOPY;  Service: Gastroenterology;  Laterality: N/A;  post: diverticulosis, ascending polyp x2,  transverse polyp x3, descending polyp x4, rectal polyp x1, hemorroids   • ENDOSCOPY N/A 11/15/2022    Procedure: ESOPHAGOGASTRODUODENOSCOPY with biopsy;  Surgeon: AMY Garcia MD;  Location: [facility] ENDOSCOPY;  Service: Gastroenterology;  Laterality: N/A;  post: esophagitis with biopsy to rule out candida , gastric biopsy   • KNEE SURGERY Left 2018      General Information     Row Name 04/17/23 1120          OT Time and Intention    Document Type evaluation  -LS     Mode of Treatment occupational therapy  -LS     Row Name 04/17/23 1120          General Information    Patient Profile Reviewed yes  -LS     Prior Level of Function independent:  -LS     Existing Precautions/Restrictions fall  -LS     Barriers to Rehab none identified  -LS     Row Name 04/17/23 1120          Living Environment    People in Home sibling(s)  Niece is in and out. Brother nor niece is helpful to pt, concern for possible poor treatment in home environment, SW on board  -LS     Row Name 04/17/23 1120          Home Main Entrance    Number of Stairs, Main Entrance one  -LS     Row Name 04/17/23 1120          Stairs Within Home, Primary    Number of Stairs, Within Home, Primary none  -LS     Row Name 04/17/23 1120          Cognition    Orientation Status (Cognition) oriented x 4  -LS     Row Name 04/17/23 1120          Safety Issues, Functional Mobility    Impairments Affecting Function (Mobility) balance;endurance/activity tolerance;pain;strength;range of motion (ROM)  -LS     Comment, Safety Issues/Impairments (Mobility) BLE severe edema  -LS           User Key  (r) = Recorded By, (t) = Taken By, (c) = Cosigned By    Initials Name Provider Type    LS Main Oshea OT Occupational Therapist                 Mobility/ADL's     Row Name 04/17/23 1123          Bed Mobility    Bed Mobility sit-supine  -LS     Sit-Supine [place] (Bed Mobility) moderate assist (50% patient effort)  -LS     Assistive Device (Bed Mobility) bed rails  -LS      Comment, (Bed Mobility) BLEs require assistance  -[facility] Name 04/17/23 1123          Transfers    Transfers sit-stand transfer  -[facility] Name 04/17/23 1123          Sit-Stand Transfer    Sit-Stand [place] (Transfers) minimum assist (75% patient effort);2 person assist  -[facility] Name 04/17/23 1123          Functional Mobility    Functional Mobility- Ind. Level minimum assist (75% patient effort);moderate assist (50% patient effort);2 person assist required  -     Functional Mobility- Comment 2 person handheld assist  -[facility] Name 04/17/23 1123          Activities of Daily Living    BADL Assessment/Intervention lower body dressing  -LS     Row Name 04/17/23 1123          Lower Body Dressing Assessment/Training    [place] Level (Lower Body Dressing) maximum assist (25% patient effort)  -     Comment, (Lower Body Dressing) Unable to wear socks with current state of BLEs with edema  -           User Key  (r) = Recorded By, (t) = Taken By, (c) = Cosigned By    Initials Name Provider Type     Main Oshea OT Occupational Therapist               Obj/Interventions     [facility] Name 04/17/23 1125          Sensory Assessment (Somatosensory)    Sensory Assessment Diminished sensation in B feet  -LS     Row Name 04/17/23 1125          Vision Assessment/Intervention    Visual Impairment/Limitations corrective lenses full-time  -[facility] Name 04/17/23 1125          Range of Motion Comprehensive    General Range of Motion bilateral upper extremity ROM WFL  -     Comment, General Range of Motion Min limited in B shoulders  -LS     Row Name 04/17/23 1125          Strength Comprehensive (MMT)    Comment, General Manual Muscle Testing (MMT) Assessment BUEs grossly 3+/5 (shoulders within range)  -[facility] Name 04/17/23 1125          Balance    Balance Assessment sitting static balance;sitting dynamic balance;standing static balance;standing dynamic balance  -     Static Sitting Balance independent   -LS     Dynamic Sitting Balance standby assist  -LS     Position, Sitting Balance unsupported;sitting edge of bed  -LS     Static Standing Balance minimal assist  -LS     Dynamic Standing Balance moderate assist;minimal assist;2-person assist  -LS     Comment, Balance Dyn standing 2 person HHA  -LS           User Key  (r) = Recorded By, (t) = Taken By, (c) = Cosigned By    Initials Name Provider Type    LS Main Oshea OT Occupational Therapist               Goals/Plan     Row Name 04/17/23 1136          Bed Mobility Goal 1 (OT)    Activity/Assistive Device (Bed Mobility Goal 1, OT) bed mobility activities, all  -LS     [place] Level/Cues Needed (Bed Mobility Goal 1, OT) contact guard required  -LS     Time Frame (Bed Mobility Goal 1, OT) long term goal (LTG);2 weeks  -     Row Name 04/17/23 1136          Transfer Goal 1 (OT)    Activity/Assistive Device (Transfer Goal 1, OT) sit-to-stand/stand-to-sit;bed-to-chair/chair-to-bed;toilet  -LS     [place] Level/Cues Needed (Transfer Goal 1, OT) standby assist  -LS     Time Frame (Transfer Goal 1, OT) long term goal (LTG);2 weeks  -     Row Name 04/17/23 1136          Bathing Goal 1 (OT)    Activity/Device (Bathing Goal 1, OT) bathing skills, all  -LS     [place] Level/Cues Needed (Bathing Goal 1, OT) contact guard required  -LS     Time Frame (Bathing Goal 1, OT) long term goal (LTG);2 weeks  -     Row Name 04/17/23 1136          Dressing Goal 1 (OT)    Activity/Device (Dressing Goal 1, OT) dressing skills, all  -LS     [place]/Cues Needed (Dressing Goal 1, OT) contact guard required  -LS     Time Frame (Dressing Goal 1, OT) long term goal (LTG);2 weeks  -     Row Name 04/17/23 1136          Therapy Assessment/Plan (OT)    Planned Therapy Interventions (OT) activity tolerance training;BADL retraining;functional balance retraining;IADL retraining;occupation/activity based interventions;ROM/therapeutic exercise;strengthening  exercise;transfer/mobility retraining  -           User Key  (r) = Recorded By, (t) = Taken By, (c) = Cosigned By    Initials Name Provider Type    LS Main Oshea, OT Occupational Therapist               Clinical Impression     Row Name 04/17/23 1128          Pain Assessment    Additional Documentation Pain Scale: FACES Pre/Post-Treatment (Group)  -LS     Row Name 04/17/23 1128          Pain Scale: FACES Pre/Post-Treatment    Pain: FACES Scale, Pretreatment 4-->hurts little more  -LS     Posttreatment Pain Rating 4-->hurts little more  -LS     Pain Location - Side/Orientation Bilateral  -LS     Pain Location lower  -LS     Pain Location - extremity  -LS     Row Name 04/17/23 1128          Plan of Care Review    Plan of Care Reviewed With patient  -LS     Outcome Evaluation Pt is a 56 y/o M admitted with chest pain and dyspnea, as well as LE lymphedema all combining to make ADL completion difficult. Medical Hx: lymphedema BLE, arthritis, asthma, hx left TKA, morbid obesity. At baseline pt lives with his brother and the brother works, and per pt reports isn't helpful even when home. He has a niece who is in and out. Pt reports concerning information regarding poor treatment at home, SW on board. Pt uses a rolling walker or cane for household mobility, cane primarily. He has been home from rehab for two months, and until now has managed to maintain ADL independence. Today, patient sitting EOB on arrival. Due to swelling, unable to don socks however with current state of BLEs pt will require assistance with all lwer body self care. Min A x2 to come to standing and Min-Mod Ax2 to ambulate 10 feet. BLEs painful when standing or walking, L>R. Upon sitting EOB, Mod A to return to supine for mgmt of BLEs. Patient is below baseline and with limited assistance at home he will need SNF for rehab at d/c. Will follow.  -LS     Row Name 04/17/23 1128          Therapy Assessment/Plan (OT)    Rehab Potential (OT) good, to  achieve stated therapy goals  -     Criteria for Skilled Therapeutic Interventions Met (OT) yes;skilled treatment is necessary  -     Therapy Frequency (OT) 3 times/wk  -LS     Predicted Duration of Therapy Intervention (OT) until dc  -     Row Name 04/17/23 1128          Therapy Plan Review/Discharge Plan (OT)    Anticipated Discharge Disposition (OT) skilled nursing facility  -     Row Name 04/17/23 1128          Vital Signs    O2 Delivery Pre Treatment room air  -LS     O2 Delivery Intra Treatment room air  -LS     O2 Delivery Post Treatment room air  -LS     Pre Patient Position Sitting  -LS     Intra Patient Position Standing  -LS     Post Patient Position Supine  -     Row Name 04/17/23 1128          Positioning and Restraints    Pre-Treatment Position in bed  -LS     Post Treatment Position bed  -LS     In Bed notified nsg;fowlers;call light within reach;encouraged to call for assist;exit alarm on;legs elevated  -LS           User Key  (r) = Recorded By, (t) = Taken By, (c) = Cosigned By    Initials Name Provider Type    Main Willams OT Occupational Therapist               Outcome Measures     Row Name 04/17/23 1137          How much help from another is currently needed...    Putting on and taking off regular lower body clothing? 2  -LS     Bathing (including washing, rinsing, and drying) 2  -LS     Toileting (which includes using toilet bed pan or urinal) 2  -LS     Putting on and taking off regular upper body clothing 3  -LS     Taking care of personal grooming (such as brushing teeth) 3  -LS     Eating meals 4  -LS     AM-PAC 6 Clicks Score (OT) 16  -     Row Name 04/17/23 1137          Functional Assessment    Outcome Measure Options AM-PAC 6 Clicks Daily Activity (OT)  -LS           User Key  (r) = Recorded By, (t) = Taken By, (c) = Cosigned By    Initials Name Provider Type    Main Willams OT Occupational Therapist                Occupational Therapy Education     Title: PT OT SLP  Therapies (Done)     Topic: Occupational Therapy (Done)     Point: ADL training (Done)     Description:   Instruct learner(s) on proper safety adaptation and remediation techniques during self care or transfers.   Instruct in proper use of assistive devices.              Learning Progress Summary           Patient Acceptance, E,TB, VU by FELIX at 4/17/2023 1137                               User Key     Initials Effective Dates Name Provider Type Discipline    FELIX 09/22/22 -  Main Oshea OT Occupational Therapist OT              OT Recommendation and Plan  Planned Therapy Interventions (OT): activity tolerance training, BADL retraining, functional balance retraining, IADL retraining, occupation/activity based interventions, ROM/therapeutic exercise, strengthening exercise, transfer/mobility retraining  Therapy Frequency (OT): 3 times/wk  Plan of Care Review  Plan of Care Reviewed With: patient  Outcome Evaluation: Pt is a 56 y/o M admitted with chest pain and dyspnea, as well as LE lymphedema all combining to make ADL completion difficult. Medical Hx: lymphedema BLE, arthritis, asthma, hx left TKA, morbid obesity. At baseline pt lives with his brother and the brother works, and per pt reports isn't helpful even when home. He has a niece who is in and out. Pt reports concerning information regarding poor treatment at home, SW on board. Pt uses a rolling walker or cane for household mobility, cane primarily. He has been home from rehab for two months, and until now has managed to maintain ADL independence. Today, patient sitting EOB on arrival. Due to swelling, unable to don socks however with current state of BLEs pt will require assistance with all lwer body self care. Min A x2 to come to standing and Min-Mod Ax2 to ambulate 10 feet. BLEs painful when standing or walking, L>R. Upon sitting EOB, Mod A to return to supine for mgmt of BLEs. Patient is below baseline and with limited assistance at home he will need SNF  for rehab at d/c. Will follow.     Time Calculation:    Time Calculation- OT     Row Name 04/17/23 1137             Time Calculation- OT    OT Start Time 0925  -LS      OT Stop Time 0956  -LS      OT Time Calculation (min) 31 min  -LS      OT Received On 04/17/23  -      OT - Next Appointment 04/19/23  -      OT Goal Re-Cert Due Date 05/01/23  -         Untimed Charges    OT Eval/Re-eval Minutes 31  -LS         Total Minutes    Untimed Charges Total Minutes 31  -LS       Total Minutes 31  -LS            User Key  (r) = Recorded By, (t) = Taken By, (c) = Cosigned By    Initials Name Provider Type    LS Main Oshea OT Occupational Therapist              Therapy Charges for Today     Code Description Service Date Service Provider Modifiers Qty    18692467018 HC OT EVAL MOD COMPLEXITY 4 4/17/2023 Main Oshea OT GO 1               Main Oshea OT  4/17/2023

## 2023-04-17 NOTE — CASE MANAGEMENT/SOCIAL WORK
"Continued Stay Note   [facility]     Patient Name: Robert Randhawa Jr.  MRN: [id number]  Today's Date: 4/17/2023    Admit Date: 4/16/2023    Plan: [facility] pending insurance precert. Insurance precert started 4/17/23. Plans to use [facility] if dc transportation from SNF not available at time of dc.   Discharge Plan     Row Name 04/17/23 1225       Plan    Plan [facility] pending insurance precert. Insurance precert started 4/17/23. Plans to use [facility] if dc transportation from SNF not available at time of dc.    Plan Comments Notified by SHARAD SUTTON that she initiated SNF precert via [employer] portal. [employer] auth ID [id number]. Precert pending    Row Name 04/17/23 1030       Plan    Plan [facility] pending insurance precert. Need PT/OT notes to initiate precert. Plans to use [facility] at dc if transportation from SNF not available    Plan Comments Notified by Mariluz with [facility] that they can accept patient pending insurance precert. Notified PT Selene & OT Main via secure chat that their notes are needed to begin insurance precert for SNF. Luis Miguel SUTTON will begin precert once this documentation is available.    Row Name 04/17/23 1021       Plan    Plan SNF recommended per PT.Referral made to [facility] - pending response. Will need precert. Plans to use [facility] at dc for transport    Plan Comments Per discussion with PT Selene - she is recommending SNF at discharge. Pt requests referral to [facility] at this time. Referral made in [facility] and message left for liason. Response pending. Notifed Luis Miguel Brock of need for precert.    Row Name 04/17/23 0904       Plan    Plan Home. Possible home health/outpt rehab/SNF.Wants [facility] if home health or outpatient rehab needed. PT/OT pending. Plans to use [facility] at dc for transport    Patient/Family in Agreement with Plan yes    Plan Comments Pt reports he lives with brother and is IADLs \"as best as I can\".He reports brother is not there \"about 99% of the " "time\".Pt reports he makes his own meals,and cleans his home \"as best I can,but it is cluttered and I get no help\". He uses [facility] or brother Sajan provides transportation for him.He confirms pcp and requests to be enrolled in meds to bed (updated in Epic). He is agreeable to referral to [facility] via [facility] to be screened for medicaid waiver services (home health aid/attendant; meals to go,transportation). Referral made. He is agreeable to services if needed at discharge,and wants [facility] if home health or outpatient rehab needed. He is unsure where he would want to go if SNF needed. DC Barriers: PT/OT evals pending; myoview & echo pending; cardiology consult pending               Discharge Codes    No documentation.               Expected Discharge Date and Time     Expected Discharge Date Expected Discharge Time    Apr 18, 2023         Selene Ayala RN, [facility]  Office: [phone]  Fax: [phone]  [email or web address]      Phone communication or documentation only - no physical contact with patient or family.    Selene Ayala RN    "

## 2023-04-17 NOTE — NURSING NOTE
WOCN note:    57 yr old male admitted 4/16/23 with shortness of breath and chest pain. WOCN consult received for BLE assessment.     Patient has been followed by [facility] wound center and [facility] PT for lymphedema care but is no longer receiving any services. He was scheduled to follow up with a lymphedema clinic but patient states they do not accept his insurance.   Patient has lymphedema pumps that he states he is using one hour daily at home. He is unable to don the CircAid compression garments at this time.   Patient also states that he has an appointment scheduled with vascular surgery for his legs.     Patient presents with lymphedema to BLE. There is a small 0.2x0.2cm wound to the left anterior ankle within the skin fold. The area was cleansed with NS and covered with Maxorb. There are no other open wounds but the left leg is more edematous, painful and erythematous than the right. Both lower legs were wrapped with kerlix and 6 inch ACE wraps from the base of the toes to below the knees. Recommend to remove and re-wrap daily and PRN. We will continue to follow as needed.

## 2023-04-17 NOTE — PLAN OF CARE
Goal Outcome Evaluation:  Plan of Care Reviewed With: patient            Pt is a 56 y/o M admitted with chest pain and dyspnea, as well as LE lymphedema all combining to make ADL completion difficult. Medical Hx: lymphedema BLE, arthritis, asthma, hx left TKA, morbid obesity. At baseline pt lives with his brother and the brother works, and per pt reports isn't helpful even when home. He has a niece who is in and out. Pt reports concerning information regarding poor treatment at home, SW on board. Pt uses a rolling walker or cane for household mobility, cane primarily. He has been home from rehab for two months, and until now has managed to maintain ADL independence. Today, patient sitting EOB on arrival. Due to swelling, unable to don socks however with current state of BLEs pt will require assistance with all lwer body self care. Min A x2 to come to standing and Min-Mod Ax2 to ambulate 10 feet. BLEs painful when standing or walking, L>R. Upon sitting EOB, Mod A to return to supine for mgmt of BLEs. Patient is below baseline and with limited assistance at home he will need SNF for rehab at d/c. Will follow.

## 2023-04-18 VITALS
DIASTOLIC BLOOD PRESSURE: 65 MMHG | TEMPERATURE: 98.6 F | OXYGEN SATURATION: 97 % | HEIGHT: 73 IN | BODY MASS INDEX: 41.75 KG/M2 | HEART RATE: 79 BPM | SYSTOLIC BLOOD PRESSURE: 110 MMHG | RESPIRATION RATE: 28 BRPM | WEIGHT: 315 LBS

## 2023-04-18 PROBLEM — I25.119 CHEST PAIN DUE TO CORONARY ARTERY DISEASE: Status: RESOLVED | Noted: 2023-04-16 | Resolved: 2023-04-18

## 2023-04-18 LAB
ANION GAP SERPL CALCULATED.3IONS-SCNC: 10 MMOL/L (ref 5–15)
BUN SERPL-MCNC: 22 MG/DL (ref 6–20)
BUN/CREAT SERPL: 23.9 (ref 7–25)
CALCIUM SPEC-SCNC: 8.4 MG/DL (ref 8.6–10.5)
CHLORIDE SERPL-SCNC: 102 MMOL/L (ref 98–107)
CO2 SERPL-SCNC: 23 MMOL/L (ref 22–29)
CREAT SERPL-MCNC: 0.92 MG/DL (ref 0.76–1.27)
DEPRECATED RDW RBC AUTO: 51.6 FL (ref 37–54)
EGFRCR SERPLBLD CKD-EPI 2021: 96.4 ML/MIN/1.73
ERYTHROCYTE [DISTWIDTH] IN BLOOD BY AUTOMATED COUNT: 14.3 % (ref 12.3–15.4)
GLUCOSE SERPL-MCNC: 100 MG/DL (ref 65–99)
HCT VFR BLD AUTO: 35.4 % (ref 37.5–51)
HGB BLD-MCNC: 11.5 G/DL (ref 13–17.7)
MCH RBC QN AUTO: 33.9 PG (ref 26.6–33)
MCHC RBC AUTO-ENTMCNC: 32.4 G/DL (ref 31.5–35.7)
MCV RBC AUTO: 104.7 FL (ref 79–97)
PLATELET # BLD AUTO: 58 10*3/MM3 (ref 140–450)
PMV BLD AUTO: 9.1 FL (ref 6–12)
POTASSIUM SERPL-SCNC: 4.5 MMOL/L (ref 3.5–5.2)
QT INTERVAL: 312 MS
RBC # BLD AUTO: 3.38 10*6/MM3 (ref 4.14–5.8)
SODIUM SERPL-SCNC: 135 MMOL/L (ref 136–145)
WBC NRBC COR # BLD: 3.9 10*3/MM3 (ref 3.4–10.8)

## 2023-04-18 PROCEDURE — G0378 HOSPITAL OBSERVATION PER HR: HCPCS

## 2023-04-18 PROCEDURE — 96372 THER/PROPH/DIAG INJ SC/IM: CPT

## 2023-04-18 PROCEDURE — 25010000002 ENOXAPARIN PER 10 MG: Performed by: STUDENT IN AN ORGANIZED HEALTH CARE EDUCATION/TRAINING PROGRAM

## 2023-04-18 RX ORDER — ACETAMINOPHEN 325 MG/1
650 TABLET ORAL EVERY 6 HOURS PRN
Status: DISCONTINUED | OUTPATIENT
Start: 2023-04-18 | End: 2023-04-18 | Stop reason: HOSPADM

## 2023-04-18 RX ADMIN — TAMSULOSIN HYDROCHLORIDE 0.4 MG: 0.4 CAPSULE ORAL at 09:51

## 2023-04-18 RX ADMIN — Medication 10 ML: at 09:57

## 2023-04-18 RX ADMIN — ACETAMINOPHEN 650 MG: 325 TABLET, FILM COATED ORAL at 00:11

## 2023-04-18 RX ADMIN — FUROSEMIDE 20 MG: 20 TABLET ORAL at 09:51

## 2023-04-18 RX ADMIN — ENOXAPARIN SODIUM 40 MG: 100 INJECTION SUBCUTANEOUS at 09:51

## 2023-04-18 NOTE — PROGRESS NOTES
Referring Provider: Mele Devine MD    Reason for follow-up: Sinus rhythm     Patient Care Team:  Alfredo Torres MD as PCP - General  Alfredo Torres MD as PCP - Family Medicine  Kenney Reyez MD as Consulting Physician (Cardiology)  Lionel Lynn MD as Consulting Physician (Hematology and Oncology)    Subjective .      ROS    Since I have last seen yesterday, the patient has been without any chest discomfort ,shortness of breath, palpitations, dizziness or syncope.  Denies having any headache ,abdominal pain ,nausea, vomiting , diarrhea constipation, loss of weight or loss of appetite.  Denies having any excessive bruising ,hematuria or blood in the stool.    Review of all systems negative except as indicated    History  Past Medical History:   Diagnosis Date   • Arthritis    • Asthma    • Chronic deep vein thrombosis (DVT)    • Lymphedema of both lower extremities        Past Surgical History:   Procedure Laterality Date   • APPENDECTOMY  1974   • CARDIAC CATHETERIZATION N/A 2/28/2020    Procedure: Left ventriculography;  Surgeon: Kenney Reyez MD;  Location: [facility] CATH INVASIVE LOCATION;  Service: Cardiovascular;  Laterality: N/A;   • CARDIAC CATHETERIZATION N/A 2/28/2020    Procedure: Coronary angiography;  Surgeon: Kenney Reyez MD;  Location: [facility] CATH INVASIVE LOCATION;  Service: Cardiovascular;  Laterality: N/A;   • CARDIAC CATHETERIZATION N/A 2/28/2020    Procedure: Left Heart Cath;  Surgeon: Kenney Reyez MD;  Location: [facility] CATH INVASIVE LOCATION;  Service: Cardiovascular;  Laterality: N/A;   • COLONOSCOPY N/A 11/15/2022    Procedure: COLONOSCOPY with polypectomy;  Surgeon: AMY Garcia MD;  Location: [facility] ENDOSCOPY;  Service: Gastroenterology;  Laterality: N/A;  post: diverticulosis, ascending polyp x2, transverse polyp x3, descending polyp x4, rectal polyp x1, hemorroids   • ENDOSCOPY N/A 11/15/2022    Procedure: ESOPHAGOGASTRODUODENOSCOPY with biopsy;  Surgeon:  AMY Garcia MD;  Location: [facility] ENDOSCOPY;  Service: Gastroenterology;  Laterality: N/A;  post: esophagitis with biopsy to rule out candida , gastric biopsy   • KNEE SURGERY Left 2018       Family History   Problem Relation Age of Onset   • Heart disease Mother    • Diabetes Mother    • Arthritis Father    • Aortic aneurysm Father        Social History     Tobacco Use   • Smoking status: Former     Packs/day: 0.50     Years: 5.00     Pack years: 2.50     Types: Cigarettes     Start date:      Quit date:      Years since quittin.3     Passive exposure: Past   • Smokeless tobacco: Never   Vaping Use   • Vaping Use: Never used   Substance Use Topics   • Alcohol use: Never   • Drug use: Never        Medications Prior to Admission   Medication Sig Dispense Refill Last Dose   • furosemide (Lasix) 20 MG tablet Take 1 tablet by mouth Daily. Take daily or PRN 30 tablet 0    • riFAXIMin (XIFAXAN) 550 MG tablet Take 1 tablet by mouth Every 12 (Twelve) Hours.      • spironolactone (ALDACTONE) 25 MG tablet Take 2 tablets by mouth Daily.      • tamsulosin (FLOMAX) 0.4 MG capsule 24 hr capsule Take 1 capsule by mouth Daily.          Allergies  Sulfa antibiotics    Scheduled Meds:enoxaparin, 40 mg, Subcutaneous, Q12H  furosemide, 20 mg, Oral, Daily  sodium chloride, 10 mL, Intravenous, Q12H  tamsulosin, 0.4 mg, Oral, Daily      Continuous Infusions:Pharmacy to Dose enoxaparin (LOVENOX),       PRN Meds:.•  acetaminophen  •  ipratropium-albuterol  •  nitroglycerin  •  ondansetron **OR** ondansetron  •  Pharmacy to Dose enoxaparin (LOVENOX)  •  sodium chloride  •  sodium chloride    Objective     VITAL SIGNS  Vitals:    23 1534 23 1946 23 0002 23 0421   BP: 115/62 110/67 118/55 107/57   BP Location:  Right arm Right arm Right arm   Patient Position:  Lying Lying Lying   Pulse: 97 102 101 91   Resp: (!) 33 (!) 30 (!) 32 (!) 30   Temp: 99.2 °F (37.3 °C) 99.1 °F (37.3 °C) 98.5 °F (36.9 °C)  "98.6 °F (37 °C)   TempSrc: Oral Oral Oral Oral   SpO2: 98% 97% 92% 92%   Weight:       Height:           Flowsheet Rows    Flowsheet Row First Filed Value   Admission Height 185.4 cm (73\") Documented at 04/16/2023 1824   Admission Weight 162 kg (357 lb) Documented at 04/16/2023 1824            Intake/Output Summary (Last 24 hours) at 4/18/2023 0638  Last data filed at 4/18/2023 0002  Gross per 24 hour   Intake 480 ml   Output 350 ml   Net 130 ml        TELEMETRY: Sinus rhythm    Physical Exam:  The patient is alert, oriented and in no distress.  Vital signs as noted above.  Exogenous obesity.  Head and neck revealed no carotid bruits or jugular venous distention.  No thyromegaly or lymphadenopathy is present  Lungs clear.  No wheezing.  Breath sounds are normal bilaterally.  Heart normal first and second heart sounds.  No murmur. No precordial rub is present.  No gallop is present.  Abdomen soft and nontender.  No organomegaly is present.  Extremities with good peripheral pulses.  Significant lower extremity bilateral lymphedema.  Skin warm and dry.  Musculoskeletal system is grossly normal  CNS grossly normal    Reviewed and updated.      Results Review:   I reviewed the patient's new clinical results.  Lab Results (last 24 hours)     Procedure Component Value Units Date/Time    Basic Metabolic Panel [[id number]]  (Abnormal) Collected: 04/17/23 2356    Specimen: Blood from Arm, Left Updated: 04/18/23 0157     Glucose 100 mg/dL      BUN 22 mg/dL      Creatinine 0.92 mg/dL      Sodium 135 mmol/L      Potassium 4.5 mmol/L      Comment: Slight hemolysis detected by analyzer. Results may be affected.        Chloride 102 mmol/L      CO2 23.0 mmol/L      Calcium 8.4 mg/dL      BUN/Creatinine Ratio 23.9     Anion Gap 10.0 mmol/L      eGFR 96.4 mL/min/1.73     Narrative:      GFR Normal >60  Chronic Kidney Disease <60  Kidney Failure <15      CBC (No Diff) [[id number]]  (Abnormal) Collected: 04/17/23 2356    Specimen: Blood " from Arm, Left Updated: 04/18/23 0031     WBC 3.90 10*3/mm3      RBC 3.38 10*6/mm3      Hemoglobin 11.5 g/dL      Hematocrit 35.4 %      .7 fL      MCH 33.9 pg      MCHC 32.4 g/dL      RDW 14.3 %      RDW-SD 51.6 fl      MPV 9.1 fL      Platelets 58 10*3/mm3     Blood Culture - Blood, Arm, Right [[id number]]  (Normal) Collected: 04/16/23 2301    Specimen: Blood from Arm, Right Updated: 04/17/23 2330     Blood Culture No growth at 24 hours    Blood Culture - Blood, Arm, Left [[id number]]  (Normal) Collected: 04/16/23 2306    Specimen: Blood from Arm, Left Updated: 04/17/23 2330     Blood Culture No growth at 24 hours    Urinalysis, Microscopic Only - Urine, Clean Catch [[id number]]  (Abnormal) Collected: 04/17/23 2149    Specimen: Urine, Clean Catch Updated: 04/17/23 2219     RBC, UA Too Numerous to Count /HPF      WBC, UA 0-2 /HPF      Comment: Urine culture not indicated.        Bacteria, UA None Seen /HPF      Squamous Epithelial Cells, UA 0-2 /HPF      Hyaline Casts, UA None Seen /LPF      Methodology Automated Microscopy    Urinalysis With Culture If Indicated - Urine, Clean Catch [[id number]]  (Abnormal) Collected: 04/17/23 2149    Specimen: Urine, Clean Catch Updated: 04/17/23 2214     Color, UA Orange     Comment: Any Substance that causes an abnormal urine color can alter the accuracy of the chemical reactions.        Appearance, UA Clear     pH, UA 6.5     Specific Gravity, UA 1.027     Glucose, UA Negative     Ketones, UA Negative     Bilirubin, UA Small (1+)     Comment: Confirmation testing is unavailable.  A serum bilirubin is recommended for further assessment.        Blood, UA Large (3+)     Protein, UA Trace     Leuk Esterase, UA Trace     Nitrite, UA Positive     Urobilinogen, UA 4.0 E.U./dL    Narrative:      In absence of clinical symptoms, the presence of pyuria, bacteria, and/or nitrites on the urinalysis result does not correlate with infection.          Imaging Results (Last 24 Hours)      ** No results found for the last 24 hours. **      LAB RESULTS (LAST 7 DAYS)    CBC  Results from last 7 days   Lab Units 04/17/23 2356 04/17/23 0514 04/16/23 1909   WBC 10*3/mm3 3.90 4.40 3.80   RBC 10*6/mm3 3.38* 3.45* 3.90*   HEMOGLOBIN g/dL 11.5* 11.8* 13.5   HEMATOCRIT % 35.4* 35.9* 39.9   MCV fL 104.7* 104.0* 102.3*   PLATELETS 10*3/mm3 58* 66* 85*       BMP  Results from last 7 days   Lab Units 04/17/23 2356 04/17/23 0514 04/16/23 2003 04/16/23 1909   SODIUM mmol/L 135* 135*  --  138   POTASSIUM mmol/L 4.5 5.2  --  4.2   CHLORIDE mmol/L 102 103  --  102   CO2 mmol/L 23.0 22.0  --  25.0   BUN mg/dL 22* 19  --  16   CREATININE mg/dL 0.92 0.89 0.90 0.82   GLUCOSE mg/dL 100* 91  --  114*       CMP   Results from last 7 days   Lab Units 04/17/23 2356 04/17/23 0514 04/16/23 2003 04/16/23 1909   SODIUM mmol/L 135* 135*  --  138   POTASSIUM mmol/L 4.5 5.2  --  4.2   CHLORIDE mmol/L 102 103  --  102   CO2 mmol/L 23.0 22.0  --  25.0   BUN mg/dL 22* 19  --  16   CREATININE mg/dL 0.92 0.89 0.90 0.82   GLUCOSE mg/dL 100* 91  --  114*   ALBUMIN g/dL  --   --   --  3.5   BILIRUBIN mg/dL  --   --   --  1.3*   ALK PHOS U/L  --   --   --  142*   AST (SGOT) U/L  --   --   --  63*   ALT (SGPT) U/L  --   --   --  29   AMMONIA umol/L  --   --   --  56         BNP        TROPONIN  Results from last 7 days   Lab Units 04/17/23 0514   HSTROP T ng/L 18*       CoAg  Results from last 7 days   Lab Units 04/16/23  1909   INR  1.20*   APTT seconds 25.7*       Creatinine Clearance  Estimated Creatinine Clearance: 139.9 mL/min (by C-G formula based on SCr of 0.92 mg/dL).    ABG        Radiology  CT Angiogram Chest Pulmonary Embolism    Result Date: 4/16/2023  1.  No evidence of pulmonary embolus to the lobar level. Segmental and subsegmental branches obscured. 2.  Cirrhosis and stigmata of portal hypertension. Recommend routine screening for HCC. 3.  Minor periportal lymphadenopathy, nonspecific in the setting of chronic liver  disease. Electronically signed by:  Mayelin Morelos M.D.  4/16/2023 8:31 PM Mountain Time    XR Chest AP    Result Date: 4/16/2023  Impression: Cardiomegaly. No active disease. Electronically Signed: Jaret Donovan  4/16/2023 7:01 PM EDT  Workstation ID: [id number]          EKG            I personally viewed and interpreted the patient's EKG/Telemetry data:    ECHOCARDIOGRAM:    Results for orders placed during the hospital encounter of 04/16/23    Adult Transthoracic Echo Complete W/ Cont if Necessary Per Protocol    Interpretation Summary  •  Left ventricular ejection fraction appears to be 56 - 60%.  •  Estimated right ventricular systolic pressure from tricuspid regurgitation is mildly elevated (35-45 mmHg).    Indications  Chest pain    Technically satisfactory study.  Mitral valve is structurally normal.  Tricuspid valve is structurally normal.  Aortic valve is thickened with mild aortic valve stenosis (gradient across the aortic valve 21/12 mmHg and valve area of 2.2 cm².  Pulmonic valve could not be well visualized.  No evidence for mitral tricuspid or aortic regurgitation is seen by Doppler study.  Left atrium is normal in size.  Right atrium is normal in size.  Left ventricle is normal in size and contractility with ejection fraction of 60%.  Right ventricle is normal in size.  Atrial septum is intact.  Aorta is normal.  No pericardial effusion or intracardiac thrombus is seen.    Impression  Mild aortic valve stenosis.  Gradient across the aortic valve 21/12 mmHg and valve area of 2.2 cm².  Left ventricular size and contractility is normal with ejection fraction of 60%.          STRESS MYOVIEW:    Cardiolite (Tc-99m Sestamibi) stress test    CARDIAC CATHETERIZATION:            OTHER:         Assessment & Plan     Principal Problem:    Chest pain due to coronary artery disease  Active Problems:    Lymphedema of both lower extremities      ]]]]]]]]]]]]]]]]]]  Impression  ===========  Chest  discomfort-atypical.  Troponin levels are negative.  EKG showed no acute changes.  CT scan of the negative for pulmonary embolus.  Showed evidence for pulmonary hypertension hepatic cirrhosis.     Echocardiogram 4/17/2023  Mild aortic valve stenosis.  Gradient across the aortic valve 21/12 mmHg and valve area of 2.2 cm².  Left ventricular size and contractility is normal with ejection fraction of 60%.     Stress Cardiolite test-normal- 4/17/2023    Cardiac catheterization 2/28/2020 revealed normal left ventricle function and normal coronary arteries       -exogenous obesity     -history of chronic DVT asthma and arthritis. Eliquis has been discontinued recently.     - Chronic lower extremity edema/lymphedema.     - Cirrhosis of the liver     - Significant thrombocytopenia with platelet count of 66,000.      -status post left knee surgery  appendectomy     -former smoker     -allergic to sulfa     -family history is negative for coronary artery disease  ============  Plan  ============  Chest discomfort-atypical.  Troponin levels are negative.  EKG showed no acute changes.  CT scan of the negative for pulmonary embolus.  Showed evidence for pulmonary hypertension hepatic cirrhosis.     Echocardiogram 4/17/2023  Mild aortic valve stenosis.  Gradient across the aortic valve 21/12 mmHg and valve area of 2.2 cm².  Left ventricular size and contractility is normal with ejection fraction of 60%.     Stress Cardiolite test-normal- 4/17/2023     Hepatic cirrhosis  Thrombocytopenia-66,000.  34224- 4/18/2023  No bleeding problems.     Medications were reviewed and updated.    Okay with discharge plans from cardiac standpoint.  Have discussed with attending physician Dr. Devine for coordination of care.    Further plan will depend on patient's progress.  [[[[[[[[[[[[[[[[[[                Kenney Reyez MD  04/18/23  06:38 EDT

## 2023-04-18 NOTE — CASE MANAGEMENT/SOCIAL WORK
Continued Stay Note  NA[facility] Buenrostro     Patient Name: Robert Randhawa Jr.  MRN: [id number]  Today's Date: 4/18/2023    Admit Date: 4/16/2023    Plan: D/C plan: [facility]. Precert approved 4/17 Valid 4/18-4/20. Bed available today 4/18. Need w/c van for transport.   Discharge Plan     Row Name 04/18/23 1145       Plan    Plan D/C plan: [facility], accepted. Precert approved 4/17 Valid 4/18-4/20. Bed available today 4/18. Need w/c van for transport.    Patient/Family in Agreement with Plan yes    Plan Comments CM received notification that precert has been approved and is valid 4/18-4/20. Per MD, pt is d/c ready today. CM confirmed that [facility] has bed ready today for patient. [facility] not able to transport today. CM will request hospital w/c van to transport. CM updated pt at bedside of insurance approval.                   Expected Discharge Date and Time     Expected Discharge Date Expected Discharge Time    Apr 18, 2023         Met with patient in room  Maintained distance greater than six feet and spent less than 15 minutes in the room.            Denver De León RN

## 2023-04-18 NOTE — PLAN OF CARE
Problem: Skin Injury Risk Increased  Goal: Skin Health and Integrity  Outcome: Met     Problem: Asthma Comorbidity  Goal: Maintenance of Asthma Control  Outcome: Met     Problem: Fall Injury Risk  Goal: Absence of Fall and Fall-Related Injury  Outcome: Met  Intervention: Promote Injury-Free Environment  Recent Flowsheet Documentation  Taken 4/18/2023 1008 by Ruth Peter LPN  Safety Promotion/Fall Prevention: safety round/check completed     Problem: Behavioral Health Comorbidity  Goal: Maintenance of Behavioral Health Symptom Control  Outcome: Met     Problem: Hypertension Comorbidity  Goal: Blood Pressure in Desired Range  Outcome: Met   Goal Outcome Evaluation:               Discharging to [facility]. EMS will transport.

## 2023-04-18 NOTE — DISCHARGE SUMMARY
[facility]  DISCHARGE SUMMARY    Patient Name: Robert Randhawa Jr.  : 1965  MRN: [id number]    Date of Admission: 2023  Discharge Diagnosis: chest pain atypical   Date of Discharge:  2023  Primary Care Physician: Alfredo Torres MD      Presenting Problem:   Tachycardia [R00.0]  Dyspnea, unspecified type [R06.00]  Chest pain, unspecified type [R07.9]  Chest pain due to coronary artery disease [I25.119]    Active and Resolved Hospital Problems:  Active Hospital Problems    Diagnosis POA   • Lymphedema of both lower extremities [I89.0] Unknown      Resolved Hospital Problems    Diagnosis POA   • **Chest pain due to coronary artery disease [I25.119] Yes         Hospital Course     Hospital Course:  Robert Randhawa Jr. is a 57 y.o. male   presented to [facility] on 2023 complaining of chest pain.  Admits to non-radiating substernal chest pain at rest that is worse with coughing of sneezing ongoing for the past three days complicated by chills, dyspnea with exertion, and worsening n/l LE lymphedema.  Denies fevers, vomiting, diarrhea, numbness. States due to worsening lymphedema and chronic conditions he is having difficulty with ADLs. Presented to [facility] for evaluation.     In the Ed, vitals 99.2, , RR 20, /53, 93% Ra.  Labs notable for HS troponin 15, glucose 114, AST 63, tbili1.3, d-dimer 2.27, INR 1.2.  CT PE without evidence of PE but does show cirrhosis and stigmata or portal HTN with minor periportal lymphadenopathy.  EKG with sinus tachycardia but not GISSELL/STI/TWI.  He is to be admitted for cardiac evaluation.  Stress cardiolite test normal.      DISCHARGE Follow Up Recommendations for labs and diagnostics: PCP in a week      Reasons For Change In Medications and Indications for New Medications:      Day of Discharge     Vital Signs:  Temp:  [98.4 °F (36.9 °C)-99.2 °F (37.3 °C)] 98.6 °F (37 °C)  Heart Rate:  []  79  Resp:  [25-33] 28  BP: ()/(55-67) 110/65    Physical Exam:  Physical Exam  Constitutional:       Appearance: He is obese.   HENT:      Mouth/Throat:      Mouth: Mucous membranes are moist.   Cardiovascular:      Rate and Rhythm: Normal rate and regular rhythm.      Pulses: Normal pulses.      Heart sounds: Normal heart sounds.   Pulmonary:      Effort: Pulmonary effort is normal.      Breath sounds: Normal breath sounds.   Abdominal:      Tenderness: There is no abdominal tenderness.   Musculoskeletal:      Right lower leg: Edema present.      Left lower leg: Edema present.   Neurological:      Mental Status: He is alert and oriented to person, place, and time.   Psychiatric:         Mood and Affect: Mood normal.            Pertinent  and/or Most Recent Results     LAB RESULTS:      Lab 04/17/23 2356 04/17/23 0514 04/16/23 1909   WBC 3.90 4.40 3.80   HEMOGLOBIN 11.5* 11.8* 13.5   HEMATOCRIT 35.4* 35.9* 39.9   PLATELETS 58* 66* 85*   NEUTROS ABS  --   --  3.30   LYMPHS ABS  --   --  0.30*   MONOS ABS  --   --  0.10   EOS ABS  --   --  0.00   .7* 104.0* 102.3*   PROTIME  --   --  12.2*   APTT  --   --  25.7*         Lab 04/17/23 2356 04/17/23 0514 04/16/23 2003 04/16/23 1909   SODIUM 135* 135*  --  138   POTASSIUM 4.5 5.2  --  4.2   CHLORIDE 102 103  --  102   CO2 23.0 22.0  --  25.0   ANION GAP 10.0 10.0  --  11.0   BUN 22* 19  --  16   CREATININE 0.92 0.89 0.90 0.82   EGFR 96.4 99.3 99.0 101.8   GLUCOSE 100* 91  --  114*   CALCIUM 8.4* 8.5*  --  9.1   HEMOGLOBIN A1C  --  5.20  --   --    TSH  --   --   --  0.842         Lab 04/16/23 1909   TOTAL PROTEIN 7.9   ALBUMIN 3.5   GLOBULIN 4.4   ALT (SGPT) 29   AST (SGOT) 63*   BILIRUBIN 1.3*   ALK PHOS 142*         Lab 04/17/23 0514 04/17/23 0235 04/16/23  1909 04/16/23  1727   PROBNP  --   --  75.8  --    HSTROP T 18* 23* 15* 21*   PROTIME  --   --  12.2*  --    INR  --   --  1.20*  --          Lab 04/16/23 1909   CHOLESTEROL 114   LDL CHOL 56    HDL CHOL 41   TRIGLYCERIDES 87             Brief Urine Lab Results  (Last result in the past 365 days)      Color   Clarity   Blood   Leuk Est   Nitrite   Protein   CREAT   Urine HCG        04/17/23 2149 Orange  Comment: Any Substance that causes an abnormal urine color can alter the accuracy of the chemical reactions.   Clear   Large (3+)   Trace   Positive   Trace               Microbiology Results (last 10 days)     Procedure Component Value - Date/Time    Blood Culture - Blood, Arm, Left [[id number]]  (Normal) Collected: 04/16/23 2306    Lab Status: Preliminary result Specimen: Blood from Arm, Left Updated: 04/17/23 2330     Blood Culture No growth at 24 hours    Blood Culture - Blood, Arm, Right [[id number]]  (Normal) Collected: 04/16/23 2301    Lab Status: Preliminary result Specimen: Blood from Arm, Right Updated: 04/17/23 2330     Blood Culture No growth at 24 hours    Respiratory Panel PCR w/COVID-19(SARS-CoV-2) E[facility]ON/CINTHIA/[facility]/PAD/COR/MAD/MARTIN In-House, NP Swab in UTM/VTM, 3-4 HR TAT - Swab, Nasopharynx [[id number]]  (Normal) Collected: 04/16/23 1850    Lab Status: Final result Specimen: Swab from Nasopharynx Updated: 04/16/23 1952     ADENOVIRUS, PCR Not Detected     Coronavirus 229E Not Detected     Coronavirus HKU1 Not Detected     Coronavirus NL63 Not Detected     Coronavirus OC43 Not Detected     COVID19 Not Detected     Human Metapneumovirus Not Detected     Human Rhinovirus/Enterovirus Not Detected     Influenza A PCR Not Detected     Influenza B PCR Not Detected     Parainfluenza Virus 1 Not Detected     Parainfluenza Virus 2 Not Detected     Parainfluenza Virus 3 Not Detected     Parainfluenza Virus 4 Not Detected     RSV, PCR Not Detected     Bordetella pertussis pcr Not Detected     Bordetella parapertussis PCR Not Detected     Chlamydophila pneumoniae PCR Not Detected     Mycoplasma pneumo by PCR Not Detected    Narrative:      In the setting of a positive respiratory panel with a viral infection  PLUS a negative procalcitonin without other underlying concern for bacterial infection, consider observing off antibiotics or discontinuation of antibiotics and continue supportive care. If the respiratory panel is positive for atypical bacterial infection (Bordetella pertussis, Chlamydophila pneumoniae, or Mycoplasma pneumoniae), consider antibiotic de-escalation to target atypical bacterial infection.          CT Angiogram Chest Pulmonary Embolism    Result Date: 4/16/2023  Impression: 1.  No evidence of pulmonary embolus to the lobar level. Segmental and subsegmental branches obscured. 2.  Cirrhosis and stigmata of portal hypertension. Recommend routine screening for HCC. 3.  Minor periportal lymphadenopathy, nonspecific in the setting of chronic liver disease. Electronically signed by:  Mayelin Morelos M.D.  4/16/2023 8:31 PM Mountain Time    XR Chest AP    Result Date: 4/16/2023  Impression: Impression: Cardiomegaly. No active disease. Electronically Signed: Jaret Donovan  4/16/2023 7:01 PM EDT  Workstation ID: [id number]      Results for orders placed during the hospital encounter of 04/16/23    Duplex Venous Lower Extremity - Bilateral CAR    Interpretation Summary  •  Normal bilateral lower extremity venous duplex scan.      Results for orders placed during the hospital encounter of 04/16/23    Duplex Venous Lower Extremity - Bilateral CAR    Interpretation Summary  •  Normal bilateral lower extremity venous duplex scan.      Results for orders placed during the hospital encounter of 04/16/23    Adult Transthoracic Echo Complete W/ Cont if Necessary Per Protocol    Interpretation Summary  •  Left ventricular ejection fraction appears to be 56 - 60%.  •  Estimated right ventricular systolic pressure from tricuspid regurgitation is mildly elevated (35-45 mmHg).    Indications  Chest pain    Technically satisfactory study.  Mitral valve is structurally normal.  Tricuspid valve is structurally normal.  Aortic valve  is thickened with mild aortic valve stenosis (gradient across the aortic valve 21/12 mmHg and valve area of 2.2 cm².  Pulmonic valve could not be well visualized.  No evidence for mitral tricuspid or aortic regurgitation is seen by Doppler study.  Left atrium is normal in size.  Right atrium is normal in size.  Left ventricle is normal in size and contractility with ejection fraction of 60%.  Right ventricle is normal in size.  Atrial septum is intact.  Aorta is normal.  No pericardial effusion or intracardiac thrombus is seen.    Impression  Mild aortic valve stenosis.  Gradient across the aortic valve 21/12 mmHg and valve area of 2.2 cm².  Left ventricular size and contractility is normal with ejection fraction of 60%.      Labs Pending at Discharge:  Pending Labs     Order Current Status    Blood Culture - Blood, Arm, Left Preliminary result    Blood Culture - Blood, Arm, Right Preliminary result          Procedures Performed           Consults:   Consults     Date and Time Order Name Status Description    4/16/2023 11:46 PM Inpatient Cardiology Consult      4/16/2023 10:49 PM Hospitalist (on-call MD unless specified)              Discharge Details        Discharge Medications      Continue These Medications      Instructions Start Date   furosemide 20 MG tablet  Commonly known as: Lasix   20 mg, Oral, Daily, Take daily or PRN      riFAXIMin 550 MG tablet  Commonly known as: XIFAXAN   550 mg, Oral, Every 12 Hours Scheduled      spironolactone 25 MG tablet  Commonly known as: ALDACTONE   50 mg, Oral, Daily      tamsulosin 0.4 MG capsule 24 hr capsule  Commonly known as: FLOMAX   1 capsule, Oral, Daily             Allergies   Allergen Reactions   • Sulfa Antibiotics Unknown (See Comments)         Discharge Disposition:   Skilled Nursing Facility (DC - External)    Diet:  Hospital:  Diet Order   Procedures   • Diet: Regular/House Diet, Cardiac Diets, Diabetic Diets; Healthy Heart (2-3 Na+); Consistent Carbohydrate;  Texture: Regular Texture (IDDSI 7); Fluid Consistency: Thin (IDDSI 0)         Discharge Activity:   Activity Instructions     Activity as Tolerated              CODE STATUS:  Code Status and Medical Interventions:   Ordered at: 04/16/23 0968     Code Status (Patient has no pulse and is not breathing):    CPR (Attempt to Resuscitate)     Medical Interventions (Patient has pulse or is breathing):    Full Support         Future Appointments   Date Time Provider Department Center   9/13/2023  1:30 PM LAB MD [facility] NA  [facility]   9/13/2023  1:45 PM Lionel Lynn MD [facility]   11/9/2023  1:00 PM Kenney Reyez MD [facility] NA       Additional Instructions for the Follow-ups that You Need to Schedule     Discharge Follow-up with PCP   As directed       Currently Documented PCP:    Alfredo Torres MD    PCP Phone Number:    [phone]     Follow Up Details: in a week               Time spent on Discharge including face to face service:  >32 minutes    This patient has been examined wearing appropriate Personal Protective Equipment and discussed with hospital infection control department. 04/18/23      Signature: Electronically signed by Mele Devine MD, 04/18/23, 12:26 PM EDT.

## 2023-04-18 NOTE — CASE MANAGEMENT/SOCIAL WORK
Case Management Discharge Note      Final Note: [facility]    Provided Post Acute Provider List?: Yes  Post Acute Provider List: Home Health, Nursing Home, Outpatient Therapy  Provided Post Acute Provider Quality & Resource List?: Yes  Post Acute Provider Quality and Resource List: Nursing Home, Home Health  Delivered To: Patient  Method of Delivery: In person    Selected Continued Care - Discharged on 4/18/2023 Admission date: 4/16/2023 - Discharge disposition: Skilled Nursing Facility (DC - External)    Destination Coordination complete.    Service Provider Selected Services Address Phone Fax Patient Preferred    [facility] [place] [facility] [address] [facility] [place] [address] [phone] --                      Transportation Services  W/C Van: Other ([facility] w/c transport van)    Final Discharge Disposition Code: 03 - skilled nursing facility (SNF)

## 2023-04-21 LAB
BACTERIA SPEC AEROBE CULT: NORMAL
BACTERIA SPEC AEROBE CULT: NORMAL

## 2023-04-27 ENCOUNTER — TRANSCRIBE ORDERS (OUTPATIENT)
Dept: ADMINISTRATIVE | Facility: HOSPITAL | Age: 58
End: 2023-04-27
Payer: MEDICARE

## 2023-04-27 DIAGNOSIS — R19.09 GROIN MASS: Primary | ICD-10-CM

## 2023-04-27 DIAGNOSIS — R59.9 ENLARGED LYMPH NODE: ICD-10-CM

## 2023-04-30 ENCOUNTER — OFFICE (OUTPATIENT)
Dept: URBAN - METROPOLITAN AREA CLINIC 64 | Facility: CLINIC | Age: 58
End: 2023-04-30

## 2023-04-30 DIAGNOSIS — K74.60 UNSPECIFIED CIRRHOSIS OF LIVER: ICD-10-CM

## 2023-04-30 DIAGNOSIS — E66.9 OBESITY, UNSPECIFIED: ICD-10-CM

## 2023-04-30 PROCEDURE — 99490 CHRNC CARE MGMT STAFF 1ST 20: CPT | Performed by: INTERNAL MEDICINE

## 2023-06-23 ENCOUNTER — OFFICE (OUTPATIENT)
Dept: URBAN - METROPOLITAN AREA CLINIC 64 | Facility: CLINIC | Age: 58
End: 2023-06-23

## 2023-06-23 VITALS
HEIGHT: 73 IN | HEART RATE: 92 BPM | WEIGHT: 315 LBS | SYSTOLIC BLOOD PRESSURE: 94 MMHG | DIASTOLIC BLOOD PRESSURE: 51 MMHG

## 2023-06-23 DIAGNOSIS — E66.9 OBESITY, UNSPECIFIED: ICD-10-CM

## 2023-06-23 DIAGNOSIS — I89.0 LYMPHEDEMA, NOT ELSEWHERE CLASSIFIED: ICD-10-CM

## 2023-06-23 DIAGNOSIS — K59.00 CONSTIPATION, UNSPECIFIED: ICD-10-CM

## 2023-06-23 DIAGNOSIS — K82.8 OTHER SPECIFIED DISEASES OF GALLBLADDER: ICD-10-CM

## 2023-06-23 DIAGNOSIS — K74.60 UNSPECIFIED CIRRHOSIS OF LIVER: ICD-10-CM

## 2023-06-23 PROCEDURE — 99214 OFFICE O/P EST MOD 30 MIN: CPT | Performed by: NURSE PRACTITIONER

## 2023-06-30 ENCOUNTER — OFFICE (OUTPATIENT)
Dept: URBAN - METROPOLITAN AREA CLINIC 64 | Facility: CLINIC | Age: 58
End: 2023-06-30
Payer: MEDICARE

## 2023-06-30 DIAGNOSIS — K72.90 HEPATIC FAILURE, UNSPECIFIED WITHOUT COMA: ICD-10-CM

## 2023-06-30 DIAGNOSIS — K29.70 GASTRITIS, UNSPECIFIED, WITHOUT BLEEDING: ICD-10-CM

## 2023-06-30 DIAGNOSIS — E66.9 OBESITY, UNSPECIFIED: ICD-10-CM

## 2023-06-30 DIAGNOSIS — K74.60 UNSPECIFIED CIRRHOSIS OF LIVER: ICD-10-CM

## 2023-06-30 PROCEDURE — 99490 CHRNC CARE MGMT STAFF 1ST 20: CPT | Performed by: INTERNAL MEDICINE

## 2023-06-30 PROCEDURE — 99439 CHRNC CARE MGMT STAF EA ADDL: CPT | Performed by: INTERNAL MEDICINE

## 2023-07-31 ENCOUNTER — OFFICE (OUTPATIENT)
Dept: URBAN - METROPOLITAN AREA CLINIC 64 | Facility: CLINIC | Age: 58
End: 2023-07-31
Payer: MEDICARE

## 2023-07-31 DIAGNOSIS — K29.70 GASTRITIS, UNSPECIFIED, WITHOUT BLEEDING: ICD-10-CM

## 2023-07-31 DIAGNOSIS — K72.90 HEPATIC FAILURE, UNSPECIFIED WITHOUT COMA: ICD-10-CM

## 2023-07-31 DIAGNOSIS — K74.60 UNSPECIFIED CIRRHOSIS OF LIVER: ICD-10-CM

## 2023-07-31 DIAGNOSIS — E66.9 OBESITY, UNSPECIFIED: ICD-10-CM

## 2023-07-31 PROCEDURE — 99490 CHRNC CARE MGMT STAFF 1ST 20: CPT | Performed by: INTERNAL MEDICINE

## 2023-07-31 PROCEDURE — 99439 CHRNC CARE MGMT STAF EA ADDL: CPT | Performed by: INTERNAL MEDICINE

## 2023-08-17 ENCOUNTER — APPOINTMENT (OUTPATIENT)
Dept: GENERAL RADIOLOGY | Facility: HOSPITAL | Age: 58
End: 2023-08-17
Payer: MEDICARE

## 2023-08-17 ENCOUNTER — HOSPITAL ENCOUNTER (OUTPATIENT)
Facility: HOSPITAL | Age: 58
Setting detail: OBSERVATION
Discharge: NURSING, REHAB OR LONG-TERM CARE | End: 2023-08-21
Attending: EMERGENCY MEDICINE | Admitting: EMERGENCY MEDICINE
Payer: MEDICARE

## 2023-08-17 DIAGNOSIS — R06.00 DYSPNEA, UNSPECIFIED TYPE: Primary | ICD-10-CM

## 2023-08-17 DIAGNOSIS — I89.0 LYMPHEDEMA: ICD-10-CM

## 2023-08-17 PROCEDURE — 71045 X-RAY EXAM CHEST 1 VIEW: CPT

## 2023-08-17 PROCEDURE — 99285 EMERGENCY DEPT VISIT HI MDM: CPT

## 2023-08-17 PROCEDURE — 93005 ELECTROCARDIOGRAM TRACING: CPT | Performed by: EMERGENCY MEDICINE

## 2023-08-17 PROCEDURE — 36415 COLL VENOUS BLD VENIPUNCTURE: CPT

## 2023-08-17 RX ORDER — SODIUM CHLORIDE 0.9 % (FLUSH) 0.9 %
10 SYRINGE (ML) INJECTION AS NEEDED
Status: DISCONTINUED | OUTPATIENT
Start: 2023-08-17 | End: 2023-08-21 | Stop reason: HOSPADM

## 2023-08-18 ENCOUNTER — APPOINTMENT (OUTPATIENT)
Dept: CT IMAGING | Facility: HOSPITAL | Age: 58
End: 2023-08-18
Payer: MEDICARE

## 2023-08-18 ENCOUNTER — APPOINTMENT (OUTPATIENT)
Dept: CARDIOLOGY | Facility: HOSPITAL | Age: 58
End: 2023-08-18
Payer: MEDICARE

## 2023-08-18 PROBLEM — R06.00 DYSPNEA: Status: ACTIVE | Noted: 2023-08-18

## 2023-08-18 LAB
ALBUMIN SERPL-MCNC: 3 G/DL (ref 3.5–5.2)
ALBUMIN/GLOB SERPL: 0.6 G/DL
ALP SERPL-CCNC: 197 U/L (ref 39–117)
ALT SERPL W P-5'-P-CCNC: 26 U/L (ref 1–41)
AMMONIA BLD-SCNC: 83 UMOL/L (ref 16–60)
ANION GAP SERPL CALCULATED.3IONS-SCNC: 7 MMOL/L (ref 5–15)
ANION GAP SERPL CALCULATED.3IONS-SCNC: 8 MMOL/L (ref 5–15)
APTT PPP: 26.9 SECONDS (ref 61–76.5)
AST SERPL-CCNC: 72 U/L (ref 1–40)
B PARAPERT DNA SPEC QL NAA+PROBE: NOT DETECTED
B PERT DNA SPEC QL NAA+PROBE: NOT DETECTED
BACTERIA UR QL AUTO: ABNORMAL /HPF
BASOPHILS # BLD AUTO: 0 10*3/MM3 (ref 0–0.2)
BASOPHILS # BLD AUTO: 0 10*3/MM3 (ref 0–0.2)
BASOPHILS NFR BLD AUTO: 0.6 % (ref 0–1.5)
BASOPHILS NFR BLD AUTO: 0.9 % (ref 0–1.5)
BH CV LOWER VASCULAR LEFT COMMON FEMORAL AUGMENT: NORMAL
BH CV LOWER VASCULAR LEFT COMMON FEMORAL COMPETENT: NORMAL
BH CV LOWER VASCULAR LEFT COMMON FEMORAL PHASIC: NORMAL
BH CV LOWER VASCULAR LEFT COMMON FEMORAL SPONT: NORMAL
BH CV LOWER VASCULAR LEFT DISTAL FEMORAL SPONT: NORMAL
BH CV LOWER VASCULAR LEFT GASTRONEMIUS COMPRESS: NORMAL
BH CV LOWER VASCULAR LEFT GREATER SAPH AK SPONT: NORMAL
BH CV LOWER VASCULAR LEFT GREATER SAPH BK COMPRESS: NORMAL
BH CV LOWER VASCULAR LEFT LESSER SAPH COMPRESS: NORMAL
BH CV LOWER VASCULAR LEFT MID FEMORAL AUGMENT: NORMAL
BH CV LOWER VASCULAR LEFT MID FEMORAL COMPETENT: NORMAL
BH CV LOWER VASCULAR LEFT MID FEMORAL PHASIC: NORMAL
BH CV LOWER VASCULAR LEFT MID FEMORAL SPONT: NORMAL
BH CV LOWER VASCULAR LEFT POPLITEAL AUGMENT: NORMAL
BH CV LOWER VASCULAR LEFT POPLITEAL COMPETENT: NORMAL
BH CV LOWER VASCULAR LEFT POPLITEAL COMPRESS: NORMAL
BH CV LOWER VASCULAR LEFT POPLITEAL PHASIC: NORMAL
BH CV LOWER VASCULAR LEFT POPLITEAL SPONT: NORMAL
BH CV LOWER VASCULAR LEFT POSTERIOR TIBIAL COMPRESS: NORMAL
BH CV LOWER VASCULAR LEFT PROXIMAL FEMORAL SPONT: NORMAL
BH CV LOWER VASCULAR LEFT SAPHENOFEMORAL JUNCTION SPONT: NORMAL
BH CV LOWER VASCULAR RIGHT COMMON FEMORAL AUGMENT: NORMAL
BH CV LOWER VASCULAR RIGHT COMMON FEMORAL COMPETENT: NORMAL
BH CV LOWER VASCULAR RIGHT COMMON FEMORAL PHASIC: NORMAL
BH CV LOWER VASCULAR RIGHT COMMON FEMORAL SPONT: NORMAL
BH CV LOWER VASCULAR RIGHT DISTAL FEMORAL SPONT: NORMAL
BH CV LOWER VASCULAR RIGHT GASTRONEMIUS COMPRESS: NORMAL
BH CV LOWER VASCULAR RIGHT GREATER SAPH AK SPONT: NORMAL
BH CV LOWER VASCULAR RIGHT GREATER SAPH BK COMPRESS: NORMAL
BH CV LOWER VASCULAR RIGHT LESSER SAPH COMPRESS: NORMAL
BH CV LOWER VASCULAR RIGHT MID FEMORAL AUGMENT: NORMAL
BH CV LOWER VASCULAR RIGHT MID FEMORAL COMPETENT: NORMAL
BH CV LOWER VASCULAR RIGHT MID FEMORAL PHASIC: NORMAL
BH CV LOWER VASCULAR RIGHT MID FEMORAL SPONT: NORMAL
BH CV LOWER VASCULAR RIGHT POPLITEAL AUGMENT: NORMAL
BH CV LOWER VASCULAR RIGHT POPLITEAL COMPETENT: NORMAL
BH CV LOWER VASCULAR RIGHT POPLITEAL COMPRESS: NORMAL
BH CV LOWER VASCULAR RIGHT POPLITEAL PHASIC: NORMAL
BH CV LOWER VASCULAR RIGHT POPLITEAL SPONT: NORMAL
BH CV LOWER VASCULAR RIGHT POSTERIOR TIBIAL SPONT: NORMAL
BH CV LOWER VASCULAR RIGHT PROXIMAL FEMORAL SPONT: NORMAL
BH CV LOWER VASCULAR RIGHT SAPHENOFEMORAL JUNCTION SPONT: NORMAL
BILIRUB SERPL-MCNC: 0.9 MG/DL (ref 0–1.2)
BILIRUB UR QL STRIP: NEGATIVE
BUN SERPL-MCNC: 17 MG/DL (ref 6–20)
BUN SERPL-MCNC: 17 MG/DL (ref 6–20)
BUN/CREAT SERPL: 21.5 (ref 7–25)
BUN/CREAT SERPL: 23.6 (ref 7–25)
C PNEUM DNA NPH QL NAA+NON-PROBE: NOT DETECTED
CALCIUM SPEC-SCNC: 8.1 MG/DL (ref 8.6–10.5)
CALCIUM SPEC-SCNC: 8.7 MG/DL (ref 8.6–10.5)
CHLORIDE SERPL-SCNC: 100 MMOL/L (ref 98–107)
CHLORIDE SERPL-SCNC: 103 MMOL/L (ref 98–107)
CLARITY UR: CLEAR
CO2 SERPL-SCNC: 25 MMOL/L (ref 22–29)
CO2 SERPL-SCNC: 26 MMOL/L (ref 22–29)
COLOR UR: ABNORMAL
CREAT SERPL-MCNC: 0.72 MG/DL (ref 0.76–1.27)
CREAT SERPL-MCNC: 0.79 MG/DL (ref 0.76–1.27)
D DIMER PPP FEU-MCNC: 2.15 MG/L (FEU) (ref 0–0.59)
D-LACTATE SERPL-SCNC: 1.6 MMOL/L (ref 0.3–2)
DEPRECATED RDW RBC AUTO: 52.5 FL (ref 37–54)
DEPRECATED RDW RBC AUTO: 54.7 FL (ref 37–54)
EGFRCR SERPLBLD CKD-EPI 2021: 102.3 ML/MIN/1.73
EGFRCR SERPLBLD CKD-EPI 2021: 105.2 ML/MIN/1.73
EOSINOPHIL # BLD AUTO: 0.1 10*3/MM3 (ref 0–0.4)
EOSINOPHIL # BLD AUTO: 0.1 10*3/MM3 (ref 0–0.4)
EOSINOPHIL NFR BLD AUTO: 2.5 % (ref 0.3–6.2)
EOSINOPHIL NFR BLD AUTO: 3.8 % (ref 0.3–6.2)
ERYTHROCYTE [DISTWIDTH] IN BLOOD BY AUTOMATED COUNT: 14.7 % (ref 12.3–15.4)
ERYTHROCYTE [DISTWIDTH] IN BLOOD BY AUTOMATED COUNT: 15.1 % (ref 12.3–15.4)
FLUAV SUBTYP SPEC NAA+PROBE: NOT DETECTED
FLUBV RNA ISLT QL NAA+PROBE: NOT DETECTED
GLOBULIN UR ELPH-MCNC: 5 GM/DL
GLUCOSE SERPL-MCNC: 102 MG/DL (ref 65–99)
GLUCOSE SERPL-MCNC: 114 MG/DL (ref 65–99)
GLUCOSE UR STRIP-MCNC: NEGATIVE MG/DL
HADV DNA SPEC NAA+PROBE: NOT DETECTED
HCOV 229E RNA SPEC QL NAA+PROBE: NOT DETECTED
HCOV HKU1 RNA SPEC QL NAA+PROBE: NOT DETECTED
HCOV NL63 RNA SPEC QL NAA+PROBE: NOT DETECTED
HCOV OC43 RNA SPEC QL NAA+PROBE: NOT DETECTED
HCT VFR BLD AUTO: 34 % (ref 37.5–51)
HCT VFR BLD AUTO: 36.6 % (ref 37.5–51)
HGB BLD-MCNC: 11.2 G/DL (ref 13–17.7)
HGB BLD-MCNC: 12.2 G/DL (ref 13–17.7)
HGB UR QL STRIP.AUTO: ABNORMAL
HMPV RNA NPH QL NAA+NON-PROBE: NOT DETECTED
HOLD SPECIMEN: NORMAL
HOLD SPECIMEN: NORMAL
HPIV1 RNA ISLT QL NAA+PROBE: NOT DETECTED
HPIV2 RNA SPEC QL NAA+PROBE: NOT DETECTED
HPIV3 RNA NPH QL NAA+PROBE: NOT DETECTED
HPIV4 P GENE NPH QL NAA+PROBE: NOT DETECTED
HYALINE CASTS UR QL AUTO: ABNORMAL /LPF
INR PPP: 1.17 (ref 0.93–1.1)
KETONES UR QL STRIP: NEGATIVE
LEUKOCYTE ESTERASE UR QL STRIP.AUTO: NEGATIVE
LYMPHOCYTES # BLD AUTO: 0.5 10*3/MM3 (ref 0.7–3.1)
LYMPHOCYTES # BLD AUTO: 0.5 10*3/MM3 (ref 0.7–3.1)
LYMPHOCYTES NFR BLD AUTO: 14.6 % (ref 19.6–45.3)
LYMPHOCYTES NFR BLD AUTO: 16.6 % (ref 19.6–45.3)
M PNEUMO IGG SER IA-ACNC: NOT DETECTED
MCH RBC QN AUTO: 34.1 PG (ref 26.6–33)
MCH RBC QN AUTO: 34.2 PG (ref 26.6–33)
MCHC RBC AUTO-ENTMCNC: 32.9 G/DL (ref 31.5–35.7)
MCHC RBC AUTO-ENTMCNC: 33.5 G/DL (ref 31.5–35.7)
MCV RBC AUTO: 102.1 FL (ref 79–97)
MCV RBC AUTO: 103.5 FL (ref 79–97)
MONOCYTES # BLD AUTO: 0.2 10*3/MM3 (ref 0.1–0.9)
MONOCYTES # BLD AUTO: 0.3 10*3/MM3 (ref 0.1–0.9)
MONOCYTES NFR BLD AUTO: 10.1 % (ref 5–12)
MONOCYTES NFR BLD AUTO: 7.3 % (ref 5–12)
NEUTROPHILS NFR BLD AUTO: 1.9 10*3/MM3 (ref 1.7–7)
NEUTROPHILS NFR BLD AUTO: 2.4 10*3/MM3 (ref 1.7–7)
NEUTROPHILS NFR BLD AUTO: 68.9 % (ref 42.7–76)
NEUTROPHILS NFR BLD AUTO: 74.7 % (ref 42.7–76)
NITRITE UR QL STRIP: NEGATIVE
NRBC BLD AUTO-RTO: 0.1 /100 WBC (ref 0–0.2)
NRBC BLD AUTO-RTO: 0.1 /100 WBC (ref 0–0.2)
NT-PROBNP SERPL-MCNC: 77.2 PG/ML (ref 0–900)
PH UR STRIP.AUTO: 5.5 [PH] (ref 5–8)
PLATELET # BLD AUTO: 82 10*3/MM3 (ref 140–450)
PLATELET # BLD AUTO: 86 10*3/MM3 (ref 140–450)
PMV BLD AUTO: 8.7 FL (ref 6–12)
PMV BLD AUTO: 8.7 FL (ref 6–12)
POTASSIUM SERPL-SCNC: 3.7 MMOL/L (ref 3.5–5.2)
POTASSIUM SERPL-SCNC: 3.7 MMOL/L (ref 3.5–5.2)
PROCALCITONIN SERPL-MCNC: 0.1 NG/ML (ref 0–0.25)
PROT SERPL-MCNC: 8 G/DL (ref 6–8.5)
PROT UR QL STRIP: NEGATIVE
PROTHROMBIN TIME: 12.4 SECONDS (ref 9.6–11.7)
QT INTERVAL: 369 MS
RBC # BLD AUTO: 3.29 10*6/MM3 (ref 4.14–5.8)
RBC # BLD AUTO: 3.58 10*6/MM3 (ref 4.14–5.8)
RBC # UR STRIP: ABNORMAL /HPF
REF LAB TEST METHOD: ABNORMAL
RHINOVIRUS RNA SPEC NAA+PROBE: NOT DETECTED
RSV RNA NPH QL NAA+NON-PROBE: NOT DETECTED
SARS-COV-2 RNA NPH QL NAA+NON-PROBE: NOT DETECTED
SODIUM SERPL-SCNC: 134 MMOL/L (ref 136–145)
SODIUM SERPL-SCNC: 135 MMOL/L (ref 136–145)
SP GR UR STRIP: 1.02 (ref 1–1.03)
SQUAMOUS #/AREA URNS HPF: ABNORMAL /HPF
TROPONIN T SERPL HS-MCNC: 14 NG/L
TSH SERPL DL<=0.05 MIU/L-ACNC: 1.15 UIU/ML (ref 0.27–4.2)
UROBILINOGEN UR QL STRIP: ABNORMAL
WBC # UR STRIP: ABNORMAL /HPF
WBC NRBC COR # BLD: 2.7 10*3/MM3 (ref 3.4–10.8)
WBC NRBC COR # BLD: 3.2 10*3/MM3 (ref 3.4–10.8)
WHOLE BLOOD HOLD COAG: NORMAL
WHOLE BLOOD HOLD SPECIMEN: NORMAL

## 2023-08-18 PROCEDURE — 83880 ASSAY OF NATRIURETIC PEPTIDE: CPT | Performed by: EMERGENCY MEDICINE

## 2023-08-18 PROCEDURE — 81001 URINALYSIS AUTO W/SCOPE: CPT | Performed by: NURSE PRACTITIONER

## 2023-08-18 PROCEDURE — 80053 COMPREHEN METABOLIC PANEL: CPT | Performed by: EMERGENCY MEDICINE

## 2023-08-18 PROCEDURE — 93970 EXTREMITY STUDY: CPT

## 2023-08-18 PROCEDURE — 85025 COMPLETE CBC W/AUTO DIFF WBC: CPT | Performed by: EMERGENCY MEDICINE

## 2023-08-18 PROCEDURE — 25510000001 IOPAMIDOL PER 1 ML: Performed by: EMERGENCY MEDICINE

## 2023-08-18 PROCEDURE — G0378 HOSPITAL OBSERVATION PER HR: HCPCS

## 2023-08-18 PROCEDURE — 25010000002 FUROSEMIDE PER 20 MG: Performed by: NURSE PRACTITIONER

## 2023-08-18 PROCEDURE — 87040 BLOOD CULTURE FOR BACTERIA: CPT | Performed by: EMERGENCY MEDICINE

## 2023-08-18 PROCEDURE — 96375 TX/PRO/DX INJ NEW DRUG ADDON: CPT

## 2023-08-18 PROCEDURE — 83605 ASSAY OF LACTIC ACID: CPT

## 2023-08-18 PROCEDURE — 85610 PROTHROMBIN TIME: CPT | Performed by: EMERGENCY MEDICINE

## 2023-08-18 PROCEDURE — 84443 ASSAY THYROID STIM HORMONE: CPT | Performed by: EMERGENCY MEDICINE

## 2023-08-18 PROCEDURE — 85025 COMPLETE CBC W/AUTO DIFF WBC: CPT | Performed by: NURSE PRACTITIONER

## 2023-08-18 PROCEDURE — 71275 CT ANGIOGRAPHY CHEST: CPT

## 2023-08-18 PROCEDURE — 25010000002 CEFTRIAXONE PER 250 MG: Performed by: NURSE PRACTITIONER

## 2023-08-18 PROCEDURE — 0202U NFCT DS 22 TRGT SARS-COV-2: CPT | Performed by: EMERGENCY MEDICINE

## 2023-08-18 PROCEDURE — 97162 PT EVAL MOD COMPLEX 30 MIN: CPT | Performed by: PHYSICAL THERAPIST

## 2023-08-18 PROCEDURE — 85730 THROMBOPLASTIN TIME PARTIAL: CPT | Performed by: EMERGENCY MEDICINE

## 2023-08-18 PROCEDURE — 85379 FIBRIN DEGRADATION QUANT: CPT | Performed by: EMERGENCY MEDICINE

## 2023-08-18 PROCEDURE — 84484 ASSAY OF TROPONIN QUANT: CPT | Performed by: EMERGENCY MEDICINE

## 2023-08-18 PROCEDURE — 84145 PROCALCITONIN (PCT): CPT | Performed by: EMERGENCY MEDICINE

## 2023-08-18 PROCEDURE — 82140 ASSAY OF AMMONIA: CPT | Performed by: NURSE PRACTITIONER

## 2023-08-18 RX ORDER — FUROSEMIDE 10 MG/ML
40 INJECTION INTRAMUSCULAR; INTRAVENOUS ONCE
Status: DISCONTINUED | OUTPATIENT
Start: 2023-08-18 | End: 2023-08-18

## 2023-08-18 RX ORDER — MORPHINE SULFATE 2 MG/ML
0.5 INJECTION, SOLUTION INTRAMUSCULAR; INTRAVENOUS EVERY 4 HOURS PRN
Status: DISCONTINUED | OUTPATIENT
Start: 2023-08-18 | End: 2023-08-21 | Stop reason: HOSPADM

## 2023-08-18 RX ORDER — ALBUTEROL SULFATE 90 UG/1
2 AEROSOL, METERED RESPIRATORY (INHALATION) EVERY 4 HOURS PRN
COMMUNITY
Start: 2023-07-26

## 2023-08-18 RX ORDER — ENOXAPARIN SODIUM 100 MG/ML
40 INJECTION SUBCUTANEOUS EVERY 12 HOURS SCHEDULED
Status: DISCONTINUED | OUTPATIENT
Start: 2023-08-18 | End: 2023-08-18

## 2023-08-18 RX ORDER — POLYETHYLENE GLYCOL 3350 17 G/17G
17 POWDER, FOR SOLUTION ORAL DAILY PRN
Status: DISCONTINUED | OUTPATIENT
Start: 2023-08-18 | End: 2023-08-21 | Stop reason: HOSPADM

## 2023-08-18 RX ORDER — FUROSEMIDE 10 MG/ML
40 INJECTION INTRAMUSCULAR; INTRAVENOUS DAILY
Status: DISCONTINUED | OUTPATIENT
Start: 2023-08-18 | End: 2023-08-19

## 2023-08-18 RX ORDER — GABAPENTIN 300 MG/1
300 CAPSULE ORAL 3 TIMES DAILY
COMMUNITY
Start: 2023-08-17

## 2023-08-18 RX ORDER — TAMSULOSIN HYDROCHLORIDE 0.4 MG/1
0.4 CAPSULE ORAL DAILY
Status: DISCONTINUED | OUTPATIENT
Start: 2023-08-18 | End: 2023-08-21 | Stop reason: HOSPADM

## 2023-08-18 RX ORDER — DICYCLOMINE HYDROCHLORIDE 10 MG/1
10 CAPSULE ORAL
Status: DISCONTINUED | OUTPATIENT
Start: 2023-08-18 | End: 2023-08-21 | Stop reason: HOSPADM

## 2023-08-18 RX ORDER — SODIUM CHLORIDE 0.9 % (FLUSH) 0.9 %
10 SYRINGE (ML) INJECTION AS NEEDED
Status: DISCONTINUED | OUTPATIENT
Start: 2023-08-18 | End: 2023-08-21 | Stop reason: HOSPADM

## 2023-08-18 RX ORDER — HYDROCODONE BITARTRATE AND ACETAMINOPHEN 5; 325 MG/1; MG/1
1 TABLET ORAL ONCE AS NEEDED
Status: COMPLETED | OUTPATIENT
Start: 2023-08-18 | End: 2023-08-18

## 2023-08-18 RX ORDER — SODIUM CHLORIDE 0.9 % (FLUSH) 0.9 %
10 SYRINGE (ML) INJECTION EVERY 12 HOURS SCHEDULED
Status: DISCONTINUED | OUTPATIENT
Start: 2023-08-18 | End: 2023-08-21 | Stop reason: HOSPADM

## 2023-08-18 RX ORDER — DICYCLOMINE HYDROCHLORIDE 10 MG/1
10 CAPSULE ORAL
COMMUNITY

## 2023-08-18 RX ORDER — AMOXICILLIN AND CLAVULANATE POTASSIUM 875; 125 MG/1; MG/1
1 TABLET, FILM COATED ORAL EVERY 12 HOURS SCHEDULED
COMMUNITY
Start: 2023-08-11 | End: 2023-08-21 | Stop reason: HOSPADM

## 2023-08-18 RX ORDER — GABAPENTIN 300 MG/1
300 CAPSULE ORAL 3 TIMES DAILY
Status: DISCONTINUED | OUTPATIENT
Start: 2023-08-18 | End: 2023-08-21 | Stop reason: HOSPADM

## 2023-08-18 RX ORDER — ONDANSETRON 2 MG/ML
4 INJECTION INTRAMUSCULAR; INTRAVENOUS EVERY 6 HOURS PRN
Status: DISCONTINUED | OUTPATIENT
Start: 2023-08-18 | End: 2023-08-21 | Stop reason: HOSPADM

## 2023-08-18 RX ORDER — ALBUTEROL SULFATE 2.5 MG/3ML
2.5 SOLUTION RESPIRATORY (INHALATION) EVERY 6 HOURS PRN
Status: DISCONTINUED | OUTPATIENT
Start: 2023-08-18 | End: 2023-08-21 | Stop reason: HOSPADM

## 2023-08-18 RX ORDER — HYDROCODONE BITARTRATE AND ACETAMINOPHEN 5; 325 MG/1; MG/1
1 TABLET ORAL EVERY 6 HOURS PRN
Status: COMPLETED | OUTPATIENT
Start: 2023-08-18 | End: 2023-08-19

## 2023-08-18 RX ORDER — BISACODYL 5 MG/1
5 TABLET, DELAYED RELEASE ORAL DAILY PRN
Status: DISCONTINUED | OUTPATIENT
Start: 2023-08-18 | End: 2023-08-21 | Stop reason: HOSPADM

## 2023-08-18 RX ORDER — PANTOPRAZOLE SODIUM 40 MG/10ML
40 INJECTION, POWDER, LYOPHILIZED, FOR SOLUTION INTRAVENOUS
Status: DISCONTINUED | OUTPATIENT
Start: 2023-08-18 | End: 2023-08-21 | Stop reason: HOSPADM

## 2023-08-18 RX ORDER — AMOXICILLIN 250 MG
2 CAPSULE ORAL 2 TIMES DAILY
Status: DISCONTINUED | OUTPATIENT
Start: 2023-08-18 | End: 2023-08-21 | Stop reason: HOSPADM

## 2023-08-18 RX ORDER — ACETAMINOPHEN 325 MG/1
650 TABLET ORAL EVERY 4 HOURS PRN
Status: DISCONTINUED | OUTPATIENT
Start: 2023-08-18 | End: 2023-08-21 | Stop reason: HOSPADM

## 2023-08-18 RX ORDER — BISACODYL 10 MG
10 SUPPOSITORY, RECTAL RECTAL DAILY PRN
Status: DISCONTINUED | OUTPATIENT
Start: 2023-08-18 | End: 2023-08-21 | Stop reason: HOSPADM

## 2023-08-18 RX ORDER — SPIRONOLACTONE 25 MG/1
50 TABLET ORAL DAILY
Status: DISCONTINUED | OUTPATIENT
Start: 2023-08-18 | End: 2023-08-21 | Stop reason: HOSPADM

## 2023-08-18 RX ORDER — ONDANSETRON 4 MG/1
4 TABLET, FILM COATED ORAL EVERY 6 HOURS PRN
Status: DISCONTINUED | OUTPATIENT
Start: 2023-08-18 | End: 2023-08-21 | Stop reason: HOSPADM

## 2023-08-18 RX ORDER — SODIUM CHLORIDE 9 MG/ML
40 INJECTION, SOLUTION INTRAVENOUS AS NEEDED
Status: DISCONTINUED | OUTPATIENT
Start: 2023-08-18 | End: 2023-08-21 | Stop reason: HOSPADM

## 2023-08-18 RX ADMIN — SENNOSIDES AND DOCUSATE SODIUM 2 TABLET: 50; 8.6 TABLET ORAL at 21:36

## 2023-08-18 RX ADMIN — GABAPENTIN 300 MG: 300 CAPSULE ORAL at 09:45

## 2023-08-18 RX ADMIN — HYDROCODONE BITARTRATE AND ACETAMINOPHEN 1 TABLET: 5; 325 TABLET ORAL at 01:59

## 2023-08-18 RX ADMIN — DICYCLOMINE HYDROCHLORIDE 10 MG: 10 CAPSULE ORAL at 14:10

## 2023-08-18 RX ADMIN — SPIRONOLACTONE 50 MG: 25 TABLET ORAL at 09:41

## 2023-08-18 RX ADMIN — Medication 10 ML: at 21:37

## 2023-08-18 RX ADMIN — DICYCLOMINE HYDROCHLORIDE 10 MG: 10 CAPSULE ORAL at 09:44

## 2023-08-18 RX ADMIN — GABAPENTIN 300 MG: 300 CAPSULE ORAL at 21:36

## 2023-08-18 RX ADMIN — IOPAMIDOL 100 ML: 755 INJECTION, SOLUTION INTRAVENOUS at 03:58

## 2023-08-18 RX ADMIN — Medication 10 ML: at 09:52

## 2023-08-18 RX ADMIN — CEFTRIAXONE SODIUM 2000 MG: 2 INJECTION, POWDER, FOR SOLUTION INTRAMUSCULAR; INTRAVENOUS at 09:40

## 2023-08-18 RX ADMIN — FUROSEMIDE 40 MG: 10 INJECTION, SOLUTION INTRAMUSCULAR; INTRAVENOUS at 09:43

## 2023-08-18 RX ADMIN — RIFAXIMIN 550 MG: 550 TABLET ORAL at 09:43

## 2023-08-18 RX ADMIN — TAMSULOSIN HYDROCHLORIDE 0.4 MG: 0.4 CAPSULE ORAL at 09:44

## 2023-08-18 RX ADMIN — PANTOPRAZOLE SODIUM 40 MG: 40 INJECTION, POWDER, LYOPHILIZED, FOR SOLUTION INTRAVENOUS at 09:41

## 2023-08-18 RX ADMIN — RIFAXIMIN 550 MG: 550 TABLET ORAL at 21:36

## 2023-08-18 RX ADMIN — GABAPENTIN 300 MG: 300 CAPSULE ORAL at 18:18

## 2023-08-18 NOTE — CASE MANAGEMENT/SOCIAL WORK
Continued Stay Note  [facility]     Patient Name: Robert Randhawa Jr.  MRN: [id number]  Today's Date: 8/18/2023    Admit Date: 8/17/2023    Plan: [facility] pending acceptance will need order   Discharge Plan       Row Name 08/18/23 1505       Plan    Plan Comments CM contacted by Pt RN to inform that pt is now agreeable to SNF at [facility]. Met with pt at bedside and provided an ECF list. Requests referral sent to [facility]. DCP sent to CP through Epic and Message sent to Liaison, Awaiting Response.                    Expected Discharge Date and Time       Expected Discharge Date Expected Discharge Time    Aug 19, 2023         Met with patient in room wearing PPE: mask    Maintained distance greater than six feet and spent less than 15 minutes in the room    Alicia Dorsey RN    Phone [phone]  Fax [phone]

## 2023-08-18 NOTE — CASE MANAGEMENT/SOCIAL WORK
Discharge Planning Assessment   [facility]     Patient Name: Robert Randhawa Jr.  MRN: [id number]  Today's Date: 8/18/2023    Admit Date: 8/17/2023    Plan: Home referral to [facility]  pending acceptance will need order   Discharge Needs Assessment       Row Name 08/18/23 1009       Living Environment    People in Home sibling(s)    Current Living Arrangements home    Potentially Unsafe Housing Conditions none    Primary Care Provided by self    Provides Primary Care For no one       Resource/Environmental Concerns    Resource/Environmental Concerns financial    Transportation Concerns none       Food Insecurity    Within the past 12 months, you worried that your food would run out before you got the money to buy more. Sometimes    Within the past 12 months, the food you bought just didn't last and you didn't have money to get more. Sometimes       Transition Planning    Patient/Family Anticipates Transition to home with family    Patient/Family Anticipated Services at Transition home health care    Transportation Anticipated family or friend will provide       Discharge Needs Assessment    Readmission Within the Last 30 Days no previous admission in last 30 days    Equipment Currently Used at Home walker, rolling;cane, straight    Concerns to be Addressed discharge planning    Anticipated Changes Related to Illness none    Discharge Facility/Level of Care Needs home with home health    Provided Post Acute Provider List? Yes    Post Acute Provider List Home Health    Provided Post Acute Provider Quality & Resource List? Yes    Post Acute Provider Quality and Resource List Home Health    Delivered To Patient    Method of Delivery In person                   Discharge Plan       Row Name 08/18/23 1011       Plan    Plan Home referral to [facility]  pending acceptance will need order    Plan Comments Met with Patient at bedside Lives at home with brother but his is not there majority of time has to do ADL's  on own and struggles at time and has financial concerns. Spoke about discharge planning has another Brother Sajan who will provide discharge transportation and Patient reports he is unable to care for leg wounds and has trouble finding transportation to appointments. Requested home health referral Referral sent to [facility] if unable to accept he is agreeable to anyone in network. Referral sent to [facility] pending accpetnace. d/c barriers: IV Lasix, IV ATB, Wound care consult                  Continued Care and Services - Admitted Since 8/17/2023       Home Medical Care       Service Provider Request Status Selected Services Address Phone Fax Patient Preferred     [facility] P[facility] - [facility] [address] [facility] [address] [phone] --                  Expected Discharge Date and Time       Expected Discharge Date Expected Discharge Time    Aug 19, 2023            Demographic Summary       Row Name 08/18/23 1008       General Information    Admission Type observation    Arrived From emergency department    Required Notices Provided Important Message from Medicare    Referral Source admission list    Reason for Consult discharge planning    Preferred Language English                   Functional Status       Row Name 08/18/23 1008       Functional Status    Usual Activity Tolerance moderate    Current Activity Tolerance moderate       Functional Status, IADL    Medications independent    Meal Preparation independent    Housekeeping independent    Laundry independent    Shopping assistive person       Mental Status    General Appearance WDL WDL       Mental Status Summary    Recent Changes in Mental Status/Cognitive Functioning no changes                            Patient Forms       Row Name 08/18/23 1014       Patient Forms    Important Message from Medicare ([facility]) --  Ramirez 8/18 per reg                      Shraddha Bah RN

## 2023-08-18 NOTE — H&P
[facility] Observation Unit H&P    Patient Name: Robert Randhawa Jr.  : 1965  MRN: [id number]  Primary Care Physician: Alfredo Torres MD  Date of admission: 2023     Patient Care Team:  Alfredo Torres MD as PCP - General  Alfredo Torres MD as PCP - Family Medicine  Kenney Reyez MD as Consulting Physician (Cardiology)  Lionel Lynn MD as Consulting Physician (Hematology and Oncology)          Subjective   History Present Illness     Chief Complaint:   Chief Complaint   Patient presents with    Leg Pain     Left leg pain, started about three hours ago     Leg Pain     Mr. Randhawa is a 58 y.o.  presents to [facility] complaining of leg pain       History of Present Illness    ED 23: 58-year-old male with chronic lymphedema complains of worsening leg pain in the setting of worsening dyspnea with dry cough for the past 3 to 4 days. No fever. Patient has had some intermittent mild chest pain, none today. Patient had a remote DVT in his left lower extremity after a knee replacement but does not take any anticoagulation and to his knowledge has not had any recurrent DVTs. Patient denies any known history of coronary artery disease or CHF but does have Mcnally cirrhosis.     Observation 23: Patient is a 58-year-old male presenting to the hospital with complaints of worsening bilateral lower extremity.  Patient also reports increased pain in left leg that started yesterday without fall or incident.  Patient reports shortness of breath without chest pain, nausea, vomiting or syncope.  Patient states it has been difficult to move with his legs increasing in size.  Patient reports DVT in left lower extremity about a year ago and off anticoagulants currently.       Review of Systems   Constitutional: Positive for decreased appetite and malaise/fatigue.   HENT: Negative.     Eyes: Negative.    Cardiovascular:  Positive for dyspnea on exertion and leg swelling.   Respiratory:  Positive for  cough and shortness of breath.    Endocrine: Negative.    Hematologic/Lymphatic: Negative.    Skin: Negative.    Musculoskeletal: Negative.    Gastrointestinal: Negative.    Genitourinary:  Positive for frequency.   Neurological:  Positive for weakness.   Psychiatric/Behavioral: Negative.     Allergic/Immunologic: Negative.          Personal History     Past Medical History:   Past Medical History:   Diagnosis Date    Arthritis     Asthma     Chronic deep vein thrombosis (DVT)     Lymphedema of both lower extremities        Surgical History:      Past Surgical History:   Procedure Laterality Date    APPENDECTOMY  1974    CARDIAC CATHETERIZATION N/A 2/28/2020    Procedure: Left ventriculography;  Surgeon: Kenney Reyez MD;  Location: [facility] CATH INVASIVE LOCATION;  Service: Cardiovascular;  Laterality: N/A;    CARDIAC CATHETERIZATION N/A 2/28/2020    Procedure: Coronary angiography;  Surgeon: Kenney Reyez MD;  Location: [facility] CATH INVASIVE LOCATION;  Service: Cardiovascular;  Laterality: N/A;    CARDIAC CATHETERIZATION N/A 2/28/2020    Procedure: Left Heart Cath;  Surgeon: Kenney Reyez MD;  Location: [facility] CATH INVASIVE LOCATION;  Service: Cardiovascular;  Laterality: N/A;    COLONOSCOPY N/A 11/15/2022    Procedure: COLONOSCOPY with polypectomy;  Surgeon: AMY Garcia MD;  Location: [facility] ENDOSCOPY;  Service: Gastroenterology;  Laterality: N/A;  post: diverticulosis, ascending polyp x2, transverse polyp x3, descending polyp x4, rectal polyp x1, hemorroids    ENDOSCOPY N/A 11/15/2022    Procedure: ESOPHAGOGASTRODUODENOSCOPY with biopsy;  Surgeon: AMY Garcia MD;  Location: [facility] ENDOSCOPY;  Service: Gastroenterology;  Laterality: N/A;  post: esophagitis with biopsy to rule out candida , gastric biopsy    KNEE SURGERY Left 2018           Family History: family history includes Aortic aneurysm in his father; Arthritis in his father; Diabetes in his mother; Heart disease in his mother.  Otherwise pertinent FHx was reviewed and unremarkable.     Social History:  reports that he quit smoking about 40 years ago. His smoking use included cigarettes. He started smoking about 45 years ago. He has a 2.50 pack-year smoking history. He has been exposed to tobacco smoke. He has never used smokeless tobacco. He reports that he does not drink alcohol and does not use drugs.      Medications:  Prior to Admission medications    Medication Sig Start Date End Date Taking? Authorizing Provider   albuterol sulfate  (90 Base) MCG/ACT inhaler Inhale 2 puffs Every 4 (Four) Hours As Needed. 7/26/23  Yes Rolly Rivera MD   amoxicillin-clavulanate (AUGMENTIN) 875-125 MG per tablet Take 1 tablet by mouth Every 12 (Twelve) Hours. For ten days 8/11/23  Yes Rolly Rivera MD   dicyclomine (BENTYL) 10 MG capsule Take 1 capsule by mouth 3 (Three) Times a Day Before Meals.   Yes Rolly Rivera MD   furosemide (Lasix) 20 MG tablet Take 1 tablet by mouth Daily. Take daily or PRN 1/30/23  Yes Kush Lees MD   gabapentin (NEURONTIN) 300 MG capsule Take 1 capsule by mouth 3 (Three) Times a Day. 8/17/23  Yes Rolly Rivera MD   riFAXIMin (XIFAXAN) 550 MG tablet Take 1 tablet by mouth Every 12 (Twelve) Hours.   Yes Rolly Rivera MD   spironolactone (ALDACTONE) 25 MG tablet Take 2 tablets by mouth Daily.  Patient not taking: Reported on 8/18/2023    Rolly Rivera MD   tamsulosin (FLOMAX) 0.4 MG capsule 24 hr capsule Take 1 capsule by mouth Daily.    Rolly Rivera MD       Allergies:    Allergies   Allergen Reactions    Sulfa Antibiotics Unknown (See Comments)       Objective   Objective     Vital Signs  Temp:  [98.3 øF (36.8 øC)-98.7 øF (37.1 øC)] 98.3 øF (36.8 øC)  Heart Rate:  [67-93] 80  Resp:  [16-18] 16  BP: (103-152)/(63-82) 112/72  SpO2:  [96 %-99 %] 96 %  on   ;   Device (Oxygen Therapy): room air  Body mass index is 49.34 kg/mý.    Physical Exam  Vitals and nursing  note reviewed.   Constitutional:       Appearance: Normal appearance.   HENT:      Head: Normocephalic and atraumatic.      Right Ear: External ear normal.      Left Ear: External ear normal.      Nose: Nose normal.      Mouth/Throat:      Pharynx: Oropharynx is clear.   Eyes:      Extraocular Movements: Extraocular movements intact.      Conjunctiva/sclera: Conjunctivae normal.      Pupils: Pupils are equal, round, and reactive to light.   Cardiovascular:      Rate and Rhythm: Normal rate and regular rhythm.      Heart sounds: Murmur heard.   Pulmonary:      Effort: Pulmonary effort is normal.      Breath sounds: Normal breath sounds.   Abdominal:      General: Bowel sounds are normal. There is distension.      Palpations: Abdomen is soft.   Musculoskeletal:      Cervical back: Normal range of motion.      Right lower leg: Edema present.      Left lower leg: Edema present.   Skin:     Findings: Abrasion, erythema and wound present.   Neurological:      Mental Status: He is alert and oriented to person, place, and time.      Motor: Weakness present.      Gait: Gait abnormal.   Psychiatric:         Mood and Affect: Mood normal. Mood is depressed.         Behavior: Behavior normal.         Thought Content: Thought content normal.         Judgment: Judgment normal.         Results Review:  I have personally reviewed most recent cardiac tracings, lab results, microbiology results, and radiology images and interpretations and agree with findings, most notably: CBC, CMP, BNP, EKG, venous duplex, CT chest.    Results from last 7 days   Lab Units 08/18/23  0001   WBC 10*3/mm3 3.20*   HEMOGLOBIN g/dL 12.2*   HEMATOCRIT % 36.6*   PLATELETS 10*3/mm3 86*   INR  1.17*     Results from last 7 days   Lab Units 08/18/23  0010 08/18/23  0001   SODIUM mmol/L  --  134*   POTASSIUM mmol/L  --  3.7   CHLORIDE mmol/L  --  100   CO2 mmol/L  --  26.0   BUN mg/dL  --  17   CREATININE mg/dL  --  0.79   GLUCOSE mg/dL  --  114*   CALCIUM mg/dL   --  8.7   ALT (SGPT) U/L  --  26   AST (SGOT) U/L  --  72*   HSTROP T ng/L  --  14   PROBNP pg/mL  --  77.2   LACTATE mmol/L 1.6  --    PROCALCITONIN ng/mL  --  0.10     Estimated Creatinine Clearance: 165.2 mL/min (by C-G formula based on SCr of 0.79 mg/dL).  Brief Urine Lab Results  (Last result in the past 365 days)        Color   Clarity   Blood   Leuk Est   Nitrite   Protein   CREAT   Urine HCG        04/17/23 2149 Orange  Comment: Any Substance that causes an abnormal urine color can alter the accuracy of the chemical reactions.   Clear   Large (3+)   Trace   Positive   Trace                   Microbiology Results (last 10 days)       Procedure Component Value - Date/Time    Respiratory Panel PCR w/COVID-19(SARS-CoV-2) EAMON/CINTHIA/ELMO/PAD/COR/MAD/MARTIN In-House, NP Swab in UTM/VTM, 3-4 HR TAT - Swab, Nasopharynx [[id number]]  (Normal) Collected: 08/18/23 0002    Lab Status: Final result Specimen: Swab from Nasopharynx Updated: 08/18/23 0055     ADENOVIRUS, PCR Not Detected     Coronavirus 229E Not Detected     Coronavirus HKU1 Not Detected     Coronavirus NL63 Not Detected     Coronavirus OC43 Not Detected     COVID19 Not Detected     Human Metapneumovirus Not Detected     Human Rhinovirus/Enterovirus Not Detected     Influenza A PCR Not Detected     Influenza B PCR Not Detected     Parainfluenza Virus 1 Not Detected     Parainfluenza Virus 2 Not Detected     Parainfluenza Virus 3 Not Detected     Parainfluenza Virus 4 Not Detected     RSV, PCR Not Detected     Bordetella pertussis pcr Not Detected     Bordetella parapertussis PCR Not Detected     Chlamydophila pneumoniae PCR Not Detected     Mycoplasma pneumo by PCR Not Detected    Narrative:      In the setting of a positive respiratory panel with a viral infection PLUS a negative procalcitonin without other underlying concern for bacterial infection, consider observing off antibiotics or discontinuation of antibiotics and continue supportive care. If the  respiratory panel is positive for atypical bacterial infection (Bordetella pertussis, Chlamydophila pneumoniae, or Mycoplasma pneumoniae), consider antibiotic de-escalation to target atypical bacterial infection.            ECG/EMG Results (most recent)       Procedure Component Value Units Date/Time    ECG 12 Lead Dyspnea [[id number]] Collected: 08/17/23 2331     Updated: 08/18/23 0708     QT Interval 369 ms     Narrative:      HEART RATE= 86  bpm  RR Interval= 700  ms  FL Interval= 176  ms  P Horizontal Axis= -18  deg  P Front Axis= 75  deg  QRSD Interval= 92  ms  QT Interval= 369  ms  QRS Axis= 72  deg  T Wave Axis= -7  deg  - BORDERLINE ECG -  Sinus rhythm  Left atrial enlargement  Electronically Signed By: Usama Rai (Maicol) 18-Aug-2023 07:08:10  Date and Time of Study: 2023-08-17 23:31:15            Results for orders placed during the hospital encounter of 08/17/23    Duplex Venous Lower Extremity - Bilateral    Interpretation Summary    Limited examination due to body habitus, fluid retention and patient intolerance. There is no deep or superficial vein thrombosis in the imaged segments of the lower extremities.      Results for orders placed during the hospital encounter of 04/16/23    Adult Transthoracic Echo Complete W/ Cont if Necessary Per Protocol    Interpretation Summary    Left ventricular ejection fraction appears to be 56 - 60%.    Estimated right ventricular systolic pressure from tricuspid regurgitation is mildly elevated (35-45 mmHg).    Indications  Chest pain    Technically satisfactory study.  Mitral valve is structurally normal.  Tricuspid valve is structurally normal.  Aortic valve is thickened with mild aortic valve stenosis (gradient across the aortic valve 21/12 mmHg and valve area of 2.2 cmý.  Pulmonic valve could not be well visualized.  No evidence for mitral tricuspid or aortic regurgitation is seen by Doppler study.  Left atrium is normal in size.  Right atrium is normal in  size.  Left ventricle is normal in size and contractility with ejection fraction of 60%.  Right ventricle is normal in size.  Atrial septum is intact.  Aorta is normal.  No pericardial effusion or intracardiac thrombus is seen.    Impression  Mild aortic valve stenosis.  Gradient across the aortic valve 21/12 mmHg and valve area of 2.2 cmý.  Left ventricular size and contractility is normal with ejection fraction of 60%.      XR Chest 1 View    Result Date: 8/17/2023  Impression: Mild vascular congestion and cardiomegaly, similar as compared to the previous study. Electronically Signed: Evelyn Ureña MD  8/17/2023 11:47 PM EDT  Workstation ID: [id number]    CT Angiogram Chest Pulmonary Embolism    Result Date: 8/18/2023  Impression: 1. No evidence of pulmonary embolism. Probable mild vascular congestion with cardiomegaly. Otherwise, no acute cardiopulmonary process. 2. Ancillary findings as described above. Electronically Signed: Evelyn Ureña MD  8/18/2023 4:03 AM EDT  Workstation ID: [id number]       Estimated Creatinine Clearance: 165.2 mL/min (by C-G formula based on SCr of 0.79 mg/dL).    Assessment & Plan   Assessment/Plan       Active Hospital Problems    Diagnosis  POA    **Dyspnea [R06.00]  Yes      Resolved Hospital Problems   No resolved problems to display.       Dyspnea   Lab Results   Component Value Date    TROPONINT 14 08/18/2023    TROPONINT 18 (H) 04/17/2023    PROBNP 77.2 08/18/2023    PROBNP 75.8 04/16/2023     (L) 08/18/2023   -Procalcitonin 0.10, lactate 1.6  -Blood cultures pending  -Respiratory viral panel negative  -Echocardiogram (April 2023): EF 56 to 60%, mild aortic valve stenosis  -Chest x-ray: Mild vascular congestion and cardiomegaly similar to previous studies  -EKG: sinus rhythm heart rate 86 with left atrial enlargement  -IV lasix Initiated/ monitor BMP  -Monitor I's and O's and daily weights  -2 g sodium diet  -D dimer 2.15  -CT chest: No evidence of pulmonary embolism, mild  vascular congestion cardiomegaly    Cirrhosis  Lab Results   Component Value Date    ALT 26 08/18/2023    AST 72 (H) 08/18/2023    BILITOT 0.9 08/18/2023    ALBUMIN 3.0 (L) 08/18/2023   -Ultrasound liver (July 2023) cirrhotic appearance with no lesions noted, cholelithiasis  -Continue IV Lasix, spironolactone, beta-blocker  -Low-sodium diet  -Monitor daily weights    Chronic Lymphadema  -BNP is 77  -PT/OT consulted  -Wound care consult  -Venous duplex showed limited examination due to body habitus and fluid but otherwise showed no DVT or superficial thrombosis  -Blood cultures pending  -Initiate IV Rocephin possible cellulitis    Pancytopenia  -WBC 3.2, platelets 86, RBC 3.58 hemoglobin 12.2 (baseline with likely related to cirrhosis  -Continue to monitor  -Seen by hematology outpatient    Asthma without exacerbation  -Continue albuterol as needed      BPH  -Continue Flomax    Neuropathy  -Continue gabapentin    VTE Prophylaxis -   Mechanical Order History:       None          Pharmalogical Order History:        Ordered     Dose Route Frequency Stop    08/18/23 0813  Enoxaparin Sodium (LOVENOX) syringe 40 mg  Status:  Discontinued         40 mg SC Every 12 Hours Scheduled 08/18/23 0813                    CODE STATUS:    Code Status and Medical Interventions:   Ordered at: 08/18/23 0813     Level Of Support Discussed With:    Patient     Code Status (Patient has no pulse and is not breathing):    CPR (Attempt to Resuscitate)     Medical Interventions (Patient has pulse or is breathing):    Full Support       This patient has been examined wearing personal protective equipment.     I discussed the patient's findings and my recommendations with patient, family, nursing staff, primary care team, and consulting provider.      Signature:Electronically signed by QUEENIE Hearn, 08/18/23, 12:53 PM EDT.          I spent 35 minutes caring for Robert on this date of service. This time includes time spent by me in the  following activities: reviewing tests, obtaining and/or reviewing a separately obtained history, performing a medically appropriate examination and/or evaluation, counseling and educating the patient/family/caregiver, ordering medications, tests, or procedures, referring and communicating with other health care professionals, documenting information in the medical record, independently interpreting results and communicating that information with the patient/family/caregiver, and care coordination.

## 2023-08-18 NOTE — DISCHARGE PLACEMENT REQUEST
"Josemanuel Randhawa Jr. (58 y.o. Male)       Date of Birth   1965    Social Security Number       Address   [address] [place] [address] [place] [address]    Home Phone   [phone]    MRN   [id number]       Jain   None    Marital Status   Single                            Admission Date   8/17/23    Admission Type   Emergency    Admitting Provider   Usama Rai MD    Attending Provider   Usama Rai MD    Department, Room/Bed   [facility] OBSERVATION, 111/1       Discharge Date       Discharge Disposition       Discharge Destination                                 Attending Provider: Usama Rai MD    Allergies: Sulfa Antibiotics    Isolation: None   Infection: None   Code Status: CPR    Ht: 185.4 cm (73\")   Wt: 170 kg (374 lb)    Admission Cmt: None   Principal Problem: Dyspnea [R06.00]                   Active Insurance as of 8/17/2023       Primary Coverage       Payor Plan Insurance Group Employer/Plan Group    HUMANA MEDICARE REPLACEMENT HUMANA MEDICARE REPLACEMENT [id number]       Payor Plan Address Payor Plan Phone Number Payor Plan Fax Number Effective Dates    [address] [address] [phone]  12/1/2019 - None Entered    [facility] [address]         Subscriber Name Subscriber Birth Date Member ID       JOSEMANUEL RANDHAWA JR. 1965 [id number]               Secondary Coverage       Payor Plan Insurance Group Employer/Plan Group    [id number]        Payor Plan Address Payor Plan Phone Number Payor Plan Fax Number Effective Dates    [address]   1/4/2023 - None Entered    [place] [address]         Subscriber Name Subscriber Birth Date Member ID       JOSEMANUEL RANDHAWA JR. 1965 [id number]                     Emergency Contacts        (Rel.) Home Phone Work Phone Mobile Phone    EdisSajan (Brother) -- -- [phone]    SheldonIman (Sister) -- -- [phone]            "

## 2023-08-18 NOTE — CASE MANAGEMENT/SOCIAL WORK
Social Work Assessment  [facility]     Patient Name: Robert Randhawa Jr.  MRN: [id number]  Today's Date: 8/18/2023    Admit Date: 8/17/2023     Discharge Plan       Row Name 08/18/23 1626       Plan    Plan [facility], referral pending. PASRR approved. Will require precert.    Plan Comments Charting update to reflect PASRR status.           08/18/23 1625   PASRR   PASRR Status Approved/Complete     GUS Love, TARUNW, [facility]    Phone: [phone]  Cell: [phone]  Fax: [phone]  [email or web address]

## 2023-08-18 NOTE — CONSULTS
Consult visit. Pt in room alone watching TV. Pt seemed frustrated and alone and spoke of friends who are supposed to be friends, but are not there for him when he needs them. He mentions a brother who is with him during short visits, not substantial times of connection. Pt is lonely. He speaks of being alone with his thoughts and acknowledges he needs other people to help him do physical tasks at home. He is not Pentecostalism/spiritual. He appreciated  visit in the sense that  was someone who listened to him, not spiritually, but as another human being. Pt asked  to let him get some rest and talk later. If still admitted on Monday,  will follow up for support. No other needs at this time.

## 2023-08-18 NOTE — PLAN OF CARE
Goal Outcome Evaluation:  Plan of Care Reviewed With: patient           Outcome Evaluation: Patient is a 59 y/o M who was admitted 8/17/23 due to BLE leg pain as well as dyspnea.  Doppler negative for DVT, and CT chest negative for PE this date.  PMHx includes h/o DVT LLE, L TKA 2018, Arthritis, Asthma, chronic Lymphedema B lower legs, and h/o cardiac cath. At baseline pt reports independence in his home and uses a cane, uses RW outside of home.  He requires assist with lower body bathing and dressing.  Lives with brother, but pt reports brother is not always there to help him with ADLs.  Pt no longer drives.  Has 1 GISSELL home.  During today's evaluation pt was limited per nursing as his BP was low while in bed.  Only in bed activity performed with tolerance by pt.  Pt was assisted in rolling L/R as well as with lars care and positioning of urinal.  Pt was able to assist in pulling himself up in bed with BUE on rails and required mod A to position BLE to assist in pushing with feet/heels also.  Pt's BP taken ending session reading 102/70.  Pt was non-symptomatic during bed mobility activities this date.  Due to pt's mobility deficits with in bed activity this date, and difficulty with lars care, recommend SNF at discharge.  Pt having difficulty raising legs, rolling, scooting up in bed, and using urinal.  PT will continue to follow pt for additional assessment, and for OOB activity to assess safety.      Anticipated Discharge Disposition (PT): skilled nursing facility

## 2023-08-18 NOTE — THERAPY EVALUATION
Patient Name: Robert Randhawa Jr.  : 1965    MRN: [id number]                              Today's Date: 2023       Admit Date: 2023    Visit Dx:     ICD-10-CM ICD-9-CM   1. Dyspnea, unspecified type  R06.00 786.09   2. Lymphedema  I89.0 457.1     Patient Active Problem List   Diagnosis    Chest pain    Shortness of breath    Arthritis    Asthma    Deep vein thrombosis (DVT)    Lymphedema of both lower extremities    Cellulitis of lower extremity    Chest pressure    Unstable angina    Cellulitis of left lower extremity without foot    Obesity, morbid    Cellulitis of left lower extremity    Non-pressure chronic ulcer left lower leg, limited to breakdown skin    Cellulitis of left foot    Venous hypertension of both lower extremities    Onychomycosis of foot with other complication    Sepsis    Dyspnea     Past Medical History:   Diagnosis Date    Arthritis     Asthma     Chronic deep vein thrombosis (DVT)     Lymphedema of both lower extremities      Past Surgical History:   Procedure Laterality Date    APPENDECTOMY  1974    CARDIAC CATHETERIZATION N/A 2020    Procedure: Left ventriculography;  Surgeon: Kenney Reyez MD;  Location: [facility] CATH INVASIVE LOCATION;  Service: Cardiovascular;  Laterality: N/A;    CARDIAC CATHETERIZATION N/A 2020    Procedure: Coronary angiography;  Surgeon: Kenney Reyez MD;  Location: [facility] CATH INVASIVE LOCATION;  Service: Cardiovascular;  Laterality: N/A;    CARDIAC CATHETERIZATION N/A 2020    Procedure: Left Heart Cath;  Surgeon: Kenney Reyez MD;  Location: [facility] CATH INVASIVE LOCATION;  Service: Cardiovascular;  Laterality: N/A;    COLONOSCOPY N/A 11/15/2022    Procedure: COLONOSCOPY with polypectomy;  Surgeon: AMY Garcia MD;  Location: [facility] ENDOSCOPY;  Service: Gastroenterology;  Laterality: N/A;  post: diverticulosis, ascending polyp x2, transverse polyp x3, descending polyp x4, rectal polyp x1, hemorroids    ENDOSCOPY N/A  11/15/2022    Procedure: ESOPHAGOGASTRODUODENOSCOPY with biopsy;  Surgeon: AMY Garcia MD;  Location: [facility] ENDOSCOPY;  Service: Gastroenterology;  Laterality: N/A;  post: esophagitis with biopsy to rule out candida , gastric biopsy    KNEE SURGERY Left 2018      General Information       Row Name 08/18/23 1137          Physical Therapy Time and Intention    Document Type evaluation  -EJ     Mode of Treatment physical therapy  -EJ       Row Name 08/18/23 1137          General Information    Patient Profile Reviewed yes  -EJ     Prior Level of Function independent:;all household mobility;gait;transfer;bed mobility;min assist:;ADL's;bathing  Per pt he uses SPC in home, and RW outside of home. He is able to bath/dress upper half, but brother will assist with lower half/lower body.  Pt reports he does not drive.  Has medical transport via his insurance.  -EJ     Existing Precautions/Restrictions fall;orthostatic hypotension  -EJ     Barriers to Rehab medically complex;physical barrier  BLE lyphedema.  -EJ       Row Name 08/18/23 1137          Living Environment    People in Home sibling(s)  Lives with brother.  Pt reports brother is not there that much to assist him. Pt reports he is interested in possibly moving to assisted living facility.  -EJ     Name(s) of People in Home Sajan (brother)  -EJ       Row Name 08/18/23 1137          Home Main Entrance    Number of Stairs, Main Entrance one  -EJ       Row Name 08/18/23 1137          Stairs Within Home, Primary    Number of Stairs, Within Home, Primary none  -EJ       Row Name 08/18/23 1137          Cognition    Orientation Status (Cognition) oriented x 4  -EJ       Row Name 08/18/23 1137          Safety Issues, Functional Mobility    Impairments Affecting Function (Mobility) balance;endurance/activity tolerance;strength;range of motion (ROM)  -EJ               User Key  (r) = Recorded By, (t) = Taken By, (c) = Cosigned By      Initials Name Provider Type    EJ  Jayla Cody, PT Physical Therapist                   Mobility       Row Name 08/18/23 1144          Bed Mobility    Bed Mobility rolling left;rolling right;scooting/bridging  -EJ     Rolling Left Hartman (Bed Mobility) moderate assist (50% patient effort);1 person assist  -EJ     Rolling Right Hartman (Bed Mobility) moderate assist (50% patient effort);1 person assist  -EJ     Scooting/Bridging Hartman (Bed Mobility) 1 person assist;moderate assist (50% patient effort);minimum assist (75% patient effort)  -       Row Name 08/18/23 1144          Bed-Chair Transfer    Bed-Chair Hartman (Transfers) not tested  -       Row Name 08/18/23 1144          Sit-Stand Transfer    Sit-Stand Hartman (Transfers) not tested  -       Row Name 08/18/23 1144          Gait/Stairs (Locomotion)    Hartman Level (Gait) not tested  -               User Key  (r) = Recorded By, (t) = Taken By, (c) = Cosigned By      Initials Name Provider Type    EJ Jayla Cody, PT Physical Therapist                   Obj/Interventions       Row Name 08/18/23 1144          Range of Motion Comprehensive    Comment, General Range of Motion BUE AROM grossly WFL.  Lower extremity ROM limited due to chronic lyphedema and heaviness of legs limiting motion.  -       Row Name 08/18/23 1144          Strength Comprehensive (MMT)    Comment, General Manual Muscle Testing (MMT) Assessment BUE strength grossly 4- to 4/5, BLE strength 3-/5 via obs.  -       Row Name 08/18/23 1144          Motor Skills    Motor Skills functional endurance  -     Functional Endurance Fair-  -               User Key  (r) = Recorded By, (t) = Taken By, (c) = Cosigned By      Initials Name Provider Type     Jayla Cody, PT Physical Therapist                   Goals/Plan       Row Name 08/18/23 1352          Bed Mobility Goal 1 (PT)    Activity/Assistive Device (Bed Mobility Goal 1, PT) bed mobility activities, all  -EJ      Knightsen Level/Cues Needed (Bed Mobility Goal 1, PT) minimum assist (75% or more patient effort)  -EJ     Time Frame (Bed Mobility Goal 1, PT) long term goal (LTG);2 weeks  -[facility] Name 08/18/23 1352          Transfer Goal 1 (PT)    Activity/Assistive Device (Transfer Goal 1, PT) transfers, all;walker, rolling  -EJ     Knightsen Level/Cues Needed (Transfer Goal 1, PT) minimum assist (75% or more patient effort)  -EJ     Time Frame (Transfer Goal 1, PT) long term goal (LTG);2 weeks  -[facility] Name 08/18/23 1352          Gait Training Goal 1 (PT)    Activity/Assistive Device (Gait Training Goal 1, PT) gait (walking locomotion);walker, rolling  -EJ     Knightsen Level (Gait Training Goal 1, PT) minimum assist (75% or more patient effort)  -EJ     Distance (Gait Training Goal 1, PT) 20 ft x 2  -EJ     Time Frame (Gait Training Goal 1, PT) long term goal (LTG);2 weeks  -[facility] Name 08/18/23 1352          Therapy Assessment/Plan (PT)    Planned Therapy Interventions (PT) balance training;bed mobility training;gait training;postural re-education;patient/family education;neuromuscular re-education;strengthening;transfer training;stair training  -               User Key  (r) = Recorded By, (t) = Taken By, (c) = Cosigned By      Initials Name Provider Type     Jayla Cody, PT Physical Therapist                   Clinical Impression       [facility] Name 08/18/23 2741          Pain    Pain Intervention(s) Therapeutic presence;Repositioned;Emotional support  -     Additional Documentation Pain Scale: FACES Pre/Post-Treatment (Group)  -[facility] Name 08/18/23 1146          Pain Scale: FACES Pre/Post-Treatment    Pain: FACES Scale, Pretreatment 2-->hurts little bit  -EJ     Posttreatment Pain Rating 2-->hurts little bit  -EJ     Pain Location lower  -     Pain Location - extremity  B lower legs due to lymphedema  -[facility] Name 08/18/23 1141          Plan of Care Review    Plan of Care  Reviewed With patient  -EJ     Outcome Evaluation Patient is a 57 y/o M who was admitted 8/17/23 due to BLE leg pain as well as dyspnea.  Doppler negative for DVT, and CT chest negative for PE this date.  PMHx includes h/o DVT LLE, L TKA 2018, Arthritis, Asthma, chronic Lymphedema B lower legs, and h/o cardiac cath. At baseline pt reports independence in his home and uses a cane, uses RW outside of home.  He requires assist with lower body bathing and dressing.  Lives with brother, but pt reports brother is not always there to help him with ADLs.  Pt no longer drives.  Has 1 GISSELL home.  During today's evaluation pt was limited per nursing as his BP was low while in bed.  Only in bed activity performed with tolerance by pt.  Pt was assisted in rolling L/R as well as with lars care and positioning of urinal.  Pt was able to assist in pulling himself up in bed with BUE on rails and required mod A to position BLE to assist in pushing with feet/heels also.  Pt's BP taken ending session reading 102/70.  Pt was non-symptomatic during bed mobility activities this date.  Due to pt's mobility deficits with in bed activity this date, and difficulty with lars care, recommend SNF at discharge.  Pt having difficulty raising legs, rolling, scooting up in bed, and using urinal.  PT will continue to follow pt for additional assessment, and for OOB activity to assess safety.  -       Row Name 08/18/23 1146          Therapy Assessment/Plan (PT)    Criteria for Skilled Interventions Met (PT) yes;skilled treatment is necessary  -EJ     Therapy Frequency (PT) 5 times/wk  -EJ     Predicted Duration of Therapy Intervention (PT) until discharge  -       Row Name 08/18/23 1146          Positioning and Restraints    Pre-Treatment Position in bed  -EJ     Post Treatment Position bed  -EJ     In Bed fowlers;legs elevated;with other staff;call light within reach;encouraged to call for assist;exit alarm on  -EJ               User Key  (r) =  Recorded By, (t) = Taken By, (c) = Cosigned By      Initials Name Provider Type     Jayla Cody, PT Physical Therapist                   Outcome Measures       Row Name 08/18/23 1353          How much help from another person do you currently need...    Turning from your back to your side while in flat bed without using bedrails? 3  -EJ     Moving from lying on back to sitting on the side of a flat bed without bedrails? 2  -EJ     Moving to and from a bed to a chair (including a wheelchair)? 2  -EJ     Standing up from a chair using your arms (e.g., wheelchair, bedside chair)? 2  -EJ     Climbing 3-5 steps with a railing? 1  -EJ     To walk in hospital room? 2  -EJ     AM-PAC 6 Clicks Score (PT) 12  -EJ     Highest level of mobility 4 --> Transferred to chair/commode  -       Row Name 08/18/23 1353          Functional Assessment    Outcome Measure Options AM-PAC 6 Clicks Basic Mobility (PT)  -               User Key  (r) = Recorded By, (t) = Taken By, (c) = Cosigned By      Initials Name Provider Type    Jayla Diego, PT Physical Therapist                                 Physical Therapy Education       Title: PT OT SLP Therapies (In Progress)       Topic: Physical Therapy (In Progress)       Point: Mobility training (Done)       Learning Progress Summary             Patient Acceptance, E, VU by  at 8/18/2023 1353                         Point: Home exercise program (Not Started)       Learner Progress:  Not documented in this visit.              Point: Body mechanics (Done)       Learning Progress Summary             Patient Acceptance, E, VU by  at 8/18/2023 1353                         Point: Precautions (Not Started)       Learner Progress:  Not documented in this visit.                              User Key       Initials Effective Dates Name Provider Type [facility] 07/11/23 -  Jayla Cody, PT Physical Therapist PT                  PT Recommendation and Plan  Planned Therapy  Interventions (PT): balance training, bed mobility training, gait training, postural re-education, patient/family education, neuromuscular re-education, strengthening, transfer training, stair training  Plan of Care Reviewed With: patient  Outcome Evaluation: Patient is a 59 y/o M who was admitted 8/17/23 due to BLE leg pain as well as dyspnea.  Doppler negative for DVT, and CT chest negative for PE this date.  PMHx includes h/o DVT LLE, L TKA 2018, Arthritis, Asthma, chronic Lymphedema B lower legs, and h/o cardiac cath. At baseline pt reports independence in his home and uses a cane, uses RW outside of home.  He requires assist with lower body bathing and dressing.  Lives with brother, but pt reports brother is not always there to help him with ADLs.  Pt no longer drives.  Has 1 GISSELL home.  During today's evaluation pt was limited per nursing as his BP was low while in bed.  Only in bed activity performed with tolerance by pt.  Pt was assisted in rolling L/R as well as with lars care and positioning of urinal.  Pt was able to assist in pulling himself up in bed with BUE on rails and required mod A to position BLE to assist in pushing with feet/heels also.  Pt's BP taken ending session reading 102/70.  Pt was non-symptomatic during bed mobility activities this date.  Due to pt's mobility deficits with in bed activity this date, and difficulty with lars care, recommend SNF at discharge.  Pt having difficulty raising legs, rolling, scooting up in bed, and using urinal.  PT will continue to follow pt for additional assessment, and for OOB activity to assess safety.     Time Calculation:         PT Charges       Row Name 08/18/23 0558             Time Calculation    Start Time 1026  -EJ      Stop Time 1058  -EJ      Time Calculation (min) 32 min  -EJ      PT Received On 08/18/23  -EJ      PT - Next Appointment 08/20/23  -EJ      PT Goal Re-Cert Due Date 09/01/23  -EJ         Time Calculation- PT    Total Timed Code  Minutes- PT 0 minute(s)  -BRIGID                User Key  (r) = Recorded By, (t) = Taken By, (c) = Cosigned By      Initials Name Provider Type    Jayla Diego, PT Physical Therapist                  Therapy Charges for Today       Code Description Service Date Service Provider Modifiers Qty    [id number] HC PT EVAL MOD COMPLEXITY 4 8/18/2023 Jayla Cody, PT GP 1            PT G-Codes  Outcome Measure Options: AM-PAC 6 Clicks Basic Mobility (PT)  AM-PAC 6 Clicks Score (PT): 12  PT Discharge Summary  Anticipated Discharge Disposition (PT): skilled nursing facility    Jayla Cody, PT  8/18/2023

## 2023-08-18 NOTE — NURSING NOTE
"58-year-old male with a history of chronic lymphedema presents to the hospital with complaints of increasing leg pain and worsening dyspnea.  The EMR was reviewed.  Patient states that he does not wear any type of compression nor does he have any type lymphedema pumps states that he has been seen in the past by lymphedema clinic but they stated and told him that they did not take his insurance.  Patient also reports that he is not \"supposed to \"wear any type of compression stating that it.  Will \"kill him \".  States that compression makes it worse.  I did explain in depth to patient and caregiver that typically with lymphedema.  It needs to be controlled with compression is very specific compression lymphedema pumps as well as lymphedema massage therapy.  Patient is quite edematous to from the foot to the thighs.  I am not able to palpate a pedal pulse through the edema.  There is some erythema but it does not appear to be acute some scattered superficial abrasions noted but nothing is open at this time as far as open draining wounds.  Would recommend at this point.  Good skin care and use of Aquaphor.  Patient really does need to be seen by a lymphedema clinic for long-term therapy.  I did suggest the patient follow-up with the lymphedema therapy clinic such as [facility] or could be seen by [facility] in [place].  I am aware of issues regarding Medicaid in the clinic but the patient's primary appears to be [facility].  "

## 2023-08-18 NOTE — ED PROVIDER NOTES
Subjective   History of Present Illness  58-year-old male with chronic lymphedema complains of worsening leg pain in the setting of worsening dyspnea with dry cough for the past 3 to 4 days.  No fever.  Patient has had some intermittent mild chest pain, none today.  Patient had a remote DVT in his left lower extremity after a knee replacement but does not take any anticoagulation and to his knowledge has not had any recurrent DVTs.  Patient denies any known history of coronary artery disease or CHF but does have Mcnally cirrhosis.    Review of Systems   Respiratory:  Positive for cough and shortness of breath.    Cardiovascular:  Positive for leg swelling.   Musculoskeletal:         Chronic lymphedema   Skin:         Chronic venous stasis   All other systems reviewed and are negative.    Past Medical History:   Diagnosis Date    Arthritis     Asthma     Chronic deep vein thrombosis (DVT)     Lymphedema of both lower extremities        Allergies   Allergen Reactions    Sulfa Antibiotics Unknown (See Comments)       Past Surgical History:   Procedure Laterality Date    APPENDECTOMY  1974    CARDIAC CATHETERIZATION N/A 2/28/2020    Procedure: Left ventriculography;  Surgeon: Kenney Reyez MD;  Location: [facility] CATH INVASIVE LOCATION;  Service: Cardiovascular;  Laterality: N/A;    CARDIAC CATHETERIZATION N/A 2/28/2020    Procedure: Coronary angiography;  Surgeon: Kenney Reyez MD;  Location: [facility] CATH INVASIVE LOCATION;  Service: Cardiovascular;  Laterality: N/A;    CARDIAC CATHETERIZATION N/A 2/28/2020    Procedure: Left Heart Cath;  Surgeon: Kenney Reyez MD;  Location: [facility] CATH INVASIVE LOCATION;  Service: Cardiovascular;  Laterality: N/A;    COLONOSCOPY N/A 11/15/2022    Procedure: COLONOSCOPY with polypectomy;  Surgeon: AMY Garcia MD;  Location: [facility] ENDOSCOPY;  Service: Gastroenterology;  Laterality: N/A;  post: diverticulosis, ascending polyp x2, transverse polyp x3, descending polyp x4,  rectal polyp x1, hemorroids    ENDOSCOPY N/A 11/15/2022    Procedure: ESOPHAGOGASTRODUODENOSCOPY with biopsy;  Surgeon: AMY Garcia MD;  Location: [facility] ENDOSCOPY;  Service: Gastroenterology;  Laterality: N/A;  post: esophagitis with biopsy to rule out candida , gastric biopsy    KNEE SURGERY Left 2018       Family History   Problem Relation Age of Onset    Heart disease Mother     Diabetes Mother     Arthritis Father     Aortic aneurysm Father        Social History     Socioeconomic History    Marital status: Single   Tobacco Use    Smoking status: Former     Packs/day: 0.50     Years: 5.00     Pack years: 2.50     Types: Cigarettes     Start date:      Quit date:      Years since quittin.6     Passive exposure: Past    Smokeless tobacco: Never   Vaping Use    Vaping Use: Never used   Substance and Sexual Activity    Alcohol use: Never    Drug use: Never    Sexual activity: Defer           Objective   Physical Exam  Constitutional:       Comments: Pleasant morbidly obese 58-year-old male in no acute distress   HENT:      Mouth/Throat:      Mouth: Mucous membranes are moist.      Pharynx: Oropharynx is clear.   Cardiovascular:      Rate and Rhythm: Normal rate and regular rhythm.      Heart sounds: Normal heart sounds.   Pulmonary:      Effort: Pulmonary effort is normal.      Breath sounds: Normal breath sounds.   Abdominal:      General: Bowel sounds are normal. There is no distension.      Palpations: Abdomen is soft.      Tenderness: There is no abdominal tenderness.   Musculoskeletal:      Comments: Chronic appearing lymphedema with venous stasis dermatitis, left is greater than right and this is the patient's baseline   Skin:     General: Skin is warm and dry.      Capillary Refill: Capillary refill takes less than 2 seconds.   Neurological:      General: No focal deficit present.      Mental Status: He is oriented to person, place, and time.   Psychiatric:         Mood and Affect: Mood  normal.         Behavior: Behavior normal.       Procedures           ED Course                                           Medical Decision Making  Patient with chronically elevated D-dimer with worsening dyspnea, will obtain CT of the chest, a.m. Dopplers, a.m. echo, given thrombocytopenia and morbid obesity, will hold anticoagulation pending a.m. Dopplers.    CT chest without pulmonary embolus, patient hemodynamically stable, will place in ED observation    Problems Addressed:  Dyspnea, unspecified type: complicated acute illness or injury  Lymphedema: complicated acute illness or injury    Amount and/or Complexity of Data Reviewed  Labs: ordered.  Radiology: ordered.  ECG/medicine tests: ordered.    Risk  Prescription drug management.  Decision regarding hospitalization.        Final diagnoses:   Dyspnea, unspecified type   Lymphedema       ED Disposition  ED Disposition       ED Disposition   Decision to Admit    Condition   --    Comment   --               No follow-up provider specified.       Medication List      No changes were made to your prescriptions during this visit.            Usama Rai MD  08/18/23 0422

## 2023-08-18 NOTE — DISCHARGE PLACEMENT REQUEST
"Josemanuel Randhawa Jr. (58 y.o. Male)       Date of Birth   1965    Social Security Number       Address   [address] [place] [address] [place] [address]    Home Phone   [phone]    MRN   [id number]       [facility]   None    Marital Status   Single                            Admission Date   8/17/23    Admission Type   Emergency    Admitting Provider   Usama Rai MD    Attending Provider   Usama Rai MD    Department, Room/Bed   [facility] OBSERVATION, 111/1       Discharge Date       Discharge Disposition       Discharge Destination                                 Attending Provider: Usama Rai MD    Allergies: Sulfa Antibiotics    Isolation: None   Infection: None   Code Status: CPR    Ht: 185.4 cm (73\")   Wt: 170 kg (374 lb)    Admission Cmt: None   Principal Problem: Dyspnea [R06.00]                   Active Insurance as of 8/17/2023       Primary Coverage       Payor Plan Insurance Group Employer/Plan Group    HUMANA MEDICARE REPLACEMENT HUMANA MEDICARE REPLACEMENT [id number]       Payor Plan Address Payor Plan Phone Number Payor Plan Fax Number Effective Dates    [address] [address] [phone]  12/1/2019 - None Entered    [facility] [id number]         Subscriber Name Subscriber Birth Date Member ID       JOSEMANUEL RANDHAWA JR. 1965 [id number]               Secondary Coverage       Payor Plan Insurance Group Employer/Plan Group    [id number]        Payor Plan Address Payor Plan Phone Number Payor Plan Fax Number Effective Dates    [address]   1/4/2023 - None Entered    [place] [address]         Subscriber Name Subscriber Birth Date Member ID       JOSEMANUEL RANDHWAA JR. 1965 [id number]                     Emergency Contacts        (Rel.) Home Phone Work Phone Mobile Phone    EdisSajan (Brother) -- -- [phone]    SheldonIman (Sister) -- -- [phone]                "

## 2023-08-18 NOTE — ED NOTES
Nursing report ED to floor  Robert Randhawa Jr.  58 y.o.  male    HPI:   Chief Complaint   Patient presents with    Leg Pain     Left leg pain, started about three hours ago       Admitting doctor:   Usama Rai MD    Admitting diagnosis:   The primary encounter diagnosis was Dyspnea, unspecified type. A diagnosis of Lymphedema was also pertinent to this visit.    Code status:   Current Code Status       Date Active Code Status Order ID Comments User Context       Prior            Allergies:   Sulfa antibiotics    Isolation:  No active isolations     Fall Risk:  Fall Risk Assessment was completed, and patient is at high risk for falls.   Predictive Model Details         51 (High) Factor Value    Calculated 8/18/2023 02:07 Age 58    Risk of Fall Model Active Peripheral IV Present     Imaging order in this encounter Present     Musculoskeletal Assessment X     Respiratory Rate 18     Financial Class Other     Magnesium not on file     Albumin 3 g/dL     Diastolic BP 67     Drug Use No     Tobacco Use Quit     Calcium 8.7 mg/dL     Jose Scale not on file     Peripheral Vascular Assessment WDL     Total Bilirubin 0.9 mg/dL     Chloride 100 mmol/L     Clinically Relevant Sex Not Female     Gastrointestinal Assessment WDL     Number of Distinct Medication Classes administered 1     Number of administrations of Analgesic Narcotics 1     Creatinine 0.79 mg/dL     Cardiac Assessment WDL     ALT 26 U/L     Skin Assessment X     Days after Admission 0.155     Potassium 3.7 mmol/L         Weight:       08/17/23  2208   Weight: (!) 170 kg (374 lb)       Intake and Output  No intake or output data in the 24 hours ending 08/18/23 0503    Diet:        Most recent vitals:   Vitals:    08/18/23 0047 08/18/23 0102 08/18/23 0147 08/18/23 0246   BP: 121/76 118/78 120/67 123/77   Pulse: 91 93 88 91   Resp:       Temp:       TempSrc:       SpO2: 98% 99% 97% 98%   Weight:       Height:           Active LDAs/IV Access:   Lines,  Drains & Airways       Active LDAs       Name Placement date Placement time Site Days    Peripheral IV 08/18/23 0036 Anterior;Left Forearm 08/18/23 0036  Forearm  less than 1    Peripheral IV 08/18/23 0036 Right Antecubital 08/18/23 0036  Antecubital  less than 1                    Skin Condition:   Skin Assessments (last day)       Date/Time Skin WDL    08/17/23 22:41:27 X;characteristics             Labs (abnormal labs have a star):   Labs Reviewed   COMPREHENSIVE METABOLIC PANEL - Abnormal; Notable for the following components:       Result Value    Glucose 114 (*)     Sodium 134 (*)     Albumin 3.0 (*)     AST (SGOT) 72 (*)     Alkaline Phosphatase 197 (*)     All other components within normal limits    Narrative:     GFR Normal >60  Chronic Kidney Disease <60  Kidney Failure <15     CBC WITH AUTO DIFFERENTIAL - Abnormal; Notable for the following components:    WBC 3.20 (*)     RBC 3.58 (*)     Hemoglobin 12.2 (*)     Hematocrit 36.6 (*)     .1 (*)     MCH 34.2 (*)     Platelets 86 (*)     Lymphocyte % 14.6 (*)     Lymphocytes, Absolute 0.50 (*)     All other components within normal limits   D-DIMER, QUANTITATIVE - Abnormal; Notable for the following components:    D-Dimer, Quantitative 2.15 (*)     All other components within normal limits    Narrative:     According to the assay 's published package insert, a normal (<0.50 mg/L (FEU)) D-dimer result in conjunction with a non-high clinical probability assessment, excludes deep vein thrombosis (DVT) and pulmonary embolism (PE) with high sensitivity.    D-dimer values increase with age and this can make VTE exclusion of an older population difficult. To address this, the [employer], based on best available evidence and recent guidelines, recommends that clinicians use age-adjusted D-dimer thresholds in patients greater than 50 years of age with: a) a low probability of PE who do not meet all Pulmonary Embolism Rule Out  "Criteria, or b) in those with intermediate probability of PE.   The formula for an age-adjusted D-dimer cut-off is \"age/100\".  For example, a 60 year old patient would have an age-adjusted cut-off of 0.60 mg/L (FEU) and an 80 year old 0.80 mg/L (FEU).   PROTIME-INR - Abnormal; Notable for the following components:    Protime 12.4 (*)     INR 1.17 (*)     All other components within normal limits   APTT - Abnormal; Notable for the following components:    PTT 26.9 (*)     All other components within normal limits   RESPIRATORY PANEL PCR W/ COVID-19 (SARS-COV-2) EAMON/CINTHIA/ELMO/PAD/COR/MAD/MARTIN IN-HOUSE, NP [facility]AB IN [facility], 3-4 HR TAT - Normal    Narrative:     In the setting of a positive respiratory panel with a viral infection PLUS a negative procalcitonin without other underlying concern for bacterial infection, consider observing off antibiotics or discontinuation of antibiotics and continue supportive care. If the respiratory panel is positive for atypical bacterial infection (Bordetella pertussis, Chlamydophila pneumoniae, or Mycoplasma pneumoniae), consider antibiotic de-escalation to target atypical bacterial infection.   PROCALCITONIN - Normal    Narrative:     As a Marker for Sepsis (Non-Neonates):    1. <0.5 ng/mL represents a low risk of severe sepsis and/or septic shock.  2. >2 ng/mL represents a high risk of severe sepsis and/or septic shock.    As a Marker for Lower Respiratory Tract Infections that require antibiotic therapy:    PCT on Admission    Antibiotic Therapy       6-12 Hrs later    >0.5                Strongly Recommended  >0.25 - <0.5        Recommended   0.1 - 0.25          Discouraged              Remeasure/reassess PCT  <0.1                Strongly Discouraged     Remeasure/reassess PCT    As 28 day mortality risk marker: \"Change in Procalcitonin Result\" (>80% or <=80%) if Day 0 (or Day 1) and Day 4 values are available. Refer to http://www.[employer]-pct-calculator.com    Change in PCT <=80%  A " decrease of PCT levels below or equal to 80% defines a positive change in PCT test result representing a higher risk for 28-day all-cause mortality of patients diagnosed with severe sepsis for septic shock.    Change in PCT >80%  A decrease of PCT levels of more than 80% defines a negative change in PCT result representing a lower risk for 28-day all-cause mortality of patients diagnosed with severe sepsis or septic shock.      TSH - Normal   BNP (IN-HOUSE) - Normal    Narrative:     Among patients with dyspnea, NT-proBNP is highly sensitive for the detection of acute congestive heart failure. In addition NT-proBNP of <300 pg/ml effectively rules out acute congestive heart failure with 99% negative predictive value.     SINGLE HSTROPONIN T - Normal    Narrative:     High Sensitive Troponin T Reference Range:  <10.0 ng/L- Negative Female for AMI  <15.0 ng/L- Negative Male for AMI  >=10 - Abnormal Female indicating possible myocardial injury.  >=15 - Abnormal Male indicating possible myocardial injury.   Clinicians would have to utilize clinical acumen, EKG, Troponin, and serial changes to determine if it is an Acute Myocardial Infarction or myocardial injury due to an underlying chronic condition.        POC LACTATE - Normal   BLOOD CULTURE   BLOOD CULTURE   RAINBOW DRAW    Narrative:     The following orders were created for panel order [place] Draw.  Procedure                               Abnormality         Status                     ---------                               -----------         ------                     Green Top (Gel)[[id number]]                                  Final result               Lavender Top[[id number]]                                     Final result               Gold Top - SST[[id number]]                                   Final result               Light Blue Top[[id number]]                                   Final result                 Please view results for these tests on the  individual orders.   POC LACTATE   CBC AND DIFFERENTIAL    Narrative:     The following orders were created for panel order CBC & Differential.  Procedure                               Abnormality         Status                     ---------                               -----------         ------                     CBC Auto Differential[[id number]]        Abnormal            Final result                 Please view results for these tests on the individual orders.   GREEN TOP   LAVENDER TOP   GOLD TOP - SST   LIGHT BLUE TOP       LOC: Person, Place, Time, and Situation    Telemetry:  Observation Unit    Cardiac Monitoring Ordered: yes    EKG:   ECG 12 Lead Dyspnea   Preliminary Result   HEART RATE= 86  bpm   RR Interval= 700  ms   [place] Interval= 176  ms   P Horizontal Axis= -18  deg   P Front Axis= 75  deg   QRSD Interval= 92  ms   QT Interval= 369  ms   QRS Axis= 72  deg   T Wave Axis= -7  deg   - BORDERLINE ECG -   Sinus rhythm   Left atrial enlargement   When compared with ECG of 16-Apr-2023 18:36:48,   Significant rate decrease   Electronically Signed By:    Date and Time of Study: 2023-08-17 23:31:15          Medications Given in the ED:   Medications   sodium chloride 0.9 % flush 10 mL (has no administration in time range)   furosemide (LASIX) injection 40 mg (has no administration in time range)   HYDROcodone-acetaminophen (NORCO) 5-325 MG per tablet 1 tablet (1 tablet Oral Given 8/18/23 0159)   iopamidol (ISOVUE-370) 76 % injection 100 mL (100 mL Intravenous Given 8/18/23 6621)       Imaging results:  XR Chest 1 View    Result Date: 8/17/2023  Impression: Mild vascular congestion and cardiomegaly, similar as compared to the previous study. Electronically Signed: Evelyn Ureña MD  8/17/2023 11:47 PM EDT  Workstation ID: [id number]    CT Angiogram Chest Pulmonary Embolism    Result Date: 8/18/2023  Impression: 1. No evidence of pulmonary embolism. Probable mild vascular congestion with cardiomegaly. Otherwise, no  acute cardiopulmonary process. 2. Ancillary findings as described above. Electronically Signed: Evelyn Ureña MD  2023 4:03 AM EDT  Workstation ID: [id number]     Social issues:   Social History     Socioeconomic History    Marital status: Single   Tobacco Use    Smoking status: Former     Packs/day: 0.50     Years: 5.00     Pack years: 2.50     Types: Cigarettes     Start date:      Quit date:      Years since quittin.6     Passive exposure: Past    Smokeless tobacco: Never   Vaping Use    Vaping Use: Never used   Substance and Sexual Activity    Alcohol use: Never    Drug use: Never    Sexual activity: Defer       NIH Stroke Scale:  Interval: (not recorded)  1a. Level of Consciousness: (not recorded)  1b. LOC Questions: (not recorded)  1c. LOC Commands: (not recorded)  2. Best Gaze: (not recorded)  3. Visual: (not recorded)  4. Facial Palsy: (not recorded)  5a. Motor Arm, Left: (not recorded)  5b. Motor Arm, Right: (not recorded)  6a. Motor Leg, Left: (not recorded)  6b. Motor Leg, Right: (not recorded)  7. Limb Ataxia: (not recorded)  8. Sensory: (not recorded)  9. Best Language: (not recorded)  10. Dysarthria: (not recorded)  11. Extinction and Inattention (formerly Neglect): (not recorded)    Total (NIH Stroke Scale): (not recorded)     Additional notable assessment information:     Nursing report ED to floor:  [id number]    Vianey Holt RN   23 05:03 EDT

## 2023-08-18 NOTE — CASE MANAGEMENT/SOCIAL WORK
Continued Stay Note  NAZ Buenrostro     Patient Name: Robert Randhawa Jr.  MRN: [id number]  Today's Date: 8/18/2023    Admit Date: 8/17/2023    D/C Plan: SNF, Referral sent to [facility], pending acceptance. Will need Precert and PASRR.    Discharge Plan       Row Name 08/18/23 6539       Plan    Plan D/C Plan: SND, Referral sent to [facility], pending acceptance. Will need PASRR and Precert.      Row Name 08/18/23 9292       Plan    Plan Comments CM contacted by Pt RN to inform that pt is now agreeable to SNF at [facility]. Met with pt at bedside and provided an ECF list. Requests referral sent to [facility]. DCP sent to CP through Epic and Message sent to Liaison, Awaiting Response.                    Expected Discharge Date and Time       Expected Discharge Date Expected Discharge Time    Aug 19, 2023           Met with patient in room wearing PPE: mask    Maintained distance greater than six feet and spent less than 15 minutes in the room    Alicia Dorsey RN    Phone [phone]  Fax [phone]

## 2023-08-19 LAB
ANION GAP SERPL CALCULATED.3IONS-SCNC: 7 MMOL/L (ref 5–15)
BASOPHILS # BLD AUTO: 0 10*3/MM3 (ref 0–0.2)
BASOPHILS NFR BLD AUTO: 0.6 % (ref 0–1.5)
BUN SERPL-MCNC: 16 MG/DL (ref 6–20)
BUN/CREAT SERPL: 23.9 (ref 7–25)
CALCIUM SPEC-SCNC: 8.4 MG/DL (ref 8.6–10.5)
CHLORIDE SERPL-SCNC: 104 MMOL/L (ref 98–107)
CO2 SERPL-SCNC: 25 MMOL/L (ref 22–29)
CREAT SERPL-MCNC: 0.67 MG/DL (ref 0.76–1.27)
DEPRECATED RDW RBC AUTO: 55.6 FL (ref 37–54)
EGFRCR SERPLBLD CKD-EPI 2021: 107.6 ML/MIN/1.73
EOSINOPHIL # BLD AUTO: 0.1 10*3/MM3 (ref 0–0.4)
EOSINOPHIL NFR BLD AUTO: 3.2 % (ref 0.3–6.2)
ERYTHROCYTE [DISTWIDTH] IN BLOOD BY AUTOMATED COUNT: 14.9 % (ref 12.3–15.4)
GLUCOSE SERPL-MCNC: 98 MG/DL (ref 65–99)
HCT VFR BLD AUTO: 33.6 % (ref 37.5–51)
HGB BLD-MCNC: 11.6 G/DL (ref 13–17.7)
LYMPHOCYTES # BLD AUTO: 0.6 10*3/MM3 (ref 0.7–3.1)
LYMPHOCYTES NFR BLD AUTO: 20.5 % (ref 19.6–45.3)
MCH RBC QN AUTO: 35 PG (ref 26.6–33)
MCHC RBC AUTO-ENTMCNC: 34.5 G/DL (ref 31.5–35.7)
MCV RBC AUTO: 101.4 FL (ref 79–97)
MONOCYTES # BLD AUTO: 0.2 10*3/MM3 (ref 0.1–0.9)
MONOCYTES NFR BLD AUTO: 7.7 % (ref 5–12)
NEUTROPHILS NFR BLD AUTO: 2.1 10*3/MM3 (ref 1.7–7)
NEUTROPHILS NFR BLD AUTO: 68 % (ref 42.7–76)
NRBC BLD AUTO-RTO: 0 /100 WBC (ref 0–0.2)
PLATELET # BLD AUTO: 74 10*3/MM3 (ref 140–450)
PMV BLD AUTO: 9.4 FL (ref 6–12)
POTASSIUM SERPL-SCNC: 4.2 MMOL/L (ref 3.5–5.2)
RBC # BLD AUTO: 3.32 10*6/MM3 (ref 4.14–5.8)
SODIUM SERPL-SCNC: 136 MMOL/L (ref 136–145)
WBC NRBC COR # BLD: 3.1 10*3/MM3 (ref 3.4–10.8)

## 2023-08-19 PROCEDURE — 25010000002 CEFTRIAXONE PER 250 MG: Performed by: NURSE PRACTITIONER

## 2023-08-19 PROCEDURE — 96376 TX/PRO/DX INJ SAME DRUG ADON: CPT

## 2023-08-19 PROCEDURE — 25010000002 FUROSEMIDE PER 20 MG: Performed by: NURSE PRACTITIONER

## 2023-08-19 PROCEDURE — 80048 BASIC METABOLIC PNL TOTAL CA: CPT | Performed by: NURSE PRACTITIONER

## 2023-08-19 PROCEDURE — G0378 HOSPITAL OBSERVATION PER HR: HCPCS

## 2023-08-19 PROCEDURE — 96365 THER/PROPH/DIAG IV INF INIT: CPT

## 2023-08-19 PROCEDURE — 85025 COMPLETE CBC W/AUTO DIFF WBC: CPT | Performed by: NURSE PRACTITIONER

## 2023-08-19 RX ORDER — FUROSEMIDE 10 MG/ML
40 INJECTION INTRAMUSCULAR; INTRAVENOUS EVERY 12 HOURS
Status: DISCONTINUED | OUTPATIENT
Start: 2023-08-19 | End: 2023-08-21 | Stop reason: HOSPADM

## 2023-08-19 RX ORDER — LACTULOSE 10 G/15ML
10 SOLUTION ORAL DAILY
Status: DISCONTINUED | OUTPATIENT
Start: 2023-08-19 | End: 2023-08-21 | Stop reason: HOSPADM

## 2023-08-19 RX ADMIN — Medication 10 ML: at 21:41

## 2023-08-19 RX ADMIN — FUROSEMIDE 40 MG: 10 INJECTION, SOLUTION INTRAMUSCULAR; INTRAVENOUS at 08:20

## 2023-08-19 RX ADMIN — TAMSULOSIN HYDROCHLORIDE 0.4 MG: 0.4 CAPSULE ORAL at 08:19

## 2023-08-19 RX ADMIN — PANTOPRAZOLE SODIUM 40 MG: 40 INJECTION, POWDER, LYOPHILIZED, FOR SOLUTION INTRAVENOUS at 06:24

## 2023-08-19 RX ADMIN — SENNOSIDES AND DOCUSATE SODIUM 2 TABLET: 50; 8.6 TABLET ORAL at 08:18

## 2023-08-19 RX ADMIN — DICYCLOMINE HYDROCHLORIDE 10 MG: 10 CAPSULE ORAL at 06:24

## 2023-08-19 RX ADMIN — RIFAXIMIN 550 MG: 550 TABLET ORAL at 21:39

## 2023-08-19 RX ADMIN — DICYCLOMINE HYDROCHLORIDE 10 MG: 10 CAPSULE ORAL at 06:36

## 2023-08-19 RX ADMIN — GABAPENTIN 300 MG: 300 CAPSULE ORAL at 15:39

## 2023-08-19 RX ADMIN — CEFTRIAXONE SODIUM 2000 MG: 2 INJECTION, POWDER, FOR SOLUTION INTRAMUSCULAR; INTRAVENOUS at 08:19

## 2023-08-19 RX ADMIN — GABAPENTIN 300 MG: 300 CAPSULE ORAL at 21:39

## 2023-08-19 RX ADMIN — HYDROCODONE BITARTRATE AND ACETAMINOPHEN 1 TABLET: 5; 325 TABLET ORAL at 19:37

## 2023-08-19 RX ADMIN — RIFAXIMIN 550 MG: 550 TABLET ORAL at 08:18

## 2023-08-19 RX ADMIN — LACTULOSE 10 G: 20 SOLUTION ORAL at 08:19

## 2023-08-19 RX ADMIN — FUROSEMIDE 40 MG: 10 INJECTION, SOLUTION INTRAMUSCULAR; INTRAVENOUS at 21:39

## 2023-08-19 RX ADMIN — DICYCLOMINE HYDROCHLORIDE 10 MG: 10 CAPSULE ORAL at 15:39

## 2023-08-19 RX ADMIN — DICYCLOMINE HYDROCHLORIDE 10 MG: 10 CAPSULE ORAL at 10:25

## 2023-08-19 RX ADMIN — GABAPENTIN 300 MG: 300 CAPSULE ORAL at 08:19

## 2023-08-19 RX ADMIN — Medication 10 ML: at 08:20

## 2023-08-19 RX ADMIN — SPIRONOLACTONE 50 MG: 25 TABLET ORAL at 08:19

## 2023-08-19 NOTE — PAYOR COMM NOTE
"Josemanuel Randhawa Jr. (58 y.o. Male)       Date of Birth   1965    Social Security Number       Address   [address] [place] [address] [place] [address]    Home Phone   [phone]    MRN   [id number]       Islam   None    Marital Status   Single                            Admission Date   8/17/23    Admission Type   Emergency    Admitting Provider   Usama Rai MD    Attending Provider   Usama Rai MD    Department, Room/Bed   [facility] OBSERVATION, 111/1       Discharge Date       Discharge Disposition       Discharge Destination                                 Attending Provider: Usama Rai MD    Allergies: Sulfa Antibiotics    Isolation: None   Infection: None   Code Status: CPR    Ht: 185.4 cm (73\")   Wt: 170 kg (375 lb 10.6 oz)    Admission Cmt: None   Principal Problem: Dyspnea [R06.00]                   Active Insurance as of 8/17/2023       Primary Coverage       Payor Plan Insurance Group Employer/Plan Group    HUMANA MEDICARE REPLACEMENT HUMANA MEDICARE REPLACEMENT [id number]       Payor Plan Address Payor Plan Phone Number Payor Plan Fax Number Effective Dates    [address] [address] [phone]  12/1/2019 - None Entered    [facility] [id number]         Subscriber Name Subscriber Birth Date Member ID       JOSEMANUEL RANDHAWA JR. 1965 [id number]               Secondary Coverage       Payor Plan Insurance Group Employer/Plan Group    [id number]        Payor Plan Address Payor Plan Phone Number Payor Plan Fax Number Effective Dates    [address]   1/4/2023 - None Entered    [place] [address]         Subscriber Name Subscriber Birth Date Member ID       JOSEMANUEL RANDHAWA JR. 1965 [id number]                     Emergency Contacts        (Rel.) Home Phone Work Phone Mobile Phone    EdisSajan (Brother) -- -- [phone]    SheldonIman (Sister) -- -- [phone]                 History & Physical    "     Nicol Hubbard, APRN at 23 1225          [facility] Observation Unit H&P    Patient Name: Robert Randhawa Jr.  : 1965  MRN: [id number]  Primary Care Physician: Alfredo Torres MD  Date of admission: 2023     Patient Care Team:  Alfredo Torres MD as PCP - General  Alfredo Torres MD as PCP - Family Medicine  Kenney Reyez MD as Consulting Physician (Cardiology)  Lionel Lynn MD as Consulting Physician (Hematology and Oncology)          Subjective   History Present Illness     Chief Complaint:   Chief Complaint   Patient presents with    Leg Pain     Left leg pain, started about three hours ago     Leg Pain     Mr. Randhawa is a 58 y.o.  presents to [facility] complaining of leg pain       History of Present Illness    ED 23: 58-year-old male with chronic lymphedema complains of worsening leg pain in the setting of worsening dyspnea with dry cough for the past 3 to 4 days. No fever. Patient has had some intermittent mild chest pain, none today. Patient had a remote DVT in his left lower extremity after a knee replacement but does not take any anticoagulation and to his knowledge has not had any recurrent DVTs. Patient denies any known history of coronary artery disease or CHF but does have Mcnally cirrhosis.     Observation 23: Patient is a 58-year-old male presenting to the hospital with complaints of worsening bilateral lower extremity.  Patient also reports increased pain in left leg that started yesterday without fall or incident.  Patient reports shortness of breath without chest pain, nausea, vomiting or syncope.  Patient states it has been difficult to move with his legs increasing in size.  Patient reports DVT in left lower extremity about a year ago and off anticoagulants currently.       Review of Systems   Constitutional: Positive for decreased appetite and malaise/fatigue.   HENT: Negative.     Eyes: Negative.    Cardiovascular:  Positive for dyspnea on  exertion and leg swelling.   Respiratory:  Positive for cough and shortness of breath.    Endocrine: Negative.    Hematologic/Lymphatic: Negative.    Skin: Negative.    Musculoskeletal: Negative.    Gastrointestinal: Negative.    Genitourinary:  Positive for frequency.   Neurological:  Positive for weakness.   Psychiatric/Behavioral: Negative.     Allergic/Immunologic: Negative.          Personal History     Past Medical History:   Past Medical History:   Diagnosis Date    Arthritis     Asthma     Chronic deep vein thrombosis (DVT)     Lymphedema of both lower extremities        Surgical History:      Past Surgical History:   Procedure Laterality Date    APPENDECTOMY  1974    CARDIAC CATHETERIZATION N/A 2/28/2020    Procedure: Left ventriculography;  Surgeon: Kenney Reyez MD;  Location: [facility] CATH INVASIVE LOCATION;  Service: Cardiovascular;  Laterality: N/A;    CARDIAC CATHETERIZATION N/A 2/28/2020    Procedure: Coronary angiography;  Surgeon: Kenney Reyez MD;  Location: [facility] CATH INVASIVE LOCATION;  Service: Cardiovascular;  Laterality: N/A;    CARDIAC CATHETERIZATION N/A 2/28/2020    Procedure: Left Heart Cath;  Surgeon: Kenney Reyez MD;  Location: [facility] CATH INVASIVE LOCATION;  Service: Cardiovascular;  Laterality: N/A;    COLONOSCOPY N/A 11/15/2022    Procedure: COLONOSCOPY with polypectomy;  Surgeon: AMY Garcia MD;  Location: [facility] ENDOSCOPY;  Service: Gastroenterology;  Laterality: N/A;  post: diverticulosis, ascending polyp x2, transverse polyp x3, descending polyp x4, rectal polyp x1, hemorroids    ENDOSCOPY N/A 11/15/2022    Procedure: ESOPHAGOGASTRODUODENOSCOPY with biopsy;  Surgeon: AMY Garcia MD;  Location: [facility] ENDOSCOPY;  Service: Gastroenterology;  Laterality: N/A;  post: esophagitis with biopsy to rule out candida , gastric biopsy    KNEE SURGERY Left 2018           Family History: family history includes Aortic aneurysm in his father; Arthritis in his father;  Diabetes in his mother; Heart disease in his mother. Otherwise pertinent FHx was reviewed and unremarkable.     Social History:  reports that he quit smoking about 40 years ago. His smoking use included cigarettes. He started smoking about 45 years ago. He has a 2.50 pack-year smoking history. He has been exposed to tobacco smoke. He has never used smokeless tobacco. He reports that he does not drink alcohol and does not use drugs.      Medications:  Prior to Admission medications    Medication Sig Start Date End Date Taking? Authorizing Provider   albuterol sulfate  (90 Base) MCG/ACT inhaler Inhale 2 puffs Every 4 (Four) Hours As Needed. 7/26/23  Yes Rolly Rivera MD   amoxicillin-clavulanate (AUGMENTIN) 875-125 MG per tablet Take 1 tablet by mouth Every 12 (Twelve) Hours. For ten days 8/11/23  Yes Rolly Rivera MD   dicyclomine (BENTYL) 10 MG capsule Take 1 capsule by mouth 3 (Three) Times a Day Before Meals.   Yes Rolly Rivera MD   furosemide (Lasix) 20 MG tablet Take 1 tablet by mouth Daily. Take daily or PRN 1/30/23  Yes Kush Lees MD   gabapentin (NEURONTIN) 300 MG capsule Take 1 capsule by mouth 3 (Three) Times a Day. 8/17/23  Yes Rolly Rivera MD   riFAXIMin (XIFAXAN) 550 MG tablet Take 1 tablet by mouth Every 12 (Twelve) Hours.   Yes Rolly Rivera MD   spironolactone (ALDACTONE) 25 MG tablet Take 2 tablets by mouth Daily.  Patient not taking: Reported on 8/18/2023    Rolly Rivera MD   tamsulosin (FLOMAX) 0.4 MG capsule 24 hr capsule Take 1 capsule by mouth Daily.    Rolly Rivera MD       Allergies:    Allergies   Allergen Reactions    Sulfa Antibiotics Unknown (See Comments)       Objective   Objective     Vital Signs  Temp:  [98.3 øF (36.8 øC)-98.7 øF (37.1 øC)] 98.3 øF (36.8 øC)  Heart Rate:  [67-93] 80  Resp:  [16-18] 16  BP: (103-152)/(63-82) 112/72  SpO2:  [96 %-99 %] 96 %  on   ;   Device (Oxygen Therapy): room air  Body mass index  is 49.34 kg/mý.    Physical Exam  Vitals and nursing note reviewed.   Constitutional:       Appearance: Normal appearance.   HENT:      Head: Normocephalic and atraumatic.      Right Ear: External ear normal.      Left Ear: External ear normal.      Nose: Nose normal.      Mouth/Throat:      Pharynx: Oropharynx is clear.   Eyes:      Extraocular Movements: Extraocular movements intact.      Conjunctiva/sclera: Conjunctivae normal.      Pupils: Pupils are equal, round, and reactive to light.   Cardiovascular:      Rate and Rhythm: Normal rate and regular rhythm.      Heart sounds: Murmur heard.   Pulmonary:      Effort: Pulmonary effort is normal.      Breath sounds: Normal breath sounds.   Abdominal:      General: Bowel sounds are normal. There is distension.      Palpations: Abdomen is soft.   Musculoskeletal:      Cervical back: Normal range of motion.      Right lower leg: Edema present.      Left lower leg: Edema present.   Skin:     Findings: Abrasion, erythema and wound present.   Neurological:      Mental Status: He is alert and oriented to person, place, and time.      Motor: Weakness present.      Gait: Gait abnormal.   Psychiatric:         Mood and Affect: Mood normal. Mood is depressed.         Behavior: Behavior normal.         Thought Content: Thought content normal.         Judgment: Judgment normal.         Results Review:  I have personally reviewed most recent cardiac tracings, lab results, microbiology results, and radiology images and interpretations and agree with findings, most notably: CBC, CMP, BNP, EKG, venous duplex, CT chest.    Results from last 7 days   Lab Units 08/18/23  0001   WBC 10*3/mm3 3.20*   HEMOGLOBIN g/dL 12.2*   HEMATOCRIT % 36.6*   PLATELETS 10*3/mm3 86*   INR  1.17*     Results from last 7 days   Lab Units 08/18/23  0010 08/18/23  0001   SODIUM mmol/L  --  134*   POTASSIUM mmol/L  --  3.7   CHLORIDE mmol/L  --  100   CO2 mmol/L  --  26.0   BUN mg/dL  --  17   CREATININE  mg/dL  --  0.79   GLUCOSE mg/dL  --  114*   CALCIUM mg/dL  --  8.7   ALT (SGPT) U/L  --  26   AST (SGOT) U/L  --  72*   HSTROP T ng/L  --  14   PROBNP pg/mL  --  77.2   LACTATE mmol/L 1.6  --    PROCALCITONIN ng/mL  --  0.10     Estimated Creatinine Clearance: 165.2 mL/min (by C-G formula based on SCr of 0.79 mg/dL).  Brief Urine Lab Results  (Last result in the past 365 days)        Color   Clarity   Blood   Leuk Est   Nitrite   Protein   CREAT   Urine HCG        04/17/23 2149 Orange  Comment: Any Substance that causes an abnormal urine color can alter the accuracy of the chemical reactions.   Clear   Large (3+)   Trace   Positive   Trace                   Microbiology Results (last 10 days)       Procedure Component Value - Date/Time    Respiratory Panel PCR w/COVID-19(SARS-CoV-2) EAMON/CINTHIA/ELMO/PAD/COR/MAD/MARTIN In-House, NP Swab in UTM/VTM, 3-4 HR TAT - Swab, Nasopharynx [[id number]]  (Normal) Collected: 08/18/23 0002    Lab Status: Final result Specimen: Swab from Nasopharynx Updated: 08/18/23 0055     ADENOVIRUS, PCR Not Detected     Coronavirus 229E Not Detected     Coronavirus HKU1 Not Detected     Coronavirus NL63 Not Detected     Coronavirus OC43 Not Detected     COVID19 Not Detected     Human Metapneumovirus Not Detected     Human Rhinovirus/Enterovirus Not Detected     Influenza A PCR Not Detected     Influenza B PCR Not Detected     Parainfluenza Virus 1 Not Detected     Parainfluenza Virus 2 Not Detected     Parainfluenza Virus 3 Not Detected     Parainfluenza Virus 4 Not Detected     RSV, PCR Not Detected     Bordetella pertussis pcr Not Detected     Bordetella parapertussis PCR Not Detected     Chlamydophila pneumoniae PCR Not Detected     Mycoplasma pneumo by PCR Not Detected    Narrative:      In the setting of a positive respiratory panel with a viral infection PLUS a negative procalcitonin without other underlying concern for bacterial infection, consider observing off antibiotics or discontinuation of  antibiotics and continue supportive care. If the respiratory panel is positive for atypical bacterial infection (Bordetella pertussis, Chlamydophila pneumoniae, or Mycoplasma pneumoniae), consider antibiotic de-escalation to target atypical bacterial infection.            ECG/EMG Results (most recent)       Procedure Component Value Units Date/Time    ECG 12 Lead Dyspnea [[id number]] Collected: 08/17/23 2331     Updated: 08/18/23 0708     QT Interval 369 ms     Narrative:      HEART RATE= 86  bpm  RR Interval= 700  ms  MN Interval= 176  ms  P Horizontal Axis= -18  deg  P Front Axis= 75  deg  QRSD Interval= 92  ms  QT Interval= 369  ms  QRS Axis= 72  deg  T Wave Axis= -7  deg  - BORDERLINE ECG -  Sinus rhythm  Left atrial enlargement  Electronically Signed By: Usama Rai (Maicol) 18-Aug-2023 07:08:10  Date and Time of Study: 2023-08-17 23:31:15            Results for orders placed during the hospital encounter of 08/17/23    Duplex Venous Lower Extremity - Bilateral    Interpretation Summary    Limited examination due to body habitus, fluid retention and patient intolerance. There is no deep or superficial vein thrombosis in the imaged segments of the lower extremities.      Results for orders placed during the hospital encounter of 04/16/23    Adult Transthoracic Echo Complete W/ Cont if Necessary Per Protocol    Interpretation Summary    Left ventricular ejection fraction appears to be 56 - 60%.    Estimated right ventricular systolic pressure from tricuspid regurgitation is mildly elevated (35-45 mmHg).    Indications  Chest pain    Technically satisfactory study.  Mitral valve is structurally normal.  Tricuspid valve is structurally normal.  Aortic valve is thickened with mild aortic valve stenosis (gradient across the aortic valve 21/12 mmHg and valve area of 2.2 cmý.  Pulmonic valve could not be well visualized.  No evidence for mitral tricuspid or aortic regurgitation is seen by Doppler study.  Left atrium is  normal in size.  Right atrium is normal in size.  Left ventricle is normal in size and contractility with ejection fraction of 60%.  Right ventricle is normal in size.  Atrial septum is intact.  Aorta is normal.  No pericardial effusion or intracardiac thrombus is seen.    Impression  Mild aortic valve stenosis.  Gradient across the aortic valve 21/12 mmHg and valve area of 2.2 cmý.  Left ventricular size and contractility is normal with ejection fraction of 60%.      XR Chest 1 View    Result Date: 8/17/2023  Impression: Mild vascular congestion and cardiomegaly, similar as compared to the previous study. Electronically Signed: Evelyn Ureña MD  8/17/2023 11:47 PM EDT  Workstation ID: [id number]    CT Angiogram Chest Pulmonary Embolism    Result Date: 8/18/2023  Impression: 1. No evidence of pulmonary embolism. Probable mild vascular congestion with cardiomegaly. Otherwise, no acute cardiopulmonary process. 2. Ancillary findings as described above. Electronically Signed: Evelyn Ureña MD  8/18/2023 4:03 AM EDT  Workstation ID: [id number]       Estimated Creatinine Clearance: 165.2 mL/min (by C-G formula based on SCr of 0.79 mg/dL).    Assessment & Plan   Assessment/Plan       Active Hospital Problems    Diagnosis  POA    **Dyspnea [R06.00]  Yes      Resolved Hospital Problems   No resolved problems to display.       Dyspnea   Lab Results   Component Value Date    TROPONINT 14 08/18/2023    TROPONINT 18 (H) 04/17/2023    PROBNP 77.2 08/18/2023    PROBNP 75.8 04/16/2023     (L) 08/18/2023   -Procalcitonin 0.10, lactate 1.6  -Blood cultures pending  -Respiratory viral panel negative  -Echocardiogram (April 2023): EF 56 to 60%, mild aortic valve stenosis  -Chest x-ray: Mild vascular congestion and cardiomegaly similar to previous studies  -EKG: sinus rhythm heart rate 86 with left atrial enlargement  -IV lasix Initiated/ monitor BMP  -Monitor I's and O's and daily weights  -2 g sodium diet  -D dimer 2.15  -CT chest: No  evidence of pulmonary embolism, mild vascular congestion cardiomegaly    Cirrhosis  Lab Results   Component Value Date    ALT 26 08/18/2023    AST 72 (H) 08/18/2023    BILITOT 0.9 08/18/2023    ALBUMIN 3.0 (L) 08/18/2023   -Ultrasound liver (July 2023) cirrhotic appearance with no lesions noted, cholelithiasis  -Continue IV Lasix, spironolactone, beta-blocker  -Low-sodium diet  -Monitor daily weights    Chronic Lymphadema  -BNP is 77  -PT/OT consulted  -Wound care consult  -Venous duplex showed limited examination due to body habitus and fluid but otherwise showed no DVT or superficial thrombosis  -Blood cultures pending  -Initiate IV Rocephin possible cellulitis    Pancytopenia  -WBC 3.2, platelets 86, RBC 3.58 hemoglobin 12.2 (baseline with likely related to cirrhosis  -Continue to monitor  -Seen by hematology outpatient    Asthma without exacerbation  -Continue albuterol as needed      BPH  -Continue Flomax    Neuropathy  -Continue gabapentin    VTE Prophylaxis -   Mechanical Order History:       None          Pharmalogical Order History:        Ordered     Dose Route Frequency Stop    08/18/23 0813  Enoxaparin Sodium (LOVENOX) syringe 40 mg  Status:  Discontinued         40 mg SC Every 12 Hours Scheduled 08/18/23 0813                    CODE STATUS:    Code Status and Medical Interventions:   Ordered at: 08/18/23 0813     Level Of Support Discussed With:    Patient     Code Status (Patient has no pulse and is not breathing):    CPR (Attempt to Resuscitate)     Medical Interventions (Patient has pulse or is breathing):    Full Support       This patient has been examined wearing personal protective equipment.     I discussed the patient's findings and my recommendations with patient, family, nursing staff, primary care team, and consulting provider.      Signature:Electronically signed by QUEENIE Hearn, 08/18/23, 12:53 PM EDT.          I spent 35 minutes caring for Robert on this date of service. This time  includes time spent by me in the following activities: reviewing tests, obtaining and/or reviewing a separately obtained history, performing a medically appropriate examination and/or evaluation, counseling and educating the patient/family/caregiver, ordering medications, tests, or procedures, referring and communicating with other health care professionals, documenting information in the medical record, independently interpreting results and communicating that information with the patient/family/caregiver, and care coordination.       Electronically signed by Nicol Hubbard APRN at 23 1259          Physician Progress Notes (last 24 hours)        Nicol Hubbard APRN at 23 1131          [facility] Observation Unit H&P    Patient Name: Robert Randhawa Jr.  : 1965  MRN: [id number]  Primary Care Physician: Alfredo Torres MD  Date of admission: 2023     Patient Care Team:  Alfredo Torres MD as PCP - General  Alfredo Torres MD as PCP - Family Medicine  Kenney Reyez MD as Consulting Physician (Cardiology)  Lionel Lynn MD as Consulting Physician (Hematology and Oncology)          Subjective   History Present Illness     Chief Complaint:   Chief Complaint   Patient presents with    Leg Pain     Left leg pain, started about three hours ago     Leg Pain     Mr. Randhawa is a 58 y.o.  presents to [facility] complaining of leg pain       History of Present Illness    ED 23: 58-year-old male with chronic lymphedema complains of worsening leg pain in the setting of worsening dyspnea with dry cough for the past 3 to 4 days. No fever. Patient has had some intermittent mild chest pain, none today. Patient had a remote DVT in his left lower extremity after a knee replacement but does not take any anticoagulation and to his knowledge has not had any recurrent DVTs. Patient denies any known history of coronary artery disease or CHF but does have Mcnally cirrhosis.      Observation 23:  Patient is a 58-year-old male presenting to the hospital with complaints of worsening bilateral lower extremity.  Patient also reports increased pain in left leg that started yesterday without fall or incident.  Patient reports shortness of breath without chest pain, nausea, vomiting or syncope.  Patient states it has been difficult to move with his legs increasing in size.  Patient reports DVT in left lower extremity about a year ago and off anticoagulants currently.     8/19/23: Patient reports continued bilateral lower extremity edema.  Patient states he tried with PT yesterday but had difficulty raising his leg due to weights.  Patient agreed for placement [facility] due to inability to care for self    ROS        Personal History     Past Medical History:   Past Medical History:   Diagnosis Date    Arthritis     Asthma     Chronic deep vein thrombosis (DVT)     Lymphedema of both lower extremities        Surgical History:      Past Surgical History:   Procedure Laterality Date    APPENDECTOMY  1974    CARDIAC CATHETERIZATION N/A 2/28/2020    Procedure: Left ventriculography;  Surgeon: Kenney Reyez MD;  Location: [facility] CATH INVASIVE LOCATION;  Service: Cardiovascular;  Laterality: N/A;    CARDIAC CATHETERIZATION N/A 2/28/2020    Procedure: Coronary angiography;  Surgeon: Kenney Reyez MD;  Location: [facility] CATH INVASIVE LOCATION;  Service: Cardiovascular;  Laterality: N/A;    CARDIAC CATHETERIZATION N/A 2/28/2020    Procedure: Left Heart Cath;  Surgeon: Kenney Reyez MD;  Location: [facility] CATH INVASIVE LOCATION;  Service: Cardiovascular;  Laterality: N/A;    COLONOSCOPY N/A 11/15/2022    Procedure: COLONOSCOPY with polypectomy;  Surgeon: AMY Garcia MD;  Location: [facility] ENDOSCOPY;  Service: Gastroenterology;  Laterality: N/A;  post: diverticulosis, ascending polyp x2, transverse polyp x3, descending polyp x4, rectal polyp x1, hemorroids    ENDOSCOPY N/A 11/15/2022    Procedure:  ESOPHAGOGASTRODUODENOSCOPY with biopsy;  Surgeon: AMY Garcia MD;  Location: [facility] ENDOSCOPY;  Service: Gastroenterology;  Laterality: N/A;  post: esophagitis with biopsy to rule out candida , gastric biopsy    KNEE SURGERY Left 2018           Family History: family history includes Aortic aneurysm in his father; Arthritis in his father; Diabetes in his mother; Heart disease in his mother. Otherwise pertinent FHx was reviewed and unremarkable.     Social History:  reports that he quit smoking about 40 years ago. His smoking use included cigarettes. He started smoking about 45 years ago. He has a 2.50 pack-year smoking history. He has been exposed to tobacco smoke. He has never used smokeless tobacco. He reports that he does not drink alcohol and does not use drugs.      Medications:  Prior to Admission medications    Medication Sig Start Date End Date Taking? Authorizing Provider   albuterol sulfate  (90 Base) MCG/ACT inhaler Inhale 2 puffs Every 4 (Four) Hours As Needed. 7/26/23  Yes Rolly Rivera MD   amoxicillin-clavulanate (AUGMENTIN) 875-125 MG per tablet Take 1 tablet by mouth Every 12 (Twelve) Hours. For ten days 8/11/23  Yes Rolly Rivera MD   dicyclomine (BENTYL) 10 MG capsule Take 1 capsule by mouth 3 (Three) Times a Day Before Meals.   Yes Rolly Rivera MD   furosemide (Lasix) 20 MG tablet Take 1 tablet by mouth Daily. Take daily or PRN 1/30/23  Yes Kush Lees MD   gabapentin (NEURONTIN) 300 MG capsule Take 1 capsule by mouth 3 (Three) Times a Day. 8/17/23  Yes Rolly Rivera MD   riFAXIMin (XIFAXAN) 550 MG tablet Take 1 tablet by mouth Every 12 (Twelve) Hours.   Yes Rolly Rivera MD   spironolactone (ALDACTONE) 25 MG tablet Take 2 tablets by mouth Daily.   Yes Rolly Rivera MD       Allergies:    Allergies   Allergen Reactions    Sulfa Antibiotics Unknown (See Comments)       Objective   Objective     Vital Signs  Temp:  [97.8 øF (36.6  øC)-99 øF (37.2 øC)] 99 øF (37.2 øC)  Heart Rate:  [70-88] 77  Resp:  [18-20] 20  BP: ()/(59-76) 94/59  SpO2:  [95 %-96 %] 95 %  on   ;   Device (Oxygen Therapy): room air  Body mass index is 49.56 kg/mý.    Physical Exam  Vitals and nursing note reviewed.   Constitutional:       Appearance: Normal appearance. He is obese.   HENT:      Head: Normocephalic and atraumatic.      Right Ear: External ear normal.      Left Ear: External ear normal.      Nose: Nose normal.      Mouth/Throat:      Pharynx: Oropharynx is clear.   Eyes:      Extraocular Movements: Extraocular movements intact.      Conjunctiva/sclera: Conjunctivae normal.      Pupils: Pupils are equal, round, and reactive to light.   Cardiovascular:      Rate and Rhythm: Normal rate and regular rhythm.      Pulses: Normal pulses.      Heart sounds: Normal heart sounds.   Pulmonary:      Effort: Pulmonary effort is normal.      Breath sounds: Normal breath sounds.   Abdominal:      General: Bowel sounds are normal.      Palpations: Abdomen is soft.   Musculoskeletal:      Cervical back: Normal range of motion.      Right lower leg: Edema present.      Left lower leg: Edema present.   Skin:     General: Skin is warm.      Capillary Refill: Capillary refill takes 2 to 3 seconds.      Findings: Erythema present.   Neurological:      Mental Status: He is alert and oriented to person, place, and time.      Motor: Weakness present.      Gait: Gait abnormal.   Psychiatric:         Mood and Affect: Mood is depressed.         Behavior: Behavior normal.         Thought Content: Thought content normal.         Judgment: Judgment normal.         Results Review:  I have personally reviewed most recent cardiac tracings, lab results, microbiology results, and radiology images and interpretations and agree with findings, most notably: CBC, CMP, venous duplex, EKG, chest x-ray.    Results from last 7 days   Lab Units 08/19/23  0449 08/18/23  1334 08/18/23  0001   WBC  10*3/mm3 3.10*   < > 3.20*   HEMOGLOBIN g/dL 11.6*   < > 12.2*   HEMATOCRIT % 33.6*   < > 36.6*   PLATELETS 10*3/mm3 74*   < > 86*   INR   --   --  1.17*    < > = values in this interval not displayed.     Results from last 7 days   Lab Units 08/19/23  0449 08/18/23  1334 08/18/23  0010 08/18/23  0001   SODIUM mmol/L 136   < >  --  134*   POTASSIUM mmol/L 4.2   < >  --  3.7   CHLORIDE mmol/L 104   < >  --  100   CO2 mmol/L 25.0   < >  --  26.0   BUN mg/dL 16   < >  --  17   CREATININE mg/dL 0.67*   < >  --  0.79   GLUCOSE mg/dL 98   < >  --  114*   CALCIUM mg/dL 8.4*   < >  --  8.7   ALT (SGPT) U/L  --   --   --  26   AST (SGOT) U/L  --   --   --  72*   HSTROP T ng/L  --   --   --  14   PROBNP pg/mL  --   --   --  77.2   LACTATE mmol/L  --   --  1.6  --    PROCALCITONIN ng/mL  --   --   --  0.10    < > = values in this interval not displayed.     Estimated Creatinine Clearance: 194.8 mL/min (A) (by C-G formula based on SCr of 0.67 mg/dL (L)).  Brief Urine Lab Results  (Last result in the past 365 days)        Color   Clarity   Blood   Leuk Est   Nitrite   Protein   CREAT   Urine HCG        08/18/23 1757 Orange  Comment: Any Substance that causes an abnormal urine color can alter the accuracy of the chemical reactions.   Clear   Moderate (2+)   Negative   Negative   Negative                   Microbiology Results (last 10 days)       Procedure Component Value - Date/Time    Blood Culture - Blood, Arm, Left [[id number]]  (Normal) Collected: 08/18/23 0034    Lab Status: Preliminary result Specimen: Blood from Arm, Left Updated: 08/19/23 0046     Blood Culture No growth at 24 hours    Respiratory Panel PCR w/COVID-19(SARS-CoV-2) EAMON/CINTHIA/ELMO/PAD/COR/MAD/MARTIN In-House, NP Swab in [facility]/VTM, 3-4 HR TAT - Swab, Nasopharynx [[id number]]  (Normal) Collected: 08/18/23 0002    Lab Status: Final result Specimen: Swab from Nasopharynx Updated: 08/18/23 0055     ADENOVIRUS, PCR Not Detected     Coronavirus 229E Not Detected      Coronavirus HKU1 Not Detected     Coronavirus NL63 Not Detected     Coronavirus OC43 Not Detected     COVID19 Not Detected     Human Metapneumovirus Not Detected     Human Rhinovirus/Enterovirus Not Detected     Influenza A PCR Not Detected     Influenza B PCR Not Detected     Parainfluenza Virus 1 Not Detected     Parainfluenza Virus 2 Not Detected     Parainfluenza Virus 3 Not Detected     Parainfluenza Virus 4 Not Detected     RSV, PCR Not Detected     Bordetella pertussis pcr Not Detected     Bordetella parapertussis PCR Not Detected     Chlamydophila pneumoniae PCR Not Detected     Mycoplasma pneumo by PCR Not Detected    Narrative:      In the setting of a positive respiratory panel with a viral infection PLUS a negative procalcitonin without other underlying concern for bacterial infection, consider observing off antibiotics or discontinuation of antibiotics and continue supportive care. If the respiratory panel is positive for atypical bacterial infection (Bordetella pertussis, Chlamydophila pneumoniae, or Mycoplasma pneumoniae), consider antibiotic de-escalation to target atypical bacterial infection.    Blood Culture - Blood, Arm, Right [[id number]]  (Normal) Collected: 08/18/23 0001    Lab Status: Preliminary result Specimen: Blood from Arm, Right Updated: 08/19/23 0016     Blood Culture No growth at 24 hours            ECG/EMG Results (most recent)       Procedure Component Value Units Date/Time    ECG 12 Lead Dyspnea [[id number]] Collected: 08/17/23 2331     Updated: 08/18/23 0708     QT Interval 369 ms     Narrative:      HEART RATE= 86  bpm  RR Interval= 700  ms  ME Interval= 176  ms  P Horizontal Axis= -18  deg  P Front Axis= 75  deg  QRSD Interval= 92  ms  QT Interval= 369  ms  QRS Axis= 72  deg  T Wave Axis= -7  deg  - BORDERLINE ECG -  Sinus rhythm  Left atrial enlargement  Electronically Signed By: Usama Rai (Maicol) 18-Aug-2023 07:08:10  Date and Time of Study: 2023-08-17 23:31:15             Results for orders placed during the hospital encounter of 08/17/23    Duplex Venous Lower Extremity - Bilateral    Interpretation Summary    Limited examination due to body habitus, fluid retention and patient intolerance. There is no deep or superficial vein thrombosis in the imaged segments of the lower extremities.      Results for orders placed during the hospital encounter of 04/16/23    Adult Transthoracic Echo Complete W/ Cont if Necessary Per Protocol    Interpretation Summary    Left ventricular ejection fraction appears to be 56 - 60%.    Estimated right ventricular systolic pressure from tricuspid regurgitation is mildly elevated (35-45 mmHg).    Indications  Chest pain    Technically satisfactory study.  Mitral valve is structurally normal.  Tricuspid valve is structurally normal.  Aortic valve is thickened with mild aortic valve stenosis (gradient across the aortic valve 21/12 mmHg and valve area of 2.2 cmý.  Pulmonic valve could not be well visualized.  No evidence for mitral tricuspid or aortic regurgitation is seen by Doppler study.  Left atrium is normal in size.  Right atrium is normal in size.  Left ventricle is normal in size and contractility with ejection fraction of 60%.  Right ventricle is normal in size.  Atrial septum is intact.  Aorta is normal.  No pericardial effusion or intracardiac thrombus is seen.    Impression  Mild aortic valve stenosis.  Gradient across the aortic valve 21/12 mmHg and valve area of 2.2 cmý.  Left ventricular size and contractility is normal with ejection fraction of 60%.      XR Chest 1 View    Result Date: 8/17/2023  Impression: Mild vascular congestion and cardiomegaly, similar as compared to the previous study. Electronically Signed: Evelyn Ureña MD  8/17/2023 11:47 PM EDT  Workstation ID: [id number]    CT Angiogram Chest Pulmonary Embolism    Result Date: 8/18/2023  Impression: 1. No evidence of pulmonary embolism. Probable mild vascular congestion with  cardiomegaly. Otherwise, no acute cardiopulmonary process. 2. Ancillary findings as described above. Electronically Signed: Evelyn Ureña MD  8/18/2023 4:03 AM EDT  Workstation ID: [id number]       Estimated Creatinine Clearance: 194.8 mL/min (A) (by C-G formula based on SCr of 0.67 mg/dL (L)).    Assessment & Plan   Assessment/Plan       Active Hospital Problems    Diagnosis  POA    **Dyspnea [R06.00]  Yes      Resolved Hospital Problems   No resolved problems to display.     Dyspnea         Lab Results   Component Value Date     TROPONINT 14 08/18/2023     TROPONINT 18 (H) 04/17/2023     PROBNP 77.2 08/18/2023     PROBNP 75.8 04/16/2023      (L) 08/18/2023   -Procalcitonin 0.10, lactate 1.6  -Blood cultures pending  -Respiratory viral panel negative  -Echocardiogram (April 2023): EF 56 to 60%, mild aortic valve stenosis  -Chest x-ray: Mild vascular congestion and cardiomegaly similar to previous studies  -EKG: sinus rhythm heart rate 86 with left atrial enlargement  -IV lasix Initiated/ monitor BMP  -Monitor I's and O's and daily weights  -2 g sodium diet  -D dimer 2.15  -CT chest: No evidence of pulmonary embolism, mild vascular congestion cardiomegaly     Cirrhosis        Lab Results   Component Value Date     ALT 26 08/18/2023     AST 72 (H) 08/18/2023     BILITOT 0.9 08/18/2023     ALBUMIN 3.0 (L) 08/18/2023   -Ultrasound liver (July 2023) cirrhotic appearance with no lesions noted, cholelithiasis  -Continue IV Lasix, spironolactone, beta-blocker  -Low-sodium diet  -Ammonia 83  -Add lactulose  -Monitor daily weights     Chronic Lymphadema  -BNP is 77  -PT/OT consulted  -Wound care consult  -Venous duplex showed limited examination due to body habitus and fluid but otherwise showed no DVT or superficial thrombosis  -Blood cultures pending  -Initiate IV Rocephin possible cellulitis     Pancytopenia  -WBC 3.2, platelets 86, RBC 3.58 hemoglobin 12.2 (baseline with likely related to cirrhosis    -Continue to  monitor  -Seen by hematology outpatient     Asthma without exacerbation  -Continue albuterol as needed      BPH  -Continue Flomax     Neuropathy  -Continue gabapentin        VTE Prophylaxis -   Mechanical Order History:       None          Pharmalogical Order History:        Ordered     Dose Route Frequency Stop    08/18/23 0813  Enoxaparin Sodium (LOVENOX) syringe 40 mg  Status:  Discontinued         40 mg SC Every 12 Hours Scheduled 08/18/23 0813                    CODE STATUS:    Code Status and Medical Interventions:   Ordered at: 08/18/23 0813     Level Of Support Discussed With:    Patient     Code Status (Patient has no pulse and is not breathing):    CPR (Attempt to Resuscitate)     Medical Interventions (Patient has pulse or is breathing):    Full Support       This patient has been examined wearing personal protective equipment.     I discussed the patient's findings and my recommendations with patient, family, nursing staff, primary care team, and consulting provider.      Signature:Electronically signed by QUEENIE Hearn, 08/19/23, 11:33 AM EDT.          I spent 45 minutes caring for Robert on this date of service. This time includes time spent by me in the following activities: reviewing tests, obtaining and/or reviewing a separately obtained history, performing a medically appropriate examination and/or evaluation, counseling and educating the patient/family/caregiver, ordering medications, tests, or procedures, referring and communicating with other health care professionals, documenting information in the medical record, independently interpreting results and communicating that information with the patient/family/caregiver, and care coordination.       Electronically signed by Nicol Hubbard APRN at 08/19/23 1138          Consult Notes (last 24 hours)        Kenton Núñez at 08/18/23 1638        Consult Orders    1. Inpatient Spiritual Care Consult [[id number]] ordered by Usama Rai MD at  08/18/23 0605                 Consult visit. Pt in room alone watching TV. Pt seemed frustrated and alone and spoke of friends who are supposed to be friends, but are not there for him when he needs them. He mentions a brother who is with him during short visits, not substantial times of connection. Pt is lonely. He speaks of being alone with his thoughts and acknowledges he needs other people to help him do physical tasks at home. He is not Adventist/spiritual. He appreciated  visit in the sense that  was someone who listened to him, not spiritually, but as another human being. Pt asked  to let him get some rest and talk later. If still admitted on Monday,  will follow up for support. No other needs at this time.     Electronically signed by Kenton Núñez at 08/18/23 1647          Physical Therapy Notes (last 48 hours)        Jayla Cody, PT at 08/18/23 1353  Version 1 of 1         Goal Outcome Evaluation:  Plan of Care Reviewed With: patient           Outcome Evaluation: Patient is a 59 y/o M who was admitted 8/17/23 due to BLE leg pain as well as dyspnea.  Doppler negative for DVT, and CT chest negative for PE this date.  PMHx includes h/o DVT LLE, L TKA 2018, Arthritis, Asthma, chronic Lymphedema B lower legs, and h/o cardiac cath. At baseline pt reports independence in his home and uses a cane, uses RW outside of home.  He requires assist with lower body bathing and dressing.  Lives with brother, but pt reports brother is not always there to help him with ADLs.  Pt no longer drives.  Has 1 GISSELL home.  During today's evaluation pt was limited per nursing as his BP was low while in bed.  Only in bed activity performed with tolerance by pt.  Pt was assisted in rolling L/R as well as with lars care and positioning of urinal.  Pt was able to assist in pulling himself up in bed with BUE on rails and required mod A to position BLE to assist in pushing with feet/heels also.  Pt's  BP taken ending session reading 102/70.  Pt was non-symptomatic during bed mobility activities this date.  Due to pt's mobility deficits with in bed activity this date, and difficulty with lars care, recommend SNF at discharge.  Pt having difficulty raising legs, rolling, scooting up in bed, and using urinal.  PT will continue to follow pt for additional assessment, and for OOB activity to assess safety.      Anticipated Discharge Disposition (PT): skilled nursing facility    Electronically signed by Jayla Cody, PT at 23 1353       Jayla Cody, PT at 23 1356  Version 1 of 1         Patient Name: Robert Randhawa Jr.  : 1965    MRN: [id number]                              Today's Date: 2023       Admit Date: 2023    Visit Dx:     ICD-10-CM ICD-9-CM   1. Dyspnea, unspecified type  R06.00 786.09   2. Lymphedema  I89.0 457.1     Patient Active Problem List   Diagnosis    Chest pain    Shortness of breath    Arthritis    Asthma    Deep vein thrombosis (DVT)    Lymphedema of both lower extremities    Cellulitis of lower extremity    Chest pressure    Unstable angina    Cellulitis of left lower extremity without foot    Obesity, morbid    Cellulitis of left lower extremity    Non-pressure chronic ulcer left lower leg, limited to breakdown skin    Cellulitis of left foot    Venous hypertension of both lower extremities    Onychomycosis of foot with other complication    Sepsis    Dyspnea     Past Medical History:   Diagnosis Date    Arthritis     Asthma     Chronic deep vein thrombosis (DVT)     Lymphedema of both lower extremities      Past Surgical History:   Procedure Laterality Date    APPENDECTOMY  1974    CARDIAC CATHETERIZATION N/A 2020    Procedure: Left ventriculography;  Surgeon: Kenney Reyez MD;  Location: [facility] INVASIVE LOCATION;  Service: Cardiovascular;  Laterality: N/A;    CARDIAC CATHETERIZATION N/A 2020    Procedure: Coronary angiography;   Surgeon: Kenney Reyez MD;  Location: [facility] CATH INVASIVE LOCATION;  Service: Cardiovascular;  Laterality: N/A;    CARDIAC CATHETERIZATION N/A 2/28/2020    Procedure: Left Heart Cath;  Surgeon: Kenney Reyez MD;  Location: [facility] CATH INVASIVE LOCATION;  Service: Cardiovascular;  Laterality: N/A;    COLONOSCOPY N/A 11/15/2022    Procedure: COLONOSCOPY with polypectomy;  Surgeon: AMY Garcia MD;  Location: [facility] ENDOSCOPY;  Service: Gastroenterology;  Laterality: N/A;  post: diverticulosis, ascending polyp x2, transverse polyp x3, descending polyp x4, rectal polyp x1, hemorroids    ENDOSCOPY N/A 11/15/2022    Procedure: ESOPHAGOGASTRODUODENOSCOPY with biopsy;  Surgeon: AMY Garcia MD;  Location: [facility] ENDOSCOPY;  Service: Gastroenterology;  Laterality: N/A;  post: esophagitis with biopsy to rule out candida , gastric biopsy    KNEE SURGERY Left 2018      General Information       Row Name 08/18/23 1137          Physical Therapy Time and Intention    Document Type evaluation  -EJ     Mode of Treatment physical therapy  -EJ       Row Name 08/18/23 1137          General Information    Patient Profile Reviewed yes  -EJ     Prior Level of Function independent:;all household mobility;gait;transfer;bed mobility;min assist:;ADL's;bathing  Per pt he uses SPC in home, and RW outside of home. He is able to bath/dress upper half, but brother will assist with lower half/lower body.  Pt reports he does not drive.  Has medical transport via his insurance.  -EJ     Existing Precautions/Restrictions fall;orthostatic hypotension  -EJ     Barriers to Rehab medically complex;physical barrier  BLE lyphedema.  -EJ       Row Name 08/18/23 1137          Living Environment    People in Home sibling(s)  Lives with brother.  Pt reports brother is not there that much to assist him. Pt reports he is interested in possibly moving to assisted living facility.  -EJ     Name(s) of People in Home Sajan (brother)  -EJ       Row  Name 08/18/23 1137          Home Main Entrance    Number of Stairs, Main Entrance one  -       Row Name 08/18/23 1137          Stairs Within Home, Primary    Number of Stairs, Within Home, Primary none  -       Row Name 08/18/23 1137          Cognition    Orientation Status (Cognition) oriented x 4  -       Row Name 08/18/23 1137          Safety Issues, Functional Mobility    Impairments Affecting Function (Mobility) balance;endurance/activity tolerance;strength;range of motion (ROM)  -               User Key  (r) = Recorded By, (t) = Taken By, (c) = Cosigned By      Initials Name Provider Type    Jayla Diego, PT Physical Therapist                   Mobility       Row Name 08/18/23 1144          Bed Mobility    Bed Mobility rolling left;rolling right;scooting/bridging  -     Rolling Left [place] (Bed Mobility) moderate assist (50% patient effort);1 person assist  -     Rolling Right [place] (Bed Mobility) moderate assist (50% patient effort);1 person assist  -     Scooting/Bridging [place] (Bed Mobility) 1 person assist;moderate assist (50% patient effort);minimum assist (75% patient effort)  -       Row Name 08/18/23 1144          Bed-Chair Transfer    Bed-Chair [place] (Transfers) not tested  -       Row Name 08/18/23 1144          Sit-Stand Transfer    Sit-Stand [place] (Transfers) not tested  -       Row Name 08/18/23 1144          Gait/Stairs (Locomotion)    [place] Level (Gait) not tested  -               User Key  (r) = Recorded By, (t) = Taken By, (c) = Cosigned By      Initials Name Provider Type    Jayla Diego, PT Physical Therapist                   Obj/Interventions       Row Name 08/18/23 1144          Range of Motion Comprehensive    Comment, General Range of Motion BUE AROM grossly WFL.  Lower extremity ROM limited due to chronic lyphedema and heaviness of legs limiting motion.  -       Row Name 08/18/23 1144          Strength  Comprehensive (MMT)    Comment, General Manual Muscle Testing (MMT) Assessment BUE strength grossly 4- to 4/5, BLE strength 3-/5 via obs.  -       Row Name 08/18/23 1144          Motor Skills    Motor Skills functional endurance  -EJ     Functional Endurance Fair-  -EJ               User Key  (r) = Recorded By, (t) = Taken By, (c) = Cosigned By      Initials Name Provider Type    Jayla Diego, PT Physical Therapist                   Goals/Plan       Row Name 08/18/23 1352          Bed Mobility Goal 1 (PT)    Activity/Assistive Device (Bed Mobility Goal 1, PT) bed mobility activities, all  -EJ     [place] Level/Cues Needed (Bed Mobility Goal 1, PT) minimum assist (75% or more patient effort)  -EJ     Time Frame (Bed Mobility Goal 1, PT) long term goal (LTG);2 weeks  -EJ       Row Name 08/18/23 1352          Transfer Goal 1 (PT)    Activity/Assistive Device (Transfer Goal 1, PT) transfers, all;walker, rolling  -EJ     [place] Level/Cues Needed (Transfer Goal 1, PT) minimum assist (75% or more patient effort)  -EJ     Time Frame (Transfer Goal 1, PT) long term goal (LTG);2 weeks  -EJ       Row Name 08/18/23 1352          Gait Training Goal 1 (PT)    Activity/Assistive Device (Gait Training Goal 1, PT) gait (walking locomotion);walker, rolling  -EJ     [place] Level (Gait Training Goal 1, PT) minimum assist (75% or more patient effort)  -EJ     Distance (Gait Training Goal 1, PT) 20 ft x 2  -EJ     Time Frame (Gait Training Goal 1, PT) long term goal (LTG);2 weeks  -EJ       Row Name 08/18/23 1352          Therapy Assessment/Plan (PT)    Planned Therapy Interventions (PT) balance training;bed mobility training;gait training;postural re-education;patient/family education;neuromuscular re-education;strengthening;transfer training;stair training  -EJ               User Key  (r) = Recorded By, (t) = Taken By, (c) = Cosigned By      Initials Name Provider Type    Jayla Diego, PT Physical  Therapist                   Clinical Impression       Row Name 08/18/23 1146          Pain    Pain Intervention(s) Therapeutic presence;Repositioned;Emotional support  -EJ     Additional Documentation Pain Scale: FACES Pre/Post-Treatment (Group)  -EJ       Row Name 08/18/23 1146          Pain Scale: FACES Pre/Post-Treatment    Pain: FACES Scale, Pretreatment 2-->hurts little bit  -EJ     Posttreatment Pain Rating 2-->hurts little bit  -EJ     Pain Location lower  -EJ     Pain Location - extremity  B lower legs due to lymphedema  -EJ       Row Name 08/18/23 1146          Plan of Care Review    Plan of Care Reviewed With patient  -EJ     Outcome Evaluation Patient is a 59 y/o M who was admitted 8/17/23 due to BLE leg pain as well as dyspnea.  Doppler negative for DVT, and CT chest negative for PE this date.  PMHx includes h/o DVT LLE, L TKA 2018, Arthritis, Asthma, chronic Lymphedema B lower legs, and h/o cardiac cath. At baseline pt reports independence in his home and uses a cane, uses RW outside of home.  He requires assist with lower body bathing and dressing.  Lives with brother, but pt reports brother is not always there to help him with ADLs.  Pt no longer drives.  Has 1 GISSELL home.  During today's evaluation pt was limited per nursing as his BP was low while in bed.  Only in bed activity performed with tolerance by pt.  Pt was assisted in rolling L/R as well as with lars care and positioning of urinal.  Pt was able to assist in pulling himself up in bed with BUE on rails and required mod A to position BLE to assist in pushing with feet/heels also.  Pt's BP taken ending session reading 102/70.  Pt was non-symptomatic during bed mobility activities this date.  Due to pt's mobility deficits with in bed activity this date, and difficulty with lars care, recommend SNF at discharge.  Pt having difficulty raising legs, rolling, scooting up in bed, and using urinal.  PT will continue to follow pt for additional  assessment, and for OOB activity to assess safety.  -       Row Name 08/18/23 1146          Therapy Assessment/Plan (PT)    Criteria for Skilled Interventions Met (PT) yes;skilled treatment is necessary  -EJ     Therapy Frequency (PT) 5 times/wk  -EJ     Predicted Duration of Therapy Intervention (PT) until discharge  -       Row Name 08/18/23 1146          Positioning and Restraints    Pre-Treatment Position in bed  -EJ     Post Treatment Position bed  -EJ     In Bed fowlers;legs elevated;with other staff;call light within reach;encouraged to call for assist;exit alarm on  -EJ               User Key  (r) = Recorded By, (t) = Taken By, (c) = Cosigned By      Initials Name Provider Type    Jayla Diego, PT Physical Therapist                   Outcome Measures       Row Name 08/18/23 1353          How much help from another person do you currently need...    Turning from your back to your side while in flat bed without using bedrails? 3  -EJ     Moving from lying on back to sitting on the side of a flat bed without bedrails? 2  -EJ     Moving to and from a bed to a chair (including a wheelchair)? 2  -EJ     Standing up from a chair using your arms (e.g., wheelchair, bedside chair)? 2  -EJ     Climbing 3-5 steps with a railing? 1  -EJ     To walk in hospital room? 2  -EJ     AM-PAC 6 Clicks Score (PT) 12  -EJ     Highest level of mobility 4 --> Transferred to chair/commode  -       Row Name 08/18/23 1353          Functional Assessment    Outcome Measure Options AM-PAC 6 Clicks Basic Mobility (PT)  -               User Key  (r) = Recorded By, (t) = Taken By, (c) = Cosigned By      Initials Name Provider Type    Jayla Diego, PT Physical Therapist                                 Physical Therapy Education       Title: PT OT SLP Therapies (In Progress)       Topic: Physical Therapy (In Progress)       Point: Mobility training (Done)       Learning Progress Summary             Patient Acceptance, E VU  by BRIGID at 8/18/2023 1353                         Point: Home exercise program (Not Started)       Learner Progress:  Not documented in this visit.              Point: Body mechanics (Done)       Learning Progress Summary             Patient DAVID Coombs VU by BRIGID at 8/18/2023 1353                         Point: Precautions (Not Started)       Learner Progress:  Not documented in this visit.                              User Key       Initials Effective Dates Name Provider Type Discipline    BRIGID 07/11/23 -  Jayla Cody, PT Physical Therapist PT                  PT Recommendation and Plan  Planned Therapy Interventions (PT): balance training, bed mobility training, gait training, postural re-education, patient/family education, neuromuscular re-education, strengthening, transfer training, stair training  Plan of Care Reviewed With: patient  Outcome Evaluation: Patient is a 57 y/o M who was admitted 8/17/23 due to BLE leg pain as well as dyspnea.  Doppler negative for DVT, and CT chest negative for PE this date.  PMHx includes h/o DVT LLE, L TKA 2018, Arthritis, Asthma, chronic Lymphedema B lower legs, and h/o cardiac cath. At baseline pt reports independence in his home and uses a cane, uses RW outside of home.  He requires assist with lower body bathing and dressing.  Lives with brother, but pt reports brother is not always there to help him with ADLs.  Pt no longer drives.  Has 1 GISSELL home.  During today's evaluation pt was limited per nursing as his BP was low while in bed.  Only in bed activity performed with tolerance by pt.  Pt was assisted in rolling L/R as well as with lars care and positioning of urinal.  Pt was able to assist in pulling himself up in bed with BUE on rails and required mod A to position BLE to assist in pushing with feet/heels also.  Pt's BP taken ending session reading 102/70.  Pt was non-symptomatic during bed mobility activities this date.  Due to pt's mobility deficits with in bed  activity this date, and difficulty with lars care, recommend SNF at discharge.  Pt having difficulty raising legs, rolling, scooting up in bed, and using urinal.  PT will continue to follow pt for additional assessment, and for OOB activity to assess safety.     Time Calculation:         PT Charges       Row Name 08/18/23 5035             Time Calculation    Start Time 1026  -EJ      Stop Time 1058  -EJ      Time Calculation (min) 32 min  -EJ      PT Received On 08/18/23  -EJ      PT - Next Appointment 08/20/23  -EJ      PT Goal Re-Cert Due Date 09/01/23  -EJ         Time Calculation- PT    Total Timed Code Minutes- PT 0 minute(s)  -EJ                User Key  (r) = Recorded By, (t) = Taken By, (c) = Cosigned By      Initials Name Provider Type     Jayla Cody, PT Physical Therapist                  Therapy Charges for Today       Code Description Service Date Service Provider Modifiers Qty    [id number] HC PT EVAL MOD COMPLEXITY 4 8/18/2023 Jayla Cody, PT GP 1            PT G-Codes  Outcome Measure Options: AM-PAC 6 Clicks Basic Mobility (PT)  AM-PAC 6 Clicks Score (PT): 12  PT Discharge Summary  Anticipated Discharge Disposition (PT): skilled nursing facility    Jayla Cody, NELA  8/18/2023      Electronically signed by Jayla Cody, PT at 08/18/23 5326

## 2023-08-19 NOTE — PROGRESS NOTES
[facility] Observation Unit H&P    Patient Name: Robert Randhawa Jr.  : 1965  MRN: [id number]  Primary Care Physician: Alfredo Torres MD  Date of admission: 2023     Patient Care Team:  Alfredo Torres MD as PCP - General  Alfredo Torres MD as PCP - Family Medicine  Kenney Reyez MD as Consulting Physician (Cardiology)  Lionel Lynn MD as Consulting Physician (Hematology and Oncology)          Subjective   History Present Illness     Chief Complaint:   Chief Complaint   Patient presents with    Leg Pain     Left leg pain, started about three hours ago     Leg Pain     Mr. Randhawa is a 58 y.o.  presents to [facility] complaining of leg pain       History of Present Illness    ED 23: 58-year-old male with chronic lymphedema complains of worsening leg pain in the setting of worsening dyspnea with dry cough for the past 3 to 4 days. No fever. Patient has had some intermittent mild chest pain, none today. Patient had a remote DVT in his left lower extremity after a knee replacement but does not take any anticoagulation and to his knowledge has not had any recurrent DVTs. Patient denies any known history of coronary artery disease or CHF but does have Mcnally cirrhosis.      Observation 23: Patient is a 58-year-old male presenting to the hospital with complaints of worsening bilateral lower extremity.  Patient also reports increased pain in left leg that started yesterday without fall or incident.  Patient reports shortness of breath without chest pain, nausea, vomiting or syncope.  Patient states it has been difficult to move with his legs increasing in size.  Patient reports DVT in left lower extremity about a year ago and off anticoagulants currently.     23: Patient reports continued bilateral lower extremity edema.  Patient states he tried with PT yesterday but had difficulty raising his leg due to weights.  Patient agreed for placement [facility] due to inability to  care for self    ROS        Personal History     Past Medical History:   Past Medical History:   Diagnosis Date    Arthritis     Asthma     Chronic deep vein thrombosis (DVT)     Lymphedema of both lower extremities        Surgical History:      Past Surgical History:   Procedure Laterality Date    APPENDECTOMY  1974    CARDIAC CATHETERIZATION N/A 2/28/2020    Procedure: Left ventriculography;  Surgeon: Kenney Reyez MD;  Location: [facility] CATH INVASIVE LOCATION;  Service: Cardiovascular;  Laterality: N/A;    CARDIAC CATHETERIZATION N/A 2/28/2020    Procedure: Coronary angiography;  Surgeon: Kenney Reyez MD;  Location: [facility] CATH INVASIVE LOCATION;  Service: Cardiovascular;  Laterality: N/A;    CARDIAC CATHETERIZATION N/A 2/28/2020    Procedure: Left Heart Cath;  Surgeon: Kenney Reyez MD;  Location: [facility] CATH INVASIVE LOCATION;  Service: Cardiovascular;  Laterality: N/A;    COLONOSCOPY N/A 11/15/2022    Procedure: COLONOSCOPY with polypectomy;  Surgeon: AMY Garcia MD;  Location: [facility] ENDOSCOPY;  Service: Gastroenterology;  Laterality: N/A;  post: diverticulosis, ascending polyp x2, transverse polyp x3, descending polyp x4, rectal polyp x1, hemorroids    ENDOSCOPY N/A 11/15/2022    Procedure: ESOPHAGOGASTRODUODENOSCOPY with biopsy;  Surgeon: AMY Garcia MD;  Location: [facility] ENDOSCOPY;  Service: Gastroenterology;  Laterality: N/A;  post: esophagitis with biopsy to rule out candida , gastric biopsy    KNEE SURGERY Left 2018           Family History: family history includes Aortic aneurysm in his father; Arthritis in his father; Diabetes in his mother; Heart disease in his mother. Otherwise pertinent FHx was reviewed and unremarkable.     Social History:  reports that he quit smoking about 40 years ago. His smoking use included cigarettes. He started smoking about 45 years ago. He has a 2.50 pack-year smoking history. He has been exposed to tobacco smoke. He has never used smokeless  tobacco. He reports that he does not drink alcohol and does not use drugs.      Medications:  Prior to Admission medications    Medication Sig Start Date End Date Taking? Authorizing Provider   albuterol sulfate  (90 Base) MCG/ACT inhaler Inhale 2 puffs Every 4 (Four) Hours As Needed. 7/26/23  Yes Rolly Rivera MD   amoxicillin-clavulanate (AUGMENTIN) 875-125 MG per tablet Take 1 tablet by mouth Every 12 (Twelve) Hours. For ten days 8/11/23  Yes Rolly Rivera MD   dicyclomine (BENTYL) 10 MG capsule Take 1 capsule by mouth 3 (Three) Times a Day Before Meals.   Yes Rolly Rivera MD   furosemide (Lasix) 20 MG tablet Take 1 tablet by mouth Daily. Take daily or PRN 1/30/23  Yes Kush Lees MD   gabapentin (NEURONTIN) 300 MG capsule Take 1 capsule by mouth 3 (Three) Times a Day. 8/17/23  Yes Rolly Rivera MD   riFAXIMin (XIFAXAN) 550 MG tablet Take 1 tablet by mouth Every 12 (Twelve) Hours.   Yes Rolly Rivera MD   spironolactone (ALDACTONE) 25 MG tablet Take 2 tablets by mouth Daily.   Yes Rolly Rivera MD       Allergies:    Allergies   Allergen Reactions    Sulfa Antibiotics Unknown (See Comments)       Objective   Objective     Vital Signs  Temp:  [97.8 øF (36.6 øC)-99 øF (37.2 øC)] 99 øF (37.2 øC)  Heart Rate:  [70-88] 77  Resp:  [18-20] 20  BP: ()/(59-76) 94/59  SpO2:  [95 %-96 %] 95 %  on   ;   Device (Oxygen Therapy): room air  Body mass index is 49.56 kg/mý.    Physical Exam  Vitals and nursing note reviewed.   Constitutional:       Appearance: Normal appearance. He is obese.   HENT:      Head: Normocephalic and atraumatic.      Right Ear: External ear normal.      Left Ear: External ear normal.      Nose: Nose normal.      Mouth/Throat:      Pharynx: Oropharynx is clear.   Eyes:      Extraocular Movements: Extraocular movements intact.      Conjunctiva/sclera: Conjunctivae normal.      Pupils: Pupils are equal, round, and reactive to light.    Cardiovascular:      Rate and Rhythm: Normal rate and regular rhythm.      Pulses: Normal pulses.      Heart sounds: Normal heart sounds.   Pulmonary:      Effort: Pulmonary effort is normal.      Breath sounds: Normal breath sounds.   Abdominal:      General: Bowel sounds are normal.      Palpations: Abdomen is soft.   Musculoskeletal:      Cervical back: Normal range of motion.      Right lower leg: Edema present.      Left lower leg: Edema present.   Skin:     General: Skin is warm.      Capillary Refill: Capillary refill takes 2 to 3 seconds.      Findings: Erythema present.   Neurological:      Mental Status: He is alert and oriented to person, place, and time.      Motor: Weakness present.      Gait: Gait abnormal.   Psychiatric:         Mood and Affect: Mood is depressed.         Behavior: Behavior normal.         Thought Content: Thought content normal.         Judgment: Judgment normal.         Results Review:  I have personally reviewed most recent cardiac tracings, lab results, microbiology results, and radiology images and interpretations and agree with findings, most notably: CBC, CMP, venous duplex, EKG, chest x-ray.    Results from last 7 days   Lab Units 08/19/23 0449 08/18/23 1334 08/18/23  0001   WBC 10*3/mm3 3.10*   < > 3.20*   HEMOGLOBIN g/dL 11.6*   < > 12.2*   HEMATOCRIT % 33.6*   < > 36.6*   PLATELETS 10*3/mm3 74*   < > 86*   INR   --   --  1.17*    < > = values in this interval not displayed.     Results from last 7 days   Lab Units 08/19/23 0449 08/18/23 1334 08/18/23  0010 08/18/23  0001   SODIUM mmol/L 136   < >  --  134*   POTASSIUM mmol/L 4.2   < >  --  3.7   CHLORIDE mmol/L 104   < >  --  100   CO2 mmol/L 25.0   < >  --  26.0   BUN mg/dL 16   < >  --  17   CREATININE mg/dL 0.67*   < >  --  0.79   GLUCOSE mg/dL 98   < >  --  114*   CALCIUM mg/dL 8.4*   < >  --  8.7   ALT (SGPT) U/L  --   --   --  26   AST (SGOT) U/L  --   --   --  72*   HSTROP T ng/L  --   --   --  14   PROBNP  pg/mL  --   --   --  77.2   LACTATE mmol/L  --   --  1.6  --    PROCALCITONIN ng/mL  --   --   --  0.10    < > = values in this interval not displayed.     Estimated Creatinine Clearance: 194.8 mL/min (A) (by C-G formula based on SCr of 0.67 mg/dL (L)).  Brief Urine Lab Results  (Last result in the past 365 days)        Color   Clarity   Blood   Leuk Est   Nitrite   Protein   CREAT   Urine HCG        08/18/23 1757 Orange  Comment: Any Substance that causes an abnormal urine color can alter the accuracy of the chemical reactions.   Clear   Moderate (2+)   Negative   Negative   Negative                   Microbiology Results (last 10 days)       Procedure Component Value - Date/Time    Blood Culture - Blood, Arm, Left [[id number]]  (Normal) Collected: 08/18/23 0034    Lab Status: Preliminary result Specimen: Blood from Arm, Left Updated: 08/19/23 0046     Blood Culture No growth at 24 hours    Respiratory Panel PCR w/COVID-19(SARS-CoV-2) EAMON/CINTHIA/ELMO/PAD/COR/MAD/MARTIN In-House, NP Swab in UTM/VTM, 3-4 HR TAT - Swab, Nasopharynx [[id number]]  (Normal) Collected: 08/18/23 0002    Lab Status: Final result Specimen: Swab from Nasopharynx Updated: 08/18/23 0055     ADENOVIRUS, PCR Not Detected     Coronavirus 229E Not Detected     Coronavirus HKU1 Not Detected     Coronavirus NL63 Not Detected     Coronavirus OC43 Not Detected     COVID19 Not Detected     Human Metapneumovirus Not Detected     Human Rhinovirus/Enterovirus Not Detected     Influenza A PCR Not Detected     Influenza B PCR Not Detected     Parainfluenza Virus 1 Not Detected     Parainfluenza Virus 2 Not Detected     Parainfluenza Virus 3 Not Detected     Parainfluenza Virus 4 Not Detected     RSV, PCR Not Detected     Bordetella pertussis pcr Not Detected     Bordetella parapertussis PCR Not Detected     Chlamydophila pneumoniae PCR Not Detected     Mycoplasma pneumo by PCR Not Detected    Narrative:      In the setting of a positive respiratory panel with a viral  infection PLUS a negative procalcitonin without other underlying concern for bacterial infection, consider observing off antibiotics or discontinuation of antibiotics and continue supportive care. If the respiratory panel is positive for atypical bacterial infection (Bordetella pertussis, Chlamydophila pneumoniae, or Mycoplasma pneumoniae), consider antibiotic de-escalation to target atypical bacterial infection.    Blood Culture - Blood, Arm, Right [[id number]]  (Normal) Collected: 08/18/23 0001    Lab Status: Preliminary result Specimen: Blood from Arm, Right Updated: 08/19/23 0016     Blood Culture No growth at 24 hours            ECG/EMG Results (most recent)       Procedure Component Value Units Date/Time    ECG 12 Lead Dyspnea [[id number]] Collected: 08/17/23 2331     Updated: 08/18/23 0708     QT Interval 369 ms     Narrative:      HEART RATE= 86  bpm  RR Interval= 700  ms  OR Interval= 176  ms  P Horizontal Axis= -18  deg  P Front Axis= 75  deg  QRSD Interval= 92  ms  QT Interval= 369  ms  QRS Axis= 72  deg  T Wave Axis= -7  deg  - BORDERLINE ECG -  Sinus rhythm  Left atrial enlargement  Electronically Signed By: Usama Rai (Maicol) 18-Aug-2023 07:08:10  Date and Time of Study: 2023-08-17 23:31:15            Results for orders placed during the hospital encounter of 08/17/23    Duplex Venous Lower Extremity - Bilateral    Interpretation Summary    Limited examination due to body habitus, fluid retention and patient intolerance. There is no deep or superficial vein thrombosis in the imaged segments of the lower extremities.      Results for orders placed during the hospital encounter of 04/16/23    Adult Transthoracic Echo Complete W/ Cont if Necessary Per Protocol    Interpretation Summary    Left ventricular ejection fraction appears to be 56 - 60%.    Estimated right ventricular systolic pressure from tricuspid regurgitation is mildly elevated (35-45 mmHg).    Indications  Chest pain    Technically  satisfactory study.  Mitral valve is structurally normal.  Tricuspid valve is structurally normal.  Aortic valve is thickened with mild aortic valve stenosis (gradient across the aortic valve 21/12 mmHg and valve area of 2.2 cmý.  Pulmonic valve could not be well visualized.  No evidence for mitral tricuspid or aortic regurgitation is seen by Doppler study.  Left atrium is normal in size.  Right atrium is normal in size.  Left ventricle is normal in size and contractility with ejection fraction of 60%.  Right ventricle is normal in size.  Atrial septum is intact.  Aorta is normal.  No pericardial effusion or intracardiac thrombus is seen.    Impression  Mild aortic valve stenosis.  Gradient across the aortic valve 21/12 mmHg and valve area of 2.2 cmý.  Left ventricular size and contractility is normal with ejection fraction of 60%.      XR Chest 1 View    Result Date: 8/17/2023  Impression: Mild vascular congestion and cardiomegaly, similar as compared to the previous study. Electronically Signed: Evelyn Ureña MD  8/17/2023 11:47 PM EDT  Workstation ID: [id number]    CT Angiogram Chest Pulmonary Embolism    Result Date: 8/18/2023  Impression: 1. No evidence of pulmonary embolism. Probable mild vascular congestion with cardiomegaly. Otherwise, no acute cardiopulmonary process. 2. Ancillary findings as described above. Electronically Signed: Evelyn Ureña MD  8/18/2023 4:03 AM EDT  Workstation ID: [id number]       Estimated Creatinine Clearance: 194.8 mL/min (A) (by C-G formula based on SCr of 0.67 mg/dL (L)).    Assessment & Plan   Assessment/Plan       Active Hospital Problems    Diagnosis  POA    **Dyspnea [R06.00]  Yes      Resolved Hospital Problems   No resolved problems to display.     Dyspnea         Lab Results   Component Value Date     TROPONINT 14 08/18/2023     TROPONINT 18 (H) 04/17/2023     PROBNP 77.2 08/18/2023     PROBNP 75.8 04/16/2023      (L) 08/18/2023   -Procalcitonin 0.10, lactate 1.6  -Blood  cultures pending  -Respiratory viral panel negative  -Echocardiogram (April 2023): EF 56 to 60%, mild aortic valve stenosis  -Chest x-ray: Mild vascular congestion and cardiomegaly similar to previous studies  -EKG: sinus rhythm heart rate 86 with left atrial enlargement  -IV lasix Initiated/ monitor BMP  -Monitor I's and O's and daily weights  -2 g sodium diet  -D dimer 2.15  -CT chest: No evidence of pulmonary embolism, mild vascular congestion cardiomegaly     Cirrhosis        Lab Results   Component Value Date     ALT 26 08/18/2023     AST 72 (H) 08/18/2023     BILITOT 0.9 08/18/2023     ALBUMIN 3.0 (L) 08/18/2023   -Ultrasound liver (July 2023) cirrhotic appearance with no lesions noted, cholelithiasis  -Continue IV Lasix, spironolactone, beta-blocker  -Low-sodium diet  -Ammonia 83  -Add lactulose  -Monitor daily weights     Chronic Lymphadema  -BNP is 77  -PT/OT consulted  -Wound care consult  -Venous duplex showed limited examination due to body habitus and fluid but otherwise showed no DVT or superficial thrombosis  -Blood cultures pending  -Initiate IV Rocephin possible cellulitis     Pancytopenia  -WBC 3.2, platelets 86, RBC 3.58 hemoglobin 12.2 (baseline with likely related to cirrhosis    -Continue to monitor  -Seen by hematology outpatient     Asthma without exacerbation  -Continue albuterol as needed      BPH  -Continue Flomax     Neuropathy  -Continue gabapentin        VTE Prophylaxis -   Mechanical Order History:       None          Pharmalogical Order History:        Ordered     Dose Route Frequency Stop    08/18/23 0813  Enoxaparin Sodium (LOVENOX) syringe 40 mg  Status:  Discontinued         40 mg SC Every 12 Hours Scheduled 08/18/23 0813                    CODE STATUS:    Code Status and Medical Interventions:   Ordered at: 08/18/23 0813     Level Of Support Discussed With:    Patient     Code Status (Patient has no pulse and is not breathing):    CPR (Attempt to Resuscitate)     Medical  Interventions (Patient has pulse or is breathing):    Full Support       This patient has been examined wearing personal protective equipment.     I discussed the patient's findings and my recommendations with patient, family, nursing staff, primary care team, and consulting provider.      Signature:Electronically signed by QUEENIE Hearn, 08/19/23, 11:33 AM EDT.          I spent 45 minutes caring for Robert on this date of service. This time includes time spent by me in the following activities: reviewing tests, obtaining and/or reviewing a separately obtained history, performing a medically appropriate examination and/or evaluation, counseling and educating the patient/family/caregiver, ordering medications, tests, or procedures, referring and communicating with other health care professionals, documenting information in the medical record, independently interpreting results and communicating that information with the patient/family/caregiver, and care coordination.

## 2023-08-19 NOTE — PLAN OF CARE
Goal Outcome Evaluation:         Pt a/ox4, alfred lower legs with lymphedema, assist x 3 with repositioning, staff turning pt every two hours and as needed, reviewed plan of care with pt and verbalizes understanding, clean and dry, call light in reach. Pl is to d/c to [facility].

## 2023-08-19 NOTE — CASE MANAGEMENT/SOCIAL WORK
Continued Stay Note  NAZ Buenrostro     Patient Name: Robert Randhawa Jr.  MRN: [id number]  Today's Date: 8/19/2023    Admit Date: 8/17/2023    Plan: Accepted to [facility], pending precert - started 8/19. PASRR approved.   Discharge Plan       Row Name 08/19/23 1312       Plan    Plan Accepted to [facility], pending precert - started 8/19. PASRR approved.    Plan Comments Verified with Esther at [facility], pt accepted and bed available. Precert started.                      Expected Discharge Date and Time       Expected Discharge Date Expected Discharge Time    Aug 20, 2023               Dania Mcgarry RN

## 2023-08-20 LAB
AMMONIA BLD-SCNC: 56 UMOL/L (ref 16–60)
ANION GAP SERPL CALCULATED.3IONS-SCNC: 6 MMOL/L (ref 5–15)
BASOPHILS # BLD AUTO: 0 10*3/MM3 (ref 0–0.2)
BASOPHILS NFR BLD AUTO: 0.5 % (ref 0–1.5)
BUN SERPL-MCNC: 13 MG/DL (ref 6–20)
BUN/CREAT SERPL: 19.1 (ref 7–25)
CALCIUM SPEC-SCNC: 8.5 MG/DL (ref 8.6–10.5)
CHLORIDE SERPL-SCNC: 103 MMOL/L (ref 98–107)
CO2 SERPL-SCNC: 28 MMOL/L (ref 22–29)
CREAT SERPL-MCNC: 0.68 MG/DL (ref 0.76–1.27)
DEPRECATED RDW RBC AUTO: 52.5 FL (ref 37–54)
EGFRCR SERPLBLD CKD-EPI 2021: 107.1 ML/MIN/1.73
EOSINOPHIL # BLD AUTO: 0.1 10*3/MM3 (ref 0–0.4)
EOSINOPHIL NFR BLD AUTO: 3.8 % (ref 0.3–6.2)
ERYTHROCYTE [DISTWIDTH] IN BLOOD BY AUTOMATED COUNT: 14.7 % (ref 12.3–15.4)
GLUCOSE SERPL-MCNC: 92 MG/DL (ref 65–99)
HCT VFR BLD AUTO: 36.4 % (ref 37.5–51)
HGB BLD-MCNC: 12 G/DL (ref 13–17.7)
LYMPHOCYTES # BLD AUTO: 0.7 10*3/MM3 (ref 0.7–3.1)
LYMPHOCYTES NFR BLD AUTO: 22.4 % (ref 19.6–45.3)
MCH RBC QN AUTO: 34.2 PG (ref 26.6–33)
MCHC RBC AUTO-ENTMCNC: 33 G/DL (ref 31.5–35.7)
MCV RBC AUTO: 103.7 FL (ref 79–97)
MONOCYTES # BLD AUTO: 0.2 10*3/MM3 (ref 0.1–0.9)
MONOCYTES NFR BLD AUTO: 7.7 % (ref 5–12)
NEUTROPHILS NFR BLD AUTO: 2 10*3/MM3 (ref 1.7–7)
NEUTROPHILS NFR BLD AUTO: 65.6 % (ref 42.7–76)
NRBC BLD AUTO-RTO: 0.2 /100 WBC (ref 0–0.2)
PLATELET # BLD AUTO: 79 10*3/MM3 (ref 140–450)
PMV BLD AUTO: 9.3 FL (ref 6–12)
POTASSIUM SERPL-SCNC: 4 MMOL/L (ref 3.5–5.2)
RBC # BLD AUTO: 3.51 10*6/MM3 (ref 4.14–5.8)
SODIUM SERPL-SCNC: 137 MMOL/L (ref 136–145)
WBC NRBC COR # BLD: 3 10*3/MM3 (ref 3.4–10.8)

## 2023-08-20 PROCEDURE — 97530 THERAPEUTIC ACTIVITIES: CPT

## 2023-08-20 PROCEDURE — 25010000002 FUROSEMIDE PER 20 MG: Performed by: NURSE PRACTITIONER

## 2023-08-20 PROCEDURE — G0378 HOSPITAL OBSERVATION PER HR: HCPCS

## 2023-08-20 PROCEDURE — 96376 TX/PRO/DX INJ SAME DRUG ADON: CPT

## 2023-08-20 PROCEDURE — 85025 COMPLETE CBC W/AUTO DIFF WBC: CPT | Performed by: NURSE PRACTITIONER

## 2023-08-20 PROCEDURE — 96366 THER/PROPH/DIAG IV INF ADDON: CPT

## 2023-08-20 PROCEDURE — 25010000002 CEFTRIAXONE PER 250 MG: Performed by: NURSE PRACTITIONER

## 2023-08-20 PROCEDURE — 82140 ASSAY OF AMMONIA: CPT | Performed by: NURSE PRACTITIONER

## 2023-08-20 PROCEDURE — 80048 BASIC METABOLIC PNL TOTAL CA: CPT | Performed by: NURSE PRACTITIONER

## 2023-08-20 RX ORDER — HYDROCODONE BITARTRATE AND ACETAMINOPHEN 5; 325 MG/1; MG/1
2 TABLET ORAL EVERY 6 HOURS PRN
Status: COMPLETED | OUTPATIENT
Start: 2023-08-20 | End: 2023-08-20

## 2023-08-20 RX ORDER — KETOROLAC TROMETHAMINE 15 MG/ML
15 INJECTION, SOLUTION INTRAMUSCULAR; INTRAVENOUS EVERY 6 HOURS PRN
Status: DISCONTINUED | OUTPATIENT
Start: 2023-08-20 | End: 2023-08-21 | Stop reason: HOSPADM

## 2023-08-20 RX ADMIN — FUROSEMIDE 40 MG: 10 INJECTION, SOLUTION INTRAMUSCULAR; INTRAVENOUS at 08:23

## 2023-08-20 RX ADMIN — CEFTRIAXONE SODIUM 2000 MG: 2 INJECTION, POWDER, FOR SOLUTION INTRAMUSCULAR; INTRAVENOUS at 09:48

## 2023-08-20 RX ADMIN — GABAPENTIN 300 MG: 300 CAPSULE ORAL at 16:17

## 2023-08-20 RX ADMIN — HYDROCODONE BITARTRATE AND ACETAMINOPHEN 2 TABLET: 5; 325 TABLET ORAL at 21:54

## 2023-08-20 RX ADMIN — LACTULOSE 10 G: 20 SOLUTION ORAL at 08:23

## 2023-08-20 RX ADMIN — DICYCLOMINE HYDROCHLORIDE 10 MG: 10 CAPSULE ORAL at 10:30

## 2023-08-20 RX ADMIN — RIFAXIMIN 550 MG: 550 TABLET ORAL at 21:46

## 2023-08-20 RX ADMIN — GABAPENTIN 300 MG: 300 CAPSULE ORAL at 21:46

## 2023-08-20 RX ADMIN — PANTOPRAZOLE SODIUM 40 MG: 40 INJECTION, POWDER, LYOPHILIZED, FOR SOLUTION INTRAVENOUS at 05:41

## 2023-08-20 RX ADMIN — DICYCLOMINE HYDROCHLORIDE 10 MG: 10 CAPSULE ORAL at 08:23

## 2023-08-20 RX ADMIN — TAMSULOSIN HYDROCHLORIDE 0.4 MG: 0.4 CAPSULE ORAL at 08:23

## 2023-08-20 RX ADMIN — GABAPENTIN 300 MG: 300 CAPSULE ORAL at 08:23

## 2023-08-20 RX ADMIN — Medication 10 ML: at 08:24

## 2023-08-20 RX ADMIN — FUROSEMIDE 40 MG: 10 INJECTION, SOLUTION INTRAMUSCULAR; INTRAVENOUS at 21:46

## 2023-08-20 RX ADMIN — Medication 10 ML: at 21:47

## 2023-08-20 RX ADMIN — RIFAXIMIN 550 MG: 550 TABLET ORAL at 08:23

## 2023-08-20 RX ADMIN — DICYCLOMINE HYDROCHLORIDE 10 MG: 10 CAPSULE ORAL at 16:18

## 2023-08-20 RX ADMIN — SPIRONOLACTONE 50 MG: 25 TABLET ORAL at 08:23

## 2023-08-20 NOTE — THERAPY TREATMENT NOTE
"Subjective: Pt agreeable to therapeutic plan of care.    Objective:     Bed mobility -Supine to sitting EOB Mod-A    Transfers - SPS transfer from elevated EOB to recliner chair Min-A and with rolling walker. Difficulty clearing feet off floor to take pivotal steps.     Ambulation - 0 feet N/A or Not attempted.    Therapeutic Exercise - 20 Reps B LE AROM unsupported sitting / EOB    Vitals: Hypotensive and Orthostatic  Unable to get B/P readings; however, pt reports dizziness during transitional movements. Symptoms subside while sitting at EOB for extended amount of time.     Pain: 0 VAS   Location:   Intervention for pain: N/A    Education: Provided education on the importance of mobility in the acute care setting, Verbal/Tactile Cues, and Transfer Training    Assessment: Robert ANNA Randhawa . presents with functional mobility impairments which indicate the need for skilled intervention. Tolerating session today without incident. Pt reports dizziness upon sitting at EOB. Unable to get B/P readings. Will continue to follow and progress as tolerated.     Plan/Recommendations:   Moderate Intensity Therapy recommended post-acute care. This is recommended as therapy feels the patient would require 3-4 days per week and wouldn't tolerate \"3 hour daily\" rehab intensity. SNF would be the preferred choice. If the patient does not agree to SNF, arrange HH or OP depending on home bound status. If patient is medically complex, consider LTACH.. Pt requires no DME at discharge.     Pt desires Skilled Rehab placement at discharge. Pt cooperative; agreeable to therapeutic recommendations and plan of care.         Basic Mobility 6-click:  Rollin = Total, A lot = 2, A little = 3; 4 = None  Supine>Sit:   1 = Total, A lot = 2, A little = 3; 4 = None   Sit>Stand with arms:  1 = Total, A lot = 2, A little = 3; 4 = None  Bed>Chair:   1 = Total, A lot = 2, A little = 3; 4 = None  Ambulate in room:  1 = Total, A lot = 2, A " little = 3; 4 = None  3-5 Steps with railin = Total, A lot = 2, A little = 3; 4 = None  Score: 10    Modified [place]: N/A = No pre-op stroke/TIA    Post-Tx Position: Up in Chair, Alarms activated, and Call light and personal items within reach  PPE: gloves

## 2023-08-20 NOTE — PROGRESS NOTES
[facility]     Patient Name: Robert Randhawa Jr.  : 1965  MRN: [id number]  Primary Care Physician: Alfredo Torres MD  Date of admission: 2023     Patient Care Team:  Alfredo Torres MD as PCP - General  Alfredo Torres MD as PCP - Family Medicine  Kenney Reyez MD as Consulting Physician (Cardiology)  Lionel Lynn MD as Consulting Physician (Hematology and Oncology)          Subjective   History Present Illness     Chief Complaint:   Chief Complaint   Patient presents with    Leg Pain     Left leg pain, started about three hours ago     Leg Pain     Mr. Randhawa is a 58 y.o.  presents to [facility] complaining of leg pain       History of Present Illness    ED 23: 58-year-old male with chronic lymphedema complains of worsening leg pain in the setting of worsening dyspnea with dry cough for the past 3 to 4 days. No fever. Patient has had some intermittent mild chest pain, none today. Patient had a remote DVT in his left lower extremity after a knee replacement but does not take any anticoagulation and to his knowledge has not had any recurrent DVTs. Patient denies any known history of coronary artery disease or CHF but does have Mcnally cirrhosis.      Observation 23: Patient is a 58-year-old male presenting to the hospital with complaints of worsening bilateral lower extremity.  Patient also reports increased pain in left leg that started yesterday without fall or incident.  Patient reports shortness of breath without chest pain, nausea, vomiting or syncope.  Patient states it has been difficult to move with his legs increasing in size.  Patient reports DVT in left lower extremity about a year ago and off anticoagulants currently.      23: Patient reports continued bilateral lower extremity edema.  Patient states he tried with PT yesterday but had difficulty raising his leg due to weights.  Patient agreed for placement [facility] due to  inability to care for self.    8/20/23: Patient reports continued swelling in legs but appears to be's improvement in right leg edema.  Patient denies fever, nausea, vomiting, chest pain or dyspnea overnight.  Waiting for placement at [facility].      Review of Systems   Constitutional: Positive for malaise/fatigue.   HENT: Negative.     Eyes: Negative.    Cardiovascular:  Positive for leg swelling.   Respiratory: Negative.     Endocrine: Negative.    Hematologic/Lymphatic: Negative.    Skin:  Positive for poor wound healing.   Musculoskeletal:  Positive for back pain, joint swelling and muscle weakness.   Gastrointestinal: Negative.    Genitourinary:  Positive for frequency.   Neurological:  Positive for loss of balance, numbness and weakness.   Psychiatric/Behavioral:  Positive for depression.    Allergic/Immunologic: Negative.          Personal History     Past Medical History:   Past Medical History:   Diagnosis Date    Arthritis     Asthma     Chronic deep vein thrombosis (DVT)     Lymphedema of both lower extremities        Surgical History:      Past Surgical History:   Procedure Laterality Date    APPENDECTOMY  1974    CARDIAC CATHETERIZATION N/A 2/28/2020    Procedure: Left ventriculography;  Surgeon: Kenney Reyez MD;  Location: [facility] CATH INVASIVE LOCATION;  Service: Cardiovascular;  Laterality: N/A;    CARDIAC CATHETERIZATION N/A 2/28/2020    Procedure: Coronary angiography;  Surgeon: Kenney Reyez MD;  Location: [facility] CATH INVASIVE LOCATION;  Service: Cardiovascular;  Laterality: N/A;    CARDIAC CATHETERIZATION N/A 2/28/2020    Procedure: Left Heart Cath;  Surgeon: Kenney Reyez MD;  Location: [facility] CATH INVASIVE LOCATION;  Service: Cardiovascular;  Laterality: N/A;    COLONOSCOPY N/A 11/15/2022    Procedure: COLONOSCOPY with polypectomy;  Surgeon: AMY Garcia MD;  Location: [facility] ENDOSCOPY;  Service: Gastroenterology;  Laterality: N/A;  post: diverticulosis, ascending polyp  x2, transverse polyp x3, descending polyp x4, rectal polyp x1, hemorroids    ENDOSCOPY N/A 11/15/2022    Procedure: ESOPHAGOGASTRODUODENOSCOPY with biopsy;  Surgeon: AMY Garcia MD;  Location: [facility] ENDOSCOPY;  Service: Gastroenterology;  Laterality: N/A;  post: esophagitis with biopsy to rule out candida , gastric biopsy    KNEE SURGERY Left 2018           Family History: family history includes Aortic aneurysm in his father; Arthritis in his father; Diabetes in his mother; Heart disease in his mother. Otherwise pertinent FHx was reviewed and unremarkable.     Social History:  reports that he quit smoking about 40 years ago. His smoking use included cigarettes. He started smoking about 45 years ago. He has a 2.50 pack-year smoking history. He has been exposed to tobacco smoke. He has never used smokeless tobacco. He reports that he does not drink alcohol and does not use drugs.      Medications:  Prior to Admission medications    Medication Sig Start Date End Date Taking? Authorizing Provider   albuterol sulfate  (90 Base) MCG/ACT inhaler Inhale 2 puffs Every 4 (Four) Hours As Needed. 7/26/23  Yes Rolly Rivera MD   amoxicillin-clavulanate (AUGMENTIN) 875-125 MG per tablet Take 1 tablet by mouth Every 12 (Twelve) Hours. For ten days 8/11/23  Yes Rolly Rivera MD   dicyclomine (BENTYL) 10 MG capsule Take 1 capsule by mouth 3 (Three) Times a Day Before Meals.   Yes Rolly Rivera MD   furosemide (Lasix) 20 MG tablet Take 1 tablet by mouth Daily. Take daily or PRN 1/30/23  Yes Kush Lees MD   gabapentin (NEURONTIN) 300 MG capsule Take 1 capsule by mouth 3 (Three) Times a Day. 8/17/23  Yes Rolly Rivera MD   riFAXIMin (XIFAXAN) 550 MG tablet Take 1 tablet by mouth Every 12 (Twelve) Hours.   Yes Rolly Rivera MD   spironolactone (ALDACTONE) 25 MG tablet Take 2 tablets by mouth Daily.   Yes Rolly Rivrea MD       Allergies:    Allergies   Allergen  Reactions    Sulfa Antibiotics Unknown (See Comments)       Objective   Objective     Vital Signs  Temp:  [97.9 øF (36.6 øC)-98.9 øF (37.2 øC)] 97.9 øF (36.6 øC)  Heart Rate:  [52-78] 66  Resp:  [16-18] 16  BP: ()/(61-77) 92/63  SpO2:  [91 %-97 %] 97 %  on   ;   Device (Oxygen Therapy): room air  Body mass index is 47.93 kg/mý.    Physical Exam  Vitals and nursing note reviewed.   Constitutional:       Appearance: Normal appearance. He is obese.   HENT:      Head: Normocephalic and atraumatic.      Right Ear: External ear normal.      Left Ear: External ear normal.      Nose: Nose normal.      Mouth/Throat:      Pharynx: Oropharynx is clear.   Eyes:      Extraocular Movements: Extraocular movements intact.      Conjunctiva/sclera: Conjunctivae normal.      Pupils: Pupils are equal, round, and reactive to light.   Cardiovascular:      Rate and Rhythm: Normal rate and regular rhythm.      Pulses: Normal pulses.      Heart sounds: Normal heart sounds.   Pulmonary:      Effort: Pulmonary effort is normal.      Breath sounds: Normal breath sounds.   Abdominal:      General: Bowel sounds are normal.      Palpations: Abdomen is soft.   Musculoskeletal:         General: Tenderness present.      Cervical back: Normal range of motion.      Right lower leg: Edema present.      Left lower leg: Edema present.   Skin:     Capillary Refill: Capillary refill takes 2 to 3 seconds.      Findings: Bruising and erythema present.   Neurological:      Mental Status: He is alert and oriented to person, place, and time.      Motor: Weakness present.      Gait: Gait abnormal.   Psychiatric:         Mood and Affect: Mood normal. Mood is depressed.         Behavior: Behavior normal.         Thought Content: Thought content normal.         Judgment: Judgment normal.         Results Review:  I have personally reviewed most recent cardiac tracings, lab results, microbiology results, and radiology images and interpretations and agree with  findings, most notably: CBC, CMP, urinalysis, venous duplex, cultures.    Results from last 7 days   Lab Units 08/20/23  0542 08/18/23  1334 08/18/23  0001   WBC 10*3/mm3 3.00*   < > 3.20*   HEMOGLOBIN g/dL 12.0*   < > 12.2*   HEMATOCRIT % 36.4*   < > 36.6*   PLATELETS 10*3/mm3 79*   < > 86*   INR   --   --  1.17*    < > = values in this interval not displayed.     Results from last 7 days   Lab Units 08/20/23  0542 08/18/23  1334 08/18/23  0010 08/18/23  0001   SODIUM mmol/L 137   < >  --  134*   POTASSIUM mmol/L 4.0   < >  --  3.7   CHLORIDE mmol/L 103   < >  --  100   CO2 mmol/L 28.0   < >  --  26.0   BUN mg/dL 13   < >  --  17   CREATININE mg/dL 0.68*   < >  --  0.79   GLUCOSE mg/dL 92   < >  --  114*   CALCIUM mg/dL 8.5*   < >  --  8.7   ALT (SGPT) U/L  --   --   --  26   AST (SGOT) U/L  --   --   --  72*   HSTROP T ng/L  --   --   --  14   PROBNP pg/mL  --   --   --  77.2   LACTATE mmol/L  --   --  1.6  --    PROCALCITONIN ng/mL  --   --   --  0.10    < > = values in this interval not displayed.     Estimated Creatinine Clearance: 188.6 mL/min (A) (by C-G formula based on SCr of 0.68 mg/dL (L)).  Brief Urine Lab Results  (Last result in the past 365 days)        Color   Clarity   Blood   Leuk Est   Nitrite   Protein   CREAT   Urine HCG        08/18/23 1757 Orange  Comment: Any Substance that causes an abnormal urine color can alter the accuracy of the chemical reactions.   Clear   Moderate (2+)   Negative   Negative   Negative                   Microbiology Results (last 10 days)       Procedure Component Value - Date/Time    Blood Culture - Blood, Arm, Left [[id number]]  (Normal) Collected: 08/18/23 0034    Lab Status: Preliminary result Specimen: Blood from Arm, Left Updated: 08/20/23 0045     Blood Culture No growth at 2 days    Respiratory Panel PCR w/COVID-19(SARS-CoV-2) EAMON/CINTHIA/ELMO/PAD/COR/MAD/MARTIN In-House, NP Swab in UTM/VTM, 3-4 HR TAT - Swab, Nasopharynx [[id number]]  (Normal) Collected: 08/18/23 0002     Lab Status: Final result Specimen: Swab from Nasopharynx Updated: 08/18/23 0055     ADENOVIRUS, PCR Not Detected     Coronavirus 229E Not Detected     Coronavirus HKU1 Not Detected     Coronavirus NL63 Not Detected     Coronavirus OC43 Not Detected     COVID19 Not Detected     Human Metapneumovirus Not Detected     Human Rhinovirus/Enterovirus Not Detected     Influenza A PCR Not Detected     Influenza B PCR Not Detected     Parainfluenza Virus 1 Not Detected     Parainfluenza Virus 2 Not Detected     Parainfluenza Virus 3 Not Detected     Parainfluenza Virus 4 Not Detected     RSV, PCR Not Detected     Bordetella pertussis pcr Not Detected     Bordetella parapertussis PCR Not Detected     Chlamydophila pneumoniae PCR Not Detected     Mycoplasma pneumo by PCR Not Detected    Narrative:      In the setting of a positive respiratory panel with a viral infection PLUS a negative procalcitonin without other underlying concern for bacterial infection, consider observing off antibiotics or discontinuation of antibiotics and continue supportive care. If the respiratory panel is positive for atypical bacterial infection (Bordetella pertussis, Chlamydophila pneumoniae, or Mycoplasma pneumoniae), consider antibiotic de-escalation to target atypical bacterial infection.    Blood Culture - Blood, Arm, Right [[id number]]  (Normal) Collected: 08/18/23 0001    Lab Status: Preliminary result Specimen: Blood from Arm, Right Updated: 08/20/23 0016     Blood Culture No growth at 2 days            ECG/EMG Results (most recent)       Procedure Component Value Units Date/Time    ECG 12 Lead Dyspnea [[id number]] Collected: 08/17/23 2331     Updated: 08/18/23 0708     QT Interval 369 ms     Narrative:      HEART RATE= 86  bpm  RR Interval= 700  ms  LA Interval= 176  ms  P Horizontal Axis= -18  deg  P Front Axis= 75  deg  QRSD Interval= 92  ms  QT Interval= 369  ms  QRS Axis= 72  deg  T Wave Axis= -7  deg  - BORDERLINE ECG -  Sinus  rhythm  Left atrial enlargement  Electronically Signed By: Usama Rai (Maicol) 18-Aug-2023 07:08:10  Date and Time of Study: 2023-08-17 23:31:15            Results for orders placed during the hospital encounter of 08/17/23    Duplex Venous Lower Extremity - Bilateral    Interpretation Summary    Limited examination due to body habitus, fluid retention and patient intolerance. There is no deep or superficial vein thrombosis in the imaged segments of the lower extremities.      Results for orders placed during the hospital encounter of 04/16/23    Adult Transthoracic Echo Complete W/ Cont if Necessary Per Protocol    Interpretation Summary    Left ventricular ejection fraction appears to be 56 - 60%.    Estimated right ventricular systolic pressure from tricuspid regurgitation is mildly elevated (35-45 mmHg).    Indications  Chest pain    Technically satisfactory study.  Mitral valve is structurally normal.  Tricuspid valve is structurally normal.  Aortic valve is thickened with mild aortic valve stenosis (gradient across the aortic valve 21/12 mmHg and valve area of 2.2 cmý.  Pulmonic valve could not be well visualized.  No evidence for mitral tricuspid or aortic regurgitation is seen by Doppler study.  Left atrium is normal in size.  Right atrium is normal in size.  Left ventricle is normal in size and contractility with ejection fraction of 60%.  Right ventricle is normal in size.  Atrial septum is intact.  Aorta is normal.  No pericardial effusion or intracardiac thrombus is seen.    Impression  Mild aortic valve stenosis.  Gradient across the aortic valve 21/12 mmHg and valve area of 2.2 cmý.  Left ventricular size and contractility is normal with ejection fraction of 60%.      XR Chest 1 View    Result Date: 8/17/2023  Impression: Mild vascular congestion and cardiomegaly, similar as compared to the previous study. Electronically Signed: Evelyn Ureña MD  8/17/2023 11:47 PM EDT  Workstation ID: [id number]    CT  Angiogram Chest Pulmonary Embolism    Result Date: 8/18/2023  Impression: 1. No evidence of pulmonary embolism. Probable mild vascular congestion with cardiomegaly. Otherwise, no acute cardiopulmonary process. 2. Ancillary findings as described above. Electronically Signed: Evelyn Ureña MD  8/18/2023 4:03 AM EDT  Workstation ID: [id number]       Estimated Creatinine Clearance: 188.6 mL/min (A) (by C-G formula based on SCr of 0.68 mg/dL (L)).    Assessment & Plan   Assessment/Plan       Active Hospital Problems    Diagnosis  POA    **Dyspnea [R06.00]  Yes      Resolved Hospital Problems   No resolved problems to display.     Dyspnea             Lab Results   Component Value Date     TROPONINT 14 08/18/2023     TROPONINT 18 (H) 04/17/2023     PROBNP 77.2 08/18/2023     PROBNP 75.8 04/16/2023      (L) 08/18/2023   -Procalcitonin 0.10, lactate 1.6  -Blood cultures pending  -Respiratory viral panel negative  -Echocardiogram (April 2023): EF 56 to 60%, mild aortic valve stenosis  -Chest x-ray: Mild vascular congestion and cardiomegaly similar to previous studies  -EKG: sinus rhythm heart rate 86 with left atrial enlargement  -IV lasix Initiated/ monitor BMP  -Monitor I's and O's and daily weights  -2 g sodium diet  -D dimer 2.15  -CT chest: No evidence of pulmonary embolism, mild vascular congestion cardiomegaly     Cirrhosis            Lab Results   Component Value Date     ALT 26 08/18/2023     AST 72 (H) 08/18/2023     BILITOT 0.9 08/18/2023     ALBUMIN 3.0 (L) 08/18/2023   -Ultrasound liver (July 2023) cirrhotic appearance with no lesions noted, cholelithiasis  -Continue IV Lasix, spironolactone, beta-blocker  -Low-sodium diet  -Ammonia 83  -Add lactulose  -Monitor daily weights     Chronic Lymphadema  -BNP is 77  -PT/OT consulted  -Wound care consult  -Venous duplex showed limited examination due to body habitus and fluid but otherwise showed no DVT or superficial thrombosis  -Blood cultures no growth at 24 hours    -Initiate IV Rocephin possible cellulitis     Pancytopenia  -WBC 3.00, platelets 79, RBC 3.51 hemoglobin 12.0 (baseline with likely related to cirrhosis  -Continue to monitor  -Seen by hematology outpatient     Asthma without exacerbation  -Continue albuterol as needed      BPH  -Continue Flomax     Neuropathy  -Continue gabapentin        VTE Prophylaxis -   Mechanical Order History:        Ordered        08/20/23 0643  Place Venous Foot Pump  Once            08/20/23 0643  Maintain Venous Foot Pump  Once                          Pharmalogical Order History:        Ordered     Dose Route Frequency Stop    08/18/23 0813  Enoxaparin Sodium (LOVENOX) syringe 40 mg  Status:  Discontinued         40 mg SC Every 12 Hours Scheduled 08/18/23 0813                    CODE STATUS:    Code Status and Medical Interventions:   Ordered at: 08/18/23 0813     Level Of Support Discussed With:    Patient     Code Status (Patient has no pulse and is not breathing):    CPR (Attempt to Resuscitate)     Medical Interventions (Patient has pulse or is breathing):    Full Support       This patient has been examined wearing personal protective equipment.     I discussed the patient's findings and my recommendations with patient, family, nursing staff, primary care team, and consulting provider.      Signature:Electronically signed by QUEENIE Hearn, 08/20/23, 10:18 AM EDT.          I spent 35 minutes caring for Robert on this date of service. This time includes time spent by me in the following activities: reviewing tests, obtaining and/or reviewing a separately obtained history, performing a medically appropriate examination and/or evaluation, counseling and educating the patient/family/caregiver, ordering medications, tests, or procedures, referring and communicating with other health care professionals, documenting information in the medical record, independently interpreting results and communicating that information with the  patient/family/caregiver, and care coordination.

## 2023-08-20 NOTE — PLAN OF CARE
"Assessment: Robert Randhawa Jr. presents with functional mobility impairments which indicate the need for skilled intervention. Tolerating session today without incident. Pt reports dizziness upon sitting at EOB. Unable to get B/P readings. Will continue to follow and progress as tolerated.     Plan/Recommendations:   Moderate Intensity Therapy recommended post-acute care. This is recommended as therapy feels the patient would require 3-4 days per week and wouldn't tolerate \"3 hour daily\" rehab intensity. SNF would be the preferred choice. If the patient does not agree to SNF, arrange HH or OP depending on home bound status. If patient is medically complex, consider LTACH.. Pt requires no DME at discharge.     Pt desires Skilled Rehab placement at discharge. Pt cooperative; agreeable to therapeutic recommendations and plan of care.   "

## 2023-08-20 NOTE — PLAN OF CARE
Goal Outcome Evaluation:                      Pt a/o self, situation place, waiting on admission bed to [facility], did sit up in chair this am and lorenzo well. Staff continues to turn and reposition pt every two hours and as needed, clean and dry, call light in reach

## 2023-08-21 VITALS
HEART RATE: 68 BPM | SYSTOLIC BLOOD PRESSURE: 98 MMHG | DIASTOLIC BLOOD PRESSURE: 67 MMHG | RESPIRATION RATE: 15 BRPM | OXYGEN SATURATION: 98 % | HEIGHT: 73 IN | WEIGHT: 315 LBS | BODY MASS INDEX: 41.75 KG/M2 | TEMPERATURE: 98 F

## 2023-08-21 PROCEDURE — G0378 HOSPITAL OBSERVATION PER HR: HCPCS

## 2023-08-21 PROCEDURE — 96376 TX/PRO/DX INJ SAME DRUG ADON: CPT

## 2023-08-21 PROCEDURE — 25010000002 FUROSEMIDE PER 20 MG: Performed by: NURSE PRACTITIONER

## 2023-08-21 PROCEDURE — 97530 THERAPEUTIC ACTIVITIES: CPT | Performed by: PHYSICAL THERAPIST

## 2023-08-21 RX ORDER — LACTULOSE 10 G/15ML
10 SOLUTION ORAL DAILY
Qty: 946 ML | Refills: 0 | Status: SHIPPED | OUTPATIENT
Start: 2023-08-22

## 2023-08-21 RX ORDER — PANTOPRAZOLE SODIUM 40 MG/1
40 TABLET, DELAYED RELEASE ORAL DAILY
Qty: 30 TABLET | Refills: 0 | Status: SHIPPED | OUTPATIENT
Start: 2023-08-21

## 2023-08-21 RX ADMIN — TAMSULOSIN HYDROCHLORIDE 0.4 MG: 0.4 CAPSULE ORAL at 09:06

## 2023-08-21 RX ADMIN — FUROSEMIDE 40 MG: 10 INJECTION, SOLUTION INTRAMUSCULAR; INTRAVENOUS at 09:06

## 2023-08-21 RX ADMIN — SPIRONOLACTONE 50 MG: 25 TABLET ORAL at 09:06

## 2023-08-21 RX ADMIN — DICYCLOMINE HYDROCHLORIDE 10 MG: 10 CAPSULE ORAL at 09:06

## 2023-08-21 RX ADMIN — Medication 10 ML: at 09:07

## 2023-08-21 RX ADMIN — PANTOPRAZOLE SODIUM 40 MG: 40 INJECTION, POWDER, LYOPHILIZED, FOR SOLUTION INTRAVENOUS at 06:14

## 2023-08-21 RX ADMIN — GABAPENTIN 300 MG: 300 CAPSULE ORAL at 09:06

## 2023-08-21 NOTE — DISCHARGE PLACEMENT REQUEST
"Josemanuel Randhawa Jr. (58 y.o. Male)       Date of Birth   1965    Social Security Number       Address   [address] [place] [address] [place] [address]    Home Phone   [phone]    MRN   [id number]       Congregation   None    Marital Status   Single                            Admission Date   8/17/23    Admission Type   Emergency    Admitting Provider   Usama Rai MD    Attending Provider   Usama Rai MD    Department, Room/Bed   [facility] OBSERVATION, 111/1       Discharge Date       Discharge Disposition       Discharge Destination                                 Attending Provider: Usama Rai MD    Allergies: Sulfa Antibiotics    Isolation: None   Infection: None   Code Status: CPR    Ht: 185.4 cm (73\")   Wt: 165 kg (363 lb 5.1 oz)    Admission Cmt: None   Principal Problem: Dyspnea [R06.00]                   Active Insurance as of 8/17/2023       Primary Coverage       Payor Plan Insurance Group Employer/Plan Group    HUMANA MEDICARE REPLACEMENT HUMANA MEDICARE REPLACEMENT [id number]       Payor Plan Address Payor Plan Phone Number Payor Plan Fax Number Effective Dates    [address] [address] [phone]  12/1/2019 - None Entered    [facility] [address]         Subscriber Name Subscriber Birth Date Member ID       JOSEMANUEL RANDHAWA JR. 1965 [id number]               Secondary Coverage       Payor Plan Insurance Group Employer/Plan Group    [id number]        Payor Plan Address Payor Plan Phone Number Payor Plan Fax Number Effective Dates    [address]   1/4/2023 - None Entered    [place] [address]         Subscriber Name Subscriber Birth Date Member ID       JOSEMANUEL RANDHAWA JR. 1965 [id number]                     Emergency Contacts        (Rel.) Home Phone Work Phone Mobile Phone    EdisSajan (Brother) -- -- [phone]    SheldonIman (Sister) -- -- [phone]                "

## 2023-08-21 NOTE — CASE MANAGEMENT/SOCIAL WORK
Case Management Discharge Note      Final Note: [facility]    Provided Post Acute Provider List?: Yes  Post Acute Provider List: Home Health  Provided Post Acute Provider Quality & Resource List?: Yes  Post Acute Provider Quality and Resource List: Home Health  Delivered To: Patient  Method of Delivery: In person    Selected Continued Care - Discharged on 8/21/2023 Admission date: 8/17/2023 - Discharge disposition: Skilled Nursing Facility (DC - External)      Destination Coordination complete.      Service Provider Selected Services Address Phone Fax Patient Preferred    [facility] [address] [place] [address] [phone] --              Durable Medical Equipment    No services have been selected for the patient.                Dialysis/Infusion    No services have been selected for the patient.                Home Medical Care    No services have been selected for the patient.                Therapy    No services have been selected for the patient.                Community Resources    No services have been selected for the patient.                Community & DME    No services have been selected for the patient.                    Transportation Services  W/C Van: Juan Coto    Final Discharge Disposition Code: 03 - skilled nursing facility (SNF)

## 2023-08-21 NOTE — CASE MANAGEMENT/SOCIAL WORK
Continued Stay Note  NA[facility] Buenrostro     Patient Name: Robert Randhawa Jr.  MRN: [id number]  Today's Date: 8/21/2023    Admit Date: 8/17/2023    Plan: [facility] accepted [facility] approved 8/21, PASRR approved   Discharge Plan       Row Name 08/21/23 1236       Plan    Plan [facility] accepted [facility] approved 8/21, PASRR approved    Plan Comments was notified [facility] was apporved Liamartin Orellana [facility] [id number]. Valid 8/21-8/23. Provider and nurse notified via epic secure chat. Patient is in need of transportation assistance notified Diandra with [facility] and [facility]ry but  on list                             Shraddha Bah RN

## 2023-08-21 NOTE — DISCHARGE SUMMARY
[facility]    Alfredo Torres MD    CHIEF COMPLAINT:     Leg Pain     HISTORY OF PRESENT ILLNESS:    [facility]  ED 8/17/23: 58-year-old male with chronic lymphedema complains of worsening leg pain in the setting of worsening dyspnea with dry cough for the past 3 to 4 days. No fever. Patient has had some intermittent mild chest pain, none today. Patient had a remote DVT in his left lower extremity after a knee replacement but does not take any anticoagulation and to his knowledge has not had any recurrent DVTs. Patient denies any known history of coronary artery disease or CHF but does have Mcnally cirrhosis.      Observation 8/18/23: Patient is a 58-year-old male presenting to the hospital with complaints of worsening bilateral lower extremity.  Patient also reports increased pain in left leg that started yesterday without fall or incident.  Patient reports shortness of breath without chest pain, nausea, vomiting or syncope.  Patient states it has been difficult to move with his legs increasing in size.  Patient reports DVT in left lower extremity about a year ago and off anticoagulants currently.      8/19/23: Patient reports continued bilateral lower extremity edema.  Patient states he tried with PT yesterday but had difficulty raising his leg due to weights.  Patient agreed for placement [facility] due to inability to care for self.     8/20/23: Patient reports continued swelling in legs but appears to be's improvement in right leg edema.  Patient denies fever, nausea, vomiting, chest pain or dyspnea overnight.  Waiting for placement at [facility].       Past Medical History:   Diagnosis Date    Arthritis     Asthma     Chronic deep vein thrombosis (DVT)     Lymphedema of both lower extremities      Past Surgical History:   Procedure Laterality Date    APPENDECTOMY  1974    CARDIAC CATHETERIZATION N/A 2/28/2020    Procedure: Left ventriculography;  Surgeon: Kenney Reyez MD;   Location: [facility] CATH INVASIVE LOCATION;  Service: Cardiovascular;  Laterality: N/A;    CARDIAC CATHETERIZATION N/A 2020    Procedure: Coronary angiography;  Surgeon: Kenney Reyez MD;  Location: [facility] CATH INVASIVE LOCATION;  Service: Cardiovascular;  Laterality: N/A;    CARDIAC CATHETERIZATION N/A 2020    Procedure: Left Heart Cath;  Surgeon: Kenney Reyez MD;  Location: [facility] CATH INVASIVE LOCATION;  Service: Cardiovascular;  Laterality: N/A;    COLONOSCOPY N/A 11/15/2022    Procedure: COLONOSCOPY with polypectomy;  Surgeon: AMY Garcia MD;  Location: [facility] ENDOSCOPY;  Service: Gastroenterology;  Laterality: N/A;  post: diverticulosis, ascending polyp x2, transverse polyp x3, descending polyp x4, rectal polyp x1, hemorroids    ENDOSCOPY N/A 11/15/2022    Procedure: ESOPHAGOGASTRODUODENOSCOPY with biopsy;  Surgeon: AMY Garcia MD;  Location: [facility] ENDOSCOPY;  Service: Gastroenterology;  Laterality: N/A;  post: esophagitis with biopsy to rule out candida , gastric biopsy    KNEE SURGERY Left      Family History   Problem Relation Age of Onset    Heart disease Mother     Diabetes Mother     Arthritis Father     Aortic aneurysm Father      Social History     Tobacco Use    Smoking status: Former     Packs/day: 0.50     Years: 5.00     Pack years: 2.50     Types: Cigarettes     Start date:      Quit date:      Years since quittin.6     Passive exposure: Past    Smokeless tobacco: Never   Vaping Use    Vaping Use: Never used   Substance Use Topics    Alcohol use: Never    Drug use: Never     No medications prior to admission.     Allergies:  Sulfa antibiotics      There is no immunization history on file for this patient.        REVIEW OF SYSTEMS:    Review of Systems   Constitutional: Positive for malaise/fatigue.   HENT: Negative.     Eyes: Negative.    Cardiovascular:  Positive for leg swelling.   Respiratory: Negative.     Endocrine: Negative.    Skin:  Positive  for color change, dry skin and poor wound healing.   Musculoskeletal:  Positive for arthritis, back pain and joint pain.   Gastrointestinal: Negative.    Genitourinary: Negative.    Neurological:  Positive for weakness.   Psychiatric/Behavioral:  Positive for depression.    Allergic/Immunologic: Negative.      Vital Signs  Temp:  [98 øF (36.7 øC)] 98 øF (36.7 øC)  Heart Rate:  [68] 68  Resp:  [15] 15  BP: (98)/(67) 98/67          Physical Exam:  Physical Exam  Vitals and nursing note reviewed.   Constitutional:       Appearance: Normal appearance. He is obese.   HENT:      Head: Normocephalic and atraumatic.      Right Ear: External ear normal.      Left Ear: External ear normal.      Nose: Nose normal.      Mouth/Throat:      Pharynx: Oropharynx is clear.   Eyes:      Extraocular Movements: Extraocular movements intact.      Conjunctiva/sclera: Conjunctivae normal.      Pupils: Pupils are equal, round, and reactive to light.   Cardiovascular:      Rate and Rhythm: Normal rate and regular rhythm.      Pulses: Normal pulses.      Heart sounds: Normal heart sounds.   Pulmonary:      Effort: Pulmonary effort is normal.      Breath sounds: Normal breath sounds.   Abdominal:      General: Bowel sounds are normal.      Palpations: Abdomen is soft.   Musculoskeletal:         General: Tenderness present.      Cervical back: Normal range of motion.      Right lower leg: Edema present.      Left lower leg: Edema present.   Skin:     General: Skin is warm.      Capillary Refill: Capillary refill takes 2 to 3 seconds.      Findings: Rash is crusting.   Neurological:      Mental Status: He is alert.   Psychiatric:         Mood and Affect: Mood normal. Mood is depressed.         Behavior: Behavior normal.         Thought Content: Thought content normal.         Judgment: Judgment normal.       Emotional Behavior:    WNL   Debilities:   none  Results Review:    I reviewed the patient's new clinical results.  Lab Results (most  recent)       Procedure Component Value Units Date/Time    Blood Culture - Blood, Arm, Left [[id number]]  (Normal) Collected: 08/18/23 0034    Specimen: Blood from Arm, Left Updated: 08/21/23 0046     Blood Culture No growth at 3 days    Blood Culture - Blood, Arm, Right [[id number]]  (Normal) Collected: 08/18/23 0001    Specimen: Blood from Arm, Right Updated: 08/21/23 0016     Blood Culture No growth at 3 days    Ammonia [[id number]]  (Normal) Collected: 08/20/23 1212    Specimen: Blood Updated: 08/20/23 1248     Ammonia 56 umol/L     Basic Metabolic Panel [[id number]]  (Abnormal) Collected: 08/20/23 0542    Specimen: Blood Updated: 08/20/23 0634     Glucose 92 mg/dL      BUN 13 mg/dL      Creatinine 0.68 mg/dL      Sodium 137 mmol/L      Potassium 4.0 mmol/L      Chloride 103 mmol/L      CO2 28.0 mmol/L      Calcium 8.5 mg/dL      BUN/Creatinine Ratio 19.1     Anion Gap 6.0 mmol/L      eGFR 107.1 mL/min/1.73     Narrative:      GFR Normal >60  Chronic Kidney Disease <60  Kidney Failure <15      CBC & Differential [[id number]]  (Abnormal) Collected: 08/20/23 0542    Specimen: Blood Updated: 08/20/23 0615    Narrative:      The following orders were created for panel order CBC & Differential.  Procedure                               Abnormality         Status                     ---------                               -----------         ------                     CBC Auto Differential[[id number]]        Abnormal            Final result                 Please view results for these tests on the individual orders.    CBC Auto Differential [[id number]]  (Abnormal) Collected: 08/20/23 0542    Specimen: Blood Updated: 08/20/23 0615     WBC 3.00 10*3/mm3      RBC 3.51 10*6/mm3      Hemoglobin 12.0 g/dL      Hematocrit 36.4 %      .7 fL      MCH 34.2 pg      MCHC 33.0 g/dL      RDW 14.7 %      RDW-SD 52.5 fl      MPV 9.3 fL      Platelets 79 10*3/mm3      Neutrophil % 65.6 %      Lymphocyte % 22.4 %      Monocyte % 7.7 %       Eosinophil % 3.8 %      Basophil % 0.5 %      Neutrophils, Absolute 2.00 10*3/mm3      Lymphocytes, Absolute 0.70 10*3/mm3      Monocytes, Absolute 0.20 10*3/mm3      Eosinophils, Absolute 0.10 10*3/mm3      Basophils, Absolute 0.00 10*3/mm3      nRBC 0.2 /100 WBC     Basic Metabolic Panel [[id number]]  (Abnormal) Collected: 08/19/23 0449    Specimen: Blood Updated: 08/19/23 0549     Glucose 98 mg/dL      BUN 16 mg/dL      Creatinine 0.67 mg/dL      Sodium 136 mmol/L      Potassium 4.2 mmol/L      Chloride 104 mmol/L      CO2 25.0 mmol/L      Calcium 8.4 mg/dL      BUN/Creatinine Ratio 23.9     Anion Gap 7.0 mmol/L      eGFR 107.6 mL/min/1.73     Narrative:      GFR Normal >60  Chronic Kidney Disease <60  Kidney Failure <15      CBC & Differential [[id number]]  (Abnormal) Collected: 08/19/23 0449    Specimen: Blood Updated: 08/19/23 0517    Narrative:      The following orders were created for panel order CBC & Differential.  Procedure                               Abnormality         Status                     ---------                               -----------         ------                     CBC Auto Differential[[id number]]        Abnormal            Final result                 Please view results for these tests on the individual orders.    CBC Auto Differential [[id number]]  (Abnormal) Collected: 08/19/23 0449    Specimen: Blood Updated: 08/19/23 0517     WBC 3.10 10*3/mm3      RBC 3.32 10*6/mm3      Hemoglobin 11.6 g/dL      Hematocrit 33.6 %      .4 fL      MCH 35.0 pg      MCHC 34.5 g/dL      RDW 14.9 %      RDW-SD 55.6 fl      MPV 9.4 fL      Platelets 74 10*3/mm3      Neutrophil % 68.0 %      Lymphocyte % 20.5 %      Monocyte % 7.7 %      Eosinophil % 3.2 %      Basophil % 0.6 %      Neutrophils, Absolute 2.10 10*3/mm3      Lymphocytes, Absolute 0.60 10*3/mm3      Monocytes, Absolute 0.20 10*3/mm3      Eosinophils, Absolute 0.10 10*3/mm3      Basophils, Absolute 0.00 10*3/mm3      nRBC 0.0 /100  WBC     Urinalysis With Culture If Indicated - Urine, Clean Catch [[id number]]  (Abnormal) Collected: 08/18/23 1757    Specimen: Urine, Clean Catch Updated: 08/18/23 1831     Color, UA Orange     Comment: Any Substance that causes an abnormal urine color can alter the accuracy of the chemical reactions.        Appearance, UA Clear     pH, UA 5.5     Specific Gravity, UA 1.021     Glucose, UA Negative     Ketones, UA Negative     Bilirubin, UA Negative     Blood, UA Moderate (2+)     Protein, UA Negative     Leuk Esterase, UA Negative     Nitrite, UA Negative     Urobilinogen, UA 1.0 E.U./dL    Narrative:      In absence of clinical symptoms, the presence of pyuria, bacteria, and/or nitrites on the urinalysis result does not correlate with infection.    Urinalysis, Microscopic Only - Urine, Clean Catch [[id number]]  (Abnormal) Collected: 08/18/23 1757    Specimen: Urine, Clean Catch Updated: 08/18/23 1830     RBC, UA 6-12 /HPF      WBC, UA 0-2 /HPF      Comment: Urine culture not indicated.        Bacteria, UA None Seen /HPF      Squamous Epithelial Cells, UA 0-2 /HPF      Hyaline Casts, UA None Seen /LPF      Methodology Automated Microscopy    Ammonia [[id number]]  (Abnormal) Collected: 08/18/23 1334    Specimen: Blood from Hand, Right Updated: 08/18/23 1400     Ammonia 83 umol/L     D-dimer, Quantitative [[id number]]  (Abnormal) Collected: 08/18/23 0001    Specimen: Blood Updated: 08/18/23 0116     D-Dimer, Quantitative 2.15 mg/L (FEU)     Narrative:      According to the assay 's published package insert, a normal (<0.50 mg/L (FEU)) D-dimer result in conjunction with a non-high clinical probability assessment, excludes deep vein thrombosis (DVT) and pulmonary embolism (PE) with high sensitivity.    D-dimer values increase with age and this can make VTE exclusion of an older population difficult. To address this, the [employer], based on best available evidence and recent guidelines,  "recommends that clinicians use age-adjusted D-dimer thresholds in patients greater than 50 years of age with: a) a low probability of PE who do not meet all Pulmonary Embolism Rule Out Criteria, or b) in those with intermediate probability of PE.   The formula for an age-adjusted D-dimer cut-off is \"age/100\".  For example, a 60 year old patient would have an age-adjusted cut-off of 0.60 mg/L (FEU) and an 80 year old 0.80 mg/L (FEU).    Protime-INR [[id number]]  (Abnormal) Collected: 08/18/23 0001    Specimen: Blood Updated: 08/18/23 0116     Protime 12.4 Seconds      INR 1.17    aPTT [[id number]]  (Abnormal) Collected: 08/18/23 0001    Specimen: Blood Updated: 08/18/23 0116     PTT 26.9 seconds     [place] Draw [[id number]] Collected: 08/18/23 0001    Specimen: Blood Updated: 08/18/23 0116    Narrative:      The following orders were created for panel order [place] Draw.  Procedure                               Abnormality         Status                     ---------                               -----------         ------                     Green Top (Gel)[[id number]]                                  Final result               Lavender Top[[id number]]                                     Final result               Gold Top - SST[[id number]]                                   Final result               Light Blue Top[[id number]]                                   Final result                 Please view results for these tests on the individual orders.    Green Top (Gel) [[id number]] Collected: 08/18/23 0001    Specimen: Blood Updated: 08/18/23 0116     Extra Tube Hold for add-ons.     Comment: Auto resulted.       Lavender Top [[id number]] Collected: 08/18/23 0001    Specimen: Blood Updated: 08/18/23 0116     Extra Tube hold for add-on     Comment: Auto resulted       Light Blue Top [[id number]] Collected: 08/18/23 0001    Specimen: Blood Updated: 08/18/23 0116     Extra Tube Hold for add-ons.     Comment: Auto resulted       " Gold Top - SST [[id number]] Collected: 08/18/23 0001    Specimen: Blood Updated: 08/18/23 0116     Extra Tube Hold for add-ons.     Comment: Auto resulted.       Respiratory Panel PCR w/COVID-19(SARS-CoV-2) EAMON/CINTHIA/ELMO/PAD/COR/MAD/MARTIN In-House, NP Swab in UTM/VTM, 3-4 HR TAT - Swab, Nasopharynx [[id number]]  (Normal) Collected: 08/18/23 0002    Specimen: Swab from Nasopharynx Updated: 08/18/23 0055     ADENOVIRUS, PCR Not Detected     Coronavirus 229E Not Detected     Coronavirus HKU1 Not Detected     Coronavirus NL63 Not Detected     Coronavirus OC43 Not Detected     COVID19 Not Detected     Human Metapneumovirus Not Detected     Human Rhinovirus/Enterovirus Not Detected     Influenza A PCR Not Detected     Influenza B PCR Not Detected     Parainfluenza Virus 1 Not Detected     Parainfluenza Virus 2 Not Detected     Parainfluenza Virus 3 Not Detected     Parainfluenza Virus 4 Not Detected     RSV, PCR Not Detected     Bordetella pertussis pcr Not Detected     Bordetella parapertussis PCR Not Detected     Chlamydophila pneumoniae PCR Not Detected     Mycoplasma pneumo by PCR Not Detected    Narrative:      In the setting of a positive respiratory panel with a viral infection PLUS a negative procalcitonin without other underlying concern for bacterial infection, consider observing off antibiotics or discontinuation of antibiotics and continue supportive care. If the respiratory panel is positive for atypical bacterial infection (Bordetella pertussis, Chlamydophila pneumoniae, or Mycoplasma pneumoniae), consider antibiotic de-escalation to target atypical bacterial infection.    Procalcitonin [[id number]]  (Normal) Collected: 08/18/23 0001    Specimen: Blood Updated: 08/18/23 0049     Procalcitonin 0.10 ng/mL     Narrative:      As a Marker for Sepsis (Non-Neonates):    1. <0.5 ng/mL represents a low risk of severe sepsis and/or septic shock.  2. >2 ng/mL represents a high risk of severe sepsis and/or septic shock.    As  "a Marker for Lower Respiratory Tract Infections that require antibiotic therapy:    PCT on Admission    Antibiotic Therapy       6-12 Hrs later    >0.5                Strongly Recommended  >0.25 - <0.5        Recommended   0.1 - 0.25          Discouraged              Remeasure/reassess PCT  <0.1                Strongly Discouraged     Remeasure/reassess PCT    As 28 day mortality risk marker: \"Change in Procalcitonin Result\" (>80% or <=80%) if Day 0 (or Day 1) and Day 4 values are available. Refer to http://www.[email or web address]pct[facility]calculator[email or web address]    Change in PCT <=80%  A decrease of PCT levels below or equal to 80% defines a positive change in PCT test result representing a higher risk for 28-day all-cause mortality of patients diagnosed with severe sepsis for septic shock.    Change in PCT >80%  A decrease of PCT levels of more than 80% defines a negative change in PCT result representing a lower risk for 28-day all-cause mortality of patients diagnosed with severe sepsis or septic shock.       TSH [[id number]]  (Normal) Collected: 08/18/23 0001    Specimen: Blood Updated: 08/18/23 0049     TSH 1.150 uIU/mL     BNP [[id number]]  (Normal) Collected: 08/18/23 0001    Specimen: Blood Updated: 08/18/23 0049     proBNP 77.2 pg/mL     Narrative:      Among patients with dyspnea, NT-proBNP is highly sensitive for the detection of acute congestive heart failure. In addition NT-proBNP of <300 pg/ml effectively rules out acute congestive heart failure with 99% negative predictive value.      Single High Sensitivity Troponin T [[id number]]  (Normal) Collected: 08/18/23 0001    Specimen: Blood Updated: 08/18/23 0049     HS Troponin T 14 ng/L     Narrative:      High Sensitive Troponin T Reference Range:  <10.0 ng/L- Negative Female for AMI  <15.0 ng/L- Negative Male for AMI  >=10 - Abnormal Female indicating possible myocardial injury.  >=15 - Abnormal Male indicating possible myocardial injury.   Clinicians would have to utilize clinical " acumen, EKG, Troponin, and serial changes to determine if it is an Acute Myocardial Infarction or myocardial injury due to an underlying chronic condition.         Comprehensive Metabolic Panel [[id number]]  (Abnormal) Collected: 08/18/23 0001    Specimen: Blood Updated: 08/18/23 0042     Glucose 114 mg/dL      BUN 17 mg/dL      Creatinine 0.79 mg/dL      Sodium 134 mmol/L      Potassium 3.7 mmol/L      Comment: Slight hemolysis detected by analyzer. Results may be affected.        Chloride 100 mmol/L      CO2 26.0 mmol/L      Calcium 8.7 mg/dL      Total Protein 8.0 g/dL      Albumin 3.0 g/dL      ALT (SGPT) 26 U/L      AST (SGOT) 72 U/L      Alkaline Phosphatase 197 U/L      Total Bilirubin 0.9 mg/dL      Globulin 5.0 gm/dL      A/G Ratio 0.6 g/dL      BUN/Creatinine Ratio 21.5     Anion Gap 8.0 mmol/L      eGFR 102.3 mL/min/1.73     Narrative:      GFR Normal >60  Chronic Kidney Disease <60  Kidney Failure <15      POC Lactate [[id number]]  (Normal) Collected: 08/18/23 0010    Specimen: Blood Updated: 08/18/23 0012     Lactate 1.6 mmol/L      Comment: Serial Number: [id number]:  [id number]               Imaging Results (Most Recent)       Procedure Component Value Units Date/Time    CT Angiogram Chest Pulmonary Embolism [[id number]] Collected: 08/18/23 0401     Updated: 08/18/23 0405    Narrative:      CT ANGIOGRAM CHEST PULMONARY EMBOLISM    Date of Exam: 8/18/2023 3:40 AM EDT    Indication: soa, elevated dimer.    Comparison: 8/17/2023.    Technique: Axial CT images were obtained of the chest after the uneventful intravenous administration of iodinated contrast utilizing pulmonary embolism protocol.  Sagittal and coronal reconstructions were performed.  Automated exposure control and   iterative reconstruction methods were used.      Findings:    Pulmonary arteries: Slightly limited evaluation due to bolus timing. No evidence of central or proximal segmental pulmonary embolism. The very distal branches are  not well evaluated. No suspicious thrombus identified.    Lungs and Pleura: The lungs are clear. Questionable mild intralobular septal thickening is present. No nodule. No consolidation. No pleural fluid.    Mediastinum/Mikaela: No mediastinal or hilar lymphadenopathy. 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    Lymph nodes: No axillary or supraclavicular adenopathy.    Cardiovascular: The heart appears enlarged. Atherosclerotic calcifications are present including within the coronary arteries.. The pericardium is normal. The aorta and its arch branch vessels are unremarkable.       Upper Abdomen: The upper abdominal contents demonstrate no acute process. Cirrhotic morphology of the liver with splenomegaly and evidence of portal venous hypertension. No acute process..          Bones and Soft Tissue: No suspicious osseous lesion.        Impression:      Impression:    1. No evidence of pulmonary embolism. Probable mild vascular congestion with cardiomegaly. Otherwise, no acute cardiopulmonary process.  2. Ancillary findings as described above.        Electronically Signed: Evelyn Ureña MD    8/18/2023 4:03 AM EDT    Workstation ID: [id number]    XR Chest 1 View [[id number]] Collected: 08/17/23 2346     Updated: 08/17/23 2349    Narrative:      XR CHEST 1 VW    Date of Exam: 8/17/2023 11:37 PM EDT    Indication: soa    Comparison: 4/16/2023.    Findings:  The heart appears enlarged. There is indistinctness of the pulmonary vasculature. No definite pleural effusion. Limited evaluation of the left costophrenic angle due to technique. Pneumothorax. No acute osseous abnormality identified.      Impression:      Impression:  Mild vascular congestion and cardiomegaly, similar as compared to the previous study.      Electronically Signed: Evelyn Ureña MD    8/17/2023 11:47 PM EDT    Workstation ID: [id number]          reviewed    ECG/EMG Results (most recent)       Procedure Component Value Units Date/Time    ECG  12 Lead Dyspnea [[id number]] Collected: 08/17/23 2331     Updated: 08/18/23 0708     QT Interval 369 ms     Narrative:      HEART RATE= 86  bpm  RR Interval= 700  ms  NE Interval= 176  ms  P Horizontal Axis= -18  deg  P Front Axis= 75  deg  QRSD Interval= 92  ms  QT Interval= 369  ms  QRS Axis= 72  deg  T Wave Axis= -7  deg  - BORDERLINE ECG -  Sinus rhythm  Left atrial enlargement  Electronically Signed By: Usama Rai (Maicol) 18-Aug-2023 07:08:10  Date and Time of Study: 2023-08-17 23:31:15    SCANNED - TELEMETRY   [[id number]] Resulted: 08/17/23     Updated: 08/21/23 0430    SCANNED - TELEMETRY   [[id number]] Resulted: 08/17/23     Updated: 08/21/23 0430    SCANNED - TELEMETRY   [[id number]] Resulted: 08/17/23     Updated: 08/21/23 0608    SCANNED - TELEMETRY   [[id number]] Resulted: 08/17/23     Updated: 08/21/23 0609    SCANNED - TELEMETRY   [[id number]] Resulted: 08/17/23     Updated: 08/21/23 0609    SCANNED - TELEMETRY   [[id number]] Resulted: 08/17/23     Updated: 08/21/23 0739    SCANNED - TELEMETRY   [[id number]] Resulted: 08/17/23     Updated: 08/21/23 1438    SCANNED - TELEMETRY   [[id number]] Resulted: 08/17/23     Updated: 08/21/23 1438    SCANNED - TELEMETRY   [[id number]] Resulted: 08/17/23     Updated: 08/22/23 0446    SCANNED - TELEMETRY   [[id number]] Resulted: 08/17/23     Updated: 08/22/23 0446    SCANNED - TELEMETRY   [[id number]] Resulted: 08/17/23     Updated: 08/22/23 0446    SCANNED - TELEMETRY   [[id number]] Resulted: 08/17/23     Updated: 08/22/23 0521    SCANNED - TELEMETRY   [[id number]] Resulted: 08/17/23     Updated: 08/22/23 0521    SCANNED - TELEMETRY   [[id number]] Resulted: 08/17/23     Updated: 08/22/23 0541    SCANNED - TELEMETRY   [[id number]] Resulted: 08/17/23     Updated: 08/22/23 0602    SCANNED - TELEMETRY   [[id number]] Resulted: 08/17/23     Updated: 08/22/23 0602    SCANNED - TELEMETRY   [[id number]] Resulted: 08/17/23     Updated: 08/22/23 0631    SCANNED - TELEMETRY   [[id number]]  Resulted: 08/17/23     Updated: 08/22/23 0631    SCANNED - TELEMETRY   [[id number]] Resulted: 08/17/23     Updated: 08/22/23 0759    SCANNED - TELEMETRY   [[id number]] Resulted: 08/17/23     Updated: 08/22/23 0906    SCANNED - TELEMETRY   [[id number]] Resulted: 08/17/23     Updated: 08/22/23 0906          reviewed    Results for orders placed during the hospital encounter of 08/17/23    Duplex Venous Lower Extremity - Bilateral    Interpretation Summary    Limited examination due to body habitus, fluid retention and patient intolerance. There is no deep or superficial vein thrombosis in the imaged segments of the lower extremities.      Results for orders placed during the hospital encounter of 04/16/23    Adult Transthoracic Echo Complete W/ Cont if Necessary Per Protocol    Interpretation Summary    Left ventricular ejection fraction appears to be 56 - 60%.    Estimated right ventricular systolic pressure from tricuspid regurgitation is mildly elevated (35-45 mmHg).    Indications  Chest pain    Technically satisfactory study.  Mitral valve is structurally normal.  Tricuspid valve is structurally normal.  Aortic valve is thickened with mild aortic valve stenosis (gradient across the aortic valve 21/12 mmHg and valve area of 2.2 cmý.  Pulmonic valve could not be well visualized.  No evidence for mitral tricuspid or aortic regurgitation is seen by Doppler study.  Left atrium is normal in size.  Right atrium is normal in size.  Left ventricle is normal in size and contractility with ejection fraction of 60%.  Right ventricle is normal in size.  Atrial septum is intact.  Aorta is normal.  No pericardial effusion or intracardiac thrombus is seen.    Impression  Mild aortic valve stenosis.  Gradient across the aortic valve 21/12 mmHg and valve area of 2.2 cmý.  Left ventricular size and contractility is normal with ejection fraction of 60%.      Microbiology Results (last 10 days)       Procedure Component Value -  Date/Time    Blood Culture - Blood, Arm, Left [[id number]]  (Normal) Collected: 08/18/23 0034    Lab Status: Preliminary result Specimen: Blood from Arm, Left Updated: 08/22/23 0045     Blood Culture No growth at 4 days    Respiratory Panel PCR w/COVID-19(SARS-CoV-2) [facility]/CINTHIA/[facility]/PAD/COR/MAD/MARTIN In-House, NP Swab in UTM/VTM, 3-4 HR TAT - Swab, Nasopharynx [[id number]]  (Normal) Collected: 08/18/23 0002    Lab Status: Final result Specimen: Swab from Nasopharynx Updated: 08/18/23 0055     ADENOVIRUS, PCR Not Detected     Coronavirus 229E Not Detected     Coronavirus HKU1 Not Detected     Coronavirus NL63 Not Detected     Coronavirus OC43 Not Detected     COVID19 Not Detected     Human Metapneumovirus Not Detected     Human Rhinovirus/Enterovirus Not Detected     Influenza A PCR Not Detected     Influenza B PCR Not Detected     Parainfluenza Virus 1 Not Detected     Parainfluenza Virus 2 Not Detected     Parainfluenza Virus 3 Not Detected     Parainfluenza Virus 4 Not Detected     RSV, PCR Not Detected     Bordetella pertussis pcr Not Detected     Bordetella parapertussis PCR Not Detected     Chlamydophila pneumoniae PCR Not Detected     Mycoplasma pneumo by PCR Not Detected    Narrative:      In the setting of a positive respiratory panel with a viral infection PLUS a negative procalcitonin without other underlying concern for bacterial infection, consider observing off antibiotics or discontinuation of antibiotics and continue supportive care. If the respiratory panel is positive for atypical bacterial infection (Bordetella pertussis, Chlamydophila pneumoniae, or Mycoplasma pneumoniae), consider antibiotic de-escalation to target atypical bacterial infection.    Blood Culture - Blood, Arm, Right [[id number]]  (Normal) Collected: 08/18/23 0001    Lab Status: Preliminary result Specimen: Blood from Arm, Right Updated: 08/22/23 0015     Blood Culture No growth at 4 days            Assessment & Plan     Dyspnea         Dyspnea             Lab Results   Component Value Date     TROPONINT 14 08/18/2023     TROPONINT 18 (H) 04/17/2023     PROBNP 77.2 08/18/2023     PROBNP 75.8 04/16/2023      (L) 08/18/2023   -Procalcitonin 0.10, lactate 1.6  -Blood cultures pending  -Respiratory viral panel negative  -Echocardiogram (April 2023): EF 56 to 60%, mild aortic valve stenosis  -Chest x-ray: Mild vascular congestion and cardiomegaly similar to previous studies  -EKG: sinus rhythm heart rate 86 with left atrial enlargement  -IV lasix Initiated/ monitor BMP  -Monitor I's and O's and daily weights  -2 g sodium diet  -D dimer 2.15  -CT chest: No evidence of pulmonary embolism, mild vascular congestion cardiomegaly     Cirrhosis            Lab Results   Component Value Date     ALT 26 08/18/2023     AST 72 (H) 08/18/2023     BILITOT 0.9 08/18/2023     ALBUMIN 3.0 (L) 08/18/2023   -Ultrasound liver (July 2023) cirrhotic appearance with no lesions noted, cholelithiasis  -Continue IV Lasix, spironolactone, beta-blocker  -Low-sodium diet  -Ammonia 83  -Add lactulose  -Monitor daily weights     Chronic Lymphadema  -BNP is 77  -PT/OT consulted  -Wound care consult  -Venous duplex showed limited examination due to body habitus and fluid but otherwise showed no DVT or superficial thrombosis  -Blood cultures no growth at 24 hours   -Initiate IV Rocephin possible cellulitis     Pancytopenia  -WBC 3.00, platelets 79, RBC 3.51 hemoglobin 12.0 (baseline with likely related to cirrhosis  -Continue to monitor  -Seen by hematology outpatient     Asthma without exacerbation  -Continue albuterol as needed      BPH  -Continue Flomax     Neuropathy  -Continue gabapentin    I discussed the patients findings and my recommendations with patient.     Discharge Diagnosis:      Dyspnea      Hospital Course  Patient is a 58 y.o. male presented with leg pain.  Patient has history of chronic lymphedema and is seeing lymphedema clinic outpatient.  Patient states  worsening leg pain associated dyspnea some dry cough for the past couple days.  Troponin 14, BNP 77.  Blood cultures show no growth at 24 hours.  Patient was prophylactically placed on IV Rocephin due to history of cellulitis and edema.  Respiratory viral panel negative.  Echocardiogram in April of this year showed EF of 56 to 60% with mild right valve stenosis.  Chest x-ray showed mild vascular congestion and cardiomegaly similar to previous studies.  Patient was placed on IV Lasix initially monitor BMP with improvement in swelling.  D-dimer was elevated at 2.15 with CT of chest showing no evidence of embolism with mild vascular congestion and cardiomegaly.  Patient has known history of pancytopenia with W BC at 3, platelets 79, RBC 3.51, hemoglobin 12 which is a slight baseline for this patient related to liver cirrhosis.  Ultrasound of liver showed cirrhotic appearance with no lesions in July.  Patient continue on IV Lasix, spironolactone beta-blocker with monitoring of labs.  Ammonia 83 with reduced to 59.  ALT normal with slightly elevated AST at 72.  Patient was also seen by wound care which gave recommendations for patient to continue outpatient.  Patient to see hematology outpatient for pancytopenia.  Patient to follow-up with GI outpatient for liver cirrhosis.  Patient also to follow-up with lymphedema clinic and accepted appointment.  Patient to follow-up PCP in 1 week for continued care management.  Testing recommendations reviewed with patient and he agrees with treatment plan.  Patient to go for rehab at [facility].  If symptoms worsen patient to call 911 or go to nearest ED.    Past Medical History:     Past Medical History:   Diagnosis Date    Arthritis     Asthma     Chronic deep vein thrombosis (DVT)     Lymphedema of both lower extremities        Past Surgical History:     Past Surgical History:   Procedure Laterality Date    APPENDECTOMY  1974    CARDIAC CATHETERIZATION N/A 2/28/2020     Procedure: Left ventriculography;  Surgeon: Kenney Reyez MD;  Location: [facility] CATH INVASIVE LOCATION;  Service: Cardiovascular;  Laterality: N/A;    CARDIAC CATHETERIZATION N/A 2020    Procedure: Coronary angiography;  Surgeon: Kenney Reyez MD;  Location: [facility] CATH INVASIVE LOCATION;  Service: Cardiovascular;  Laterality: N/A;    CARDIAC CATHETERIZATION N/A 2020    Procedure: Left Heart Cath;  Surgeon: Kenney Reyez MD;  Location: [facility] CATH INVASIVE LOCATION;  Service: Cardiovascular;  Laterality: N/A;    COLONOSCOPY N/A 11/15/2022    Procedure: COLONOSCOPY with polypectomy;  Surgeon: AMY Garcia MD;  Location: [facility] ENDOSCOPY;  Service: Gastroenterology;  Laterality: N/A;  post: diverticulosis, ascending polyp x2, transverse polyp x3, descending polyp x4, rectal polyp x1, hemorroids    ENDOSCOPY N/A 11/15/2022    Procedure: ESOPHAGOGASTRODUODENOSCOPY with biopsy;  Surgeon: AMY Garcia MD;  Location: [facility] ENDOSCOPY;  Service: Gastroenterology;  Laterality: N/A;  post: esophagitis with biopsy to rule out candida , gastric biopsy    KNEE SURGERY Left        Social History:   Social History     Socioeconomic History    Marital status: Single   Tobacco Use    Smoking status: Former     Packs/day: 0.50     Years: 5.00     Pack years: 2.50     Types: Cigarettes     Start date:      Quit date:      Years since quittin.6     Passive exposure: Past    Smokeless tobacco: Never   Vaping Use    Vaping Use: Never used   Substance and Sexual Activity    Alcohol use: Never    Drug use: Never    Sexual activity: Defer       Procedures Performed         Consults:   Consults       No orders found from 2023 to 2023.            Condition on Discharge:     Stable    Discharge Disposition  Skilled Nursing Facility (DC - External)    Discharge Medications     Discharge Medications        New Medications        Instructions Start Date   lactulose 10 GM/15ML  solution  Commonly known as: CHRONULAC   10 g, Oral, Daily      mineral oil-hydrophilic petrolatum ointment   1 application , Topical, 2 Times Daily PRN      pantoprazole 40 MG EC tablet  Commonly known as: PROTONIX   40 mg, Oral, Daily             Continue These Medications        Instructions Start Date   albuterol sulfate  (90 Base) MCG/ACT inhaler  Commonly known as: PROVENTIL HFA;VENTOLIN HFA;PROAIR HFA   2 puffs, Inhalation, Every 4 Hours PRN      dicyclomine 10 MG capsule  Commonly known as: BENTYL   10 mg, Oral, 3 Times Daily Before Meals      furosemide 20 MG tablet  Commonly known as: Lasix   20 mg, Oral, Daily, Take daily or PRN      gabapentin 300 MG capsule  Commonly known as: NEURONTIN   300 mg, Oral, 3 Times Daily      riFAXIMin 550 MG tablet  Commonly known as: XIFAXAN   550 mg, Oral, Every 12 Hours Scheduled      spironolactone 25 MG tablet  Commonly known as: ALDACTONE   50 mg, Oral, Daily             Stop These Medications      amoxicillin-clavulanate 875-125 MG per tablet  Commonly known as: AUGMENTIN              Discharge Diet:   Diet Instructions       Diet: Cardiac Diets; Healthy Heart (2-3 Na+); Regular Texture (IDDSI 7); Thin (IDDSI 0)      Discharge Diet: Cardiac Diets    Cardiac Diet: Healthy Heart (2-3 Na+)    Texture: Regular Texture (IDDSI 7)    Fluid Consistency: Thin (IDDSI 0)            Activity at Discharge:   Activity Instructions       Activity as Tolerated      Measure Blood Pressure     Additional Activity Instructions:    As Tolerated           Follow-up Appointments  Future Appointments   Date Time Provider Department Center   9/13/2023  1:30 PM LAB MD [facility]   9/13/2023  1:45 PM Lionel Lynn MD [facility]   11/9/2023  1:00 PM Kenney Reyez MD [facility]     Additional Instructions for the Follow-ups that You Need to Schedule       Discharge Follow-up with PCP   As directed       Currently Documented PCP:    Brian  MD Alfredo    PCP Phone Number:    [phone]     Follow Up Details: 7-10 days                Test Results Pending at Discharge  Pending Labs       Order Current Status    Blood Culture - Blood, Arm, Left Preliminary result    Blood Culture - Blood, Arm, Right Preliminary result             Risk for Readmission (LACE) Score: 7 (8/21/2023  6:00 AM)          QUEENIE Hearn  08/22/23  14:02 EDT

## 2023-08-21 NOTE — CASE MANAGEMENT/SOCIAL WORK
Continued Stay Note  NAZ Buenrostro     Patient Name: Robert Randhawa Jr.  MRN: [id number]  Today's Date: 8/21/2023    Admit Date: 8/17/2023    Plan: [facility] Accepted Precert Started 8/21 (Updated), PASRR approved   Discharge Plan       Row Name 08/21/23 1135       Plan    Plan [facility] Accepted Precert Started 8/21 (Updated), PASRR approved    Plan Comments [facility] able to accept Notified SHARAD Bolanos to update [facility] portal for [place] precert. Precert pending. Per Patient brother Sajan might be jessi to tranport pending time and if he can get a hold. Patient will need notified once Precert is approved so he can work on transportation                  Discharge Codes    No documentation.                 Expected Discharge Date and Time       Expected Discharge Date Expected Discharge Time    Aug 21, 2023               Shraddha Bah RN

## 2023-08-21 NOTE — DISCHARGE INSTR - APPOINTMENTS
Alfredo Torres MD PCP - General [phone]   Alfredo Torres MD PCP - Family Medicine [phone]   Kenney Reyez MD Consulting Physician, Cardiology [phone]   Lionel Lynn MD Consulting Physician, Hematology and Oncology [phone]     Order Questions    Question Answer   Follow Up Details 7-10 days

## 2023-08-21 NOTE — DISCHARGE PLACEMENT REQUEST
"Josemanuel Randhawa Jr. (58 y.o. Male)       Date of Birth   1965    Social Security Number       Address   [address] [place] [address] [place] [address]    Home Phone   [phone]    MRN   [id number]       Sikh   None    Marital Status   Single                            Admission Date   8/17/23    Admission Type   Emergency    Admitting Provider   Usama Rai MD    Attending Provider   Usama Rai MD    Department, Room/Bed   [facility] OBSERVATION, 111/1       Discharge Date       Discharge Disposition       Discharge Destination                                 Attending Provider: Usama Rai MD    Allergies: Sulfa Antibiotics    Isolation: None   Infection: None   Code Status: CPR    Ht: 185.4 cm (73\")   Wt: 165 kg (363 lb 5.1 oz)    Admission Cmt: None   Principal Problem: Dyspnea [R06.00]                   Active Insurance as of 8/17/2023       Primary Coverage       Payor Plan Insurance Group Employer/Plan Group    HUMANA MEDICARE REPLACEMENT HUMANA MEDICARE REPLACEMENT [id number]       Payor Plan Address Payor Plan Phone Number Payor Plan Fax Number Effective Dates    [address] [address] [phone]  12/1/2019 - None Entered    [facility] [address]         Subscriber Name Subscriber Birth Date Member ID       JOSEMANUEL RANDHAWA JR. 1965 [id number]               Secondary Coverage       Payor Plan Insurance Group Employer/Plan Group    [id number]        Payor Plan Address Payor Plan Phone Number Payor Plan Fax Number Effective Dates    [address]   1/4/2023 - None Entered    [place] [address]         Subscriber Name Subscriber Birth Date Member ID       JOSEMANUEL RANDHAWA JR. 1965 [id number]                     Emergency Contacts        (Rel.) Home Phone Work Phone Mobile Phone    EdisSajan (Brother) -- -- [phone]    SheldonIman (Sister) -- -- [phone]                 Physical Therapy Notes (all)    "     Jayla Cody, PT at 23 1353  Version 1 of 1         Goal Outcome Evaluation:  Plan of Care Reviewed With: patient           Outcome Evaluation: Patient is a 59 y/o M who was admitted 23 due to BLE leg pain as well as dyspnea.  Doppler negative for DVT, and CT chest negative for PE this date.  PMHx includes h/o DVT LLE, L TKA 2018, Arthritis, Asthma, chronic Lymphedema B lower legs, and h/o cardiac cath. At baseline pt reports independence in his home and uses a cane, uses RW outside of home.  He requires assist with lower body bathing and dressing.  Lives with brother, but pt reports brother is not always there to help him with ADLs.  Pt no longer drives.  Has 1 GISSELL home.  During today's evaluation pt was limited per nursing as his BP was low while in bed.  Only in bed activity performed with tolerance by pt.  Pt was assisted in rolling L/R as well as with lars care and positioning of urinal.  Pt was able to assist in pulling himself up in bed with BUE on rails and required mod A to position BLE to assist in pushing with feet/heels also.  Pt's BP taken ending session reading 102/70.  Pt was non-symptomatic during bed mobility activities this date.  Due to pt's mobility deficits with in bed activity this date, and difficulty with lars care, recommend SNF at discharge.  Pt having difficulty raising legs, rolling, scooting up in bed, and using urinal.  PT will continue to follow pt for additional assessment, and for OOB activity to assess safety.      Anticipated Discharge Disposition (PT): skilled nursing facility    Electronically signed by Jayla Cody, PT at 23 1353       Jayla Cody, PT at 23 1356  Version 1 of 1         Patient Name: Robert Randhawa Jr.  : 1965    MRN: [id number]                              Today's Date: 2023       Admit Date: 2023    Visit Dx:     ICD-10-CM ICD-9-CM   1. Dyspnea, unspecified type  R06.00 786.09   2. Lymphedema  I89.0  457.1     Patient Active Problem List   Diagnosis    Chest pain    Shortness of breath    Arthritis    Asthma    Deep vein thrombosis (DVT)    Lymphedema of both lower extremities    Cellulitis of lower extremity    Chest pressure    Unstable angina    Cellulitis of left lower extremity without foot    Obesity, morbid    Cellulitis of left lower extremity    Non-pressure chronic ulcer left lower leg, limited to breakdown skin    Cellulitis of left foot    Venous hypertension of both lower extremities    Onychomycosis of foot with other complication    Sepsis    Dyspnea     Past Medical History:   Diagnosis Date    Arthritis     Asthma     Chronic deep vein thrombosis (DVT)     Lymphedema of both lower extremities      Past Surgical History:   Procedure Laterality Date    APPENDECTOMY  1974    CARDIAC CATHETERIZATION N/A 2/28/2020    Procedure: Left ventriculography;  Surgeon: Kenney Reyez MD;  Location: [facility] CATH INVASIVE LOCATION;  Service: Cardiovascular;  Laterality: N/A;    CARDIAC CATHETERIZATION N/A 2/28/2020    Procedure: Coronary angiography;  Surgeon: Kenney Reyez MD;  Location: [facility] CATH INVASIVE LOCATION;  Service: Cardiovascular;  Laterality: N/A;    CARDIAC CATHETERIZATION N/A 2/28/2020    Procedure: Left Heart Cath;  Surgeon: Kenney Reyez MD;  Location: [facility] CATH INVASIVE LOCATION;  Service: Cardiovascular;  Laterality: N/A;    COLONOSCOPY N/A 11/15/2022    Procedure: COLONOSCOPY with polypectomy;  Surgeon: AMY Garcia MD;  Location: [facility] ENDOSCOPY;  Service: Gastroenterology;  Laterality: N/A;  post: diverticulosis, ascending polyp x2, transverse polyp x3, descending polyp x4, rectal polyp x1, hemorroids    ENDOSCOPY N/A 11/15/2022    Procedure: ESOPHAGOGASTRODUODENOSCOPY with biopsy;  Surgeon: AMY Garcia MD;  Location: [facility] ENDOSCOPY;  Service: Gastroenterology;  Laterality: N/A;  post: esophagitis with biopsy to rule out candida , gastric biopsy    KNEE SURGERY  Left 2018      General Information       Row Name 08/18/23 1137          Physical Therapy Time and Intention    Document Type evaluation  -EJ     Mode of Treatment physical therapy  -EJ       Row Name 08/18/23 1137          General Information    Patient Profile Reviewed yes  -EJ     Prior Level of Function independent:;all household mobility;gait;transfer;bed mobility;min assist:;ADL's;bathing  Per pt he uses SPC in home, and RW outside of home. He is able to bath/dress upper half, but brother will assist with lower half/lower body.  Pt reports he does not drive.  Has medical transport via his insurance.  -EJ     Existing Precautions/Restrictions fall;orthostatic hypotension  -EJ     Barriers to Rehab medically complex;physical barrier  BLE lyphedema.  -EJ       Row Name 08/18/23 1137          Living Environment    People in Home sibling(s)  Lives with brother.  Pt reports brother is not there that much to assist him. Pt reports he is interested in possibly moving to assisted living facility.  -EJ     Name(s) of People in Home Sajan (brother)  -       Row Name 08/18/23 1137          Home Main Entrance    Number of Stairs, Main Entrance one  -EJ       Row Name 08/18/23 1137          Stairs Within Home, Primary    Number of Stairs, Within Home, Primary none  -EJ       Row Name 08/18/23 1137          Cognition    Orientation Status (Cognition) oriented x 4  -EJ       Row Name 08/18/23 1137          Safety Issues, Functional Mobility    Impairments Affecting Function (Mobility) balance;endurance/activity tolerance;strength;range of motion (ROM)  -EJ               User Key  (r) = Recorded By, (t) = Taken By, (c) = Cosigned By      Initials Name Provider Type    EJ Jayla Cody, PT Physical Therapist                   Mobility       Row Name 08/18/23 1144          Bed Mobility    Bed Mobility rolling left;rolling right;scooting/bridging  -EJ     Rolling Left [place] (Bed Mobility) moderate assist (50% patient  effort);1 person assist  -     Rolling Right [place] (Bed Mobility) moderate assist (50% patient effort);1 person assist  -     Scooting/Bridging [place] (Bed Mobility) 1 person assist;moderate assist (50% patient effort);minimum assist (75% patient effort)  -[facility] Name 08/18/23 1144          Bed-Chair Transfer    Bed-Chair [place] (Transfers) not tested  -[facility] Name 08/18/23 1144          Sit-Stand Transfer    Sit-Stand [place] (Transfers) not tested  -EJ       Row Name 08/18/23 1144          Gait/Stairs (Locomotion)    [place] Level (Gait) not tested  -               User Key  (r) = Recorded By, (t) = Taken By, (c) = Cosigned By      Initials Name Provider Type    Jayla Diego, PT Physical Therapist                   Obj/Interventions       [facility] Name 08/18/23 1144          Range of Motion Comprehensive    Comment, General Range of Motion BUE AROM grossly WFL.  Lower extremity ROM limited due to chronic lyphedema and heaviness of legs limiting motion.  -[facility] Name 08/18/23 1144          Strength Comprehensive (MMT)    Comment, General Manual Muscle Testing (MMT) Assessment BUE strength grossly 4- to 4/5, BLE strength 3-/5 via obs.  -[facility] Name 08/18/23 1144          Motor Skills    Motor Skills functional endurance  -     Functional Endurance Fair-  -               User Key  (r) = Recorded By, (t) = Taken By, (c) = Cosigned By      Initials Name Provider Type    Jayla Diego, PT Physical Therapist                   Goals/Plan       Row Name 08/18/23 1352          Bed Mobility Goal 1 (PT)    Activity/Assistive Device (Bed Mobility Goal 1, PT) bed mobility activities, all  -     [place] Level/Cues Needed (Bed Mobility Goal 1, PT) minimum assist (75% or more patient effort)  -     Time Frame (Bed Mobility Goal 1, PT) long term goal (LTG);2 weeks  -[facility] Name 08/18/23 1352          Transfer Goal 1 (PT)    Activity/Assistive Device  (Transfer Goal 1, PT) transfers, all;walker, rolling  -EJ     [place] Level/Cues Needed (Transfer Goal 1, PT) minimum assist (75% or more patient effort)  -EJ     Time Frame (Transfer Goal 1, PT) long term goal (LTG);2 weeks  -EJ       Row Name 08/18/23 1352          Gait Training Goal 1 (PT)    Activity/Assistive Device (Gait Training Goal 1, PT) gait (walking locomotion);walker, rolling  -EJ     [place] Level (Gait Training Goal 1, PT) minimum assist (75% or more patient effort)  -EJ     Distance (Gait Training Goal 1, PT) 20 ft x 2  -EJ     Time Frame (Gait Training Goal 1, PT) long term goal (LTG);2 weeks  -EJ       Row Name 08/18/23 1352          Therapy Assessment/Plan (PT)    Planned Therapy Interventions (PT) balance training;bed mobility training;gait training;postural re-education;patient/family education;neuromuscular re-education;strengthening;transfer training;stair training  -EJ               User Key  (r) = Recorded By, (t) = Taken By, (c) = Cosigned By      Initials Name Provider Type    EJ Jayla Cody, PT Physical Therapist                   Clinical Impression       Row Name 08/18/23 1146          Pain    Pain Intervention(s) Therapeutic presence;Repositioned;Emotional support  -EJ     Additional Documentation Pain Scale: FACES Pre/Post-Treatment (Group)  -       Row Name 08/18/23 1146          Pain Scale: FACES Pre/Post-Treatment    Pain: FACES Scale, Pretreatment 2-->hurts little bit  -EJ     Posttreatment Pain Rating 2-->hurts little bit  -EJ     Pain Location lower  -EJ     Pain Location - extremity  B lower legs due to lymphedema  -EJ       Row Name 08/18/23 1146          Plan of Care Review    Plan of Care Reviewed With patient  -EJ     Outcome Evaluation Patient is a 57 y/o M who was admitted 8/17/23 due to BLE leg pain as well as dyspnea.  Doppler negative for DVT, and CT chest negative for PE this date.  PMHx includes h/o DVT LLE, L TKA 2018, Arthritis, Asthma, chronic  Lymphedema B lower legs, and h/o cardiac cath. At baseline pt reports independence in his home and uses a cane, uses RW outside of home.  He requires assist with lower body bathing and dressing.  Lives with brother, but pt reports brother is not always there to help him with ADLs.  Pt no longer drives.  Has 1 GISSELL home.  During today's evaluation pt was limited per nursing as his BP was low while in bed.  Only in bed activity performed with tolerance by pt.  Pt was assisted in rolling L/R as well as with lars care and positioning of urinal.  Pt was able to assist in pulling himself up in bed with BUE on rails and required mod A to position BLE to assist in pushing with feet/heels also.  Pt's BP taken ending session reading 102/70.  Pt was non-symptomatic during bed mobility activities this date.  Due to pt's mobility deficits with in bed activity this date, and difficulty with lars care, recommend SNF at discharge.  Pt having difficulty raising legs, rolling, scooting up in bed, and using urinal.  PT will continue to follow pt for additional assessment, and for OOB activity to assess safety.  -       Row Name 08/18/23 1146          Therapy Assessment/Plan (PT)    Criteria for Skilled Interventions Met (PT) yes;skilled treatment is necessary  -EJ     Therapy Frequency (PT) 5 times/wk  -EJ     Predicted Duration of Therapy Intervention (PT) until discharge  -       Row Name 08/18/23 1146          Positioning and Restraints    Pre-Treatment Position in bed  -EJ     Post Treatment Position bed  -EJ     In Bed fowlers;legs elevated;with other staff;call light within reach;encouraged to call for assist;exit alarm on  -EJ               User Key  (r) = Recorded By, (t) = Taken By, (c) = Cosigned By      Initials Name Provider Type    Jayla Diego, PT Physical Therapist                   Outcome Measures       Row Name 08/18/23 2214          How much help from another person do you currently need...    Turning from  your back to your side while in flat bed without using bedrails? 3  -EJ     Moving from lying on back to sitting on the side of a flat bed without bedrails? 2  -EJ     Moving to and from a bed to a chair (including a wheelchair)? 2  -EJ     Standing up from a chair using your arms (e.g., wheelchair, bedside chair)? 2  -EJ     Climbing 3-5 steps with a railing? 1  -EJ     To walk in hospital room? 2  -EJ     AM-PAC 6 Clicks Score (PT) 12  -EJ     Highest level of mobility 4 --> Transferred to chair/commode  -EJ       Row Name 08/18/23 1353          Functional Assessment    Outcome Measure Options AM-PAC 6 Clicks Basic Mobility (PT)  -               User Key  (r) = Recorded By, (t) = Taken By, (c) = Cosigned By      Initials Name Provider Type    EJ Jayla Cody, PT Physical Therapist                                 Physical Therapy Education       Title: PT OT SLP Therapies (In Progress)       Topic: Physical Therapy (In Progress)       Point: Mobility training (Done)       Learning Progress Summary             Patient Acceptance, E, VU by  at 8/18/2023 1353                         Point: Home exercise program (Not Started)       Learner Progress:  Not documented in this visit.              Point: Body mechanics (Done)       Learning Progress Summary             Patient Acceptance, E, VU by  at 8/18/2023 1353                         Point: Precautions (Not Started)       Learner Progress:  Not documented in this visit.                              User Key       Initials Effective Dates Name Provider Type Discipline     07/11/23 -  Jayla Cody, PT Physical Therapist PT                  PT Recommendation and Plan  Planned Therapy Interventions (PT): balance training, bed mobility training, gait training, postural re-education, patient/family education, neuromuscular re-education, strengthening, transfer training, stair training  Plan of Care Reviewed With: patient  Outcome Evaluation: Patient is a 58  y/o M who was admitted 8/17/23 due to BLE leg pain as well as dyspnea.  Doppler negative for DVT, and CT chest negative for PE this date.  PMHx includes h/o DVT LLE, L TKA 2018, Arthritis, Asthma, chronic Lymphedema B lower legs, and h/o cardiac cath. At baseline pt reports independence in his home and uses a cane, uses RW outside of home.  He requires assist with lower body bathing and dressing.  Lives with brother, but pt reports brother is not always there to help him with ADLs.  Pt no longer drives.  Has 1 GISSELL home.  During today's evaluation pt was limited per nursing as his BP was low while in bed.  Only in bed activity performed with tolerance by pt.  Pt was assisted in rolling L/R as well as with lars care and positioning of urinal.  Pt was able to assist in pulling himself up in bed with BUE on rails and required mod A to position BLE to assist in pushing with feet/heels also.  Pt's BP taken ending session reading 102/70.  Pt was non-symptomatic during bed mobility activities this date.  Due to pt's mobility deficits with in bed activity this date, and difficulty with lars care, recommend SNF at discharge.  Pt having difficulty raising legs, rolling, scooting up in bed, and using urinal.  PT will continue to follow pt for additional assessment, and for OOB activity to assess safety.     Time Calculation:         PT Charges       Row Name 08/18/23 3855             Time Calculation    Start Time 1026  -EJ      Stop Time 1058  -EJ      Time Calculation (min) 32 min  -EJ      PT Received On 08/18/23  -EJ      PT - Next Appointment 08/20/23  -EJ      PT Goal Re-Cert Due Date 09/01/23  -EJ         Time Calculation- PT    Total Timed Code Minutes- PT 0 minute(s)  -EJ                User Key  (r) = Recorded By, (t) = Taken By, (c) = Cosigned By      Initials Name Provider Type    Jayla Diego, PT Physical Therapist                  Therapy Charges for Today       Code Description Service Date Service Provider  "Modifiers Qty    [id number]  PT EVAL MOD COMPLEXITY 4 8/18/2023 Jayla Cody, PT GP 1            PT G-Codes  Outcome Measure Options: AM-PAC 6 Clicks Basic Mobility (PT)  AM-PAC 6 Clicks Score (PT): 12  PT Discharge Summary  Anticipated Discharge Disposition (PT): skilled nursing facility    Jayla Cody, PT  8/18/2023      Electronically signed by Jayla Cody, PT at 08/18/23 0302       Navid Nick, PTA at 08/20/23 1106  Version 1 of 1         Subjective: Pt agreeable to therapeutic plan of care.    Objective:     Bed mobility -Supine to sitting EOB Mod-A    Transfers - SPS transfer from elevated EOB to recliner chair Min-A and with rolling walker. Difficulty clearing feet off floor to take pivotal steps.     Ambulation - 0 feet N/A or Not attempted.    Therapeutic Exercise - 20 Reps B LE AROM unsupported sitting / EOB    Vitals: Hypotensive and Orthostatic  Unable to get B/P readings; however, pt reports dizziness during transitional movements. Symptoms subside while sitting at EOB for extended amount of time.     Pain: 0 VAS   Location:   Intervention for pain: N/A    Education: Provided education on the importance of mobility in the acute care setting, Verbal/Tactile Cues, and Transfer Training    Assessment: Robert Randhawa Jr. presents with functional mobility impairments which indicate the need for skilled intervention. Tolerating session today without incident. Pt reports dizziness upon sitting at EOB. Unable to get B/P readings. Will continue to follow and progress as tolerated.     Plan/Recommendations:   Moderate Intensity Therapy recommended post-acute care. This is recommended as therapy feels the patient would require 3-4 days per week and wouldn't tolerate \"3 hour daily\" rehab intensity. SNF would be the preferred choice. If the patient does not agree to SNF, arrange HH or OP depending on home bound status. If patient is medically complex, consider LTACH.. Pt requires no " "DME at discharge.     Pt desires Skilled Rehab placement at discharge. Pt cooperative; agreeable to therapeutic recommendations and plan of care.         Basic Mobility 6-click:  Rollin = Total, A lot = 2, A little = 3; 4 = None  Supine>Sit:   1 = Total, A lot = 2, A little = 3; 4 = None   Sit>Stand with arms:  1 = Total, A lot = 2, A little = 3; 4 = None  Bed>Chair:   1 = Total, A lot = 2, A little = 3; 4 = None  Ambulate in room:  1 = Total, A lot = 2, A little = 3; 4 = None  3-5 Steps with railin = Total, A lot = 2, A little = 3; 4 = None  Score: 10    Modified [place]: N/A = No pre-op stroke/TIA    Post-Tx Position: Up in Chair, Alarms activated, and Call light and personal items within reach  PPE: gloves      Electronically signed by Navid Nick PTA at 23 1112       Navid Nick PTA at 23 1114  Version 1 of 1         Assessment: Rboert Randhawa Jr. presents with functional mobility impairments which indicate the need for skilled intervention. Tolerating session today without incident. Pt reports dizziness upon sitting at EOB. Unable to get B/P readings. Will continue to follow and progress as tolerated.     Plan/Recommendations:   Moderate Intensity Therapy recommended post-acute care. This is recommended as therapy feels the patient would require 3-4 days per week and wouldn't tolerate \"3 hour daily\" rehab intensity. SNF would be the preferred choice. If the patient does not agree to SNF, arrange HH or OP depending on home bound status. If patient is medically complex, consider LTACH.. Pt requires no DME at discharge.     Pt desires Skilled Rehab placement at discharge. Pt cooperative; agreeable to therapeutic recommendations and plan of care.     Electronically signed by Navid Nick PTA at 23 1115       "

## 2023-08-21 NOTE — PLAN OF CARE
Problem: Respiratory Impairment - Respiratory Therapy 253  Intervention: Inhaled gas administration  Note: Intervention Status  Done      10/03/21 1035   Vitals   Resp 58   SpO2 99 %   O2 Flow Rate (L/min) 1 L/min   FiO2 (%) 21 %   Oxygen Therapy   Pulse Ox  Mode Continuous   Oxygen In Use Yes   O2 Device Interface Nasal cannula   Heated Humidification No   Device Temperature (°C)   (bubble humidifier)   Equipment water level 3/4      "Goal Outcome Evaluation:  Plan of Care Reviewed With: patient        Assessment: Robert Randhawa Jr. presents with functional mobility impairments which indicate the need for skilled intervention. Tolerating session today without incident. Pt able to take 3-4 steps bed to chair this date.  Pt still weak and unable to ambulate further due to reports of feeling as though his RLE may give way on him.  Will continue to follow and progress as tolerated.     Plan/Recommendations:   Moderate Intensity Therapy recommended post-acute care. This is recommended as therapy feels the patient would require 3-4 days per week and wouldn't tolerate \"3 hour daily\" rehab intensity. SNF would be the preferred choice. If the patient does not agree to SNF, arrange HH or OP depending on home bound status. If patient is medically complex, consider LTACH.. Pt requires no DME at discharge.     Pt desires Skilled Rehab placement at discharge. Pt cooperative; agreeable to therapeutic recommendations and plan of care.   "

## 2023-08-21 NOTE — SIGNIFICANT NOTE
Case Management/Social Work    Patient Name:  Robert Randhawa Jr.  YOB: 1965  MRN: [id number]  Admit Date:  8/17/2023 08/21/23 1200   Post Acute Pre-Cert Documentation   Date Post Acute Pre-Cert Completed 08/21/23   Response Accepted   Post Acute Pre-Cert Initiated Comment SHARAD verified SNF precert approval via Done In [employer] portal. Done In [employer] auth ID [id number]. Valid 8/21-8/23. CM amde aware.           Electronically signed by:  Luis Miguel Brock CMA  08/21/23 12:01 EDT    Luis Miguel Brock  Case Management Associate  [address] [address]  P: [phone]  F: [phone]

## 2023-08-21 NOTE — CASE MANAGEMENT/SOCIAL WORK
Continued Stay Note  NAZ Buenrostro     Patient Name: Robert Randhawa Jr.  MRN: [id number]  Today's Date: 8/21/2023    Admit Date: 8/17/2023    Plan: [facility] Accepted, Precert Pending was started 8/19, PASRR approved   Discharge Plan       Row Name 08/21/23 0957       Plan    Plan [facility] Accepted, Precert Pending was started 8/19, PASRR approved    Plan Comments Confirmed this AM that Precert is still pending for patient                          Shraddha Bah RN

## 2023-08-21 NOTE — THERAPY TREATMENT NOTE
"Subjective: Pt agreeable to therapeutic plan of care.     Objective:     Bed mobility - Mod-A supine to sit, min A for scooting to EOB.   Transfers - Mod-A and with rolling walker sit to stand, mod A x2 for bed to chair   Ambulation - 0 feet N/A or Not attempted.  *Gait belt applied.     Vitals: WNL, BP in supine:102/67.  Sittin/65    Pain: 5 VAS   Location: B lower legs (L>R)   Intervention for pain: Repositioned and Therapeutic Presence. Elevated BLE.     Education: Provided education on the importance of mobility in the acute care setting, Verbal/Tactile Cues, and Transfer Training    Assessment: Robert Randhawa Jr. presents with functional mobility impairments which indicate the need for skilled intervention. Tolerating session today without incident. Pt able to take 3-4 steps bed to chair this date.  Pt still weak and unable to ambulate further due to reports of feeling as though his RLE may give way on him.  Will continue to follow and progress as tolerated.     Plan/Recommendations:   Moderate Intensity Therapy recommended post-acute care. This is recommended as therapy feels the patient would require 3-4 days per week and wouldn't tolerate \"3 hour daily\" rehab intensity. SNF would be the preferred choice. If the patient does not agree to SNF, arrange HH or OP depending on home bound status. If patient is medically complex, consider LTACH.. Pt requires no DME at discharge.     Pt desires Skilled Rehab placement at discharge. Pt cooperative; agreeable to therapeutic recommendations and plan of care.     Basic Mobility 6-click:  Rollin = Total, A lot = 2, A little = 3; 4 = None  Supine>Sit:   1 = Total, A lot = 2, A little = 3; 4 = None   Sit>Stand with arms:  1 = Total, A lot = 2, A little = 3; 4 = None  Bed>Chair:   1 = Total, A lot = 2, A little = 3; 4 = None  Ambulate in room:  1 = Total, A lot = 2, A little = 3; 4 = None  3-5 Steps with railin = Total, A lot = 2, A little = 3; 4 = " None  Score: 14    Post-Tx Position: Up in Chair, Alarms activated, and Call light and personal items within reach  PPE: gloves and gown

## 2023-08-21 NOTE — CASE MANAGEMENT/SOCIAL WORK
Continued Stay Note  NAZ Buenrostro     Patient Name: Robert Randhawa Jr.  MRN: [id number]  Today's Date: 8/21/2023    Admit Date: 8/17/2023    Plan: Referral to [facility] pending acceptance Will need Precert PASRR approved   Discharge Plan       Row Name 08/21/23 1105       Plan    Plan Referral to [facility] pending acceptance Will need Precert PASRR approved    Plan Comments Was notified by [facility] was out of network and patient does not have out of network benefits reviewed list that kat sent with in network facilities and Patient selected [facility] referral sent to [facility] pending acceptance                                                       Shraddha Bah RN

## 2023-08-21 NOTE — PLAN OF CARE
Goal Outcome Evaluation:               Patient had bilateral legs discomfort and pain, pain medication administered, pt rest well through the night. Will continue to monitor.

## 2023-08-23 LAB
BACTERIA SPEC AEROBE CULT: NORMAL
BACTERIA SPEC AEROBE CULT: NORMAL

## 2023-08-31 ENCOUNTER — OFFICE (OUTPATIENT)
Dept: URBAN - METROPOLITAN AREA CLINIC 64 | Facility: CLINIC | Age: 58
End: 2023-08-31
Payer: MEDICARE

## 2023-08-31 DIAGNOSIS — E66.9 OBESITY, UNSPECIFIED: ICD-10-CM

## 2023-08-31 DIAGNOSIS — K72.90 HEPATIC FAILURE, UNSPECIFIED WITHOUT COMA: ICD-10-CM

## 2023-08-31 DIAGNOSIS — K29.70 GASTRITIS, UNSPECIFIED, WITHOUT BLEEDING: ICD-10-CM

## 2023-08-31 DIAGNOSIS — K74.60 UNSPECIFIED CIRRHOSIS OF LIVER: ICD-10-CM

## 2023-08-31 PROCEDURE — 99489 CPLX CHRNC CARE EA ADDL 30: CPT | Performed by: INTERNAL MEDICINE

## 2023-08-31 PROCEDURE — 99487 CPLX CHRNC CARE 1ST 60 MIN: CPT | Performed by: INTERNAL MEDICINE

## 2023-09-06 ENCOUNTER — TELEPHONE (OUTPATIENT)
Dept: ONCOLOGY | Facility: CLINIC | Age: 58
End: 2023-09-06

## 2023-09-06 NOTE — TELEPHONE ENCOUNTER
Caller: Robert Randhawa Jr.    Relationship to patient: Self    Best call back number: [phone]    Chief complaint: R/S    Type of visit: LAB AND F/U1    Requested date: NEXT AVLIABLE WEEK OF 9-18 / 9-22    If rescheduling, when is the original appointment: 9-13

## 2023-09-25 ENCOUNTER — OFFICE (OUTPATIENT)
Dept: URBAN - METROPOLITAN AREA CLINIC 64 | Facility: CLINIC | Age: 58
End: 2023-09-25

## 2023-09-25 VITALS
HEIGHT: 73 IN | HEART RATE: 69 BPM | SYSTOLIC BLOOD PRESSURE: 121 MMHG | DIASTOLIC BLOOD PRESSURE: 56 MMHG | WEIGHT: 315 LBS

## 2023-09-25 DIAGNOSIS — K75.81 NONALCOHOLIC STEATOHEPATITIS (NASH): ICD-10-CM

## 2023-09-25 DIAGNOSIS — R10.9 UNSPECIFIED ABDOMINAL PAIN: ICD-10-CM

## 2023-09-25 DIAGNOSIS — K74.69 OTHER CIRRHOSIS OF LIVER: ICD-10-CM

## 2023-09-25 DIAGNOSIS — K76.82 HEPATIC ENCEPHALOPATHY: ICD-10-CM

## 2023-09-25 PROCEDURE — 99214 OFFICE O/P EST MOD 30 MIN: CPT | Performed by: INTERNAL MEDICINE

## 2023-09-25 RX ORDER — FUROSEMIDE 80 MG/1
80 TABLET ORAL
Qty: 90 | Refills: 3 | Status: COMPLETED
End: 2024-05-20

## 2023-09-25 RX ORDER — SPIRONOLACTONE 25 MG/1
TABLET, FILM COATED ORAL
Qty: 90 | Refills: 0 | Status: ACTIVE

## 2023-10-31 ENCOUNTER — OFFICE (OUTPATIENT)
Dept: URBAN - METROPOLITAN AREA CLINIC 64 | Facility: CLINIC | Age: 58
End: 2023-10-31

## 2023-10-31 DIAGNOSIS — K29.70 GASTRITIS, UNSPECIFIED, WITHOUT BLEEDING: ICD-10-CM

## 2023-10-31 DIAGNOSIS — K74.60 UNSPECIFIED CIRRHOSIS OF LIVER: ICD-10-CM

## 2023-10-31 DIAGNOSIS — K57.90 DIVERTICULOSIS OF INTESTINE, PART UNSPECIFIED, WITHOUT PERFO: ICD-10-CM

## 2023-10-31 DIAGNOSIS — E66.9 OBESITY, UNSPECIFIED: ICD-10-CM

## 2023-10-31 PROCEDURE — 99490 CHRNC CARE MGMT STAFF 1ST 20: CPT | Performed by: INTERNAL MEDICINE

## 2023-11-30 ENCOUNTER — OFFICE (OUTPATIENT)
Dept: URBAN - METROPOLITAN AREA CLINIC 64 | Facility: CLINIC | Age: 58
End: 2023-11-30

## 2023-11-30 DIAGNOSIS — K29.70 GASTRITIS, UNSPECIFIED, WITHOUT BLEEDING: ICD-10-CM

## 2023-11-30 DIAGNOSIS — E66.9 OBESITY, UNSPECIFIED: ICD-10-CM

## 2023-11-30 DIAGNOSIS — K72.90 HEPATIC FAILURE, UNSPECIFIED WITHOUT COMA: ICD-10-CM

## 2023-11-30 DIAGNOSIS — K74.60 UNSPECIFIED CIRRHOSIS OF LIVER: ICD-10-CM

## 2023-11-30 PROCEDURE — 99490 CHRNC CARE MGMT STAFF 1ST 20: CPT | Performed by: INTERNAL MEDICINE

## 2023-12-14 ENCOUNTER — LAB REQUISITION (OUTPATIENT)
Dept: LAB | Facility: HOSPITAL | Age: 58
End: 2023-12-14
Payer: MEDICARE

## 2023-12-14 DIAGNOSIS — N39.0 URINARY TRACT INFECTION, SITE NOT SPECIFIED: ICD-10-CM

## 2023-12-14 LAB
BACTERIA UR QL AUTO: ABNORMAL /HPF
BILIRUB UR QL STRIP: NEGATIVE
CLARITY UR: CLEAR
COLOR UR: ABNORMAL
GLUCOSE UR STRIP-MCNC: NEGATIVE MG/DL
HGB UR QL STRIP.AUTO: ABNORMAL
HYALINE CASTS UR QL AUTO: ABNORMAL /LPF
KETONES UR QL STRIP: NEGATIVE
LEUKOCYTE ESTERASE UR QL STRIP.AUTO: ABNORMAL
NITRITE UR QL STRIP: NEGATIVE
PH UR STRIP.AUTO: <=5 [PH] (ref 5–8)
PROT UR QL STRIP: NEGATIVE
RBC # UR STRIP: ABNORMAL /HPF
REF LAB TEST METHOD: ABNORMAL
SP GR UR STRIP: 1.02 (ref 1–1.03)
SQUAMOUS #/AREA URNS HPF: ABNORMAL /HPF
UROBILINOGEN UR QL STRIP: ABNORMAL
WBC # UR STRIP: ABNORMAL /HPF

## 2023-12-14 PROCEDURE — 81001 URINALYSIS AUTO W/SCOPE: CPT | Performed by: NURSE PRACTITIONER

## 2024-01-31 ENCOUNTER — OFFICE (OUTPATIENT)
Dept: URBAN - METROPOLITAN AREA CLINIC 64 | Facility: CLINIC | Age: 59
End: 2024-01-31

## 2024-01-31 DIAGNOSIS — E66.9 OBESITY, UNSPECIFIED: ICD-10-CM

## 2024-01-31 DIAGNOSIS — K74.60 UNSPECIFIED CIRRHOSIS OF LIVER: ICD-10-CM

## 2024-01-31 PROCEDURE — 99490 CHRNC CARE MGMT STAFF 1ST 20: CPT | Performed by: INTERNAL MEDICINE

## 2024-02-02 ENCOUNTER — OFFICE (OUTPATIENT)
Dept: URBAN - METROPOLITAN AREA CLINIC 64 | Facility: CLINIC | Age: 59
End: 2024-02-02

## 2024-02-02 VITALS
HEIGHT: 73 IN | HEART RATE: 88 BPM | DIASTOLIC BLOOD PRESSURE: 82 MMHG | SYSTOLIC BLOOD PRESSURE: 115 MMHG | WEIGHT: 315 LBS

## 2024-02-02 DIAGNOSIS — M79.673 PAIN IN UNSPECIFIED FOOT: ICD-10-CM

## 2024-02-02 DIAGNOSIS — K74.60 UNSPECIFIED CIRRHOSIS OF LIVER: ICD-10-CM

## 2024-02-02 DIAGNOSIS — R60.0 LOCALIZED EDEMA: ICD-10-CM

## 2024-02-02 PROCEDURE — 99214 OFFICE O/P EST MOD 30 MIN: CPT | Performed by: INTERNAL MEDICINE

## 2024-02-02 NOTE — SERVICEHPINOTES
Vidal Perez returns for follow up of EDMONDS cirrhosis. He's a 59yo M with PMH of asthma, DVT, EDMONDS cirrhosis. Patient continues to have significant swelling and chronic wounds of both legs. Diuretics were increased in March and although it made him urinate more frequently he denies improvement of leg swelling. He has seen multiple vascular surgeons in the past who do not want to operate. Another provider told him he may need amputation. He denies abdominal distension. He did not start Nadolol because he is concerned about his balance. He denies memory issues or altered mental status, he is taking Xifaxan. He declines Lactulose because it is difficult for him to go to the bathroom. He eats fine but sometimes has mid abdominal pain after eating that feels like he has been punched. He denies RUQ pain. He can be nauseous but denies vomiting. No reflux or heartburn. BMs can be very painful and he feels constipated. He is taking Metamucil. Miralax did not work for him because it made him go to the bathroom too much. Stool softeners were helpful. Patient expresses depression due to his declining health and isolation. He has minimal help with ADLs or health. He states he has been referred to [facility].Has had several hospitalizations with worsening lower extremity swelling, last was about a month ago. Has 2 BM per day without any meds. Denies blood in stool. Always feels confused/ foggy. Still w/ some abd pain in center/ upper abdomen, worse after eating. 
br
br
br SLV-- Leg swelling much worse, especially left leg. Feels like he urinates frequently and wakes up at night to urinate. Getting lymphedema wrapping 2x per week. Uses ride service from medicare. brLABS:br3/2023 - glu 114, ast 59, alk phos 157, mcv 102.3, plt 87br12/2022 - plt 100, ast 61, alk phos 159, amm 126, afp nl. MELD = 8br11/2022 - alt 28, ast 59, alk phos 143, tb 1.5. hep panel NR, ammonia 129. JAMES, Ferritin, AMA, SMAb, AFP wnl.br9/2022 - mcv 104, plt 92, alt 31, ast 69, alk phos 131, tb 1.5IMAGING:br1/2023 HIDA - reduced EF (1%) which may be r/t chronic cholecystitis or biliary dyskinesiabr1/2023 US abd - cirrhosis, cholelithiasis, no biliary dilation, patent portal veinbr7/2022 US abd - hepatomegaly, questionable cirrhosis, cholelithiasis, splenomegalyENDO:br11/2022 EGD/Colonoscopy (Dr. Edmonds) - esophagitis (BE-), gastritis (HP-), duodenitis (bx neg), no varices, polyps (TA), diverticulosis, IH 2 year EGD and 3 year cscope recallsbr3/2018 EGD/colonoscopy (Dr. Camarillo) - erosive gastritis with biopsy indicating chronic gastritis but negative for H. pylori, colon polyps (SSA, TA) status post Hemoclip placement, diverticulosis

## 2024-02-29 ENCOUNTER — OFFICE (OUTPATIENT)
Dept: URBAN - METROPOLITAN AREA CLINIC 64 | Facility: CLINIC | Age: 59
End: 2024-02-29
Payer: MEDICARE

## 2024-02-29 DIAGNOSIS — K74.60 UNSPECIFIED CIRRHOSIS OF LIVER: ICD-10-CM

## 2024-02-29 DIAGNOSIS — E66.9 OBESITY, UNSPECIFIED: ICD-10-CM

## 2024-02-29 PROCEDURE — 99490 CHRNC CARE MGMT STAFF 1ST 20: CPT | Performed by: INTERNAL MEDICINE

## 2024-02-29 PROCEDURE — 99439 CHRNC CARE MGMT STAF EA ADDL: CPT | Performed by: INTERNAL MEDICINE

## 2024-03-31 ENCOUNTER — OFFICE (OUTPATIENT)
Dept: URBAN - METROPOLITAN AREA CLINIC 64 | Facility: CLINIC | Age: 59
End: 2024-03-31
Payer: MEDICARE

## 2024-03-31 DIAGNOSIS — E66.9 OBESITY, UNSPECIFIED: ICD-10-CM

## 2024-03-31 DIAGNOSIS — K74.60 UNSPECIFIED CIRRHOSIS OF LIVER: ICD-10-CM

## 2024-03-31 PROCEDURE — 99490 CHRNC CARE MGMT STAFF 1ST 20: CPT | Performed by: INTERNAL MEDICINE

## 2024-04-30 ENCOUNTER — OFFICE (OUTPATIENT)
Dept: URBAN - METROPOLITAN AREA CLINIC 64 | Facility: CLINIC | Age: 59
End: 2024-04-30
Payer: MEDICARE

## 2024-04-30 DIAGNOSIS — K74.60 UNSPECIFIED CIRRHOSIS OF LIVER: ICD-10-CM

## 2024-04-30 DIAGNOSIS — E66.9 OBESITY, UNSPECIFIED: ICD-10-CM

## 2024-04-30 PROCEDURE — 99439 CHRNC CARE MGMT STAF EA ADDL: CPT | Performed by: INTERNAL MEDICINE

## 2024-04-30 PROCEDURE — 99490 CHRNC CARE MGMT STAFF 1ST 20: CPT | Performed by: INTERNAL MEDICINE

## 2024-05-20 ENCOUNTER — OFFICE (OUTPATIENT)
Dept: URBAN - METROPOLITAN AREA CLINIC 64 | Facility: CLINIC | Age: 59
End: 2024-05-20
Payer: MEDICAID

## 2024-05-20 VITALS
DIASTOLIC BLOOD PRESSURE: 51 MMHG | SYSTOLIC BLOOD PRESSURE: 97 MMHG | HEIGHT: 73 IN | HEART RATE: 55 BPM | WEIGHT: 284 LBS

## 2024-05-20 DIAGNOSIS — K74.60 UNSPECIFIED CIRRHOSIS OF LIVER: ICD-10-CM

## 2024-05-20 DIAGNOSIS — R60.0 LOCALIZED EDEMA: ICD-10-CM

## 2024-05-20 DIAGNOSIS — I95.9 HYPOTENSION, UNSPECIFIED: ICD-10-CM

## 2024-05-20 PROCEDURE — 99214 OFFICE O/P EST MOD 30 MIN: CPT

## 2024-05-20 RX ORDER — MIDODRINE HYDROCHLORIDE 5 MG/1
TABLET ORAL
Qty: 180 | Refills: 0 | Status: ACTIVE

## 2024-08-02 NOTE — SIGNIFICANT NOTE
Discharged home per W/C with personal belongings.  Dressing to be changed later today by VNA C as dressing changed at the end of night shift.    PATIENT INFORMATION    Anticipatory guidance discussed  Jyotsna looks healthy today    Follow-Up  - Return in one year for your next complete exam.    4 year old Health and Safety Tips  Adapted from [employer]  Healthy teeth:  Help your child brush her teeth twice a day (after breakfast and before bed).  Use a pea-sized amount of toothpaste with fluoride.  Help your child floss her teeth twice a day.  Your child should visit the dentist at least twice a year.    Ready for School:  Read books with your child about starting school and talk to them about it before it starts.  Make sure your child is in a safe place after school with an adult.  Talk with your child every day about things she liked, any worries, and if anyone is being mean to her.  Before the transition to full day school ensure that you establish a good toileting routine with your child.  They are often afraid or embarrassed to use the school bathroom and become constipated as a result of not stooling when they need to.  Work in extra time right after breakfast and after dinner to allow them to sit on the toilet and try to have a good bowel movement.  It also helps to visit the school bathroom before the school year starts.  Make sure your child knows how to lock the stall door and how to operate the sinks/soap dispenser, napkins.  Also, when you send her to school in the beginning, send them in clothing that is easy to remove and put back on independently.      Your Child and Family:  Give your child age-appropriate chores to do and expect them to be done.  At this age appropriate chores including feeding pets, wiping up spills, putting away toys, making the bed, straightening up bedroom, watering houseplants, sorting clean silverware, preparing simple snacks, using a hand-held vacuum and disinfecting doorknobs.    Have family routines.  Hug and praise your child.  Teach your child what is right and what is wrong.  Help your child to do things for  herself.  Children learn better from discipline than they do from punishment.  Help your child deal with anger by walking away or going somewhere else to play.    Staying Healthy  Eat Breakfast.  Buy fat-free milk and low-fat dairy foods and encourage 3 servings each day.  Limit candy, soft drinks and high fat foods.  Offer 5 servings of Fruits and/or vegetables per day.  Limit TV to 1 hour a day.  Do not have a TV in your child’s bedroom.  Make sure your child is active for 1 hour or more daily.    Safety:  Teach your child about how to be safe with other adults - no one should ask for a secret to be kept from parents.  No one should ask to see private parts.  No adult should ask for help with his or her private parts.    Your child should always ride in the back seat and use a car safety seat or booster seat.    Teach your child to swim.  Children are still not safe to swim unsupervised at this age, even if they are strong swimmers.    Use sunscreen when outside.  Provide a well-fitting helmet for activities like bike riding, scooter, skateboarding, horseback riding and snowboarding.    Have a working smoke alarm on each floor of your house and a fire escape plan.  Install a carbon monoxide detector in a hallway near every sleeping area.    Never have a gun in the home.  If you must have a gun, store it unloaded and locked with the ammunition locked separately from the gun.  Ask if there are guns in homes where your child plays.  If so, make sure they are stored safely.    Teach your child how to cross the street safely.  Children are not ready to cross the street alone until age 10 or older.   Teach your child about bus safety.    Health and Safety Tips - The following hyperlinks are available to access via navigaya    Parent Education from Healthy Parent    Educación para padres sobre niños sanos    Common dosing for acetaminophen and ibuprofen:   Acetaminophen (Tylenol) can be given every 4 hours.  Children's  Elixir: 160mg/5ml  Weight 36-47 lbs 48-59 lbs 60-71 lsb 72-95 lbs   Dose 7.5 ml 10 ml 12.5 ml 15 ml     Ibuprofen (Motrin) can be given every 6 hours.  Children's Elixir 100mg/5ml   Weight 36-47 lbs 48-59 lbs 60-71 lbs 72-95 lbs   Dose 7.5 ml 10 ml 12.5 ml 15 ml     Additional Educational Resources:  For additional resources regarding your symptoms, diagnosis, or further health information, please visit the Online Health Resources section in [email or web address].

## 2024-09-16 ENCOUNTER — OFFICE VISIT (OUTPATIENT)
Dept: WOUND CARE | Facility: HOSPITAL | Age: 59
End: 2024-09-16
Payer: MEDICARE

## 2024-10-29 PROCEDURE — 80048 BASIC METABOLIC PNL TOTAL CA: CPT | Performed by: INTERNAL MEDICINE

## 2024-10-29 NOTE — PROGRESS NOTES
"     LOS: 1 day   Patient Care Team:  Alfredo Torres MD as PCP - General  Alfredo Torres MD as PCP - Family Medicine  Kenney Reyez MD as Consulting Physician (Cardiology)    Subjective     Interval History: Robert Randhawa presents for evaluation of Edema, Skin Lesion for the past several years. Per the patient \"he  states \"\"I am here today for the pain and redness and sever swelling in my left leg.\".\" Patient is following up on  the following: Pain, Edema, Skin Lesion, Weight gain, Weakness In the patient's own words, \"The patient is a  very pleasant 55-year-old gentleman with a very elevated BMI and a very sedentary lifestyle he has history of  chronic left lower extremity DVT, bilateral lower extremity edema, venous hypertension both lower extremities  with chronic stasis dermatitis both lower extremities left worse than right. He was seen in our office a few  weeks ago. He had some swelling in his left lower extremity. He was treated he was advised to elevate his left  lower extremity while at home. He is coming here today complaining of pain in left lower extremity, swelling,  redness and severe itching and he has some sores on his leg that is becoming red and seeping fluid. His  weight on 9/21/2020 was 350 pounds and today his weight is 357 pounds despite he has been taking some  diuretics. He says that he is feeling feverish. He also complaining of feeling very depressed because of the  COVID-19. He says that he is confined to home he cannot drive. He has to depend on others to get him around  specially for follow-up appointments and wound care. He says that he is severely depressed..\"    Patient Complaints: Still complaining of redness heat itching and swelling lower extremity.    History taken from: patient    Review of Systems   Constitutional: Positive for unexpected weight change.   Respiratory: Positive for shortness of breath.    Endocrine: Negative.    Genitourinary: Positive for frequency. " Subjective:   Yoseph Cordero is a 72 year old male who presents for hospital follow up.   He was discharged from [facility] to Home Health Care Services  Admission Date: 10/24/24   Discharge Date: 10/27/24  Hospital Discharge Diagnosis:     Interactive contact within 2 business days post discharge first initiated on Date: Sacral decubitus ulcer with cellulitis    During the visit, the following was completed:  Obtained and reviewed discharge summary, continuity of care documents, and Hospitalist notes  Reviewed Labs (CBC, CMP)    HPI: Sacral decubitus ulcer with cellulitis was admitted to hospital received IV antibiotics visiting nurse came to her house yesterday will come again tomorrow wound clinic follow-up in 2 weeks    History/Other:   Current Medications:  Medication Reconciliation:  I am aware of an inpatient discharge within the last 30 days.  The discharge medication list has been reconciled with the patient's current medication list and reviewed by me.  See medication list for additions of new medication, and changes to current doses of medications and discontinued medications.  Outpatient Medications Marked as Taking for the 10/29/24 encounter (Office Visit) with Irving Sheets, DO   Medication Sig    collagenase 250 UNIT/GM External Ointment Apply 1 Application topically in the morning and 1 Application before bedtime.    sodium hypochlorite 0.125 % External Solution 10 ml to three gauze, pack in the wound    tamsulosin 0.4 MG Oral Cap TAKE 1 CAPSULE(0.4 MG) BY MOUTH DAILY    metoprolol tartrate 25 MG Oral Tab Take 0.5 tablets (12.5 mg total) by mouth 2 (two) times daily.    calcium carbonate-vitamin D 250-3.125 MG-MCG Oral Tab Take 1 tablet by mouth 2 (two) times daily with meals.    lactulose 10 GM/15ML Oral Solution Take 30 mL (20 g total) by mouth every 6 (six) hours as needed (severe).    polyethylene glycol, PEG 3350, 17 g Oral Powd Pack Take 17 g by mouth daily as needed (If no bowel  "  Musculoskeletal: Positive for arthralgias and joint swelling.   Skin: Positive for rash and wound.   Allergic/Immunologic: Negative.    Neurological: Positive for numbness.   Hematological: Negative.    Psychiatric/Behavioral: Positive for dysphoric mood.           Objective     Vital Signs  /78 (BP Location: Right arm, Patient Position: Lying)   Pulse 63   Temp 98.1 °F (36.7 °C) (Oral)   Resp 18   Ht 185.4 cm (73\")   Wt (!) 158 kg (347 lb 14.2 oz)   SpO2 94%   BMI 45.90 kg/m²       Physical Exam:     General Appearance:    Alert, cooperative, in no acute distress   Head:    Normocephalic, without obvious abnormality, atraumatic   Eyes:            Lids and lashes normal, conjunctivae and sclerae normal, no   icterus, no pallor, corneas clear, PERRLA   Ears:    Ears appear intact with no abnormalities noted   Throat:   No oral lesions, no thrush, oral mucosa moist   Neck:   No adenopathy, supple, trachea midline, no thyromegaly, no   carotid bruit, no JVD   Lungs:     Clear to auscultation,respirations regular, even and                  unlabored    Heart:    Regular rhythm and normal rate, normal S1 and S2, no            murmur, no gallop, no rub, no click   Chest Wall:    No abnormalities observed   Abdomen:     Normal bowel sounds, no masses, no organomegaly, soft        non-tender, non-distended, no guarding, no rebound                tenderness   Extremities:   Moves all extremities well, The patient has chronic stasis dermatitis both lower extremities left more than right. He has a scratches and xcoriations on the left lower extremity below the knee. There is quite a bit of redness in the left lower extremity  skin.   Pulses:   Pulses palpable and equal bilaterally   Skin:   No bleeding, bruising or rash   Lymph nodes:   No palpable adenopathy   Neurologic:   Cranial nerves 2 - 12 grossly intact, sensation intact, DTR       present and equal bilaterally        Results Review:    Lab Results (last " movement in last 24 hours).    sennosides 17.2 MG Oral Tab Take 1 tablet (17.2 mg total) by mouth daily.    Vitamin C 500 MG Oral Tab Take 1 tablet (500 mg total) by mouth daily.    Nutritional Supplements (ABDOULAYE NUTRIVIGOR OR) Take 1 packet by mouth 3 (three) times daily with meals.    ferrous sulfate 300 (60 Fe) MG/5ML Oral Solution Take 5 mL (300 mg total) by mouth daily.    levETIRAcetam 100 MG/ML Oral Solution Take 2.5 mL (250 mg total) by mouth 2 (two) times daily.    acetaminophen 500 MG Oral Tab Take 1 tablet (500 mg total) by mouth every 8 (eight) hours.    rivaroxaban (XARELTO) 20 MG Oral Tab Take 1 tablet (20 mg total) by mouth daily with food.       Review of Systems  GENERAL: feels well otherwise  SKIN: denies any unusual skin lesions  EYES: denies blurred vision or double vision  HEENT: denies nasal congestion, sinus pain or ST  LUNGS: denies shortness of breath with exertion  CARDIOVASCULAR: denies chest pain on exertion  GI: denies abdominal pain, denies heartburn  : 0 per night nocturia, no complaint of urinary incontinence  MUSCULOSKELETAL: denies back pain  NEURO: denies headaches  PSYCHE: denies depression or anxiety  HEMATOLOGIC: denies hx of anemia  ENDOCRINE: denies thyroid history  ALL/ASTHMA: denies hx of allergy or asthma    Objective:   Physical Exam  General Appearance:  Alert, cooperative, no distress, appears stated age   Head:  Normocephalic, without obvious abnormality, atraumatic   Eyes:  PERRL, conjunctiva/corneas clear, EOM's intact, both eyes           Throat: Lips, mucosa, and tongue normal; poot dentition   Neck: Supple, symmetrical, trachea midline, no adenopathy, thyroid: not enlarged, symmetric, no tenderness/mass/nodules, no carotid bruit or JVD   Back:   Symmetric, no curvature, ROM normal, no CVA tenderness   Lungs:   Clear to auscultation bilaterally, respirations unlabored   Chest Wall:  No tenderness or deformity   Heart:  Regular rate and rhythm, S1, S2 normal, no  24 hours)     ** No results found for the last 24 hours. **           ECG/EMG Results (last 24 hours)     ** No results found for the last 24 hours. **          Imaging Results (Last 24 Hours)     ** No results found for the last 24 hours. **               I reviewed the patient's new clinical results.    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  Will have dressings x2.  Labs are pending.  He will have Rocephin continued 1 g each 24 hours.  We will ask dietitian to consult.      Plan for disposition: Home health  Then D/C    Alfredo Torres MD  10/10/20  06:10EDT           murmur, rub or gallop   Abdomen:   Soft, non-tender, bowel sounds active all four quadrants,  no masses, no organomegaly   Genitalia: Normal male   Rectal: Normal tone, normal prostate, no masses or tenderness   Extremities: Extremities normal, atraumatic, no cyanosis or edema   Pulses: 2+ and symmetric   Skin: Swound bandages in place sacrum /right heel/left hip    Lymph nodes: Cervical, supraclavicular, and axillary nodes normal   Neurologic: Normal     /70   Pulse 93   Resp 17   Ht 5' 7\" (1.702 m)   BMI 21.55 kg/m²  Estimated body mass index is 21.55 kg/m² as calculated from the following:    Height as of this encounter: 5' 7\" (1.702 m).    Weight as of 10/25/24: 137 lb 9.6 oz (62.4 kg).    Assessment & Plan:   1. Abnormal EEG (Primary)  -     NEURO - INTERNAL  2. Anemia, unspecified type  -     CBC With Differential With Platelet; Future; Expected date: 10/29/2024  3. Pressure injury of sacral region, stage 4 (HCC)  4. Pressure injury of right hip, stage 3 (HCC)  5. Pressure injury of right heel, stage 4 (HCC)  6. Late onset Alzheimer's disease with behavioral disturbance (HCC)  7. History of stroke  8. Hemiplegia affecting right dominant side, unspecified etiology, unspecified hemiplegia type (HCC)  Other orders  -     Prevnar 20 (PCV20) [34771]  1. Abnormal EEG  Daughter will contact neurology office she reduced the dose of the levetiracetam  - NEURO - INTERNAL    2. Anemia, unspecified type  Continue iron for now  - CBC With Differential With Platelet; Future    3. Pressure injury of sacral region, stage 4 (HCC)  Visiting nurse follow-up tomorrow    4. Pressure injury of right hip, stage 3 (HCC)  As above and wound care clinic follow-up in 2 weeks    5. Pressure injury of right heel, stage 4 (HCC)  As above    6. Late onset Alzheimer's disease with behavioral disturbance (HCC)  Daughter and mother caretakers    7. History of stroke  As above    8. Hemiplegia affecting right dominant side,  unspecified etiology, unspecified hemiplegia type (HCC)  Use of wheelchair        No follow-ups on file.

## 2024-10-30 ENCOUNTER — LAB REQUISITION (OUTPATIENT)
Dept: LAB | Facility: HOSPITAL | Age: 59
End: 2024-10-30
Payer: MEDICARE

## 2024-10-30 DIAGNOSIS — R53.82 CHRONIC FATIGUE, UNSPECIFIED: ICD-10-CM

## 2024-10-30 LAB
ANION GAP SERPL CALCULATED.3IONS-SCNC: 9.6 MMOL/L (ref 5–15)
BUN SERPL-MCNC: 13 MG/DL (ref 8–23)
BUN/CREAT SERPL: 16.7 (ref 7–25)
CALCIUM SPEC-SCNC: 8.5 MG/DL (ref 8.6–10.5)
CHLORIDE SERPL-SCNC: 102 MMOL/L (ref 98–107)
CO2 SERPL-SCNC: 26.4 MMOL/L (ref 22–29)
CREAT SERPL-MCNC: 0.78 MG/DL (ref 0.76–1.27)
EGFRCR SERPLBLD CKD-EPI 2021: 102.1 ML/MIN/1.73
GLUCOSE SERPL-MCNC: 117 MG/DL (ref 65–99)
POTASSIUM SERPL-SCNC: 4 MMOL/L (ref 3.5–5.2)
SODIUM SERPL-SCNC: 138 MMOL/L (ref 136–145)

## 2024-11-18 ENCOUNTER — OFFICE (OUTPATIENT)
Age: 59
End: 2024-11-18
Payer: MEDICARE

## 2024-11-18 ENCOUNTER — OFFICE (OUTPATIENT)
Dept: URBAN - METROPOLITAN AREA CLINIC 64 | Facility: CLINIC | Age: 59
End: 2024-11-18
Payer: MEDICARE

## 2024-11-18 VITALS
WEIGHT: 308 LBS | HEART RATE: 74 BPM | DIASTOLIC BLOOD PRESSURE: 69 MMHG | SYSTOLIC BLOOD PRESSURE: 101 MMHG | DIASTOLIC BLOOD PRESSURE: 69 MMHG | HEIGHT: 73 IN | WEIGHT: 308 LBS | SYSTOLIC BLOOD PRESSURE: 101 MMHG | WEIGHT: 308 LBS | DIASTOLIC BLOOD PRESSURE: 69 MMHG | HEART RATE: 74 BPM | HEIGHT: 73 IN | WEIGHT: 308 LBS | DIASTOLIC BLOOD PRESSURE: 69 MMHG | SYSTOLIC BLOOD PRESSURE: 101 MMHG | HEIGHT: 73 IN | HEART RATE: 74 BPM | HEIGHT: 73 IN | HEART RATE: 74 BPM | HEIGHT: 73 IN | SYSTOLIC BLOOD PRESSURE: 101 MMHG | HEART RATE: 74 BPM | DIASTOLIC BLOOD PRESSURE: 69 MMHG | HEART RATE: 74 BPM | DIASTOLIC BLOOD PRESSURE: 69 MMHG | WEIGHT: 308 LBS | SYSTOLIC BLOOD PRESSURE: 101 MMHG | HEIGHT: 73 IN | SYSTOLIC BLOOD PRESSURE: 101 MMHG | HEIGHT: 73 IN | WEIGHT: 308 LBS | SYSTOLIC BLOOD PRESSURE: 101 MMHG | DIASTOLIC BLOOD PRESSURE: 69 MMHG | HEART RATE: 74 BPM | WEIGHT: 308 LBS

## 2024-11-18 DIAGNOSIS — K74.60 UNSPECIFIED CIRRHOSIS OF LIVER: ICD-10-CM

## 2024-11-18 DIAGNOSIS — I89.0 LYMPHEDEMA, NOT ELSEWHERE CLASSIFIED: ICD-10-CM

## 2024-11-18 DIAGNOSIS — I95.9 HYPOTENSION, UNSPECIFIED: ICD-10-CM

## 2024-11-18 DIAGNOSIS — K72.90 HEPATIC FAILURE, UNSPECIFIED WITHOUT COMA: ICD-10-CM

## 2024-11-18 DIAGNOSIS — R60.0 LOCALIZED EDEMA: ICD-10-CM

## 2024-11-18 PROCEDURE — 99214 OFFICE O/P EST MOD 30 MIN: CPT | Performed by: INTERNAL MEDICINE

## 2024-12-20 ENCOUNTER — HOSPITAL ENCOUNTER (OUTPATIENT)
Facility: HOSPITAL | Age: 59
Setting detail: OBSERVATION
Discharge: NURSING, REHAB OR LONG-TERM CARE | End: 2024-12-24
Attending: EMERGENCY MEDICINE | Admitting: INTERNAL MEDICINE
Payer: MEDICARE

## 2024-12-20 ENCOUNTER — APPOINTMENT (OUTPATIENT)
Dept: GENERAL RADIOLOGY | Facility: HOSPITAL | Age: 59
End: 2024-12-20
Payer: MEDICARE

## 2024-12-20 DIAGNOSIS — I89.0 LYMPHEDEMA: ICD-10-CM

## 2024-12-20 DIAGNOSIS — L03.116 CELLULITIS OF LEFT FOOT: ICD-10-CM

## 2024-12-20 DIAGNOSIS — R53.1 GENERAL WEAKNESS: Primary | ICD-10-CM

## 2024-12-20 PROBLEM — K74.60 CIRRHOSIS OF LIVER: Status: ACTIVE | Noted: 2024-12-20

## 2024-12-20 PROBLEM — D69.6 THROMBOCYTOPENIA: Status: ACTIVE | Noted: 2024-12-20

## 2024-12-20 PROBLEM — R29.6 FREQUENT FALLS: Status: ACTIVE | Noted: 2024-12-20

## 2024-12-20 LAB
ANION GAP SERPL CALCULATED.3IONS-SCNC: 9.7 MMOL/L (ref 5–15)
BASOPHILS # BLD AUTO: 0.02 10*3/MM3 (ref 0–0.2)
BASOPHILS NFR BLD AUTO: 0.4 % (ref 0–1.5)
BILIRUB UR QL STRIP: NEGATIVE
BUN SERPL-MCNC: 22 MG/DL (ref 8–23)
BUN/CREAT SERPL: 21.2 (ref 7–25)
CALCIUM SPEC-SCNC: 8.5 MG/DL (ref 8.6–10.5)
CHLORIDE SERPL-SCNC: 101 MMOL/L (ref 98–107)
CLARITY UR: CLEAR
CO2 SERPL-SCNC: 26.3 MMOL/L (ref 22–29)
COLOR UR: ABNORMAL
CREAT SERPL-MCNC: 1.04 MG/DL (ref 0.76–1.27)
DEPRECATED RDW RBC AUTO: 60.3 FL (ref 37–54)
EGFRCR SERPLBLD CKD-EPI 2021: 82.2 ML/MIN/1.73
EOSINOPHIL # BLD AUTO: 0.08 10*3/MM3 (ref 0–0.4)
EOSINOPHIL NFR BLD AUTO: 1.7 % (ref 0.3–6.2)
ERYTHROCYTE [DISTWIDTH] IN BLOOD BY AUTOMATED COUNT: 14.7 % (ref 12.3–15.4)
GLUCOSE SERPL-MCNC: 104 MG/DL (ref 65–99)
GLUCOSE UR STRIP-MCNC: NEGATIVE MG/DL
HCT VFR BLD AUTO: 39.2 % (ref 37.5–51)
HGB BLD-MCNC: 13.4 G/DL (ref 13–17.7)
HGB UR QL STRIP.AUTO: NEGATIVE
HOLD SPECIMEN: NORMAL
IMM GRANULOCYTES # BLD AUTO: 0.02 10*3/MM3 (ref 0–0.05)
IMM GRANULOCYTES NFR BLD AUTO: 0.4 % (ref 0–0.5)
KETONES UR QL STRIP: NEGATIVE
LEUKOCYTE ESTERASE UR QL STRIP.AUTO: NEGATIVE
LYMPHOCYTES # BLD AUTO: 0.7 10*3/MM3 (ref 0.7–3.1)
LYMPHOCYTES NFR BLD AUTO: 14.6 % (ref 19.6–45.3)
MAGNESIUM SERPL-MCNC: 2.3 MG/DL (ref 1.6–2.4)
MCH RBC QN AUTO: 37.6 PG (ref 26.6–33)
MCHC RBC AUTO-ENTMCNC: 34.2 G/DL (ref 31.5–35.7)
MCV RBC AUTO: 110.1 FL (ref 79–97)
MONOCYTES # BLD AUTO: 0.44 10*3/MM3 (ref 0.1–0.9)
MONOCYTES NFR BLD AUTO: 9.2 % (ref 5–12)
NEUTROPHILS NFR BLD AUTO: 3.52 10*3/MM3 (ref 1.7–7)
NEUTROPHILS NFR BLD AUTO: 73.7 % (ref 42.7–76)
NITRITE UR QL STRIP: NEGATIVE
NRBC BLD AUTO-RTO: 0 /100 WBC (ref 0–0.2)
NT-PROBNP SERPL-MCNC: 367 PG/ML (ref 0–900)
PH UR STRIP.AUTO: 6 [PH] (ref 5–8)
PLATELET # BLD AUTO: 72 10*3/MM3 (ref 140–450)
PMV BLD AUTO: 9.9 FL (ref 6–12)
POTASSIUM SERPL-SCNC: 3.9 MMOL/L (ref 3.5–5.2)
PROT UR QL STRIP: NEGATIVE
RBC # BLD AUTO: 3.56 10*6/MM3 (ref 4.14–5.8)
SODIUM SERPL-SCNC: 137 MMOL/L (ref 136–145)
SP GR UR STRIP: 1.01 (ref 1–1.03)
UROBILINOGEN UR QL STRIP: ABNORMAL
WBC NRBC COR # BLD AUTO: 4.78 10*3/MM3 (ref 3.4–10.8)
WHOLE BLOOD HOLD COAG: NORMAL

## 2024-12-20 PROCEDURE — G0378 HOSPITAL OBSERVATION PER HR: HCPCS

## 2024-12-20 PROCEDURE — 81003 URINALYSIS AUTO W/O SCOPE: CPT | Performed by: EMERGENCY MEDICINE

## 2024-12-20 PROCEDURE — 83880 ASSAY OF NATRIURETIC PEPTIDE: CPT | Performed by: EMERGENCY MEDICINE

## 2024-12-20 PROCEDURE — 83735 ASSAY OF MAGNESIUM: CPT | Performed by: EMERGENCY MEDICINE

## 2024-12-20 PROCEDURE — 71045 X-RAY EXAM CHEST 1 VIEW: CPT

## 2024-12-20 PROCEDURE — 99285 EMERGENCY DEPT VISIT HI MDM: CPT

## 2024-12-20 PROCEDURE — 85025 COMPLETE CBC W/AUTO DIFF WBC: CPT | Performed by: EMERGENCY MEDICINE

## 2024-12-20 PROCEDURE — 80048 BASIC METABOLIC PNL TOTAL CA: CPT | Performed by: EMERGENCY MEDICINE

## 2024-12-20 RX ORDER — ACETAMINOPHEN 325 MG/1
650 TABLET ORAL EVERY 4 HOURS PRN
Status: DISCONTINUED | OUTPATIENT
Start: 2024-12-20 | End: 2024-12-24 | Stop reason: HOSPADM

## 2024-12-20 RX ORDER — FOLIC ACID 1 MG/1
1 TABLET ORAL DAILY
COMMUNITY

## 2024-12-20 RX ORDER — CIPROFLOXACIN 500 MG/1
250 TABLET, FILM COATED ORAL EVERY 12 HOURS SCHEDULED
Status: COMPLETED | OUTPATIENT
Start: 2024-12-20 | End: 2024-12-21

## 2024-12-20 RX ORDER — HYDROCODONE BITARTRATE AND ACETAMINOPHEN 7.5; 325 MG/1; MG/1
1 TABLET ORAL EVERY 6 HOURS PRN
Status: DISCONTINUED | OUTPATIENT
Start: 2024-12-20 | End: 2024-12-24 | Stop reason: HOSPADM

## 2024-12-20 RX ORDER — SERTRALINE HYDROCHLORIDE 100 MG/1
100 TABLET, FILM COATED ORAL NIGHTLY
Status: DISCONTINUED | OUTPATIENT
Start: 2024-12-20 | End: 2024-12-24 | Stop reason: HOSPADM

## 2024-12-20 RX ORDER — BUMETANIDE 1 MG/1
1 TABLET ORAL 2 TIMES DAILY
Status: DISCONTINUED | OUTPATIENT
Start: 2024-12-20 | End: 2024-12-21

## 2024-12-20 RX ORDER — ACETAMINOPHEN 325 MG/1
650 TABLET ORAL EVERY 4 HOURS PRN
COMMUNITY

## 2024-12-20 RX ORDER — SODIUM CHLORIDE 0.9 % (FLUSH) 0.9 %
10 SYRINGE (ML) INJECTION AS NEEDED
Status: DISCONTINUED | OUTPATIENT
Start: 2024-12-20 | End: 2024-12-24 | Stop reason: HOSPADM

## 2024-12-20 RX ORDER — FOLIC ACID 1 MG/1
1 TABLET ORAL DAILY
Status: DISCONTINUED | OUTPATIENT
Start: 2024-12-21 | End: 2024-12-24 | Stop reason: HOSPADM

## 2024-12-20 RX ORDER — TAMSULOSIN HYDROCHLORIDE 0.4 MG/1
1 CAPSULE ORAL EVERY MORNING
COMMUNITY

## 2024-12-20 RX ORDER — MINERAL OIL/HYDROPHIL PETROLAT
OINTMENT (GRAM) TOPICAL 2 TIMES WEEKLY
Status: DISCONTINUED | OUTPATIENT
Start: 2024-12-23 | End: 2024-12-24 | Stop reason: HOSPADM

## 2024-12-20 RX ORDER — MIDODRINE HYDROCHLORIDE 5 MG/1
5 TABLET ORAL 2 TIMES DAILY
COMMUNITY

## 2024-12-20 RX ORDER — GABAPENTIN 400 MG/1
800 CAPSULE ORAL EVERY 8 HOURS SCHEDULED
Status: DISCONTINUED | OUTPATIENT
Start: 2024-12-20 | End: 2024-12-24 | Stop reason: HOSPADM

## 2024-12-20 RX ORDER — POLYETHYLENE GLYCOL 3350 17 G/17G
17 POWDER, FOR SOLUTION ORAL DAILY PRN
COMMUNITY
Start: 2024-12-18

## 2024-12-20 RX ORDER — HYDROCODONE BITARTRATE AND ACETAMINOPHEN 7.5; 325 MG/1; MG/1
1 TABLET ORAL EVERY 6 HOURS PRN
Status: ON HOLD | COMMUNITY
End: 2024-12-24

## 2024-12-20 RX ORDER — DICYCLOMINE HYDROCHLORIDE 10 MG/1
10 CAPSULE ORAL
Status: DISCONTINUED | OUTPATIENT
Start: 2024-12-21 | End: 2024-12-24 | Stop reason: HOSPADM

## 2024-12-20 RX ORDER — POTASSIUM CHLORIDE 750 MG/1
10 TABLET, EXTENDED RELEASE ORAL EVERY MORNING
COMMUNITY
Start: 2024-12-18

## 2024-12-20 RX ORDER — TAMSULOSIN HYDROCHLORIDE 0.4 MG/1
0.4 CAPSULE ORAL DAILY
Status: DISCONTINUED | OUTPATIENT
Start: 2024-12-21 | End: 2024-12-24 | Stop reason: HOSPADM

## 2024-12-20 RX ORDER — POLYETHYLENE GLYCOL 3350 17 G/17G
17 POWDER, FOR SOLUTION ORAL DAILY PRN
Status: DISCONTINUED | OUTPATIENT
Start: 2024-12-20 | End: 2024-12-24 | Stop reason: HOSPADM

## 2024-12-20 RX ORDER — SERTRALINE HYDROCHLORIDE 100 MG/1
100 TABLET, FILM COATED ORAL
COMMUNITY

## 2024-12-20 RX ORDER — MIDODRINE HYDROCHLORIDE 5 MG/1
5 TABLET ORAL
Status: DISCONTINUED | OUTPATIENT
Start: 2024-12-20 | End: 2024-12-24 | Stop reason: HOSPADM

## 2024-12-20 RX ORDER — SPIRONOLACTONE 25 MG/1
25 TABLET ORAL DAILY
Status: DISCONTINUED | OUTPATIENT
Start: 2024-12-21 | End: 2024-12-24 | Stop reason: HOSPADM

## 2024-12-20 RX ORDER — BUMETANIDE 1 MG/1
1 TABLET ORAL 2 TIMES DAILY
COMMUNITY

## 2024-12-20 RX ORDER — CIPROFLOXACIN HYDROCHLORIDE 250 MG/1
250 TABLET, FILM COATED ORAL EVERY 12 HOURS SCHEDULED
COMMUNITY
Start: 2024-12-16 | End: 2024-12-24 | Stop reason: HOSPADM

## 2024-12-20 RX ADMIN — GABAPENTIN 800 MG: 400 CAPSULE ORAL at 23:36

## 2024-12-20 RX ADMIN — HYDROCODONE BITARTRATE AND ACETAMINOPHEN 1 TABLET: 7.5; 325 TABLET ORAL at 23:36

## 2024-12-20 RX ADMIN — CIPROFLOXACIN 250 MG: 500 TABLET, FILM COATED ORAL at 23:36

## 2024-12-20 RX ADMIN — MIDODRINE HYDROCHLORIDE 5 MG: 5 TABLET ORAL at 23:36

## 2024-12-20 RX ADMIN — RIFAXIMIN 550 MG: 550 TABLET ORAL at 23:36

## 2024-12-20 RX ADMIN — SERTRALINE HYDROCHLORIDE 100 MG: 100 TABLET, FILM COATED ORAL at 23:36

## 2024-12-20 NOTE — LETTER
EMS Transport Request  For use at [facility], [place], [place], [place], and [place] only   Patient Name: Robert Randhawa Jr. : 1965   Weight:(!) 152 kg (335 lb 8.6 oz) Pick-up Location: [facility] BLS/ALS: BLS/ALS: BLS   Insurance: [employer] MEDICARE REPLACEMENT Auth End Date: 2024   Pre-Cert #: D/C Summary complete:    Destination: Other [place]   Contact Precautions: None   Equipment (O2, Fluids, etc.): None   Arrive By Date/Time: 2024 Stretcher/WC: Stretcher   CM Requesting: Cassy Fishman RN Ext: [phone]   Notes/Medical Necessity: BedRidden. #[id number]       ______________________________________________________________________    *Only 2 patient bags OR 1 carry-on size bag are permitted.  Wheelchairs and walkers CANNOT transported with the patient. Acknowledge: Yes

## 2024-12-20 NOTE — ED PROVIDER NOTES
Subjective   History of Present Illness  Chief complaint: General Weakness    59-year-old male with history of lymphedema of both legs presents with general weakness.  Symptoms have been getting progressively worse over the past 2 days.  He denies any other specific complaints.  He has had no chest pain or shortness of breath.  He denies fever.  His legs are very swollen but he states this is baseline for him.    History provided by:  Patient      Review of Systems   Constitutional:  Negative for fever.   HENT:  Negative for congestion.    Respiratory:  Negative for cough and shortness of breath.    Cardiovascular:  Positive for leg swelling. Negative for chest pain.   Gastrointestinal:  Negative for abdominal pain and vomiting.   Musculoskeletal:  Negative for back pain.   Neurological:  Positive for weakness. Negative for headaches.   Psychiatric/Behavioral:  Negative for confusion.        Past Medical History:   Diagnosis Date    Arthritis     Asthma     Chronic deep vein thrombosis (DVT)     Lymphedema of both lower extremities        Allergies   Allergen Reactions    Sulfa Antibiotics Unknown (See Comments)       Past Surgical History:   Procedure Laterality Date    APPENDECTOMY  1974    CARDIAC CATHETERIZATION N/A 2/28/2020    Procedure: Left ventriculography;  Surgeon: Kenney Reyez MD;  Location: [facility] CATH INVASIVE LOCATION;  Service: Cardiovascular;  Laterality: N/A;    CARDIAC CATHETERIZATION N/A 2/28/2020    Procedure: Coronary angiography;  Surgeon: Kenney Reyez MD;  Location: [facility] CATH INVASIVE LOCATION;  Service: Cardiovascular;  Laterality: N/A;    CARDIAC CATHETERIZATION N/A 2/28/2020    Procedure: Left Heart Cath;  Surgeon: Kenney Reyez MD;  Location: [facility] CATH INVASIVE LOCATION;  Service: Cardiovascular;  Laterality: N/A;    COLONOSCOPY N/A 11/15/2022    Procedure: COLONOSCOPY with polypectomy;  Surgeon: AMY Garcia MD;  Location: [facility] ENDOSCOPY;  Service:  "Gastroenterology;  Laterality: N/A;  post: diverticulosis, ascending polyp x2, transverse polyp x3, descending polyp x4, rectal polyp x1, hemorroids    ENDOSCOPY N/A 11/15/2022    Procedure: ESOPHAGOGASTRODUODENOSCOPY with biopsy;  Surgeon: AMY Garcia MD;  Location: [facility] ENDOSCOPY;  Service: Gastroenterology;  Laterality: N/A;  post: esophagitis with biopsy to rule out candida , gastric biopsy    KNEE SURGERY Left 2018       Family History   Problem Relation Age of Onset    Heart disease Mother     Diabetes Mother     Arthritis Father     Aortic aneurysm Father        Social History     Socioeconomic History    Marital status: Single   Tobacco Use    Smoking status: Former     Current packs/day: 0.00     Average packs/day: 0.5 packs/day for 5.0 years (2.5 ttl pk-yrs)     Types: Cigarettes     Start date:      Quit date:      Years since quittin.9     Passive exposure: Past    Smokeless tobacco: Never   Vaping Use    Vaping status: Never Used   Substance and Sexual Activity    Alcohol use: Never    Drug use: Never    Sexual activity: Defer       /74   Pulse 86   Temp 98 °F (36.7 °C) (Oral)   Resp 18   Ht 185.4 cm (73\")   Wt (!) 144 kg (318 lb)   SpO2 99%   BMI 41.96 kg/m²       Objective   Physical Exam  Vitals and nursing note reviewed.   Constitutional:       Appearance: Normal appearance.   HENT:      Head: Normocephalic and atraumatic.      Mouth/Throat:      Mouth: Mucous membranes are moist.   Cardiovascular:      Rate and Rhythm: Normal rate and regular rhythm.      Heart sounds: Normal heart sounds.   Pulmonary:      Effort: Pulmonary effort is normal. No respiratory distress.      Breath sounds: Normal breath sounds.   Abdominal:      Palpations: Abdomen is soft.      Tenderness: There is no abdominal tenderness.   Musculoskeletal:      Comments: Severe bilateral lower extremity edema, worse on the left.  No erythema or warmth.  Neurovascular intact distally.   Skin:     " General: Skin is warm and dry.   Neurological:      Mental Status: He is alert and oriented to person, place, and time.         Procedures           ED Course      Results for orders placed or performed during the hospital encounter of 12/20/24   Urinalysis With Microscopic If Indicated (No Culture) - Urine, Clean Catch    Collection Time: 12/20/24  5:17 PM    Specimen: Urine, Clean Catch   Result Value Ref Range    Color, UA Orange (A) Yellow, Straw    Appearance, UA Clear Clear    pH, UA 6.0 5.0 - 8.0    Specific Gravity, UA 1.014 1.005 - 1.030    Glucose, UA Negative Negative    Ketones, UA Negative Negative    Bilirubin, UA Negative Negative    Blood, UA Negative Negative    Protein, UA Negative Negative    Leuk Esterase, UA Negative Negative    Nitrite, UA Negative Negative    Urobilinogen, UA 1.0 E.U./dL 0.2 - 1.0 E.U./dL   Basic Metabolic Panel    Collection Time: 12/20/24  5:21 PM    Specimen: Blood   Result Value Ref Range    Glucose 104 (H) 65 - 99 mg/dL    BUN 22 8 - 23 mg/dL    Creatinine 1.04 0.76 - 1.27 mg/dL    Sodium 137 136 - 145 mmol/L    Potassium 3.9 3.5 - 5.2 mmol/L    Chloride 101 98 - 107 mmol/L    CO2 26.3 22.0 - 29.0 mmol/L    Calcium 8.5 (L) 8.6 - 10.5 mg/dL    BUN/Creatinine Ratio 21.2 7.0 - 25.0    Anion Gap 9.7 5.0 - 15.0 mmol/L    eGFR 82.2 >60.0 mL/min/1.73   Magnesium    Collection Time: 12/20/24  5:21 PM    Specimen: Blood   Result Value Ref Range    Magnesium 2.3 1.6 - 2.4 mg/dL   BNP    Collection Time: 12/20/24  5:21 PM    Specimen: Blood   Result Value Ref Range    proBNP 367.0 0.0 - 900.0 pg/mL   CBC Auto Differential    Collection Time: 12/20/24  5:21 PM    Specimen: Blood   Result Value Ref Range    WBC 4.78 3.40 - 10.80 10*3/mm3    RBC 3.56 (L) 4.14 - 5.80 10*6/mm3    Hemoglobin 13.4 13.0 - 17.7 g/dL    Hematocrit 39.2 37.5 - 51.0 %    .1 (H) 79.0 - 97.0 fL    MCH 37.6 (H) 26.6 - 33.0 pg    MCHC 34.2 31.5 - 35.7 g/dL    RDW 14.7 12.3 - 15.4 %    RDW-SD 60.3 (H) 37.0 -  54.0 fl    MPV 9.9 6.0 - 12.0 fL    Platelets 72 (L) 140 - 450 10*3/mm3    Neutrophil % 73.7 42.7 - 76.0 %    Lymphocyte % 14.6 (L) 19.6 - 45.3 %    Monocyte % 9.2 5.0 - 12.0 %    Eosinophil % 1.7 0.3 - 6.2 %    Basophil % 0.4 0.0 - 1.5 %    Immature Grans % 0.4 0.0 - 0.5 %    Neutrophils, Absolute 3.52 1.70 - 7.00 10*3/mm3    Lymphocytes, Absolute 0.70 0.70 - 3.10 10*3/mm3    Monocytes, Absolute 0.44 0.10 - 0.90 10*3/mm3    Eosinophils, Absolute 0.08 0.00 - 0.40 10*3/mm3    Basophils, Absolute 0.02 0.00 - 0.20 10*3/mm3    Immature Grans, Absolute 0.02 0.00 - 0.05 10*3/mm3    nRBC 0.0 0.0 - 0.2 /100 WBC   Gold Top - SST    Collection Time: 12/20/24  5:21 PM   Result Value Ref Range    Extra Tube Hold for add-ons.    Light Blue Top    Collection Time: 12/20/24  5:21 PM   Result Value Ref Range    Extra Tube Hold for add-ons.      XR Chest 1 View    Result Date: 12/20/2024  Impression: Cardiomegaly with findings suggesting pulmonary vascular congestion versus questionable early interstitial edema. Electronically Signed: Ramon Rodriguez MD  12/20/2024 5:29 PM EST  Workstation ID: [id number]                                                    Medical Decision Making    Patient had the above valuation.  Results were discussed with the patient.  Workup has been fairly unremarkable.  White blood cell count is normal.  BMP is unremarkable.  BNP is normal.  Urinalysis shows no UTI.  Chest x-ray is showing cardiomegaly with possible pulmonary vascular congestion.  Patient is oxygenating well on room air.  Assisting living facility staff is stating that the patient cannot come back there because he requires too much assistance for their capabilities.  Patient will be admitted for physical therapy and case management to work on placement.  I discussed with the nurse practitioner on-call for the hospitalist who agreed to admit.      Final diagnoses:   General weakness   Lymphedema       ED Disposition  ED Disposition       ED  Disposition   Decision to Admit    Condition   --    Comment   Level of Care: Med/Surg [1]   Admitting Physician: LLANES ALVAREZ, CARLOS [[id number]]   Attending Physician: LLANES ALVAREZ, CARLOS [[id number]]                 No follow-up provider specified.       Medication List      No changes were made to your prescriptions during this visit.            Dainele Ch MD  12/20/24 [id number]       Daniele Ch MD  12/20/24 2036

## 2024-12-20 NOTE — Clinical Note
Level of Care: Med/Surg [1]   Admitting Physician: LLANES ALVAREZ, CARLOS [[id number]]   Attending Physician: LLANES ALVAREZ, CARLOS [[id number]]

## 2024-12-20 NOTE — DISCHARGE INSTRUCTIONS
Follow-up with your primary care provider.  Return to the emergency room for any new or worsening symptoms or if you have any other questions or concerns.

## 2024-12-21 LAB
ANION GAP SERPL CALCULATED.3IONS-SCNC: 4.5 MMOL/L (ref 5–15)
BASOPHILS # BLD AUTO: 0.02 10*3/MM3 (ref 0–0.2)
BASOPHILS NFR BLD AUTO: 0.5 % (ref 0–1.5)
BUN SERPL-MCNC: 21 MG/DL (ref 8–23)
BUN/CREAT SERPL: 21.9 (ref 7–25)
CA-I SERPL ISE-MCNC: 1.09 MMOL/L (ref 1.15–1.3)
CALCIUM SPEC-SCNC: 7.8 MG/DL (ref 8.6–10.5)
CHLORIDE SERPL-SCNC: 105 MMOL/L (ref 98–107)
CO2 SERPL-SCNC: 25.5 MMOL/L (ref 22–29)
CREAT SERPL-MCNC: 0.96 MG/DL (ref 0.76–1.27)
DEPRECATED RDW RBC AUTO: 61.7 FL (ref 37–54)
EGFRCR SERPLBLD CKD-EPI 2021: 90.5 ML/MIN/1.73
EOSINOPHIL # BLD AUTO: 0.11 10*3/MM3 (ref 0–0.4)
EOSINOPHIL NFR BLD AUTO: 2.7 % (ref 0.3–6.2)
ERYTHROCYTE [DISTWIDTH] IN BLOOD BY AUTOMATED COUNT: 15 % (ref 12.3–15.4)
GLUCOSE SERPL-MCNC: 128 MG/DL (ref 65–99)
HCT VFR BLD AUTO: 31.8 % (ref 37.5–51)
HGB BLD-MCNC: 10.6 G/DL (ref 13–17.7)
IMM GRANULOCYTES # BLD AUTO: 0.01 10*3/MM3 (ref 0–0.05)
IMM GRANULOCYTES NFR BLD AUTO: 0.2 % (ref 0–0.5)
LYMPHOCYTES # BLD AUTO: 0.69 10*3/MM3 (ref 0.7–3.1)
LYMPHOCYTES NFR BLD AUTO: 17.2 % (ref 19.6–45.3)
MCH RBC QN AUTO: 37.2 PG (ref 26.6–33)
MCHC RBC AUTO-ENTMCNC: 33.3 G/DL (ref 31.5–35.7)
MCV RBC AUTO: 111.6 FL (ref 79–97)
MONOCYTES # BLD AUTO: 0.4 10*3/MM3 (ref 0.1–0.9)
MONOCYTES NFR BLD AUTO: 10 % (ref 5–12)
NEUTROPHILS NFR BLD AUTO: 2.79 10*3/MM3 (ref 1.7–7)
NEUTROPHILS NFR BLD AUTO: 69.4 % (ref 42.7–76)
NRBC BLD AUTO-RTO: 0 /100 WBC (ref 0–0.2)
PLATELET # BLD AUTO: 67 10*3/MM3 (ref 140–450)
PMV BLD AUTO: 11.2 FL (ref 6–12)
POTASSIUM SERPL-SCNC: 4 MMOL/L (ref 3.5–5.2)
RBC # BLD AUTO: 2.85 10*6/MM3 (ref 4.14–5.8)
SODIUM SERPL-SCNC: 135 MMOL/L (ref 136–145)
WBC NRBC COR # BLD AUTO: 4.02 10*3/MM3 (ref 3.4–10.8)

## 2024-12-21 PROCEDURE — G0378 HOSPITAL OBSERVATION PER HR: HCPCS

## 2024-12-21 PROCEDURE — 85025 COMPLETE CBC W/AUTO DIFF WBC: CPT | Performed by: NURSE PRACTITIONER

## 2024-12-21 PROCEDURE — 87481 CANDIDA DNA AMP PROBE: CPT | Performed by: NURSE PRACTITIONER

## 2024-12-21 PROCEDURE — 82330 ASSAY OF CALCIUM: CPT | Performed by: NURSE PRACTITIONER

## 2024-12-21 PROCEDURE — 97162 PT EVAL MOD COMPLEX 30 MIN: CPT

## 2024-12-21 PROCEDURE — 80048 BASIC METABOLIC PNL TOTAL CA: CPT | Performed by: NURSE PRACTITIONER

## 2024-12-21 RX ORDER — BUMETANIDE 1 MG/1
1 TABLET ORAL
Status: DISCONTINUED | OUTPATIENT
Start: 2024-12-21 | End: 2024-12-24 | Stop reason: HOSPADM

## 2024-12-21 RX ADMIN — TAMSULOSIN HYDROCHLORIDE 0.4 MG: 0.4 CAPSULE ORAL at 10:18

## 2024-12-21 RX ADMIN — CIPROFLOXACIN 250 MG: 500 TABLET, FILM COATED ORAL at 10:18

## 2024-12-21 RX ADMIN — FOLIC ACID 1 MG: 1 TABLET ORAL at 10:18

## 2024-12-21 RX ADMIN — MIDODRINE HYDROCHLORIDE 5 MG: 5 TABLET ORAL at 17:37

## 2024-12-21 RX ADMIN — GABAPENTIN 800 MG: 400 CAPSULE ORAL at 05:09

## 2024-12-21 RX ADMIN — DICYCLOMINE HYDROCHLORIDE 10 MG: 10 CAPSULE ORAL at 17:37

## 2024-12-21 RX ADMIN — GABAPENTIN 800 MG: 400 CAPSULE ORAL at 14:09

## 2024-12-21 RX ADMIN — SERTRALINE HYDROCHLORIDE 100 MG: 100 TABLET, FILM COATED ORAL at 21:56

## 2024-12-21 RX ADMIN — DICYCLOMINE HYDROCHLORIDE 10 MG: 10 CAPSULE ORAL at 10:18

## 2024-12-21 RX ADMIN — HYDROCODONE BITARTRATE AND ACETAMINOPHEN 1 TABLET: 7.5; 325 TABLET ORAL at 21:56

## 2024-12-21 RX ADMIN — SPIRONOLACTONE 25 MG: 25 TABLET, FILM COATED ORAL at 10:18

## 2024-12-21 RX ADMIN — BUMETANIDE 1 MG: 1 TABLET ORAL at 17:37

## 2024-12-21 RX ADMIN — RIFAXIMIN 550 MG: 550 TABLET ORAL at 10:18

## 2024-12-21 RX ADMIN — DICYCLOMINE HYDROCHLORIDE 10 MG: 10 CAPSULE ORAL at 14:09

## 2024-12-21 RX ADMIN — GABAPENTIN 800 MG: 400 CAPSULE ORAL at 21:56

## 2024-12-21 RX ADMIN — HYDROCODONE BITARTRATE AND ACETAMINOPHEN 1 TABLET: 7.5; 325 TABLET ORAL at 14:09

## 2024-12-21 RX ADMIN — RIFAXIMIN 550 MG: 550 TABLET ORAL at 21:56

## 2024-12-21 RX ADMIN — MIDODRINE HYDROCHLORIDE 5 MG: 5 TABLET ORAL at 10:18

## 2024-12-21 RX ADMIN — Medication 10 ML: at 10:18

## 2024-12-21 RX ADMIN — BUMETANIDE 1 MG: 1 TABLET ORAL at 10:18

## 2024-12-21 NOTE — THERAPY EVALUATION
Patient Name: Robert Randhawa Jr.  : 1965    MRN: [id number]                              Today's Date: 2024       Admit Date: 2024    Visit Dx:     ICD-10-CM ICD-9-CM   1. General weakness  R53.1 780.79   2. Lymphedema  I89.0 457.1     Patient Active Problem List   Diagnosis    Chest pain    Shortness of breath    Arthritis    Asthma    Deep vein thrombosis (DVT)    Lymphedema of both lower extremities    Cellulitis of lower extremity    Chest pressure    Unstable angina    Cellulitis of left lower extremity without foot    Obesity, morbid    Cellulitis of left lower extremity    Non-pressure chronic ulcer left lower leg, limited to breakdown skin    Cellulitis of left foot    Venous hypertension of both lower extremities    Onychomycosis of foot with other complication    Sepsis    Dyspnea    General weakness    Thrombocytopenia    Cirrhosis of liver    Frequent falls     Past Medical History:   Diagnosis Date    Arthritis     Asthma     Chronic deep vein thrombosis (DVT)     lt calf    Lymphedema of both lower extremities      Past Surgical History:   Procedure Laterality Date    APPENDECTOMY  1974    CARDIAC CATHETERIZATION N/A 2020    Procedure: Left ventriculography;  Surgeon: Kenney Reyez MD;  Location: [facility] CATH INVASIVE LOCATION;  Service: Cardiovascular;  Laterality: N/A;    CARDIAC CATHETERIZATION N/A 2020    Procedure: Coronary angiography;  Surgeon: Kenney Reyez MD;  Location: [facility] CATH INVASIVE LOCATION;  Service: Cardiovascular;  Laterality: N/A;    CARDIAC CATHETERIZATION N/A 2020    Procedure: Left Heart Cath;  Surgeon: Kenney Reyez MD;  Location: [facility] CATH INVASIVE LOCATION;  Service: Cardiovascular;  Laterality: N/A;    COLONOSCOPY N/A 11/15/2022    Procedure: COLONOSCOPY with polypectomy;  Surgeon: AMY Garcia MD;  Location: [facility] ENDOSCOPY;  Service: Gastroenterology;  Laterality: N/A;  post: diverticulosis, ascending polyp x2,  transverse polyp x3, descending polyp x4, rectal polyp x1, hemorroids    ENDOSCOPY N/A 11/15/2022    Procedure: ESOPHAGOGASTRODUODENOSCOPY with biopsy;  Surgeon: AMY Garcia MD;  Location: [facility] ENDOSCOPY;  Service: Gastroenterology;  Laterality: N/A;  post: esophagitis with biopsy to rule out candida , gastric biopsy    KNEE SURGERY Left 2018      General Information       Row Name 12/21/24 1822          Physical Therapy Time and Intention    Document Type evaluation  -EL     Mode of Treatment individual therapy;physical therapy  -EL       Row Name 12/21/24 1822          General Information    Prior Level of Function independent:;all household mobility;min assist:;ADL's  -EL       Row Name 12/21/24 1822          Living Environment    People in Home facility resident;other (see comments)  Hannah ortiz EKTA  -       Row Name 12/21/24 1822          Home Main Entrance    Number of Stairs, Main Entrance none  -EL       Row Name 12/21/24 1822          Stairs Within Home, Primary    Number of Stairs, Within Home, Primary none  -EL       Row Name 12/21/24 1822          Cognition    Orientation Status (Cognition) oriented x 4  -EL       Row Name 12/21/24 1822          Safety Issues/Impairments Affecting Functional Mobility    Impairments Affecting Function (Mobility) pain;range of motion (ROM);strength;endurance/activity tolerance  -EL               User Key  (r) = Recorded By, (t) = Taken By, (c) = Cosigned By      Initials Name Provider Type    EL Yaya Serna PT Physical Therapist                   Mobility       Row Name 12/21/24 1823          Bed Mobility    Bed Mobility supine-sit  -EL     Supine-Sit [place] (Bed Mobility) moderate assist (50% patient effort)  -EL     Comment, (Bed Mobility) to bring BLE off bed and to bring trunk over MALA  -EL       Row Name 12/21/24 1823          Bed-Chair Transfer    Bed-Chair [place] (Transfers) minimum assist (75% patient effort)  -EL     Assistive Device  (Bed-Chair Transfers) walker, front-wheeled  -EL       Row Name 12/21/24 1823          Sit-Stand Transfer    Sit-Stand [place] (Transfers) minimum assist (75% patient effort)  -EL     Assistive Device (Sit-Stand Transfers) walker, front-wheeled  -EL       Row Name 12/21/24 1823          Gait/Stairs (Locomotion)    [place] Level (Gait) minimum assist (75% patient effort)  -EL     Assistive Device (Gait) walker, front-wheeled  -EL     Distance in Feet (Gait) 30  -EL     Deviations/Abnormal Patterns (Gait) gait speed decreased;stride length decreased  -EL     Bilateral Gait Deviations forward flexed posture  -EL               User Key  (r) = Recorded By, (t) = Taken By, (c) = Cosigned By      Initials Name Provider Type    Yaya Preston, PT Physical Therapist                   Obj/Interventions       Row Name 12/21/24 1824          Range of Motion Comprehensive    General Range of Motion lower extremity range of motion deficits identified  -EL     Comment, General Range of Motion severe lymphedema in BLE limiting to general mobility  -       Row Name 12/21/24 1824          Strength Comprehensive (MMT)    General Manual Muscle Testing (MMT) Assessment lower extremity strength deficits identified  -EL     Comment, General Manual Muscle Testing (MMT) Assessment BLE 3+/5 throughout available mobitliy  -       Row Name 12/21/24 1824          Balance    Balance Assessment sitting static balance;standing dynamic balance;standing static balance  -EL     Static Sitting Balance independent  -EL     Static Standing Balance minimal assist  -EL     Dynamic Standing Balance minimal assist  -EL     Position/Device Used, Standing Balance walker, front-wheeled  -EL       Row Name 12/21/24 1824          Sensory Assessment (Somatosensory)    Sensory Assessment (Somatosensory) sensation intact  -EL               User Key  (r) = Recorded By, (t) = Taken By, (c) = Cosigned By      Initials Name Provider Type    Yaya Preston,  PT Physical Therapist                   Goals/Plan       Row Name 12/21/24 1831          Bed Mobility Goal 1 (PT)    Activity/Assistive Device (Bed Mobility Goal 1, PT) bed mobility activities, all  -EL     [place] Level/Cues Needed (Bed Mobility Goal 1, PT) modified independence  -EL     Time Frame (Bed Mobility Goal 1, PT) long term goal (LTG);2 weeks  -EL       Row Name 12/21/24 1831          Transfer Goal 1 (PT)    Activity/Assistive Device (Transfer Goal 1, PT) transfers, all;walker, rolling  -EL     [place] Level/Cues Needed (Transfer Goal 1, PT) modified independence  -EL     Time Frame (Transfer Goal 1, PT) long term goal (LTG);2 weeks  -EL       Row Name 12/21/24 1831          Gait Training Goal 1 (PT)    Activity/Assistive Device (Gait Training Goal 1, PT) gait (walking locomotion);walker, rolling  -EL     [place] Level (Gait Training Goal 1, PT) modified independence  -EL     Distance (Gait Training Goal 1, PT) 100  -EL     Time Frame (Gait Training Goal 1, PT) long term goal (LTG);2 weeks  -EL       Row Name 12/21/24 1831          Therapy Assessment/Plan (PT)    Planned Therapy Interventions (PT) balance training;neuromuscular re-education;bed mobility training;transfer training;gait training;patient/family education;strengthening  -EL               User Key  (r) = Recorded By, (t) = Taken By, (c) = Cosigned By      Initials Name Provider Type    Yaya Preston, PT Physical Therapist                   Clinical Impression       [facility] Name 12/21/24 1825          Pain    Pain Location extremity  -EL     Pain Side/Orientation bilateral;lower  -EL     Additional Documentation Pain Scale: FACES Pre/Post-Treatment (Group)  -EL       [facility] Name 12/21/24 1825          Pain Scale: FACES Pre/Post-Treatment    Pain: FACES Scale, Pretreatment 2-->hurts little bit  -EL     Posttreatment Pain Rating 2-->hurts little bit  -EL       [facility] Name 12/21/24 1825          Plan of Care Review    Plan of Care Reviewed  With patient  -EL     Outcome Evaluation Pt is a 58 YO M admitted from [facility] where he has been for nearly 1 week. Pt states generally he is able to ambulate with RWx and can perform ADLs, however the facility is concerned that he is requiring more assistance than they can provide. Pt requiring MOD A this date to come to sitting EOB, MIN A to stand and to ambulate in room wiht RWx. Pt ambluating slowly but relatively safely with RWx. Pt does appear below reported independent mobitliy baseline, and if he requires more assistance than facility is willing to provide, pt will require SNF at d/c. PT to follow while admitted.  -EL       Row Name 12/21/24 1825          Therapy Assessment/Plan (PT)    Rehab Potential (PT) good  -EL     Criteria for Skilled Interventions Met (PT) yes  -EL     Therapy Frequency (PT) 5 times/wk  -EL     Predicted Duration of Therapy Intervention (PT) until d/c  -EL       Row Name 12/21/24 1825          Vital Signs    Pre Patient Position Supine  -EL     Intra Patient Position Standing  -EL     Post Patient Position Sitting  -EL       Row Name 12/21/24 1825          Positioning and Restraints    Pre-Treatment Position in bed  -EL     Post Treatment Position chair  -EL     In Chair notified nsg;reclined;call light within reach;encouraged to call for assist;exit alarm on  -EL               User Key  (r) = Recorded By, (t) = Taken By, (c) = Cosigned By      Initials Name Provider Type    Yaya Preston, PT Physical Therapist                   Outcome Measures       Row Name 12/21/24 1831 12/21/24 0830       How much help from another person do you currently need...    Turning from your back to your side while in flat bed without using bedrails? 3  -EL 3  -BC    Moving from lying on back to sitting on the side of a flat bed without bedrails? 2  -EL 2  -BC    Moving to and from a bed to a chair (including a wheelchair)? 3  -EL 2  -BC    Standing up from a chair using your arms (e.g., wheelchair, bedside  chair)? 3  -EL 2  -BC    Climbing 3-5 steps with a railing? 1  -EL 2  -BC    To walk in hospital room? 3  -EL 2  -BC    AM-PAC 6 Clicks Score (PT) 15  -EL 13  -BC    Highest Level of Mobility Goal 4 --> Transfer to chair/commode  -EL 4 --> Transfer to chair/commode  -BC      Row Name 12/21/24 1831          Functional Assessment    Outcome Measure Options AM-PAC 6 Clicks Basic Mobility (PT)  -EL               User Key  (r) = Recorded By, (t) = Taken By, (c) = Cosigned By      Initials Name Provider Type    EL Yaya Serna, PT Physical Therapist    BC Honey Lopez, RN Registered Nurse                                 Physical Therapy Education       Title: PT OT SLP Therapies (Done)       Topic: Physical Therapy (Done)       Point: Mobility training (Done)       Learning Progress Summary            Patient Acceptance, E,TB, VU by  at 12/21/2024 1832                      Point: Precautions (Done)       Learning Progress Summary            Patient Acceptance, E,TB, VU by  at 12/21/2024 1832                                      User Key       Initials Effective Dates Name Provider Type Discipline     06/23/20 -  Yaya Serna, PT Physical Therapist PT                  PT Recommendation and Plan  Planned Therapy Interventions (PT): balance training, neuromuscular re-education, bed mobility training, transfer training, gait training, patient/family education, strengthening  Outcome Evaluation: Pt is a 58 YO M admitted from [facility] where he has been for nearly 1 week. Pt states generally he is able to ambulate with RWx and can perform ADLs, however the facility is concerned that he is requiring more assistance than they can provide. Pt requiring MOD A this date to come to sitting EOB, MIN A to stand and to ambulate in room wiht RWx. Pt ambluating slowly but relatively safely with RWx. Pt does appear below reported independent mobitliy baseline, and if he requires more assistance than facility is willing to provide, pt will  require SNF at d/c. PT to follow while admitted.     Time Calculation:   PT Evaluation Complexity  History, PT Evaluation Complexity: 1-2 personal factors and/or comorbidities  Examination of Body Systems (PT Eval Complexity): total of 3 or more elements  Clinical Presentation (PT Evaluation Complexity): evolving  Clinical Decision Making (PT Evaluation Complexity): moderate complexity  Overall Complexity (PT Evaluation Complexity): moderate complexity     PT Charges       Row Name 12/21/24 1833             Time Calculation    Start Time 0920  -EL      Stop Time 0941  -EL      Time Calculation (min) 21 min  -EL      PT Received On 12/21/24  -EL      PT - Next Appointment 12/23/24  -EL      PT Goal Re-Cert Due Date 01/04/25  -EL                User Key  (r) = Recorded By, (t) = Taken By, (c) = Cosigned By      Initials Name Provider Type    Yaya Preston PT Physical Therapist                  Therapy Charges for Today       Code Description Service Date Service Provider Modifiers Qty    [id number] HC PT EVAL MOD COMPLEXITY 4 12/21/2024 Yaay Serna PT GP 1            PT G-Codes  Outcome Measure Options: AM-PAC 6 Clicks Basic Mobility (PT)  AM-PAC 6 Clicks Score (PT): 15       Yaya Serna PT  12/21/2024

## 2024-12-21 NOTE — H&P
[facility] Medicine Services  History & Physical    Patient Name: Robert Randhawa Jr.  : 1965  MRN: [id number]  Primary Care Physician:  Provider, No Known  Date of admission: 2024  Date and Time of Service: 2024 at 2055    Subjective      Chief Complaint: general weakness    History of Present Illness: Robert Randhawa Jr. is a 59 y.o. male  from assisted living [facility] with a CMH of liver cirrhosis , chronic thrombocytopenia, morbid obesity, chronic bilateral lower extremity lymphedema, frequent falls  who presented to [facility] on 2024 with  reports of increased generalized weakness over the past 2 days. He is awake and alert and answers appropriately. He is stable on room air and denies any fever, chills, chest pain, shortness of breath, dizziness, abdominal or urinary complaints.  He has severe chronic bilateral lower extremity lymphedema left greater than right.  He has been seen by wound care in the past and reports he gets compression wraps done daily at home.  He reports they were done this morning.  He reports the edema has not increased and is at baseline for him.  He does report that he has frequent falls and fell in April and went to [facility] and was subsequently sent to [facility] rehab where he remained until this past Tuesday and was sent to [facility].  He reports he thought he could go to [facility] living and take care of himself but he is having difficulty ambulating due to the chronic lymphedema.  Afebrile vital signs are stable.  Labs are essentially unremarkable except for calcium 8.5 which is similar to previous, WBC not elevated, platelets 72,000 and improved from previous at 56,000, Alysis negative for infection, chest x-ray shows cardiomegaly possible pulmonary vascular congestion versus questionable early interstitial edema.  Stable on room air denies any shortness of air.  ED provider attempted to discharge  patient back to assisted living however they refused as he requires too much assistance for their capabilities.  Physical therapy and case management have been consulted here.  He has no complaints.  In addition wound care has been consulted for compression wraps.  Review of records shows he had a 2D echo in April 2023 which showed an ejection fraction of 56 to 60%, In February 2020 which showed no occlusive disease, and last stress test was 2023 which was negative for ischemia.      Review of Systems   Constitutional: Negative.    HENT: Negative.     Eyes: Negative.    Respiratory: Negative.     Cardiovascular:  Positive for leg swelling.   Gastrointestinal:  Positive for abdominal distention.   Endocrine: Negative.    Genitourinary: Negative.    Musculoskeletal:  Positive for gait problem.   Skin: Negative.    Allergic/Immunologic: Negative.    Neurological:  Positive for weakness.   Hematological: Negative.    Psychiatric/Behavioral: Negative.     All other systems reviewed and are negative.      Personal History     Past Medical History:   Diagnosis Date    Arthritis     Asthma     Chronic deep vein thrombosis (DVT)     Lymphedema of both lower extremities        Past Surgical History:   Procedure Laterality Date    APPENDECTOMY  1974    CARDIAC CATHETERIZATION N/A 2/28/2020    Procedure: Left ventriculography;  Surgeon: Kenney Reyez MD;  Location: [facility] INVASIVE LOCATION;  Service: Cardiovascular;  Laterality: N/A;    CARDIAC CATHETERIZATION N/A 2/28/2020    Procedure: Coronary angiography;  Surgeon: Kenney Reyez MD;  Location: [facility] INVASIVE LOCATION;  Service: Cardiovascular;  Laterality: N/A;    CARDIAC CATHETERIZATION N/A 2/28/2020    Procedure: Left Heart Cath;  Surgeon: Kenney Reyez MD;  Location: [facility] INVASIVE LOCATION;  Service: Cardiovascular;  Laterality: N/A;    COLONOSCOPY N/A 11/15/2022    Procedure: COLONOSCOPY with polypectomy;  Surgeon: AMY Garcia MD;   Location: [facility] ENDOSCOPY;  Service: Gastroenterology;  Laterality: N/A;  post: diverticulosis, ascending polyp x2, transverse polyp x3, descending polyp x4, rectal polyp x1, hemorroids    ENDOSCOPY N/A 11/15/2022    Procedure: ESOPHAGOGASTRODUODENOSCOPY with biopsy;  Surgeon: AMY Garcia MD;  Location: [facility] ENDOSCOPY;  Service: Gastroenterology;  Laterality: N/A;  post: esophagitis with biopsy to rule out candida , gastric biopsy    KNEE SURGERY Left 2018       Family History: family history includes Aortic aneurysm in his father; Arthritis in his father; Diabetes in his mother; Heart disease in his mother. Otherwise pertinent FHx was reviewed and not pertinent to current issue.    Social History:  reports that he quit smoking about 41 years ago. His smoking use included cigarettes. He started smoking about 47 years ago. He has a 2.5 pack-year smoking history. He has been exposed to tobacco smoke. He has never used smokeless tobacco. He reports that he does not drink alcohol and does not use drugs.    Home Medications:  Prior to Admission Medications       Prescriptions Last Dose Informant Patient Reported? Taking?    albuterol sulfate  (90 Base) MCG/ACT inhaler   Yes No    Inhale 2 puffs Every 4 (Four) Hours As Needed.    dicyclomine (BENTYL) 10 MG capsule  Self Yes No    Take 1 capsule by mouth 3 (Three) Times a Day Before Meals.    furosemide (Lasix) 20 MG tablet   No No    Take 1 tablet by mouth Daily. Take daily or PRN    gabapentin (NEURONTIN) 300 MG capsule   Yes No    Take 1 capsule by mouth 3 (Three) Times a Day.    lactulose (CHRONULAC) 10 GM/15ML solution   No No    Take 15 mL by mouth Daily.    mineral oil-hydrophilic petrolatum (AQUAPHOR) ointment   No No    Apply 1 application  topically to the appropriate area as directed 2 (Two) Times a Day As Needed for Dry Skin.    pantoprazole (PROTONIX) 40 MG EC tablet   No No    Take 1 tablet by mouth Daily.    riFAXIMin (XIFAXAN) 550 MG tablet   Self Yes No    Take 1 tablet by mouth Every 12 (Twelve) Hours.    spironolactone (ALDACTONE) 25 MG tablet  Self Yes No    Take 2 tablets by mouth Daily.              Allergies:  Allergies   Allergen Reactions    Sulfa Antibiotics Unknown (See Comments)       Objective      Vitals:   Temp:  [98 °F (36.7 °C)] 98 °F (36.7 °C)  Heart Rate:  [75-89] 86  Resp:  [18] 18  BP: (115-122)/(69-74) 115/71  Body mass index is 41.96 kg/m².  Physical Exam  Vitals reviewed.   Constitutional:       Appearance: He is obese.   HENT:      Head: Normocephalic and atraumatic.      Right Ear: External ear normal.      Left Ear: External ear normal.      Nose: Nose normal.      Mouth/Throat:      Mouth: Mucous membranes are moist.   Eyes:      Extraocular Movements: Extraocular movements intact.   Cardiovascular:      Rate and Rhythm: Normal rate and regular rhythm.      Heart sounds: Normal heart sounds.   Pulmonary:      Effort: Pulmonary effort is normal.      Breath sounds: Normal breath sounds.   Abdominal:      General: There is distension.      Palpations: Abdomen is soft.   Genitourinary:     Comments: deferred  Musculoskeletal:         General: Swelling present.      Cervical back: Normal range of motion and neck supple.      Right lower leg: Edema present.      Left lower leg: Edema present.   Skin:     General: Skin is warm.      Findings: Erythema present.   Neurological:      General: No focal deficit present.      Mental Status: He is alert and oriented to person, place, and time.   Psychiatric:         Mood and Affect: Mood normal.         Behavior: Behavior normal.         Thought Content: Thought content normal.         Judgment: Judgment normal.         Diagnostic Data:  Lab Results (last 24 hours)       Procedure Component Value Units Date/Time    Basic Metabolic Panel [[id number]]  (Abnormal) Collected: 12/20/24 1721    Specimen: Blood Updated: 12/20/24 1753     Glucose 104 mg/dL      BUN 22 mg/dL      Creatinine 1.04  mg/dL      Sodium 137 mmol/L      Potassium 3.9 mmol/L      Chloride 101 mmol/L      CO2 26.3 mmol/L      Calcium 8.5 mg/dL      BUN/Creatinine Ratio 21.2     Anion Gap 9.7 mmol/L      eGFR 82.2 mL/min/1.73     Narrative:      GFR Categories in Chronic Kidney Disease (CKD)      GFR Category          GFR (mL/min/1.73)    Interpretation  G1                     90 or greater         Normal or high (1)  G2                      60-89                Mild decrease (1)  G3a                   45-59                Mild to moderate decrease  G3b                   30-44                Moderate to severe decrease  G4                    15-29                Severe decrease  G5                    14 or less           Kidney failure          (1)In the absence of evidence of kidney disease, neither GFR category G1 or G2 fulfill the criteria for CKD.    eGFR calculation 2021 CKD-EPI creatinine equation, which does not include race as a factor    Magnesium [[id number]]  (Normal) Collected: 12/20/24 1721    Specimen: Blood Updated: 12/20/24 1753     Magnesium 2.3 mg/dL     BNP [[id number]]  (Normal) Collected: 12/20/24 1721    Specimen: Blood Updated: 12/20/24 1753     proBNP 367.0 pg/mL     Narrative:      This assay is used as an aid in the diagnosis of individuals suspected of having heart failure. It can be used as an aid in the diagnosis of acute decompensated heart failure (ADHF) in patients presenting with signs and symptoms of ADHF to the emergency department (ED). In addition, NT-proBNP of <300 pg/mL indicates ADHF is not likely.    Age Range Result Interpretation  NT-proBNP Concentration (pg/mL:      <50             Positive            >450                   Gray                 300-450                    Negative             <300    50-75           Positive            >900                  Gray                300-900                  Negative            <300      >75             Positive            >1800                  Leonard                 300-1800                  Negative            <300    Urinalysis With Microscopic If Indicated (No Culture) - Urine, Clean Catch [[id number]]  (Abnormal) Collected: 12/20/24 1717    Specimen: Urine, Clean Catch Updated: 12/20/24 1741     Color, UA Orange     Comment: Any Substance that causes an abnormal urine color can alter the accuracy of the chemical reactions.        Appearance, UA Clear     pH, UA 6.0     Specific Gravity, UA 1.014     Glucose, UA Negative     Ketones, UA Negative     Bilirubin, UA Negative     Blood, UA Negative     Protein, UA Negative     Leuk Esterase, UA Negative     Nitrite, UA Negative     Urobilinogen, UA 1.0 E.U./dL    Narrative:      Urine microscopic not indicated.    CBC & Differential [[id number]]  (Abnormal) Collected: 12/20/24 1721    Specimen: Blood Updated: 12/20/24 1734    Narrative:      The following orders were created for panel order CBC & Differential.  Procedure                               Abnormality         Status                     ---------                               -----------         ------                     CBC Auto Differential[[id number]]        Abnormal            Final result               Scan Slide[[id number]]                                                                    Please view results for these tests on the individual orders.    CBC Auto Differential [[id number]]  (Abnormal) Collected: 12/20/24 1721    Specimen: Blood Updated: 12/20/24 1734     WBC 4.78 10*3/mm3      RBC 3.56 10*6/mm3      Hemoglobin 13.4 g/dL      Hematocrit 39.2 %      .1 fL      MCH 37.6 pg      MCHC 34.2 g/dL      RDW 14.7 %      RDW-SD 60.3 fl      MPV 9.9 fL      Platelets 72 10*3/mm3      Neutrophil % 73.7 %      Lymphocyte % 14.6 %      Monocyte % 9.2 %      Eosinophil % 1.7 %      Basophil % 0.4 %      Immature Grans % 0.4 %      Neutrophils, Absolute 3.52 10*3/mm3      Lymphocytes, Absolute 0.70 10*3/mm3      Monocytes, Absolute 0.44 10*3/mm3       Eosinophils, Absolute 0.08 10*3/mm3      Basophils, Absolute 0.02 10*3/mm3      Immature Grans, Absolute 0.02 10*3/mm3      nRBC 0.0 /100 WBC     Extra Tubes [[id number]] Collected: 12/20/24 1721    Specimen: Blood, Venous Line Updated: 12/20/24 1731    Narrative:      The following orders were created for panel order Extra Tubes.  Procedure                               Abnormality         Status                     ---------                               -----------         ------                     Gold Top - SST[[id number]]                                   Final result               Light Blue Top[[id number]]                                   Final result                 Please view results for these tests on the individual orders.    Gold Top - SST [[id number]] Collected: 12/20/24 1721    Specimen: Blood Updated: 12/20/24 1731     Extra Tube Hold for add-ons.     Comment: Auto resulted.       Light Blue Top [[id number]] Collected: 12/20/24 1721    Specimen: Blood Updated: 12/20/24 1731     Extra Tube Hold for add-ons.     Comment: Auto resulted                Imaging Results (Last 24 Hours)       Procedure Component Value Units Date/Time    XR Chest 1 View [[id number]] Collected: 12/20/24 1728     Updated: 12/20/24 1731    Narrative:      XR CHEST 1 VW    Date of Exam: 12/20/2024 5:12 PM EST    Indication: general weakness    Comparison: Chest radiograph 1/29/2024    Findings:  Lung volumes are low. Cardiomegaly similar to prior. There is mild prominence of the central vasculature. Mild prominence of the interstitium. No discrete infectious consolidation, large effusion or pneumothorax. Degenerative related osseous change.      Impression:      Impression:  Cardiomegaly with findings suggesting pulmonary vascular congestion versus questionable early interstitial edema.      Electronically Signed: Ramon Rodriguez MD    12/20/2024 5:29 PM EST    Workstation ID: [id number]              Assessment & Plan        This  is a 59 y.o. male with:    Active and Resolved Problems  Active Hospital Problems    Diagnosis  POA    **General weakness [R53.1]  Yes     Priority: High    Lymphedema of both lower extremities [I89.0]  Yes     Priority: Medium    Thrombocytopenia [D69.6]  Yes    Cirrhosis of liver [K74.60]  Yes    Frequent falls [R29.6]  Not Applicable    Obesity, morbid [E66.01]  Yes      Resolved Hospital Problems   No resolved problems to display.       General weakness, frequent falls likely secondary to chronic debility, severe lymphedema, no current signs of infection, fall precautions PT eval and treat, case management consult for rehab placement    Edema of both lower extremities, chronic left greater than right at baseline per patient, wound care consulted for compression wraps    Chronic thrombocytopenia, platelets 72 improved from previous at 56 likely secondary to chronic liver disease continue to monitor CBC    Cirrhosis of liver, no complaints stable Home meds unverified at this time reorder pending verification pharmacy and if appropriate    Obesity, morbid BMI 41.96 consistent carb heart healthy diet lifestyle management education    VTE Prophylaxis:  No VTE prophylaxis order currently exists.  Lovenox not ordered due to chronic thrombocytopenia and risk of bleeding and SCDs will not fit patient due to severe chronic lymphedema        The patient desires to be as follows:    CODE STATUS:    Code Status (Patient has no pulse and is not breathing): CPR (Attempt to Resuscitate)  Medical Interventions (Patient has pulse or is breathing): Full Support        Admission Status:  I believe this patient meets observation status.    Expected Length of Stay: pending PT eval and case management review for possible rehab    PDMP and Medication Dispenses via Sidebar reviewed and consistent with patient reported medications.    I discussed the patient's findings and my recommendations with patient.      Signature:     This  document has been electronically signed by QUEENIE Stock on December 20, 2024 21:25 [facility] Hospitalist Team

## 2024-12-21 NOTE — PLAN OF CARE
Goal Outcome Evaluation:  Plan of Care Reviewed With: patient           Outcome Evaluation: Pt is a 58 YO M admitted from [facility] where he has been for nearly 1 week. Pt states generally he is able to ambulate with RWx and can perform ADLs, however the facility is concerned that he is requiring more assistance than they can provide. Pt requiring MOD A this date to come to sitting EOB, MIN A to stand and to ambulate in room wiht RWx. Pt ambluating slowly but relatively safely with RWx. Pt does appear below reported independent mobitliy baseline, and if he requires more assistance than facility is willing to provide, pt will require SNF at d/c. PT to follow while admitted.

## 2024-12-21 NOTE — ED NOTES
Called [facility] again, spoke to Rhona, they said the DON said that the pt may not be able to come back to the facility but is contacting administrators to verify. Charge nurse notified.

## 2024-12-21 NOTE — ED NOTES
"Nursing report ED to floor  Robert Randhawa Jr.  59 y.o.  male    HPI:   Chief Complaint   Patient presents with    Weakness - Generalized     Pt was at [facility] and staff noticed \"increased weakness\" over the last 2 days. No other complaints.        Admitting doctor:   Carlos Llanes Alvarez, MD    Admitting diagnosis:   The primary encounter diagnosis was General weakness. A diagnosis of Lymphedema was also pertinent to this visit.    Code status:   Current Code Status       Date Active Code Status Order ID Comments User Context       12/20/2024 2037 CPR (Attempt to Resuscitate) [id number]  Melba Ramirez APRN ED        Question Answer    Code Status (Patient has no pulse and is not breathing) CPR (Attempt to Resuscitate)    Medical Interventions (Patient has pulse or is breathing) Full Support                    Allergies:   Sulfa antibiotics    Isolation:  No active isolations     Fall Risk:  Fall Risk Assessment was completed, and patient is at high risk for falls.   Predictive Model Details         7 (Low) Factor Value    Calculated 12/20/2024 20:47 Age 59    Risk of Fall Model Active Peripheral IV Present     Imaging order in this encounter Present     Magnesium 2.3 mg/dL     Respiratory Rate 18     Calcium 8.5 mg/dL     Jose Scale not on file     Diastolic BP 74     Clinically Relevant Sex Not Female     Chloride 101 mmol/L     Albumin not on file     Days after Admission 0.182     Total Bilirubin not on file     Tobacco Use Quit     Creatinine 1.04 mg/dL     Potassium 3.9 mmol/L     ALT not on file         Weight:       12/20/24  1712   Weight: (!) 144 kg (318 lb)       Intake and Output  No intake or output data in the 24 hours ending 12/20/24 2048    Diet:   Dietary Orders (From admission, onward)       Start     Ordered    12/20/24 2044  Diet: Cardiac, Diabetic; Healthy Heart (2-3 Na+); Consistent Carbohydrate; Fluid Consistency: Thin (IDDSI 0)  Diet Effective Now        References:    Diet " Order [place]   Question Answer Comment   Diets: Cardiac    Diets: Diabetic    Cardiac Diet: Healthy Heart (2-3 Na+)    Diabetic Diet: Consistent Carbohydrate    Fluid Consistency: Thin (IDDSI 0)        12/20/24 2043                     Most recent vitals:   Vitals:    12/20/24 1901 12/20/24 1917 12/20/24 1933 12/20/24 1950   BP:       Pulse: 81 80 76 86   Resp:       Temp:       TempSrc:       SpO2: 99% 98% 96% 99%   Weight:       Height:           Active LDAs/IV Access:   Lines, Drains & Airways       Active LDAs       Name Placement date Placement time Site Days    Peripheral IV 12/20/24 1721 Right Antecubital 12/20/24 1721  Antecubital  less than 1                    Skin Condition:   Skin Assessments (last day)       None             Labs (abnormal labs have a star):   Labs Reviewed   BASIC METABOLIC PANEL - Abnormal; Notable for the following components:       Result Value    Glucose 104 (*)     Calcium 8.5 (*)     All other components within normal limits    Narrative:     GFR Categories in Chronic Kidney Disease (CKD)      GFR Category          GFR (mL/min/1.73)    Interpretation  G1                     90 or greater         Normal or high (1)  G2                      60-89                Mild decrease (1)  G3a                   45-59                Mild to moderate decrease  G3b                   30-44                Moderate to severe decrease  G4                    15-29                Severe decrease  G5                    14 or less           Kidney failure          (1)In the absence of evidence of kidney disease, neither GFR category G1 or G2 fulfill the criteria for CKD.    eGFR calculation 2021 CKD-EPI creatinine equation, which does not include race as a factor   URINALYSIS W/ MICROSCOPIC IF INDICATED (NO CULTURE) - Abnormal; Notable for the following components:    Color, UA Orange (*)     All other components within normal limits    Narrative:     Urine microscopic not indicated.   CBC WITH  AUTO DIFFERENTIAL - Abnormal; Notable for the following components:    RBC 3.56 (*)     .1 (*)     MCH 37.6 (*)     RDW-SD 60.3 (*)     Platelets 72 (*)     Lymphocyte % 14.6 (*)     All other components within normal limits   MAGNESIUM - Normal   BNP (IN-HOUSE) - Normal    Narrative:     This assay is used as an aid in the diagnosis of individuals suspected of having heart failure. It can be used as an aid in the diagnosis of acute decompensated heart failure (ADHF) in patients presenting with signs and symptoms of ADHF to the emergency department (ED). In addition, NT-proBNP of <300 pg/mL indicates ADHF is not likely.    Age Range Result Interpretation  NT-proBNP Concentration (pg/mL:      <50             Positive            >450                   Gray                 300-450                    Negative             <300    50-75           Positive            >900                  Gray                300-900                  Negative            <300      >75             Positive            >1800                  Gray                300-1800                  Negative            <300   CBC AND DIFFERENTIAL    Narrative:     The following orders were created for panel order CBC & Differential.  Procedure                               Abnormality         Status                     ---------                               -----------         ------                     CBC Auto Differential[[id number]]        Abnormal            Final result               Scan Slide[[id number]]                                                                    Please view results for these tests on the individual orders.   EXTRA TUBES    Narrative:     The following orders were created for panel order Extra Tubes.  Procedure                               Abnormality         Status                     ---------                               -----------         ------                     Gold Top - SST[[id number]]                                    Final result               Light Blue Top[[id number]]                                   Final result                 Please view results for these tests on the individual orders.   GOLD TOP - [facility]   LIGHT BLUE TOP       LOC: Person, Place, Time, and Situation    Telemetry:  Med/Surg    Cardiac Monitoring Ordered: no    EKG:   No orders to display       Medications Given in the ED:   Medications   sodium chloride 0.9 % flush 10 mL (has no administration in time range)   Potassium Replacement - Follow Nurse / BPA Driven Protocol (has no administration in time range)   Magnesium Standard Dose Replacement - Follow Nurse / BPA Driven Protocol (has no administration in time range)   Phosphorus Replacement - Follow Nurse / BPA Driven Protocol (has no administration in time range)   Calcium Replacement - Follow Nurse / BPA Driven Protocol (has no administration in time range)       Imaging results:  XR Chest 1 View    Result Date: 2024  Impression: Cardiomegaly with findings suggesting pulmonary vascular congestion versus questionable early interstitial edema. Electronically Signed: Ramon Rodriguez MD  2024 5:29 PM EST  Workstation ID: [id number]     Social issues:   Social History     Socioeconomic History    Marital status: Single   Tobacco Use    Smoking status: Former     Current packs/day: 0.00     Average packs/day: 0.5 packs/day for 5.0 years (2.5 ttl pk-yrs)     Types: Cigarettes     Start date:      Quit date:      Years since quittin.9     Passive exposure: Past    Smokeless tobacco: Never   Vaping Use    Vaping status: Never Used   Substance and Sexual Activity    Alcohol use: Never    Drug use: Never    Sexual activity: Defer       NIH Stroke Scale:  Interval: (not recorded)  1a. Level of Consciousness: (not recorded)  1b. LOC Questions: (not recorded)  1c. LOC Commands: (not recorded)  2. Best Gaze: (not recorded)  3. Visual: (not recorded)  4. Facial Palsy: (not recorded)  5a.  Motor Arm, Left: (not recorded)  5b. Motor Arm, Right: (not recorded)  6a. Motor Leg, Left: (not recorded)  6b. Motor Leg, Right: (not recorded)  7. Limb Ataxia: (not recorded)  8. Sensory: (not recorded)  9. Best Language: (not recorded)  10. Dysarthria: (not recorded)  11. Extinction and Inattention (formerly Neglect): (not recorded)    Total (NIH Stroke Scale): (not recorded)     Additional notable assessment information:     Nursing report ED to floor:  TEVIN Verma RN   12/20/24 20:48 EST

## 2024-12-21 NOTE — ED NOTES
Called [facility], nurse said that they need to get in touch with their DON about this pt before transporting back to facility.

## 2024-12-21 NOTE — PLAN OF CARE
Pnd placement          Problem: Adult Inpatient Plan of Care  Goal: Plan of Care Review  Outcome: Progressing  Goal: Patient-Specific Goal (Individualized)  Outcome: Progressing  Goal: Absence of Hospital-Acquired Illness or Injury  Outcome: Progressing  Intervention: Identify and Manage Fall Risk  Description: Perform standard risk assessment on admission using a validated tool or comprehensive approach appropriate to the patient; reassess fall risk frequently, with change in status or transfer to another level of care.  Communicate risk to interprofessional healthcare team; ensure fall risk visible cue.  Determine need for increased observation, equipment and environmental modification, as well as use of supportive, nonskid footwear.  Adjust safety measures to individual needs and identified risk factors.  Reinforce the importance of active participation with fall risk prevention, safety, and physical activity with the patient and family.  Perform regular intentional rounding to assess need for position change, pain assessment and personal needs, including assistance with toileting.  Recent Flowsheet Documentation  Taken 12/21/2024 1600 by Honey Lopez RN  Safety Promotion/Fall Prevention: safety round/check completed  Taken 12/21/2024 1400 by Honey Lopez RN  Safety Promotion/Fall Prevention: safety round/check completed  Taken 12/21/2024 1200 by Honey Lopez RN  Safety Promotion/Fall Prevention: safety round/check completed  Taken 12/21/2024 1000 by Honey Lopez RN  Safety Promotion/Fall Prevention: safety round/check completed  Taken 12/21/2024 0830 by Honey Lopez RN  Safety Promotion/Fall Prevention: safety round/check completed  Intervention: Prevent Skin Injury  Description: Perform a screening for skin injury risk, such as pressure or moisture-associated skin damage on admission and at regular intervals throughout hospital stay.  Keep all areas of skin (especially folds) clean and  dry.  Maintain adequate skin hydration.  Relieve and redistribute pressure and protect bony prominences and skin at risk for injury; implement measures based on patient-specific risk factors.  Match turning and repositioning schedule to clinical condition.  Encourage weight shift frequently; assist with reposition if unable to complete independently.  Float heels off bed; avoid pressure on the Achilles tendon.  Keep skin free from extended contact with medical devices.  Optimize nutrition and hydration.  Encourage functional activity and mobility, as early as tolerated.  Use aids (e.g., slide boards, mechanical lift) during transfer.  Recent Flowsheet Documentation  Taken 12/21/2024 1600 by Honey Lopez RN  Body Position:   position changed independently   turned   left  Taken 12/21/2024 1400 by Honey Lopez RN  Body Position:   position changed independently   turned   right  Taken 12/21/2024 1200 by Honey Lopez RN  Body Position:   turned   left  Taken 12/21/2024 1000 by Honey Lopez RN  Body Position:   turned   right   position changed independently  Taken 12/21/2024 0830 by Honey Lopez RN  Body Position: position changed independently  Taken 12/21/2024 0800 by Honey Lopez RN  Body Position:   turned   left   position changed independently  Goal: Optimal Comfort and Wellbeing  Outcome: Progressing  Intervention: Provide Person-Centered Care  Description: Use a family-focused approach to care; encourage support system presence and participation.  Develop trust and rapport by proactively providing information, encouraging questions, addressing concerns and offering reassurance.  Acknowledge emotional response to hospitalization.  Recognize and utilize personal coping strategies and strengths; develop goals via shared decision-making.  Honor spiritual and cultural preferences.  Recent Flowsheet Documentation  Taken 12/21/2024 0830 by Honey Lopez RN  Trust Relationship/Rapport:   care  explained   empathic listening provided   questions encouraged   reassurance provided  Goal: Readiness for Transition of Care  Outcome: Progressing     Problem: Fall Injury Risk  Goal: Absence of Fall and Fall-Related Injury  Outcome: Progressing  Intervention: Identify and Manage Contributors  Description: Develop a fall prevention plan, considering patient-centered interventions and family/caregiver involvement; identify and address patient's facilitators and barriers.  Provide reorientation, appropriate sensory stimulation and routines with changes in mental status to decrease risk of fall.  Promote use of personal vision and auditory aids.  Assess assistance level required for safe and effective self-care; provide support as needed, such as toileting and mobilization. For age 65 and older, implement timed toileting with assistance.  Encourage physical activity, such as performance of mobility and self-care at highest level of patient ability, multicomponent exercise program and provision of appropriate assistive devices.  If fall occurs, assess the severity of injury; implement fall injury protocol. Determine the cause and revise fall injury prevention plan.  Regularly review and advocate for medication adjustment to decrease fall risk; consider administration times, polypharmacy and age.  Balance adequate pain management with potential for oversedation.  Recent Flowsheet Documentation  Taken 12/21/2024 1200 by Honey Lopez, RN  Medication Review/Management: medications reviewed  Taken 12/21/2024 0830 by Honey Lopez, RN  Medication Review/Management: medications reviewed  Intervention: Promote Injury-Free Environment  Description: Provide a safe, barrier-free environment that encourages independent activity.  Keep care area uncluttered and well-lighted.  Determine need for increased observation or monitoring.  Avoid use of devices that minimize mobility, such as restraints or indwelling urinary  catheter.  Recent Flowsheet Documentation  Taken 12/21/2024 1600 by Honey Lopez RN  Safety Promotion/Fall Prevention: safety round/check completed  Taken 12/21/2024 1400 by Honey Lopez RN  Safety Promotion/Fall Prevention: safety round/check completed  Taken 12/21/2024 1200 by Honey Lopez RN  Safety Promotion/Fall Prevention: safety round/check completed  Taken 12/21/2024 1000 by Honey Lopez RN  Safety Promotion/Fall Prevention: safety round/check completed  Taken 12/21/2024 0830 by Honey Lopez RN  Safety Promotion/Fall Prevention: safety round/check completed     Problem: Comorbidity Management  Goal: Maintenance of Asthma Control  Outcome: Progressing  Intervention: Maintain Asthma Symptom Control  Description: Evaluate adherence to self-management (asthma action plan), such as medication, symptom control, trigger avoidance and self-monitoring.  Advocate for continuation of home regimen, including medication, method of delivery, schedule and symptom monitoring; acknowledge preferred modality and routine.  Minimize exposure to potential triggers, such as perfume, cleaning chemicals and all types of smoke.  Assess for proper use of inhaled medication and delivery technique; assist or reinstruct if needed.  Evaluate effectiveness of coping skills; encourage expression of feelings, expectations and concerns related to disease management and quality of life; reinforce education to enhance management plan and wellbeing.  Recent Flowsheet Documentation  Taken 12/21/2024 1200 by Honey Lopez RN  Medication Review/Management: medications reviewed  Taken 12/21/2024 0830 by Honey Lopez RN  Medication Review/Management: medications reviewed  Goal: Blood Glucose Level Within Target Range  Outcome: Progressing  Intervention: Monitor and Manage Glycemia  Description: Establish target blood glucose levels based on patient-specific factors, such as age, diabetes-related complications and illness  severity.  Document blood glucose levels and monitor trend.  Provide antihyperglycemic pharmacologic therapy to maintain blood glucose levels within targeted range.  Advocate for patient use of home devices such as insulin pump, continuous glucose monitor; assess for safe and competent participation in care.  Check blood glucose level if there is a change in mental or cognitive status.  Recognize, treat and document hypoglycemia event and potential cause.  Assess current lifestyle patterns; acknowledging positive patterns supporting wellbeing.  Evaluate effectiveness of coping skills; encourage expression of feelings, expectations and concerns related to disease management and quality of life; reinforce education to enhance management plan and wellbeing.  Recent Flowsheet Documentation  Taken 12/21/2024 1200 by Honey Lopez RN  Medication Review/Management: medications reviewed  Taken 12/21/2024 0830 by Honey Lopez RN  Medication Review/Management: medications reviewed  Goal: Maintenance of Osteoarthritis Symptom Control  Outcome: Progressing  Intervention: Maintain Osteoarthritis Symptom Control  Description: Evaluate adherence to self-management plan, such as medication, exercise and weight management.  Advocate for continuation of home regimen, such as medication and physical activity; monitor response.  Encourage participation in functional activities, such as mobility and ADLs (activities of daily living) to minimize decline associated with inactivity.  Facilitate use of patient-specific assistive devices, equipment or orthoses.  Evaluate effectiveness of coping skills; encourage expression of feelings, expectations and concerns related to disease management and quality of life; reinforce education to enhance management plan and wellbeing.  Recent Flowsheet Documentation  Taken 12/21/2024 1200 by Honey Lopez RN  Medication Review/Management: medications reviewed  Taken 12/21/2024 0830 by John  Honey RN  Activity Management: up in chair  Medication Review/Management: medications reviewed     Problem: Breathing Pattern Ineffective  Goal: Effective Breathing Pattern  Outcome: Progressing  Intervention: Promote Improved Breathing Pattern  Description: Assess and monitor airway, breathing and circulation; maintain close surveillance for deterioration.  Use a validated screening tool to identify risk for HARIS (obstructive sleep apnea).  Maintain head of bed elevation with regular position changes to minimize ventilation-perfusion mismatch and breathlessness.  Evaluate psychosocial factors that may contribute to the anxiety-breathlessness cycle; acknowledge, normalize and validate patient and support system response to patient's breathlessness.  Utilize nonpharmacologic measures, such as controlled breathing and relaxation techniques, in addition to medication, to reduce pain and anxiety.  Promote early mobility or ambulation; match activity to ability and tolerance.  Initiate cough-enhancement and airway-clearance techniques with instruction.  Provide oxygen therapy judiciously to avoid hyperoxemia; adjust to achieve oxygenation goal.  Consider positive pressure ventilation to enhance oxygenation, ventilation and reduce work of breathing.  Recent Flowsheet Documentation  Taken 12/21/2024 0830 by Honey Lopez, RN  Head of Bed (HOB) Positioning: HOB elevated     Problem: Skin Injury Risk Increased  Goal: Skin Health and Integrity  Outcome: Progressing  Intervention: Optimize Skin Protection  Description: Perform a full pressure injury risk assessment, as indicated by screening, upon admission to care unit.  Reassess skin (full inspection and injury risk, including skin temperature, consistency and color) frequently (e.g., scheduled interval, with change in condition) to provide optimal early detection and prevention.  Maintain adequate tissue perfusion (e.g., encourage fluid balance; avoid crossing legs,  constrictive clothing or devices) to promote tissue oxygenation.  Maintain head of bed at lowest degree of elevation tolerated, considering medical condition and other restrictions. Use positioning supports to prevent sliding and friction. Consider low friction textiles.  Avoid positioning onto an area that remains reddened or on bony prominences.  Minimize incontinence and moisture (e.g., toileting schedule; moisture-wicking pad, diaper or incontinence collection device; skin moisture barrier).  Cleanse skin promptly and gently, when soiled, utilizing a pH-balanced cleanser.  Relieve and redistribute pressure (e.g., scheduled position changes, weight shifts, use of support surface, medical device repositioning, protective dressing application, use of positioning device, microclimate control, use of pressure-injury-monitor  Encourage increased activity, such as sitting in a chair at the bedside or early mobilization, when able to tolerate. Avoid prolonged sitting.  Recent Flowsheet Documentation  Taken 12/21/2024 1600 by Honey Lopez RN  Pressure Reduction Techniques:   frequent weight shift encouraged   positioned off wounds   sit time limited to 2 hours   weight shift assistance provided  Pressure Reduction Devices: pressure-redistributing mattress utilized  Taken 12/21/2024 1400 by Honey Lopez RN  Pressure Reduction Techniques:   frequent weight shift encouraged   heels elevated off bed   weight shift assistance provided  Pressure Reduction Devices: pressure-redistributing mattress utilized  Taken 12/21/2024 1200 by Honey Lopez RN  Pressure Reduction Techniques: frequent weight shift encouraged  Pressure Reduction Devices: pressure-redistributing mattress utilized  Taken 12/21/2024 0830 by Honey Lopez RN  Activity Management: up in chair  Pressure Reduction Techniques:   frequent weight shift encouraged   heels elevated off bed  Head of Bed (HOB) Positioning: HOB elevated  Pressure Reduction  Devices: pressure-redistributing mattress utilized   Goal Outcome Evaluation:

## 2024-12-21 NOTE — PROGRESS NOTES
[facility] MEDICINE SERVICE  DAILY PROGRESS NOTE    NAME: Robert Randhawa Jr.  : 1965  MRN: [id number]      LOS: 0 days     PROVIDER OF SERVICE: Gustavo Gay MD    Chief Complaint: General weakness    Subjective:     Interval History:  History taken from: patient      History of Present Illness: Robert Randhawa Jr. is a 59 y.o. male  from [facility] with a CMH of liver cirrhosis , chronic thrombocytopenia, morbid obesity, chronic bilateral lower extremity lymphedema, frequent falls  who presented to [facility] on 2024 with  reports of increased generalized weakness over the past 2 days. He is awake and alert and answers appropriately. He is stable on room air and denies any fever, chills, chest pain, shortness of breath, dizziness, abdominal or urinary complaints.  He has severe chronic bilateral lower extremity lymphedema left greater than right.  He has been seen by wound care in the past and reports he gets compression wraps done daily at home.  He reports they were done this morning.  He reports the edema has not increased and is at baseline for him.  He does report that he has frequent falls and fell in April and went to [facility] and was subsequently sent to [facility] rehab where he remained until this past Tuesday and was sent to [facility].  He reports he thought he could go to assisted living and take care of himself but he is having difficulty ambulating due to the chronic lymphedema.  Afebrile vital signs are stable.  Labs are essentially unremarkable except for calcium 8.5 which is similar to previous, WBC not elevated, platelets 72,000 and improved from previous at 56,000, Alysis negative for infection, chest x-ray shows cardiomegaly possible pulmonary vascular congestion versus questionable early interstitial edema.  Stable on room air denies any shortness of air.  ED provider attempted to discharge patient back  to assisted living however they refused as he requires too much assistance for their capabilities.  Physical therapy and case management have been consulted here.  He has no complaints.  In addition wound care has been consulted for compression wraps.  Review of records shows he had a 2D echo in April 2023 which showed an ejection fraction of 56 to 60%, In February 2020 which showed no occlusive disease, and last stress test was 2023 which was negative for ischemia.      12/21 seen in bed NAD, No new complaints, still weak    Review of Systems  Constitutional: Negative.    HENT: Negative.     Eyes: Negative.    Respiratory: Negative.     Cardiovascular:  Positive for leg swelling.   Gastrointestinal:  Positive for abdominal distention.   Endocrine: Negative.    Genitourinary: Negative.    Musculoskeletal:  Positive for gait problem.   Skin: Negative.    Allergic/Immunologic: Negative.    Neurological:  Positive for weakness.   Hematological: Negative.    Psychiatric/Behavioral: Negative.     All other systems reviewed and are negative.  Objective:     Vital Signs  Temp:  [97.6 °F (36.4 °C)-98.1 °F (36.7 °C)] 97.6 °F (36.4 °C)  Heart Rate:  [65-89] 86  Resp:  [16-24] 17  BP: (103-122)/(63-75) 103/71   Body mass index is 44.27 kg/m².      Physical Exam       Appearance: He is obese.   HENT:      Head: Normocephalic and atraumatic.      Right Ear: External ear normal.      Left Ear: External ear normal.      Nose: Nose normal.      Mouth/Throat:      Mouth: Mucous membranes are moist.   Eyes:      Extraocular Movements: Extraocular movements intact.   Cardiovascular:      Rate and Rhythm: Normal rate and regular rhythm.      Heart sounds: Normal heart sounds.   Pulmonary:      Effort: Pulmonary effort is normal.      Breath sounds: Normal breath sounds.   Abdominal:      General: There is distension.      Palpations: Abdomen is soft.   Genitourinary:     Comments: deferred  Musculoskeletal:         General: Swelling  present.      Cervical back: Normal range of motion and neck supple.      Right lower leg: Edema present.      Left lower leg: Edema present.   Skin:     General: Skin is warm.      Findings: Erythema present.   Neurological:      General: No focal deficit present.      Mental Status: He is alert and oriented to person, place, and time.   Psychiatric:         Mood and Affect: Mood normal.         Behavior: Behavior normal.         Thought Content: Thought content normal.         Judgment: Judgment normal.         Diagnostic Data    Results from last 7 days   Lab Units 12/21/24  0001   WBC 10*3/mm3 4.02   HEMOGLOBIN g/dL 10.6*   HEMATOCRIT % 31.8*   PLATELETS 10*3/mm3 67*   GLUCOSE mg/dL 128*   CREATININE mg/dL 0.96   BUN mg/dL 21   SODIUM mmol/L 135*   POTASSIUM mmol/L 4.0   ANION GAP mmol/L 4.5*       XR Chest 1 View    Result Date: 12/20/2024  Impression: Cardiomegaly with findings suggesting pulmonary vascular congestion versus questionable early interstitial edema. Electronically Signed: Ramon Rodriguez MD  12/20/2024 5:29 PM EST  Workstation ID: [id number]       I reviewed the patient's new clinical results.    Assessment/Plan:     Active and Resolved Problems  Active Hospital Problems    Diagnosis  POA    **General weakness [R53.1]  Yes    Thrombocytopenia [D69.6]  Yes    Cirrhosis of liver [K74.60]  Yes    Frequent falls [R29.6]  Not Applicable    Obesity, morbid [E66.01]  Yes    Lymphedema of both lower extremities [I89.0]  Yes      Resolved Hospital Problems   No resolved problems to display.      General weakness, frequent falls likely secondary to chronic debility, severe lymphedema, no current signs of infection, fall precautions PT eval and treat, case management consult for rehab placement     Edema of both lower extremities, chronic left greater than right at baseline per patient, wound care consulted for compression wraps     Chronic thrombocytopenia, platelets 72 improved from previous at 56 likely  secondary to chronic liver disease continue to monitor CBC     Cirrhosis of liver, no complaints stable Home meds unverified at this time reorder pending verification pharmacy and if appropriate     Obesity, morbid BMI 41.96 consistent carb heart healthy diet lifestyle management education       VTE Prophylaxis:  No VTE prophylaxis order currently exists.    Disposition Planning:     Barriers to Discharge:weakness  Anticipated Date of Discharge: 12/23  Place of Discharge: [facility]      Time: 30 minutes     Code Status and Medical Interventions: CPR (Attempt to Resuscitate); Full Support   Ordered at: 12/20/24 2037     Code Status (Patient has no pulse and is not breathing):    CPR (Attempt to Resuscitate)     Medical Interventions (Patient has pulse or is breathing):    Full Support       Signature: Electronically signed by Gustavo Gay MD, 12/21/24, 14:28 EST.  [facility] Hospitalist Team

## 2024-12-21 NOTE — PLAN OF CARE
Goal Outcome Evaluation:  Patient admitted for general weakness and placement due to [facility] unable to take back due to needing more care, patient has lymphedema in BLE with left being more severe than right leg, legs unwrapped to evaluate with small ulcer noted on right inner calf, small amount of bleeding, legs moisturized, kerlix applied, and then extremities wrapped with ace wraps, pain controlled with prn medications

## 2024-12-22 LAB — C AURIS DNA SPEC QL NAA+NON-PROBE: NOT DETECTED

## 2024-12-22 PROCEDURE — G0378 HOSPITAL OBSERVATION PER HR: HCPCS

## 2024-12-22 RX ADMIN — RIFAXIMIN 550 MG: 550 TABLET ORAL at 21:31

## 2024-12-22 RX ADMIN — BUMETANIDE 1 MG: 1 TABLET ORAL at 08:53

## 2024-12-22 RX ADMIN — FOLIC ACID 1 MG: 1 TABLET ORAL at 08:53

## 2024-12-22 RX ADMIN — GABAPENTIN 800 MG: 400 CAPSULE ORAL at 05:46

## 2024-12-22 RX ADMIN — Medication 10 ML: at 08:53

## 2024-12-22 RX ADMIN — GABAPENTIN 800 MG: 400 CAPSULE ORAL at 14:58

## 2024-12-22 RX ADMIN — BUMETANIDE 1 MG: 1 TABLET ORAL at 17:20

## 2024-12-22 RX ADMIN — HYDROCODONE BITARTRATE AND ACETAMINOPHEN 1 TABLET: 7.5; 325 TABLET ORAL at 21:31

## 2024-12-22 RX ADMIN — MIDODRINE HYDROCHLORIDE 5 MG: 5 TABLET ORAL at 17:20

## 2024-12-22 RX ADMIN — HYDROCODONE BITARTRATE AND ACETAMINOPHEN 1 TABLET: 7.5; 325 TABLET ORAL at 05:46

## 2024-12-22 RX ADMIN — DICYCLOMINE HYDROCHLORIDE 10 MG: 10 CAPSULE ORAL at 12:18

## 2024-12-22 RX ADMIN — SERTRALINE HYDROCHLORIDE 100 MG: 100 TABLET, FILM COATED ORAL at 21:31

## 2024-12-22 RX ADMIN — DICYCLOMINE HYDROCHLORIDE 10 MG: 10 CAPSULE ORAL at 17:20

## 2024-12-22 RX ADMIN — MIDODRINE HYDROCHLORIDE 5 MG: 5 TABLET ORAL at 08:53

## 2024-12-22 RX ADMIN — TAMSULOSIN HYDROCHLORIDE 0.4 MG: 0.4 CAPSULE ORAL at 08:53

## 2024-12-22 RX ADMIN — DICYCLOMINE HYDROCHLORIDE 10 MG: 10 CAPSULE ORAL at 08:53

## 2024-12-22 RX ADMIN — RIFAXIMIN 550 MG: 550 TABLET ORAL at 08:53

## 2024-12-22 RX ADMIN — GABAPENTIN 800 MG: 400 CAPSULE ORAL at 21:31

## 2024-12-22 NOTE — PLAN OF CARE
Goal Outcome Evaluation:  Patients pain controlled with po pain meds, dressings intact to BLE, continues with severe swelling due to lymphedema, incontinence care provided

## 2024-12-22 NOTE — CASE MANAGEMENT/SOCIAL WORK
Discharge Planning Assessment   [facility]     Patient Name: Robert Randhawa Jr.  MRN: [id number]  Today's Date: 12/22/2024    Admit Date: 12/20/2024    Plan: Plan for SNF; pending pt choices. Will require precert and PASRR. From [facility] for transport needs.   Discharge Needs Assessment       Row Name 12/22/24 1578       Living Environment    People in Home facility resident    Current Living Arrangements assisted living facility    Potentially Unsafe Housing Conditions none    In the past 12 months has the electric, gas, oil, or water company threatened to shut off services in your home? No    Primary Care Provided by self    Provides Primary Care For no one, unable/limited ability to care for self    Family Caregiver if Needed none    Able to Return to Prior Arrangements yes  after SNF       Resource/Environmental Concerns    Resource/Environmental Concerns none    Transportation Concerns none       Transportation Needs    In the past 12 months, has lack of transportation kept you from medical appointments or from getting medications? yes  Relies on facility or [employer] transport.    In the past 12 months, has lack of transportation kept you from meetings, work, or from getting things needed for daily living? No       Food Insecurity    Within the past 12 months, you worried that your food would run out before you got the money to buy more. Never true    Within the past 12 months, the food you bought just didn't last and you didn't have money to get more. Never true       Transition Planning    Patient/Family Anticipates Transition to other (see comments)  SNF    Transportation Anticipated health plan transportation       Discharge Needs Assessment    Readmission Within the Last 30 Days no previous admission in last 30 days    Equipment Currently Used at Home wheelchair;walker, rolling;shower chair    Concerns to be Addressed no discharge needs identified    Do you want help finding or keeping  work or a job? I do not need or want help    Do you want help with school or training? For example, starting or completing job training or getting a high school diploma, GED or equivalent No    Anticipated Changes Related to Illness none    Equipment Needed After Discharge none    Provided Post Acute Provider List? Yes    Post Acute Provider List Nursing Home                   Discharge Plan       Row Name 12/22/24 1848       Plan    Plan Plan for SNF; pending pt choices. Will require precert and PASRR. From [facility] for transport needs.    Plan Comments PT recommending SNF. Met with pt at bedside. SNF list provided. Pt to review for choices. Pt is from [facility]. [facility] facility provides assistance with all ADL and provides medications. Pt PCP through [facility], but could not recall the name. Pt receives in house therapy at [facility], but declines any other services. Pt has WC, RW, and shower chair. Declines any issues affording food or medication, but states somethimes transportation to appointments can be difficult. Pt relies on facility for transport or rides through his insurance company.                  Continued Care and Services - Admitted Since 12/20/2024    No active coordination exists for this encounter.       Expected Discharge Date and Time       Expected Discharge Date Expected Discharge Time    Dec 23, 2024            Demographic Summary       Row Name 12/22/24 184       General Information    Admission Type observation    Arrived From emergency department    Required Notices Provided Observation Status Notice    Preferred Language English                   Functional Status       Row Name 12/22/24 1843       Functional Status    Usual Activity Tolerance fair    Current Activity Tolerance fair       Physical Activity    On average, how many days per week do you engage in moderate to strenuous exercise (like a brisk walk)? 3 days    On average, how many minutes do you engage in  exercise at this level? 10 min  in- house PT at [facility]    Number of minutes of exercise per week 30       Assessment of Health Literacy    How often do you have someone help you read hospital materials? Never    How often do you have problems learning about your medical condition because of difficulty understanding written information? Never    How often do you have a problem understanding what is told to you about your medical condition? Never    How confident are you filling out medical forms by yourself? Quite a bit    Health Literacy Good       Functional Status, IADL    Medications assistive person    Meal Preparation assistive person    Housekeeping assistive person    Laundry assistive person    Shopping assistive person    If for any reason you need help with day-to-day activities such as bathing, preparing meals, shopping, managing finances, etc., do you get the help you need? I get all the help I need    IADL Comments AL facility assists with ADLs       Mental Status    General Appearance WDL WDL       Mental Status Summary    Recent Changes in Mental Status/Cognitive Functioning no changes       Employment/    Employment Status retired    Current or Previous Occupation not applicable             Jyoti Velazco RN BSN  Weekend   [facility]  Phone: [phone]  Fax: [phone]

## 2024-12-22 NOTE — PLAN OF CARE
Goal Outcome Evaluation:  Plan of Care Reviewed With: patient        Progress: no change        Pt alert and able to make needs known. Pt VS stable. Pt dressing changed. Personal items and call light within reach. Plan of care ongoing.

## 2024-12-22 NOTE — PROGRESS NOTES
[facility] MEDICINE SERVICE  DAILY PROGRESS NOTE    NAME: Robert Randhawa Jr.  : 1965  MRN: [id number]      LOS: 0 days     PROVIDER OF SERVICE: Gustavo Gay MD    Chief Complaint: General weakness    Subjective:     Interval History:  History taken from: patient      History of Present Illness: Robert Randhawa Jr. is a 59 y.o. male  from [facility] with a CMH of liver cirrhosis , chronic thrombocytopenia, morbid obesity, chronic bilateral lower extremity lymphedema, frequent falls  who presented to [facility] on 2024 with  reports of increased generalized weakness over the past 2 days. He is awake and alert and answers appropriately. He is stable on room air and denies any fever, chills, chest pain, shortness of breath, dizziness, abdominal or urinary complaints.  He has severe chronic bilateral lower extremity lymphedema left greater than right.  He has been seen by wound care in the past and reports he gets compression wraps done daily at home.  He reports they were done this morning.  He reports the edema has not increased and is at baseline for him.  He does report that he has frequent falls and fell in April and went to [facility] and was subsequently sent to [facility] rehab where he remained until this past Tuesday and was sent to [facility].  He reports he thought he could go to assisted living and take care of himself but he is having difficulty ambulating due to the chronic lymphedema.  Afebrile vital signs are stable.  Labs are essentially unremarkable except for calcium 8.5 which is similar to previous, WBC not elevated, platelets 72,000 and improved from previous at 56,000, Alysis negative for infection, chest x-ray shows cardiomegaly possible pulmonary vascular congestion versus questionable early interstitial edema.  Stable on room air denies any shortness of air.  ED provider attempted to discharge patient back  to assisted living however they refused as he requires too much assistance for their capabilities.  Physical therapy and case management have been consulted here.  He has no complaints.  In addition wound care has been consulted for compression wraps.  Review of records shows he had a 2D echo in April 2023 which showed an ejection fraction of 56 to 60%, In February 2020 which showed no occlusive disease, and last stress test was 2023 which was negative for ischemia.      12/21 seen in bed NAD, No new complaints, still weak  12/22/2024 patient seen and examined in medical distress, vital signs stable, discussed with RN, patient has no new complaints, awaiting placement.    Review of Systems  Constitutional: Negative.    HENT: Negative.     Eyes: Negative.    Respiratory: Negative.     Cardiovascular:  Positive for leg swelling.   Gastrointestinal:  Positive for abdominal distention.   Endocrine: Negative.    Genitourinary: Negative.    Musculoskeletal:  Positive for gait problem.   Skin: Negative.    Allergic/Immunologic: Negative.    Neurological:  Positive for weakness.   Hematological: Negative.    Psychiatric/Behavioral: Negative.     All other systems reviewed and are negative.  Objective:     Vital Signs  Temp:  [97.8 °F (36.6 °C)-98.1 °F (36.7 °C)] 98.1 °F (36.7 °C)  Heart Rate:  [76-82] 82  Resp:  [18-22] 18  BP: ()/(61-76) 105/67   Body mass index is 44.27 kg/m².      Physical Exam       Appearance: He is obese.   HENT:      Head: Normocephalic and atraumatic.      Right Ear: External ear normal.      Left Ear: External ear normal.      Nose: Nose normal.      Mouth/Throat:      Mouth: Mucous membranes are moist.   Eyes:      Extraocular Movements: Extraocular movements intact.   Cardiovascular:      Rate and Rhythm: Normal rate and regular rhythm.      Heart sounds: Normal heart sounds.   Pulmonary:      Effort: Pulmonary effort is normal.      Breath sounds: Normal breath sounds.   Abdominal:       General: There is distension.      Palpations: Abdomen is soft.   Genitourinary:     Comments: deferred  Musculoskeletal:         General: Swelling present.      Cervical back: Normal range of motion and neck supple.      Right lower leg: Edema present.      Left lower leg: Edema present.   Skin:     General: Skin is warm.      Findings: Erythema present.   Neurological:      General: No focal deficit present.      Mental Status: He is alert and oriented to person, place, and time.   Psychiatric:         Mood and Affect: Mood normal.         Behavior: Behavior normal.         Thought Content: Thought content normal.         Judgment: Judgment normal.         Diagnostic Data    Results from last 7 days   Lab Units 12/21/24  0001   WBC 10*3/mm3 4.02   HEMOGLOBIN g/dL 10.6*   HEMATOCRIT % 31.8*   PLATELETS 10*3/mm3 67*   GLUCOSE mg/dL 128*   CREATININE mg/dL 0.96   BUN mg/dL 21   SODIUM mmol/L 135*   POTASSIUM mmol/L 4.0   ANION GAP mmol/L 4.5*       XR Chest 1 View    Result Date: 12/20/2024  Impression: Cardiomegaly with findings suggesting pulmonary vascular congestion versus questionable early interstitial edema. Electronically Signed: Ramon Rodriguez MD  12/20/2024 5:29 PM EST  Workstation ID: [id number]       I reviewed the patient's new clinical results.    Assessment/Plan:     Active and Resolved Problems  Active Hospital Problems    Diagnosis  POA    **General weakness [R53.1]  Yes    Thrombocytopenia [D69.6]  Yes    Cirrhosis of liver [K74.60]  Yes    Frequent falls [R29.6]  Not Applicable    Obesity, morbid [E66.01]  Yes    Lymphedema of both lower extremities [I89.0]  Yes      Resolved Hospital Problems   No resolved problems to display.      General weakness, frequent falls likely secondary to chronic debility, severe lymphedema, no current signs of infection, fall precautions PT eval and treat, case management consult for rehab placement     Edema of both lower extremities, chronic left greater than right  at baseline per patient, wound care consulted for compression wraps     Chronic thrombocytopenia, platelets 72 improved from previous at 56 likely secondary to chronic liver disease continue to monitor CBC     Cirrhosis of liver, no complaints stable Home meds unverified at this time reorder pending verification pharmacy and if appropriate     Obesity, morbid BMI 41.96 consistent carb heart healthy diet lifestyle management education       VTE Prophylaxis:  No VTE prophylaxis order currently exists.    Disposition Planning:     Barriers to Discharge:weakness  Anticipated Date of Discharge: 12/23  Place of Discharge: [facility]      Time: 30 minutes     Code Status and Medical Interventions: CPR (Attempt to Resuscitate); Full Support   Ordered at: 12/20/24 2037     Code Status (Patient has no pulse and is not breathing):    CPR (Attempt to Resuscitate)     Medical Interventions (Patient has pulse or is breathing):    Full Support       Signature: Electronically signed by Gustavo Gay MD, 12/22/24, 13:06 EST.  [facility] Hospitalist Team

## 2024-12-23 PROCEDURE — G0378 HOSPITAL OBSERVATION PER HR: HCPCS

## 2024-12-23 PROCEDURE — 97116 GAIT TRAINING THERAPY: CPT

## 2024-12-23 PROCEDURE — 97166 OT EVAL MOD COMPLEX 45 MIN: CPT

## 2024-12-23 PROCEDURE — 97530 THERAPEUTIC ACTIVITIES: CPT

## 2024-12-23 RX ADMIN — RIFAXIMIN 550 MG: 550 TABLET ORAL at 09:12

## 2024-12-23 RX ADMIN — HYDROCODONE BITARTRATE AND ACETAMINOPHEN 1 TABLET: 7.5; 325 TABLET ORAL at 22:49

## 2024-12-23 RX ADMIN — SPIRONOLACTONE 25 MG: 25 TABLET, FILM COATED ORAL at 09:12

## 2024-12-23 RX ADMIN — Medication 10 ML: at 09:12

## 2024-12-23 RX ADMIN — GABAPENTIN 800 MG: 400 CAPSULE ORAL at 16:19

## 2024-12-23 RX ADMIN — TAMSULOSIN HYDROCHLORIDE 0.4 MG: 0.4 CAPSULE ORAL at 09:12

## 2024-12-23 RX ADMIN — MIDODRINE HYDROCHLORIDE 5 MG: 5 TABLET ORAL at 09:12

## 2024-12-23 RX ADMIN — DICYCLOMINE HYDROCHLORIDE 10 MG: 10 CAPSULE ORAL at 17:14

## 2024-12-23 RX ADMIN — DICYCLOMINE HYDROCHLORIDE 10 MG: 10 CAPSULE ORAL at 12:53

## 2024-12-23 RX ADMIN — GABAPENTIN 800 MG: 400 CAPSULE ORAL at 06:07

## 2024-12-23 RX ADMIN — HYDROCODONE BITARTRATE AND ACETAMINOPHEN 1 TABLET: 7.5; 325 TABLET ORAL at 06:07

## 2024-12-23 RX ADMIN — BUMETANIDE 1 MG: 1 TABLET ORAL at 09:12

## 2024-12-23 RX ADMIN — GABAPENTIN 800 MG: 400 CAPSULE ORAL at 22:43

## 2024-12-23 RX ADMIN — FOLIC ACID 1 MG: 1 TABLET ORAL at 09:12

## 2024-12-23 RX ADMIN — MIDODRINE HYDROCHLORIDE 5 MG: 5 TABLET ORAL at 17:14

## 2024-12-23 RX ADMIN — DICYCLOMINE HYDROCHLORIDE 10 MG: 10 CAPSULE ORAL at 09:12

## 2024-12-23 RX ADMIN — SERTRALINE HYDROCHLORIDE 100 MG: 100 TABLET, FILM COATED ORAL at 22:43

## 2024-12-23 RX ADMIN — BUMETANIDE 1 MG: 1 TABLET ORAL at 17:14

## 2024-12-23 NOTE — CASE MANAGEMENT/SOCIAL WORK
Continued Stay Note  [facility]     Patient Name: Robert Randhawa Jr.  MRN: [id number]  Today's Date: 12/23/2024    Admit Date: 12/20/2024    Plan: [facility] accepted. PASRR approved. Precert required. Needing OT notes.   Discharge Plan       Row Name 12/23/24 1445       Plan    Plan [facility] accepted. PASRR approved. Precert required. Needing OT notes.    Patient/Family in Agreement with Plan yes    Plan Comments CM went to the bedside and spoke with the patient about his SNF choices and they were as follows : [facility] and [facility]. CM reached out to [facility] liasion and [facility] accepted the patient. CORRY has sent secure chat to Joanie Pelletier PT asking which OT will see this patient, so precert can be started. Discharge barriers: Electrolyte management and OT pending.                 Expected Discharge Date and Time       Expected Discharge Date Expected Discharge Time    Dec 25, 2024           Met with patient in room wearing PPE: mask.    Maintained distance greater than six feet and spent less than 15 minutes in the room.       Shraddha Fajardo RN

## 2024-12-23 NOTE — THERAPY EVALUATION
Patient Name: Robert Randhawa Jr.  : 1965    MRN: [id number]                              Today's Date: 2024       Admit Date: 2024    Visit Dx:     ICD-10-CM ICD-9-CM   1. General weakness  R53.1 780.79   2. Lymphedema  I89.0 457.1     Patient Active Problem List   Diagnosis    Chest pain    Shortness of breath    Arthritis    Asthma    Deep vein thrombosis (DVT)    Lymphedema of both lower extremities    Cellulitis of lower extremity    Chest pressure    Unstable angina    Cellulitis of left lower extremity without foot    Obesity, morbid    Cellulitis of left lower extremity    Non-pressure chronic ulcer left lower leg, limited to breakdown skin    Cellulitis of left foot    Venous hypertension of both lower extremities    Onychomycosis of foot with other complication    Sepsis    Dyspnea    General weakness    Thrombocytopenia    Cirrhosis of liver    Frequent falls     Past Medical History:   Diagnosis Date    Arthritis     Asthma     Chronic deep vein thrombosis (DVT)     lt calf    Lymphedema of both lower extremities      Past Surgical History:   Procedure Laterality Date    APPENDECTOMY  1974    CARDIAC CATHETERIZATION N/A 2020    Procedure: Left ventriculography;  Surgeon: Kenney Reyez MD;  Location: [facility] CATH INVASIVE LOCATION;  Service: Cardiovascular;  Laterality: N/A;    CARDIAC CATHETERIZATION N/A 2020    Procedure: Coronary angiography;  Surgeon: Kenney Reyez MD;  Location: [facility] CATH INVASIVE LOCATION;  Service: Cardiovascular;  Laterality: N/A;    CARDIAC CATHETERIZATION N/A 2020    Procedure: Left Heart Cath;  Surgeon: Kenney Reyez MD;  Location: [facility] CATH INVASIVE LOCATION;  Service: Cardiovascular;  Laterality: N/A;    COLONOSCOPY N/A 11/15/2022    Procedure: COLONOSCOPY with polypectomy;  Surgeon: AMY Garcia MD;  Location: [facility] ENDOSCOPY;  Service: Gastroenterology;  Laterality: N/A;  post: diverticulosis, ascending polyp x2,  transverse polyp x3, descending polyp x4, rectal polyp x1, hemorroids    ENDOSCOPY N/A 11/15/2022    Procedure: ESOPHAGOGASTRODUODENOSCOPY with biopsy;  Surgeon: AMY Garcia MD;  Location: [facility] ENDOSCOPY;  Service: Gastroenterology;  Laterality: N/A;  post: esophagitis with biopsy to rule out candida , gastric biopsy    KNEE SURGERY Left 2018      General Information       Row Name 12/23/24 1547          OT Time and Intention    Subjective Information complains of;weakness;fatigue;swelling  -KT     Document Type evaluation  -KT     Mode of Treatment occupational therapy  -KT     Patient Effort good  -KT     Symptoms Noted During/After Treatment fatigue  -KT       Row Name 12/23/24 1547          General Information    Patient Profile Reviewed yes  -KT     Prior Level of Function independent:;ADL's;all household mobility  -KT     Barriers to Rehab medically complex;previous functional deficit;environmental barriers  -KT       Row Name 12/23/24 1547          Occupational Profile    Reason for Services/Referral (Occupational Profile) Pt is a 59 y.o. year old male admitted to [facility] on 12/20/24 from [facility] where he has been for approx 1 week due to lymphedema in BLEs and general weakness with progressive worsening of symptoms over the past 2 days.     Pmhx significant for arthritis, astham, chronic DVT, chronic BLE lymphedema, liver cirrhosis, morbid obesity, frequent falls    At baseline, pt lives in [facility] and can genearlly amb with rxw and perform ADLs, however facility is concerned that he is requiring more assistance than they can provide. He states that he was able to do most of his ADLs independently with the exception of LB dressing/bathing.     At [facility], pt is A&Ox4 and pleasant and cooperative, though somewhat distractible. Requires frequent redirection as pt is very talkative, which increases time of amb and decreases stability over time. Pt requires mod Ax2 for bed mobility and CTS and min/mod Ax2 for  functional mobility with concern for pt's standing tolerance increasing risk of falls as pt gradually moved slower and slower while amb and gradually had more and more of a flexed posture. Once seated, pt completed urinary toileting with urinal but missed urinal and required min A to stand and dep A for toileting hygiene as pt had been slightly incontinent of bowel as well. Donned new brief Max Ax2. Pt to be followed by OT while inpatient and recommending dc to SNF for additional therapy as pt does not appear at sufficient level of independence for [facility] level of assistance.  -KT       Row Name 12/23/24 1547          Living Environment    People in Home facility resident  -KT       Row Name 12/23/24 1547          Home Main Entrance    Number of Stairs, Main Entrance none  -KT       Row Name 12/23/24 1547          Cognition    Orientation Status (Cognition) oriented x 4  -KT       Row Name 12/23/24 1547          Safety Issues/Impairments Affecting Functional Mobility    Safety Issues Affecting Function (Mobility) ability to follow commands;insight into deficits/self-awareness;judgment;positioning of assistive device;problem-solving;safety precaution awareness;safety precautions follow-through/compliance;sequencing abilities  -KT     Impairments Affecting Function (Mobility) balance;coordination;endurance/activity tolerance;motor planning;pain;postural/trunk control;strength;cognition  -KT     Cognitive Impairments, Mobility Safety/Performance attention;insight into deficits/self-awareness;judgment;problem-solving/reasoning;safety precaution awareness;safety precaution follow-through;sequencing abilities  -KT               User Key  (r) = Recorded By, (t) = Taken By, (c) = Cosigned By      Initials Name Provider Type    Twila Quinones OT Occupational Therapist                     Mobility/ADL's       Row Name 12/23/24 1549          Bed Mobility    Bed Mobility bed mobility (all) activities  -KT     All  Activities, [place] (Bed Mobility) moderate assist (50% patient effort);2 person assist  -KT       Row Name 12/23/24 1549          Transfers    Transfers bed-chair transfer  -KT       Row Name 12/23/24 1549          Bed-Chair Transfer    Bed-Chair [place] (Transfers) moderate assist (50% patient effort);2 person assist  -KT       Row Name 12/23/24 1549          Sit-Stand Transfer    Sit-Stand [place] (Transfers) moderate assist (50% patient effort);2 person assist  -KT       Row Name 12/23/24 1549          Functional Mobility    Functional Mobility- Ind. Level moderate assist (50% patient effort);2 person assist required  -KT     Functional Mobility-Distance (Feet) 8  -KT     Patient was able to Ambulate yes  -KT               User Key  (r) = Recorded By, (t) = Taken By, (c) = Cosigned By      Initials Name Provider Type    Twila Quinones OT Occupational Therapist                   Obj/Interventions       Row Name 12/23/24 1550          Sensory Assessment (Somatosensory)    Sensory Assessment (Somatosensory) sensation intact  -KT       Row Name 12/23/24 1550          Vision Assessment/Intervention    Visual Impairment/Limitations WNL  -KT       Row Name 12/23/24 1550          Range of Motion Comprehensive    General Range of Motion no range of motion deficits identified  -KT       Row Name 12/23/24 1550          Strength Comprehensive (MMT)    General Manual Muscle Testing (MMT) Assessment upper extremity strength deficits identified  -KT     Comment, General Manual Muscle Testing (MMT) Assessment grossly 3+/5  -KT       Row Name 12/23/24 1550          Balance    Balance Assessment sitting dynamic balance;standing dynamic balance;standing static balance  -KT     Dynamic Sitting Balance contact guard  -KT     Position, Sitting Balance unsupported;sitting edge of bed  -KT     Static Standing Balance minimal assist  -KT     Dynamic Standing Balance moderate assist;minimal assist  -KT      Position/Device Used, Standing Balance walker, front-wheeled  -KT               User Key  (r) = Recorded By, (t) = Taken By, (c) = Cosigned By      Initials Name Provider Type    Twila Quinones OT Occupational Therapist                   Goals/Plan       Row Name 12/23/24 1552          Transfer Goal 1 (OT)    Activity/Assistive Device (Transfer Goal 1, OT) toilet;commode  -KT     [place] Level/Cues Needed (Transfer Goal 1, OT) minimum assist (75% or more patient effort)  -KT     Time Frame (Transfer Goal 1, OT) long term goal (LTG)  -KT       Row Name 12/23/24 1552          Toileting Goal 1 (OT)    Activity/Device (Toileting Goal 1, OT) toileting skills, all  -KT     [place] Level/Cues Needed (Toileting Goal 1, OT) moderate assist (50-74% patient effort)  -KT     Time Frame (Toileting Goal 1, OT) long term goal (LTG)  -KT       Row Name 12/23/24 1552          Problem Specific Goal 1 (OT)    Problem Specific Goal 1 (OT) Pt will tolerate standing for 2-3 minutes while maintaining ideal posture (limited knee and hip flexion with prolonged standing) to increase safety and independence with functional mobility and ADLs.  -KT     Time Frame (Problem Specific Goal 1, OT) long term goal (LTG)  -KT       Row Name 12/23/24 1552          Therapy Assessment/Plan (OT)    Planned Therapy Interventions (OT) activity tolerance training;adaptive equipment training;BADL retraining;functional balance retraining;IADL retraining;neuromuscular control/coordination retraining;occupation/activity based interventions;patient/caregiver education/training;ROM/therapeutic exercise;strengthening exercise;transfer/mobility retraining  -KT               User Key  (r) = Recorded By, (t) = Taken By, (c) = Cosigned By      Initials Name Provider Type    Twila Quinones OT Occupational Therapist                   Clinical Impression       Row Name 12/23/24 1551          Pain Assessment    Pretreatment Pain Rating 2/10  -KT      Posttreatment Pain Rating 2/10  -KT     Pain Location other (see comments)  lower legs  -KT     Pain Side/Orientation bilateral;lower  -KT       Row Name 12/23/24 5811          Plan of Care Review    Plan of Care Reviewed With patient  -KT     Outcome Evaluation Pt is a 59 y.o. year old male admitted to [facility] on 12/20/24 from [facility] where he has been for approx 1 week due to lymphedema in BLEs and general weakness with progressive worsening of symptoms over the past 2 days.     Pmhx significant for arthritis, astham, chronic DVT, chronic BLE lymphedema, liver cirrhosis, morbid obesity, frequent falls    At baseline, pt lives in [facility] and can genearlly amb with rxw and perform ADLs, however facility is concerned that he is requiring more assistance than they can provide. He states that he was able to do most of his ADLs independently with the exception of LB dressing/bathing.     At [facility], pt is A&Ox4 and pleasant and cooperative, though somewhat distractible. Requires frequent redirection as pt is very talkative, which increases time of amb and decreases stability over time. Pt requires mod Ax2 for bed mobility and CTS and min/mod Ax2 for functional mobility with concern for pt's standing tolerance increasing risk of falls as pt gradually moved slower and slower while amb and gradually had more and more of a flexed posture. Once seated, pt completed urinary toileting with urinal but missed urinal and required min A to stand and dep A for toileting hygiene as pt had been slightly incontinent of bowel as well. Donned new brief Max Ax2. Pt to be followed by OT while inpatient and recommending dc to SNF for additional therapy as pt does not appear at sufficient level of independence for [facility] level of assistance.  -KT       Row Name 12/23/24 1550          Therapy Assessment/Plan (OT)    Patient/Family Therapy Goal Statement (OT) eventually return to [facility]  -KT     Rehab Potential (OT) fair  -KT     Criteria for Skilled  Therapeutic Interventions Met (OT) yes;skilled treatment is necessary  -KT     Therapy Frequency (OT) 3 times/wk  -KT       Row Name 12/23/24 1551          Therapy Plan Review/Discharge Plan (OT)    Anticipated Discharge Disposition (OT) skilled nursing facility  -KT       Row Name 12/23/24 1551          Positioning and Restraints    Pre-Treatment Position in bed  -KT     Post Treatment Position chair  -KT     In Chair call light within reach;encouraged to call for assist;exit alarm on  -KT               User Key  (r) = Recorded By, (t) = Taken By, (c) = Cosigned By      Initials Name Provider Type    KT Twila Galindo, OT Occupational Therapist                   Outcome Measures       Row Name 12/23/24 1554          How much help from another is currently needed...    Putting on and taking off regular lower body clothing? 1  -KT     Bathing (including washing, rinsing, and drying) 1  -KT     Toileting (which includes using toilet bed pan or urinal) 1  -KT     Putting on and taking off regular upper body clothing 2  -KT     Taking care of personal grooming (such as brushing teeth) 3  -KT     Eating meals 3  -KT     AM-PAC 6 Clicks Score (OT) 11  -KT       Row Name 12/23/24 0850          How much help from another person do you currently need...    Turning from your back to your side while in flat bed without using bedrails? 3  -LB     Moving from lying on back to sitting on the side of a flat bed without bedrails? 2  -LB     Moving to and from a bed to a chair (including a wheelchair)? 2  -LB     Standing up from a chair using your arms (e.g., wheelchair, bedside chair)? 2  -LB     Climbing 3-5 steps with a railing? 2  -LB     To walk in hospital room? 2  -LB     AM-PAC 6 Clicks Score (PT) 13  -LB     Highest Level of Mobility Goal 4 --> Transfer to chair/commode  -LB       Row Name 12/23/24 1554          Functional Assessment    Outcome Measure Options AM-PAC 6 Clicks Daily Activity (OT)  -KT                User Key  (r) = Recorded By, (t) = Taken By, (c) = Cosigned By      Initials Name Provider Type    Kallie Lemons, RN Registered Nurse    Twila Quinones OT Occupational Therapist                    Occupational Therapy Education        No education to display                  OT Recommendation and Plan  Planned Therapy Interventions (OT): activity tolerance training, adaptive equipment training, BADL retraining, functional balance retraining, IADL retraining, neuromuscular control/coordination retraining, occupation/activity based interventions, patient/caregiver education/training, ROM/therapeutic exercise, strengthening exercise, transfer/mobility retraining  Therapy Frequency (OT): 3 times/wk  Plan of Care Review  Plan of Care Reviewed With: patient  Outcome Evaluation: Pt is a 59 y.o. year old male admitted to [facility] on 12/20/24 from [facility] where he has been for approx 1 week due to lymphedema in BLEs and general weakness with progressive worsening of symptoms over the past 2 days.     Pmhx significant for arthritis, astham, chronic DVT, chronic BLE lymphedema, liver cirrhosis, morbid obesity, frequent falls    At baseline, pt lives in [facility] and can genearlly amb with rxw and perform ADLs, however facility is concerned that he is requiring more assistance than they can provide. He states that he was able to do most of his ADLs independently with the exception of LB dressing/bathing.     At [facility], pt is A&Ox4 and pleasant and cooperative, though somewhat distractible. Requires frequent redirection as pt is very talkative, which increases time of amb and decreases stability over time. Pt requires mod Ax2 for bed mobility and CTS and min/mod Ax2 for functional mobility with concern for pt's standing tolerance increasing risk of falls as pt gradually moved slower and slower while amb and gradually had more and more of a flexed posture. Once seated, pt completed urinary toileting with urinal but missed  urinal and required min A to stand and dep A for toileting hygiene as pt had been slightly incontinent of bowel as well. Donned new brief Max Ax2. Pt to be followed by OT while inpatient and recommending dc to SNF for additional therapy as pt does not appear at sufficient level of independence for long term level of assistance.     Time Calculation:   Evaluation Complexity (OT)  Overall Complexity of Evaluation (OT): moderate complexity     Time Calculation- OT       Row Name 12/23/24 1555             Time Calculation- OT    OT Start Time 1500  -KT      OT Stop Time 1529  -KT      OT Time Calculation (min) 29 min  -KT      OT Received On 12/23/24  -KT      OT - Next Appointment 12/26/24  -KT      OT Goal Re-Cert Due Date 01/06/25  -KT                User Key  (r) = Recorded By, (t) = Taken By, (c) = Cosigned By      Initials Name Provider Type    KT wTila Galindo OT Occupational Therapist                  Therapy Charges for Today       Code Description Service Date Service Provider Modifiers Qty    [id number]  OT EVAL MOD COMPLEXITY 4 12/23/2024 Twila Galindo OT GO 1                 Twila Galindo OT  12/23/2024

## 2024-12-23 NOTE — THERAPY TREATMENT NOTE
"Subjective: Pt agreeable to therapeutic plan of care.    Objective:     Precautions - falls    Bed mobility - Mod-A and Assist x 2  Transfers - Mod-A, Assist x 2, and with rolling walker; sit to stand several times to manage brief and urination  Ambulation - 12 feet Mod-A, Assist x 2, and with rolling walker; very slow nawaf w/ increasing knee flexion w/ distance. Severe lymphedema in BLEs.       Vitals: WNL    Pain: 0 VAS   Location: n/a  Intervention for pain: Repositioned, RN notified, Increased Activity, and Therapeutic Presence    Education: Provided education on the importance of mobility in the acute care setting, Verbal/Tactile Cues, Transfer Training, Gait Training, Energy conservation strategies, and WB'ing status    Assessment: Robert Randhawa Jr. Is quite loquacious and needs multiple cues to attend to task, as he tends to stop walking when he is talking, but is having increased weakness w/ increased distance. Pt presents with functional mobility impairments which indicate the need for skilled intervention. Tolerating session today without incident. Will continue to follow and progress as tolerated.     Plan/Recommendations:   If medically appropriate, Moderate Intensity Therapy recommended post-acute care. This is recommended as therapy feels the patient would require 3-4 days per week and wouldn't tolerate \"3 hour daily\" rehab intensity. SNF would be the preferred choice. If the patient does not agree to SNF, arrange HH or OP depending on home bound status. If patient is medically complex, consider LTACH. Pt requires no DME at discharge.     Pt desires Skilled Rehab placement at discharge. Pt cooperative; agreeable to therapeutic recommendations and plan of care.         Basic Mobility 6-click:  Rollin = Total, A lot = 2, A little = 3; 4 = None  Supine>Sit:   1 = Total, A lot = 2, A little = 3; 4 = None   Sit>Stand with arms:  1 = Total, A lot = 2, A little = 3; 4 = None  Bed>Chair:   1 " = Total, A lot = 2, A little = 3; 4 = None  Ambulate in room:  1 = Total, A lot = 2, A little = 3; 4 = None  3-5 Steps with railin = Total, A lot = 2, A little = 3; 4 = None  Score: 11    Modified [place]: N/A = No pre-op stroke/TIA    Post-Tx Position: Up in Chair, Staff Present, Alarms activated, and Call light and personal items within reach  PPE: gloves and gown    Therapy Charges for Today       Code Description Service Date Service Provider Modifiers Qty    [id number] HC GAIT TRAINING EA 15 MIN 2024 Kaye Pa, PT GP 1    [id number]  PT THERAPEUTIC ACT EA 15 MIN 2024 Kaye Pa, PT GP 1           PT Charges       Row Name 24 1539             Time Calculation    Start Time 1500  -CM      Stop Time 1523  -CM      Time Calculation (min) 23 min  -CM      PT Received On 24  -CM      PT - Next Appointment 24  -CM         Time Calculation- PT    Total Timed Code Minutes- PT 23 minute(s)  -CM                User Key  (r) = Recorded By, (t) = Taken By, (c) = Cosigned By      Initials Name Provider Type    Kaye Otero, PT Physical Therapist

## 2024-12-23 NOTE — PROGRESS NOTES
[facility] MEDICINE SERVICE  DAILY PROGRESS NOTE    NAME: Robert Randhawa Jr.  : 1965  MRN: [id number]      LOS: 0 days     PROVIDER OF SERVICE: Gustavo Gay MD    Chief Complaint: General weakness    Subjective:     Interval History:  History taken from: patient      History of Present Illness: Robert Randhawa Jr. is a 59 y.o. male  from [facility] with a CMH of liver cirrhosis , chronic thrombocytopenia, morbid obesity, chronic bilateral lower extremity lymphedema, frequent falls  who presented to [facility] on 2024 with  reports of increased generalized weakness over the past 2 days. He is awake and alert and answers appropriately. He is stable on room air and denies any fever, chills, chest pain, shortness of breath, dizziness, abdominal or urinary complaints.  He has severe chronic bilateral lower extremity lymphedema left greater than right.  He has been seen by wound care in the past and reports he gets compression wraps done daily at home.  He reports they were done this morning.  He reports the edema has not increased and is at baseline for him.  He does report that he has frequent falls and fell in April and went to [facility] and was subsequently sent to [facility] rehab where he remained until this past Tuesday and was sent to [facility].  He reports he thought he could go to assisted living and take care of himself but he is having difficulty ambulating due to the chronic lymphedema.  Afebrile vital signs are stable.  Labs are essentially unremarkable except for calcium 8.5 which is similar to previous, WBC not elevated, platelets 72,000 and improved from previous at 56,000, Alysis negative for infection, chest x-ray shows cardiomegaly possible pulmonary vascular congestion versus questionable early interstitial edema.  Stable on room air denies any shortness of air.  ED provider attempted to discharge patient back  to assisted living however they refused as he requires too much assistance for their capabilities.  Physical therapy and case management have been consulted here.  He has no complaints.  In addition wound care has been consulted for compression wraps.  Review of records shows he had a 2D echo in April 2023 which showed an ejection fraction of 56 to 60%, In February 2020 which showed no occlusive disease, and last stress test was 2023 which was negative for ischemia.      12/21 seen in bed NAD, No new complaints, still weak  12/22/2024 patient seen and examined in medical distress, vital signs stable, discussed with RN, patient has no new complaints, awaiting placement.  12/23/24 seen in bed  NAD, VSS,  no new complaints, lauren RN. vss  Plan: [facility] accepted. PASRR approved. Precert required. Needing OT notes.     Review of Systems  Constitutional: Negative.    HENT: Negative.     Eyes: Negative.    Respiratory: Negative.     Cardiovascular:  Positive for leg swelling.   Gastrointestinal:  Positive for abdominal distention.   Endocrine: Negative.    Genitourinary: Negative.    Musculoskeletal:  Positive for gait problem.   Skin: Negative.    Allergic/Immunologic: Negative.    Neurological:  Positive for weakness.   Hematological: Negative.    Psychiatric/Behavioral: Negative.     All other systems reviewed and are negative.  Objective:     Vital Signs  Temp:  [98 °F (36.7 °C)-98.4 °F (36.9 °C)] 98.4 °F (36.9 °C)  Heart Rate:  [74-76] 75  Resp:  [16-19] 19  BP: (105-119)/(68-75) 119/75   Body mass index is 44.27 kg/m².      Physical Exam       Appearance: He is obese.   HENT:      Head: Normocephalic and atraumatic.      Right Ear: External ear normal.      Left Ear: External ear normal.      Nose: Nose normal.      Mouth/Throat:      Mouth: Mucous membranes are moist.   Eyes:      Extraocular Movements: Extraocular movements intact.   Cardiovascular:      Rate and Rhythm: Normal rate and regular rhythm.      Heart  sounds: Normal heart sounds.   Pulmonary:      Effort: Pulmonary effort is normal.      Breath sounds: Normal breath sounds.   Abdominal:      General: There is distension.      Palpations: Abdomen is soft.   Genitourinary:     Comments: deferred  Musculoskeletal:         General: Swelling present.      Cervical back: Normal range of motion and neck supple.      Right lower leg: Edema present.      Left lower leg: Edema present.   Skin:     General: Skin is warm.      Findings: Erythema present.   Neurological:      General: No focal deficit present.      Mental Status: He is alert and oriented to person, place, and time.   Psychiatric:         Mood and Affect: Mood normal.         Behavior: Behavior normal.         Thought Content: Thought content normal.         Judgment: Judgment normal.         Diagnostic Data    Results from last 7 days   Lab Units 12/21/24  0001   WBC 10*3/mm3 4.02   HEMOGLOBIN g/dL 10.6*   HEMATOCRIT % 31.8*   PLATELETS 10*3/mm3 67*   GLUCOSE mg/dL 128*   CREATININE mg/dL 0.96   BUN mg/dL 21   SODIUM mmol/L 135*   POTASSIUM mmol/L 4.0   ANION GAP mmol/L 4.5*       No radiology results for the last day      I reviewed the patient's new clinical results.    Assessment/Plan:     Active and Resolved Problems  Active Hospital Problems    Diagnosis  POA    **General weakness [R53.1]  Yes    Thrombocytopenia [D69.6]  Yes    Cirrhosis of liver [K74.60]  Yes    Frequent falls [R29.6]  Not Applicable    Obesity, morbid [E66.01]  Yes    Lymphedema of both lower extremities [I89.0]  Yes      Resolved Hospital Problems   No resolved problems to display.      General weakness, frequent falls likely secondary to chronic debility, severe lymphedema, no current signs of infection, fall precautions PT eval and treat, case management consult for rehab placement     Edema of both lower extremities, chronic left greater than right at baseline per patient, wound care consulted for compression wraps     Chronic  thrombocytopenia, platelets 72 improved from previous at 56 likely secondary to chronic liver disease continue to monitor CBC     Cirrhosis of liver, no complaints stable Home meds unverified at this time reorder pending verification pharmacy and if appropriate     Obesity, morbid BMI 41.96 consistent carb heart healthy diet lifestyle management education       VTE Prophylaxis:  No VTE prophylaxis order currently exists.    Disposition Planning:     Barriers to Discharge:weakness  Anticipated Date of Discharge: 12/23  Place of Discharge: [facility]      Time: 30 minutes     Code Status and Medical Interventions: CPR (Attempt to Resuscitate); Full Support   Ordered at: 12/20/24 2037     Code Status (Patient has no pulse and is not breathing):    CPR (Attempt to Resuscitate)     Medical Interventions (Patient has pulse or is breathing):    Full Support       Signature: Electronically signed by Gustavo Gay MD, 12/23/24, 16:05 EST.  [facility] Hospitalist Team

## 2024-12-23 NOTE — NURSING NOTE
WOCN note:    59 yr old male admitted 12/20/24 with generalized weakness. WOCN consult received for compression wraps to BLE for hx of chronic lymphedema. Patient presents with severe lymphedema to both lower legs with left greater than right. He has been followed by [facility] wound center with his last appointment on 9/16/24. He was scheduled to f/u there on 10/2/24 but was listed as no show. He was to f/u with lymphedema clinic as well.   There is a full thickness wound to the right medial lower leg measuring approximately 0.7 x 0.7 cm. The wound bed is beefy red with minimal serosanguinous exudate. There is also a callus noted to the left plantar first metatarsal head.     The skin to both legs was cleansed and moisturized. An ABD pad was placed over the right lower leg wound. Both legs were wrapped with a 2 layer compression wrap from the base of the toes to the bend of the knee. Remove the wraps if they roll down or become dislodged and re-wrap with ACE wraps. Will follow up on 12/26 for compression change if still admitted.

## 2024-12-23 NOTE — PLAN OF CARE
Goal Outcome Evaluation:  Plan of Care Reviewed With: patient           Outcome Evaluation: Pt is a 59 y.o. year old male admitted to [facility] on 12/20/24 from [facility] where he has been for approx 1 week due to lymphedema in BLEs and general weakness with progressive worsening of symptoms over the past 2 days.     Pmhx significant for arthritis, astham, chronic DVT, chronic BLE lymphedema, liver cirrhosis, morbid obesity, frequent falls    At baseline, pt lives in [facility] and can genearlly amb with rxw and perform ADLs, however facility is concerned that he is requiring more assistance than they can provide. He states that he was able to do most of his ADLs independently with the exception of LB dressing/bathing.     At [facility], pt is A&Ox4 and pleasant and cooperative, though somewhat distractible. Requires frequent redirection as pt is very talkative, which increases time of amb and decreases stability over time. Pt requires mod Ax2 for bed mobility and CTS and min/mod Ax2 for functional mobility with concern for pt's standing tolerance increasing risk of falls as pt gradually moved slower and slower while amb and gradually had more and more of a flexed posture. Once seated, pt completed urinary toileting with urinal but missed urinal and required min A to stand and dep A for toileting hygiene as pt had been slightly incontinent of bowel as well. Donned new brief Max Ax2. Pt to be followed by OT while inpatient and recommending dc to SNF for additional therapy as pt does not appear at sufficient level of independence for [facility] level of assistance.    Anticipated Discharge Disposition (OT): skilled nursing facility

## 2024-12-23 NOTE — PLAN OF CARE
Goal Outcome Evaluation:              Outcome Evaluation: pt is pending rehab choices and acceptance. Still with a lot of swelling in LE.

## 2024-12-23 NOTE — PLAN OF CARE
Goal Outcome Evaluation:  Patient's pain controlled with prn pain meds, dressings intact to BLE, bed alarm on and functioning, repositions self while abed

## 2024-12-23 NOTE — PLAN OF CARE
"Goal Outcome Evaluation:      Assessment: Robert Randhawa Jr. Is quite loquacious and needs multiple cues to attend to task, as he tends to stop walking when he is talking, but is having increased weakness w/ increased distance. Pt presents with functional mobility impairments which indicate the need for skilled intervention. Tolerating session today without incident. Will continue to follow and progress as tolerated.     Plan/Recommendations:   If medically appropriate, Moderate Intensity Therapy recommended post-acute care. This is recommended as therapy feels the patient would require 3-4 days per week and wouldn't tolerate \"3 hour daily\" rehab intensity. SNF would be the preferred choice. If the patient does not agree to SNF, arrange HH or OP depending on home bound status. If patient is medically complex, consider LTACH. Pt requires no DME at discharge.     Pt desires Skilled Rehab placement at discharge. Pt cooperative; agreeable to therapeutic recommendations and plan of care.              Anticipated Discharge Disposition (PT): skilled nursing facility                        "

## 2024-12-24 VITALS
DIASTOLIC BLOOD PRESSURE: 77 MMHG | HEIGHT: 73 IN | BODY MASS INDEX: 41.75 KG/M2 | WEIGHT: 315 LBS | RESPIRATION RATE: 14 BRPM | HEART RATE: 77 BPM | SYSTOLIC BLOOD PRESSURE: 120 MMHG | TEMPERATURE: 98 F | OXYGEN SATURATION: 97 %

## 2024-12-24 PROCEDURE — 97530 THERAPEUTIC ACTIVITIES: CPT

## 2024-12-24 PROCEDURE — G0378 HOSPITAL OBSERVATION PER HR: HCPCS

## 2024-12-24 RX ORDER — HYDROCODONE BITARTRATE AND ACETAMINOPHEN 7.5; 325 MG/1; MG/1
1 TABLET ORAL EVERY 6 HOURS PRN
Qty: 6 TABLET | Refills: 0 | Status: SHIPPED | OUTPATIENT
Start: 2024-12-24

## 2024-12-24 RX ADMIN — GABAPENTIN 800 MG: 400 CAPSULE ORAL at 06:25

## 2024-12-24 RX ADMIN — FOLIC ACID 1 MG: 1 TABLET ORAL at 08:09

## 2024-12-24 RX ADMIN — DICYCLOMINE HYDROCHLORIDE 10 MG: 10 CAPSULE ORAL at 17:44

## 2024-12-24 RX ADMIN — GABAPENTIN 800 MG: 400 CAPSULE ORAL at 15:45

## 2024-12-24 RX ADMIN — MIDODRINE HYDROCHLORIDE 5 MG: 5 TABLET ORAL at 08:09

## 2024-12-24 RX ADMIN — BUMETANIDE 1 MG: 1 TABLET ORAL at 17:44

## 2024-12-24 RX ADMIN — BUMETANIDE 1 MG: 1 TABLET ORAL at 08:09

## 2024-12-24 RX ADMIN — DICYCLOMINE HYDROCHLORIDE 10 MG: 10 CAPSULE ORAL at 11:43

## 2024-12-24 RX ADMIN — MIDODRINE HYDROCHLORIDE 5 MG: 5 TABLET ORAL at 17:44

## 2024-12-24 RX ADMIN — TAMSULOSIN HYDROCHLORIDE 0.4 MG: 0.4 CAPSULE ORAL at 08:09

## 2024-12-24 RX ADMIN — DICYCLOMINE HYDROCHLORIDE 10 MG: 10 CAPSULE ORAL at 08:09

## 2024-12-24 RX ADMIN — SPIRONOLACTONE 25 MG: 25 TABLET, FILM COATED ORAL at 08:09

## 2024-12-24 NOTE — SIGNIFICANT NOTE
Case Management/Social Work    Patient Name:  Robert Randhawa Jr.  YOB: 1965  MRN: [id number]  Admit Date:  12/20/2024 12/24/24 1217   Post Acute Pre-Cert Documentation   Date Post Acute Pre-Cert Completed 12/24/24   All Clinicals Submitted? Yes   Response Received from Insurance? Approval   Post Acute Pre-Cert Initiated Comment SHARAD verified SNF precert approval via [facility] portal. POrtal auth ID [id number]. SNF precert valid 12/24-12/26. CM made aware.           Electronically signed by:  Luis Miguel Brock CMA  12/24/24 12:18 EST    Luis Miguel Brock  Case Management Associate  [address] [address]  P: [phone]  F: [phone]

## 2024-12-24 NOTE — PROGRESS NOTES
[facility] MEDICINE SERVICE  DAILY PROGRESS NOTE    NAME: Robert Randhawa Jr.  : 1965  MRN: [id number]      LOS: 0 days     PROVIDER OF SERVICE: Gustavo Gay MD    Chief Complaint: General weakness    Subjective:     Interval History:  History taken from: patient      History of Present Illness: Robert Randhawa Jr. is a 59 y.o. male  from [facility] with a CMH of liver cirrhosis , chronic thrombocytopenia, morbid obesity, chronic bilateral lower extremity lymphedema, frequent falls  who presented to [facility] on 2024 with  reports of increased generalized weakness over the past 2 days. He is awake and alert and answers appropriately. He is stable on room air and denies any fever, chills, chest pain, shortness of breath, dizziness, abdominal or urinary complaints.  He has severe chronic bilateral lower extremity lymphedema left greater than right.  He has been seen by wound care in the past and reports he gets compression wraps done daily at home.  He reports they were done this morning.  He reports the edema has not increased and is at baseline for him.  He does report that he has frequent falls and fell in April and went to [facility] and was subsequently sent to [facility] rehab where he remained until this past Tuesday and was sent to [facility].  He reports he thought he could go to assisted living and take care of himself but he is having difficulty ambulating due to the chronic lymphedema.  Afebrile vital signs are stable.  Labs are essentially unremarkable except for calcium 8.5 which is similar to previous, WBC not elevated, platelets 72,000 and improved from previous at 56,000, Alysis negative for infection, chest x-ray shows cardiomegaly possible pulmonary vascular congestion versus questionable early interstitial edema.  Stable on room air denies any shortness of air.  ED provider attempted to discharge patient back  to assisted living however they refused as he requires too much assistance for their capabilities.  Physical therapy and case management have been consulted here.  He has no complaints.  In addition wound care has been consulted for compression wraps.  Review of records shows he had a 2D echo in April 2023 which showed an ejection fraction of 56 to 60%, In February 2020 which showed no occlusive disease, and last stress test was 2023 which was negative for ischemia.      12/21 seen in bed NAD, No new complaints, still weak  12/22/2024 patient seen and examined in medical distress, vital signs stable, discussed with RN, patient has no new complaints, awaiting placement.  12/23/24 seen in bed  NAD, VSS,  no new complaints, dw RN. Vss  12/24/24 seen and examined in medical distress, vital signs stable, no complaints, awaiting placement, discussed with RN.  Plan: [facility] accepted. PASRR approved. Precert required. Needing OT notes.     Review of Systems  Constitutional: Negative.    HENT: Negative.     Eyes: Negative.    Respiratory: Negative.     Cardiovascular:  Positive for leg swelling.   Gastrointestinal:  Positive for abdominal distention.   Endocrine: Negative.    Genitourinary: Negative.    Musculoskeletal:  Positive for gait problem.   Skin: Negative.    Allergic/Immunologic: Negative.    Neurological:  Positive for weakness.   Hematological: Negative.    Psychiatric/Behavioral: Negative.     All other systems reviewed and are negative.  Objective:     Vital Signs  Temp:  [97.8 °F (36.6 °C)-98.4 °F (36.9 °C)] 97.8 °F (36.6 °C)  Heart Rate:  [66-75] 74  Resp:  [16-19] 16  BP: (110-122)/(71-77) 122/77   Body mass index is 44.27 kg/m².      Physical Exam       Appearance: He is obese.   HENT:      Head: Normocephalic and atraumatic.      Right Ear: External ear normal.      Left Ear: External ear normal.      Nose: Nose normal.      Mouth/Throat:      Mouth: Mucous membranes are moist.   Eyes:      Extraocular  Movements: Extraocular movements intact.   Cardiovascular:      Rate and Rhythm: Normal rate and regular rhythm.      Heart sounds: Normal heart sounds.   Pulmonary:      Effort: Pulmonary effort is normal.      Breath sounds: Normal breath sounds.   Abdominal:      General: There is distension.      Palpations: Abdomen is soft.   Genitourinary:     Comments: deferred  Musculoskeletal:         General: Swelling present.      Cervical back: Normal range of motion and neck supple.      Right lower leg: Edema present.      Left lower leg: Edema present.   Skin:     General: Skin is warm.      Findings: Erythema present.   Neurological:      General: No focal deficit present.      Mental Status: He is alert and oriented to person, place, and time.   Psychiatric:         Mood and Affect: Mood normal.         Behavior: Behavior normal.         Thought Content: Thought content normal.         Judgment: Judgment normal.         Diagnostic Data    Results from last 7 days   Lab Units 12/21/24  0001   WBC 10*3/mm3 4.02   HEMOGLOBIN g/dL 10.6*   HEMATOCRIT % 31.8*   PLATELETS 10*3/mm3 67*   GLUCOSE mg/dL 128*   CREATININE mg/dL 0.96   BUN mg/dL 21   SODIUM mmol/L 135*   POTASSIUM mmol/L 4.0   ANION GAP mmol/L 4.5*       No radiology results for the last day      I reviewed the patient's new clinical results.    Assessment/Plan:     Active and Resolved Problems  Active Hospital Problems    Diagnosis  POA    **General weakness [R53.1]  Yes    Thrombocytopenia [D69.6]  Yes    Cirrhosis of liver [K74.60]  Yes    Frequent falls [R29.6]  Not Applicable    Obesity, morbid [E66.01]  Yes    Lymphedema of both lower extremities [I89.0]  Yes      Resolved Hospital Problems   No resolved problems to display.      General weakness, frequent falls likely secondary to chronic debility, severe lymphedema, no current signs of infection, fall precautions PT eval and treat, case management consult for rehab placement     Edema of both lower  extremities, chronic left greater than right at baseline per patient, wound care consulted for compression wraps     Chronic thrombocytopenia, platelets 72 improved from previous at 56 likely secondary to chronic liver disease continue to monitor CBC     Cirrhosis of liver, no complaints stable Home meds unverified at this time reorder pending verification pharmacy and if appropriate     Obesity, morbid BMI 41.96 consistent carb heart healthy diet lifestyle management education       VTE Prophylaxis:  No VTE prophylaxis order currently exists.    Disposition Planning:     Barriers to Discharge:weakness  Anticipated Date of Discharge: 12/23  Place of Discharge: [facility]      Time: 30 minutes     Code Status and Medical Interventions: CPR (Attempt to Resuscitate); Full Support   Ordered at: 12/20/24 2037     Code Status (Patient has no pulse and is not breathing):    CPR (Attempt to Resuscitate)     Medical Interventions (Patient has pulse or is breathing):    Full Support       Signature: Electronically signed by Gustavo Gay MD, 12/24/24, 10:40 EST.  [facility] Hospitalist Team

## 2024-12-24 NOTE — DISCHARGE PLACEMENT REQUEST
"Josemanuel Randhawa Jr. (59 y.o. Male)       Date of Birth   1965    Social Security Number       Address   [address] [place] [address] [place] [facility]    Home Phone   [phone]    MRN   [id number]       Lutheran   None    Marital Status   Single                            Admission Date   12/20/24    Admission Type   Emergency    Admitting Provider   Llanes Alvarez, Carlos, MD    Attending Provider   Gustavo Gay MD    Department, Room/Bed   [facility] SURGICAL INPATIENT, 4131/1       Discharge Date       Discharge Disposition       Discharge Destination                                 Attending Provider: Gustavo Gay MD    Allergies: Sulfa Antibiotics    Isolation: None   Infection: None   Code Status: CPR    Ht: 185.4 cm (73\")   Wt: 152 kg (335 lb 8.6 oz)    Admission Cmt: None   Principal Problem: General weakness [R53.1]                   Active Insurance as of 12/20/2024       Primary Coverage       Payor Plan Insurance Group Employer/Plan Group    HUMANA MEDICARE REPLACEMENT HUMANA MEDICARE REPLACEMENT [id number]       Payor Plan Address Payor Plan Phone Number Payor Plan Fax Number Effective Dates    [address] [address] [phone]  12/1/2019 - None Entered    [facility] [id number]         Subscriber Name Subscriber Birth Date Member ID       GENJOSEMANUEL JR. 1965 [id number]               Secondary Coverage       Payor Plan Insurance Group Employer/Plan Group    HUMANA MEDICAID IN HU[place] IN PATHWAYS        Payor Plan Address Payor Plan Phone Number Payor Plan Fax Number Effective Dates    [address] [address]   9/16/2024 - None Entered    [place] [address]         Subscriber Name Subscriber Birth Date Member ID       GENJOSEMANUEL JR. 1965 [id number]                     Emergency Contacts        (Rel.) Home Phone Work Phone Mobile Phone    GenSajan villanueva (Brother) -- -- [phone]    Iman Chung (Sister) -- " -- [phone]                 History & Physical        Essing-Melba Gaming APRN at 24       Attestation signed by Llanes Alvarez, Carlos, MD at 24 0546    I have reviewed this documentation and agree.                      [facility] Medicine Services  History & Physical    Patient Name: Robert Randhawa Jr.  : 1965  MRN: [id number]  Primary Care Physician:  Provider, No Known  Date of admission: 2024  Date and Time of Service: 2024 at 2055    Subjective      Chief Complaint: general weakness    History of Present Illness: Robert Randhawa Jr. is a 59 y.o. male  from [facility] with a CMH of liver cirrhosis , chronic thrombocytopenia, morbid obesity, chronic bilateral lower extremity lymphedema, frequent falls  who presented to [facility] on 2024 with  reports of increased generalized weakness over the past 2 days. He is awake and alert and answers appropriately. He is stable on room air and denies any fever, chills, chest pain, shortness of breath, dizziness, abdominal or urinary complaints.  He has severe chronic bilateral lower extremity lymphedema left greater than right.  He has been seen by wound care in the past and reports he gets compression wraps done daily at home.  He reports they were done this morning.  He reports the edema has not increased and is at baseline for him.  He does report that he has frequent falls and fell in April and went to [facility] and was subsequently sent to [facility] rehab where he remained until this past Tuesday and was sent to [facility].  He reports he thought he could go to [facility] living and take care of himself but he is having difficulty ambulating due to the chronic lymphedema.  Afebrile vital signs are stable.  Labs are essentially unremarkable except for calcium 8.5 which is similar to previous, WBC not elevated, platelets 72,000 and improved from previous  at 56,000, Alysis negative for infection, chest x-ray shows cardiomegaly possible pulmonary vascular congestion versus questionable early interstitial edema.  Stable on room air denies any shortness of air.  ED provider attempted to discharge patient back to assisted living however they refused as he requires too much assistance for their capabilities.  Physical therapy and case management have been consulted here.  He has no complaints.  In addition wound care has been consulted for compression wraps.  Review of records shows he had a 2D echo in April 2023 which showed an ejection fraction of 56 to 60%, In February 2020 which showed no occlusive disease, and last stress test was 2023 which was negative for ischemia.      Review of Systems   Constitutional: Negative.    HENT: Negative.     Eyes: Negative.    Respiratory: Negative.     Cardiovascular:  Positive for leg swelling.   Gastrointestinal:  Positive for abdominal distention.   Endocrine: Negative.    Genitourinary: Negative.    Musculoskeletal:  Positive for gait problem.   Skin: Negative.    Allergic/Immunologic: Negative.    Neurological:  Positive for weakness.   Hematological: Negative.    Psychiatric/Behavioral: Negative.     All other systems reviewed and are negative.      Personal History     Past Medical History:   Diagnosis Date    Arthritis     Asthma     Chronic deep vein thrombosis (DVT)     Lymphedema of both lower extremities        Past Surgical History:   Procedure Laterality Date    APPENDECTOMY  1974    CARDIAC CATHETERIZATION N/A 2/28/2020    Procedure: Left ventriculography;  Surgeon: Kenney Reyez MD;  Location: [facility] CATH INVASIVE LOCATION;  Service: Cardiovascular;  Laterality: N/A;    CARDIAC CATHETERIZATION N/A 2/28/2020    Procedure: Coronary angiography;  Surgeon: Kenney Reyez MD;  Location: [facility] CATH INVASIVE LOCATION;  Service: Cardiovascular;  Laterality: N/A;    CARDIAC CATHETERIZATION N/A 2/28/2020    Procedure:  Left Heart Cath;  Surgeon: Kenney Reyez MD;  Location: [facility] CATH INVASIVE LOCATION;  Service: Cardiovascular;  Laterality: N/A;    COLONOSCOPY N/A 11/15/2022    Procedure: COLONOSCOPY with polypectomy;  Surgeon: AMY Garcia MD;  Location: [facility] ENDOSCOPY;  Service: Gastroenterology;  Laterality: N/A;  post: diverticulosis, ascending polyp x2, transverse polyp x3, descending polyp x4, rectal polyp x1, hemorroids    ENDOSCOPY N/A 11/15/2022    Procedure: ESOPHAGOGASTRODUODENOSCOPY with biopsy;  Surgeon: AMY Garcia MD;  Location: [facility] ENDOSCOPY;  Service: Gastroenterology;  Laterality: N/A;  post: esophagitis with biopsy to rule out candida , gastric biopsy    KNEE SURGERY Left 2018       Family History: family history includes Aortic aneurysm in his father; Arthritis in his father; Diabetes in his mother; Heart disease in his mother. Otherwise pertinent FHx was reviewed and not pertinent to current issue.    Social History:  reports that he quit smoking about 41 years ago. His smoking use included cigarettes. He started smoking about 47 years ago. He has a 2.5 pack-year smoking history. He has been exposed to tobacco smoke. He has never used smokeless tobacco. He reports that he does not drink alcohol and does not use drugs.    Home Medications:  Prior to Admission Medications       Prescriptions Last Dose Informant Patient Reported? Taking?    albuterol sulfate  (90 Base) MCG/ACT inhaler   Yes No    Inhale 2 puffs Every 4 (Four) Hours As Needed.    dicyclomine (BENTYL) 10 MG capsule  Self Yes No    Take 1 capsule by mouth 3 (Three) Times a Day Before Meals.    furosemide (Lasix) 20 MG tablet   No No    Take 1 tablet by mouth Daily. Take daily or PRN    gabapentin (NEURONTIN) 300 MG capsule   Yes No    Take 1 capsule by mouth 3 (Three) Times a Day.    lactulose (CHRONULAC) 10 GM/15ML solution   No No    Take 15 mL by mouth Daily.    mineral oil-hydrophilic petrolatum (AQUAPHOR) ointment    No No    Apply 1 application  topically to the appropriate area as directed 2 (Two) Times a Day As Needed for Dry Skin.    pantoprazole (PROTONIX) 40 MG EC tablet   No No    Take 1 tablet by mouth Daily.    riFAXIMin (XIFAXAN) 550 MG tablet  Self Yes No    Take 1 tablet by mouth Every 12 (Twelve) Hours.    spironolactone (ALDACTONE) 25 MG tablet  Self Yes No    Take 2 tablets by mouth Daily.              Allergies:  Allergies   Allergen Reactions    Sulfa Antibiotics Unknown (See Comments)       Objective      Vitals:   Temp:  [98 °F (36.7 °C)] 98 °F (36.7 °C)  Heart Rate:  [75-89] 86  Resp:  [18] 18  BP: (115-122)/(69-74) 115/71  Body mass index is 41.96 kg/m².  Physical Exam  Vitals reviewed.   Constitutional:       Appearance: He is obese.   HENT:      Head: Normocephalic and atraumatic.      Right Ear: External ear normal.      Left Ear: External ear normal.      Nose: Nose normal.      Mouth/Throat:      Mouth: Mucous membranes are moist.   Eyes:      Extraocular Movements: Extraocular movements intact.   Cardiovascular:      Rate and Rhythm: Normal rate and regular rhythm.      Heart sounds: Normal heart sounds.   Pulmonary:      Effort: Pulmonary effort is normal.      Breath sounds: Normal breath sounds.   Abdominal:      General: There is distension.      Palpations: Abdomen is soft.   Genitourinary:     Comments: deferred  Musculoskeletal:         General: Swelling present.      Cervical back: Normal range of motion and neck supple.      Right lower leg: Edema present.      Left lower leg: Edema present.   Skin:     General: Skin is warm.      Findings: Erythema present.   Neurological:      General: No focal deficit present.      Mental Status: He is alert and oriented to person, place, and time.   Psychiatric:         Mood and Affect: Mood normal.         Behavior: Behavior normal.         Thought Content: Thought content normal.         Judgment: Judgment normal.         Diagnostic Data:  Lab Results  (last 24 hours)       Procedure Component Value Units Date/Time    Basic Metabolic Panel [[id number]]  (Abnormal) Collected: 12/20/24 1721    Specimen: Blood Updated: 12/20/24 1753     Glucose 104 mg/dL      BUN 22 mg/dL      Creatinine 1.04 mg/dL      Sodium 137 mmol/L      Potassium 3.9 mmol/L      Chloride 101 mmol/L      CO2 26.3 mmol/L      Calcium 8.5 mg/dL      BUN/Creatinine Ratio 21.2     Anion Gap 9.7 mmol/L      eGFR 82.2 mL/min/1.73     Narrative:      GFR Categories in Chronic Kidney Disease (CKD)      GFR Category          GFR (mL/min/1.73)    Interpretation  G1                     90 or greater         Normal or high (1)  G2                      60-89                Mild decrease (1)  G3a                   45-59                Mild to moderate decrease  G3b                   30-44                Moderate to severe decrease  G4                    15-29                Severe decrease  G5                    14 or less           Kidney failure          (1)In the absence of evidence of kidney disease, neither GFR category G1 or G2 fulfill the criteria for CKD.    eGFR calculation 2021 CKD-EPI creatinine equation, which does not include race as a factor    Magnesium [[id number]]  (Normal) Collected: 12/20/24 1721    Specimen: Blood Updated: 12/20/24 1753     Magnesium 2.3 mg/dL     BNP [[id number]]  (Normal) Collected: 12/20/24 1721    Specimen: Blood Updated: 12/20/24 1753     proBNP 367.0 pg/mL     Narrative:      This assay is used as an aid in the diagnosis of individuals suspected of having heart failure. It can be used as an aid in the diagnosis of acute decompensated heart failure (ADHF) in patients presenting with signs and symptoms of ADHF to the emergency department (ED). In addition, NT-proBNP of <300 pg/mL indicates ADHF is not likely.    Age Range Result Interpretation  NT-proBNP Concentration (pg/mL:      <50             Positive            >450                   Gray                 300-450                     Negative             <300    50-75           Positive            >900                  Gray                300-900                  Negative            <300      >75             Positive            >1800                  Gray                300-1800                  Negative            <300    Urinalysis With Microscopic If Indicated (No Culture) - Urine, Clean Catch [[id number]]  (Abnormal) Collected: 12/20/24 1717    Specimen: Urine, Clean Catch Updated: 12/20/24 1741     Color, UA Orange     Comment: Any Substance that causes an abnormal urine color can alter the accuracy of the chemical reactions.        Appearance, UA Clear     pH, UA 6.0     Specific Gravity, UA 1.014     Glucose, UA Negative     Ketones, UA Negative     Bilirubin, UA Negative     Blood, UA Negative     Protein, UA Negative     Leuk Esterase, UA Negative     Nitrite, UA Negative     Urobilinogen, UA 1.0 E.U./dL    Narrative:      Urine microscopic not indicated.    CBC & Differential [[id number]]  (Abnormal) Collected: 12/20/24 1721    Specimen: Blood Updated: 12/20/24 1734    Narrative:      The following orders were created for panel order CBC & Differential.  Procedure                               Abnormality         Status                     ---------                               -----------         ------                     CBC Auto Differential[[id number]]        Abnormal            Final result               Scan Slide[[id number]]                                                                    Please view results for these tests on the individual orders.    CBC Auto Differential [[id number]]  (Abnormal) Collected: 12/20/24 1721    Specimen: Blood Updated: 12/20/24 1734     WBC 4.78 10*3/mm3      RBC 3.56 10*6/mm3      Hemoglobin 13.4 g/dL      Hematocrit 39.2 %      .1 fL      MCH 37.6 pg      MCHC 34.2 g/dL      RDW 14.7 %      RDW-SD 60.3 fl      MPV 9.9 fL      Platelets 72 10*3/mm3      Neutrophil % 73.7 %       Lymphocyte % 14.6 %      Monocyte % 9.2 %      Eosinophil % 1.7 %      Basophil % 0.4 %      Immature Grans % 0.4 %      Neutrophils, Absolute 3.52 10*3/mm3      Lymphocytes, Absolute 0.70 10*3/mm3      Monocytes, Absolute 0.44 10*3/mm3      Eosinophils, Absolute 0.08 10*3/mm3      Basophils, Absolute 0.02 10*3/mm3      Immature Grans, Absolute 0.02 10*3/mm3      nRBC 0.0 /100 WBC     Extra Tubes [[id number]] Collected: 12/20/24 1721    Specimen: Blood, Venous Line Updated: 12/20/24 1731    Narrative:      The following orders were created for panel order Extra Tubes.  Procedure                               Abnormality         Status                     ---------                               -----------         ------                     Gold Top - SST[[id number]]                                   Final result               Light Blue Top[[id number]]                                   Final result                 Please view results for these tests on the individual orders.    Gold Top - SST [[id number]] Collected: 12/20/24 1721    Specimen: Blood Updated: 12/20/24 1731     Extra Tube Hold for add-ons.     Comment: Auto resulted.       Light Blue Top [[id number]] Collected: 12/20/24 1721    Specimen: Blood Updated: 12/20/24 1731     Extra Tube Hold for add-ons.     Comment: Auto resulted                Imaging Results (Last 24 Hours)       Procedure Component Value Units Date/Time    XR Chest 1 View [[id number]] Collected: 12/20/24 1728     Updated: 12/20/24 1731    Narrative:      XR CHEST 1 VW    Date of Exam: 12/20/2024 5:12 PM EST    Indication: general weakness    Comparison: Chest radiograph 1/29/2024    Findings:  Lung volumes are low. Cardiomegaly similar to prior. There is mild prominence of the central vasculature. Mild prominence of the interstitium. No discrete infectious consolidation, large effusion or pneumothorax. Degenerative related osseous change.      Impression:      Impression:  Cardiomegaly with  findings suggesting pulmonary vascular congestion versus questionable early interstitial edema.      Electronically Signed: Ramon Rodriguez MD    12/20/2024 5:29 PM EST    Workstation ID: [id number]              Assessment & Plan        This is a 59 y.o. male with:    Active and Resolved Problems  Active Hospital Problems    Diagnosis  POA    **General weakness [R53.1]  Yes     Priority: High    Lymphedema of both lower extremities [I89.0]  Yes     Priority: Medium    Thrombocytopenia [D69.6]  Yes    Cirrhosis of liver [K74.60]  Yes    Frequent falls [R29.6]  Not Applicable    Obesity, morbid [E66.01]  Yes      Resolved Hospital Problems   No resolved problems to display.       General weakness, frequent falls likely secondary to chronic debility, severe lymphedema, no current signs of infection, fall precautions PT eval and treat, case management consult for rehab placement    Edema of both lower extremities, chronic left greater than right at baseline per patient, wound care consulted for compression wraps    Chronic thrombocytopenia, platelets 72 improved from previous at 56 likely secondary to chronic liver disease continue to monitor CBC    Cirrhosis of liver, no complaints stable Home meds unverified at this time reorder pending verification pharmacy and if appropriate    Obesity, morbid BMI 41.96 consistent carb heart healthy diet lifestyle management education    VTE Prophylaxis:  No VTE prophylaxis order currently exists.  Lovenox not ordered due to chronic thrombocytopenia and risk of bleeding and SCDs will not fit patient due to severe chronic lymphedema        The patient desires to be as follows:    CODE STATUS:    Code Status (Patient has no pulse and is not breathing): CPR (Attempt to Resuscitate)  Medical Interventions (Patient has pulse or is breathing): Full Support        Admission Status:  I believe this patient meets observation status.    Expected Length of Stay: pending PT eval and case  management review for possible rehab    PDMP and Medication Dispenses via Sidebar reviewed and consistent with patient reported medications.    I discussed the patient's findings and my recommendations with patient.      Signature:     This document has been electronically signed by QUEENIE Stock on 2024 21:25 [facility] Hospitalist Team    Electronically signed by Llanes Alvarez, Carlos, MD at 24 0521       Operative/Procedure Notes (all)    No notes of this type exist for this encounter.          Physician Progress Notes (last 48 hours)        Gustavo Gay MD at 24 1605              [facility] MEDICINE SERVICE  DAILY PROGRESS NOTE    NAME: Robert Randhawa Jr.  : 1965  MRN: [id number]      LOS: 0 days     PROVIDER OF SERVICE: Gustavo Gay MD    Chief Complaint: General weakness    Subjective:     Interval History:  History taken from: patient      History of Present Illness: Robert Randhawa Jr. is a 59 y.o. male  from assisted living [facility] with a CMH of liver cirrhosis , chronic thrombocytopenia, morbid obesity, chronic bilateral lower extremity lymphedema, frequent falls  who presented to [facility] on 2024 with  reports of increased generalized weakness over the past 2 days. He is awake and alert and answers appropriately. He is stable on room air and denies any fever, chills, chest pain, shortness of breath, dizziness, abdominal or urinary complaints.  He has severe chronic bilateral lower extremity lymphedema left greater than right.  He has been seen by wound care in the past and reports he gets compression wraps done daily at home.  He reports they were done this morning.  He reports the edema has not increased and is at baseline for him.  He does report that he has frequent falls and fell in April and went to [facility] and was subsequently sent to [facility] rehab where he remained until this past  Tuesday and was sent to [facility].  He reports he thought he could go to assisted living and take care of himself but he is having difficulty ambulating due to the chronic lymphedema.  Afebrile vital signs are stable.  Labs are essentially unremarkable except for calcium 8.5 which is similar to previous, WBC not elevated, platelets 72,000 and improved from previous at 56,000, Alysis negative for infection, chest x-ray shows cardiomegaly possible pulmonary vascular congestion versus questionable early interstitial edema.  Stable on room air denies any shortness of air.  ED provider attempted to discharge patient back to assisted living however they refused as he requires too much assistance for their capabilities.  Physical therapy and case management have been consulted here.  He has no complaints.  In addition wound care has been consulted for compression wraps.  Review of records shows he had a 2D echo in April 2023 which showed an ejection fraction of 56 to 60%, In February 2020 which showed no occlusive disease, and last stress test was 2023 which was negative for ischemia.      12/21 seen in bed NAD, No new complaints, still weak  12/22/2024 patient seen and examined in medical distress, vital signs stable, discussed with RN, patient has no new complaints, awaiting placement.  12/23/24 seen in bed  NAD, VSS,  no new complaints, lauren RN. vss  Plan: [place] accepted. PASRR approved. Precert required. Needing OT notes.     Review of Systems  Constitutional: Negative.    HENT: Negative.     Eyes: Negative.    Respiratory: Negative.     Cardiovascular:  Positive for leg swelling.   Gastrointestinal:  Positive for abdominal distention.   Endocrine: Negative.    Genitourinary: Negative.    Musculoskeletal:  Positive for gait problem.   Skin: Negative.    Allergic/Immunologic: Negative.    Neurological:  Positive for weakness.   Hematological: Negative.    Psychiatric/Behavioral: Negative.     All  other systems reviewed and are negative.  Objective:     Vital Signs  Temp:  [98 °F (36.7 °C)-98.4 °F (36.9 °C)] 98.4 °F (36.9 °C)  Heart Rate:  [74-76] 75  Resp:  [16-19] 19  BP: (105-119)/(68-75) 119/75   Body mass index is 44.27 kg/m².      Physical Exam       Appearance: He is obese.   HENT:      Head: Normocephalic and atraumatic.      Right Ear: External ear normal.      Left Ear: External ear normal.      Nose: Nose normal.      Mouth/Throat:      Mouth: Mucous membranes are moist.   Eyes:      Extraocular Movements: Extraocular movements intact.   Cardiovascular:      Rate and Rhythm: Normal rate and regular rhythm.      Heart sounds: Normal heart sounds.   Pulmonary:      Effort: Pulmonary effort is normal.      Breath sounds: Normal breath sounds.   Abdominal:      General: There is distension.      Palpations: Abdomen is soft.   Genitourinary:     Comments: deferred  Musculoskeletal:         General: Swelling present.      Cervical back: Normal range of motion and neck supple.      Right lower leg: Edema present.      Left lower leg: Edema present.   Skin:     General: Skin is warm.      Findings: Erythema present.   Neurological:      General: No focal deficit present.      Mental Status: He is alert and oriented to person, place, and time.   Psychiatric:         Mood and Affect: Mood normal.         Behavior: Behavior normal.         Thought Content: Thought content normal.         Judgment: Judgment normal.         Diagnostic Data    Results from last 7 days   Lab Units 12/21/24  0001   WBC 10*3/mm3 4.02   HEMOGLOBIN g/dL 10.6*   HEMATOCRIT % 31.8*   PLATELETS 10*3/mm3 67*   GLUCOSE mg/dL 128*   CREATININE mg/dL 0.96   BUN mg/dL 21   SODIUM mmol/L 135*   POTASSIUM mmol/L 4.0   ANION GAP mmol/L 4.5*       No radiology results for the last day      I reviewed the patient's new clinical results.    Assessment/Plan:     Active and Resolved Problems  Active Hospital Problems    Diagnosis  POA    **General  weakness [R53.1]  Yes    Thrombocytopenia [D69.6]  Yes    Cirrhosis of liver [K74.60]  Yes    Frequent falls [R29.6]  Not Applicable    Obesity, morbid [E66.01]  Yes    Lymphedema of both lower extremities [I89.0]  Yes      Resolved Hospital Problems   No resolved problems to display.      General weakness, frequent falls likely secondary to chronic debility, severe lymphedema, no current signs of infection, fall precautions PT eval and treat, case management consult for rehab placement     Edema of both lower extremities, chronic left greater than right at baseline per patient, wound care consulted for compression wraps     Chronic thrombocytopenia, platelets 72 improved from previous at 56 likely secondary to chronic liver disease continue to monitor CBC     Cirrhosis of liver, no complaints stable Home meds unverified at this time reorder pending verification pharmacy and if appropriate     Obesity, morbid BMI 41.96 consistent carb heart healthy diet lifestyle management education       VTE Prophylaxis:  No VTE prophylaxis order currently exists.    Disposition Planning:     Barriers to Discharge:weakness  Anticipated Date of Discharge:   Place of Discharge: [facility]      Time: 30 minutes     Code Status and Medical Interventions: CPR (Attempt to Resuscitate); Full Support   Ordered at: 24     Code Status (Patient has no pulse and is not breathing):    CPR (Attempt to Resuscitate)     Medical Interventions (Patient has pulse or is breathing):    Full Support       Signature: Electronically signed by Gustavo Gay MD, 24, 16:05 EST.  [facility] Hospitalist Team    Electronically signed by Gustavo Gay MD at 24 1606       Gustavo Gay MD at 24 1306              [facility] MEDICINE SERVICE  DAILY PROGRESS NOTE    NAME: Robert Randhawa Jr.  : 1965  MRN: [id number]      LOS: 0 days     PROVIDER OF SERVICE: Gustavo Gay MD    Chief Complaint:  General weakness    Subjective:     Interval History:  History taken from: patient      History of Present Illness: Robert Randhawa Jr. is a 59 y.o. male  from [facility] with a CMH of liver cirrhosis , chronic thrombocytopenia, morbid obesity, chronic bilateral lower extremity lymphedema, frequent falls  who presented to [facility] on 12/20/2024 with  reports of increased generalized weakness over the past 2 days. He is awake and alert and answers appropriately. He is stable on room air and denies any fever, chills, chest pain, shortness of breath, dizziness, abdominal or urinary complaints.  He has severe chronic bilateral lower extremity lymphedema left greater than right.  He has been seen by wound care in the past and reports he gets compression wraps done daily at home.  He reports they were done this morning.  He reports the edema has not increased and is at baseline for him.  He does report that he has frequent falls and fell in April and went to [facility] and was subsequently sent to [facility] rehab where he remained until this past Tuesday and was sent to [facility].  He reports he thought he could go to assisted living and take care of himself but he is having difficulty ambulating due to the chronic lymphedema.  Afebrile vital signs are stable.  Labs are essentially unremarkable except for calcium 8.5 which is similar to previous, WBC not elevated, platelets 72,000 and improved from previous at 56,000, Alysis negative for infection, chest x-ray shows cardiomegaly possible pulmonary vascular congestion versus questionable early interstitial edema.  Stable on room air denies any shortness of air.  ED provider attempted to discharge patient back to assisted living however they refused as he requires too much assistance for their capabilities.  Physical therapy and case management have been consulted here.  He has no complaints.  In addition wound  care has been consulted for compression wraps.  Review of records shows he had a 2D echo in April 2023 which showed an ejection fraction of 56 to 60%, In February 2020 which showed no occlusive disease, and last stress test was 2023 which was negative for ischemia.      12/21 seen in bed NAD, No new complaints, still weak  12/22/2024 patient seen and examined in medical distress, vital signs stable, discussed with RN, patient has no new complaints, awaiting placement.    Review of Systems  Constitutional: Negative.    HENT: Negative.     Eyes: Negative.    Respiratory: Negative.     Cardiovascular:  Positive for leg swelling.   Gastrointestinal:  Positive for abdominal distention.   Endocrine: Negative.    Genitourinary: Negative.    Musculoskeletal:  Positive for gait problem.   Skin: Negative.    Allergic/Immunologic: Negative.    Neurological:  Positive for weakness.   Hematological: Negative.    Psychiatric/Behavioral: Negative.     All other systems reviewed and are negative.  Objective:     Vital Signs  Temp:  [97.8 °F (36.6 °C)-98.1 °F (36.7 °C)] 98.1 °F (36.7 °C)  Heart Rate:  [76-82] 82  Resp:  [18-22] 18  BP: ()/(61-76) 105/67   Body mass index is 44.27 kg/m².      Physical Exam       Appearance: He is obese.   HENT:      Head: Normocephalic and atraumatic.      Right Ear: External ear normal.      Left Ear: External ear normal.      Nose: Nose normal.      Mouth/Throat:      Mouth: Mucous membranes are moist.   Eyes:      Extraocular Movements: Extraocular movements intact.   Cardiovascular:      Rate and Rhythm: Normal rate and regular rhythm.      Heart sounds: Normal heart sounds.   Pulmonary:      Effort: Pulmonary effort is normal.      Breath sounds: Normal breath sounds.   Abdominal:      General: There is distension.      Palpations: Abdomen is soft.   Genitourinary:     Comments: deferred  Musculoskeletal:         General: Swelling present.      Cervical back: Normal range of motion and  neck supple.      Right lower leg: Edema present.      Left lower leg: Edema present.   Skin:     General: Skin is warm.      Findings: Erythema present.   Neurological:      General: No focal deficit present.      Mental Status: He is alert and oriented to person, place, and time.   Psychiatric:         Mood and Affect: Mood normal.         Behavior: Behavior normal.         Thought Content: Thought content normal.         Judgment: Judgment normal.         Diagnostic Data    Results from last 7 days   Lab Units 12/21/24  0001   WBC 10*3/mm3 4.02   HEMOGLOBIN g/dL 10.6*   HEMATOCRIT % 31.8*   PLATELETS 10*3/mm3 67*   GLUCOSE mg/dL 128*   CREATININE mg/dL 0.96   BUN mg/dL 21   SODIUM mmol/L 135*   POTASSIUM mmol/L 4.0   ANION GAP mmol/L 4.5*       XR Chest 1 View    Result Date: 12/20/2024  Impression: Cardiomegaly with findings suggesting pulmonary vascular congestion versus questionable early interstitial edema. Electronically Signed: Ramon Rodriguez MD  12/20/2024 5:29 PM EST  Workstation ID: [id number]       I reviewed the patient's new clinical results.    Assessment/Plan:     Active and Resolved Problems  Active Hospital Problems    Diagnosis  POA    **General weakness [R53.1]  Yes    Thrombocytopenia [D69.6]  Yes    Cirrhosis of liver [K74.60]  Yes    Frequent falls [R29.6]  Not Applicable    Obesity, morbid [E66.01]  Yes    Lymphedema of both lower extremities [I89.0]  Yes      Resolved Hospital Problems   No resolved problems to display.      General weakness, frequent falls likely secondary to chronic debility, severe lymphedema, no current signs of infection, fall precautions PT eval and treat, case management consult for rehab placement     Edema of both lower extremities, chronic left greater than right at baseline per patient, wound care consulted for compression wraps     Chronic thrombocytopenia, platelets 72 improved from previous at 56 likely secondary to chronic liver disease continue to monitor  CBC     Cirrhosis of liver, no complaints stable Home meds unverified at this time reorder pending verification pharmacy and if appropriate     Obesity, morbid BMI 41.96 consistent carb heart healthy diet lifestyle management education       VTE Prophylaxis:  No VTE prophylaxis order currently exists.    Disposition Planning:     Barriers to Discharge:weakness  Anticipated Date of Discharge:   Place of Discharge: [facility]      Time: 30 minutes     Code Status and Medical Interventions: CPR (Attempt to Resuscitate); Full Support   Ordered at: 24     Code Status (Patient has no pulse and is not breathing):    CPR (Attempt to Resuscitate)     Medical Interventions (Patient has pulse or is breathing):    Full Support       Signature: Electronically signed by Gustavo Gay MD, 24, 13:06 EST.  [facility] Hospitalist Team    Electronically signed by Gustavo Gay MD at 24 1306       Consult Notes (last 48 hours)  Notes from 24 1018 through 24 1018   No notes of this type exist for this encounter.       Nutrition Notes (most recent note)    No notes exist for this encounter.       Speech Language Pathology Notes (most recent note)    No notes exist for this encounter.       Yaya Serna, PT   Physical Therapist  Specialty:  Physical Therapy  Therapy Evaluation      Signed  Date of Service:  24  Creation Time:  24     Signed        Expand All Collapse All  Patient Name: Robert Randhawa Jr.                   : 1965                        MRN: [id number]                              Today's Date: 2024                                 Admit Date: 2024                      Visit Dx:   Visit Diagnosis       ICD-10-CM ICD-9-CM   1. General weakness  R53.1 780.79   2. Lymphedema  I89.0 457.1         Problem List       Patient Active Problem List   Diagnosis    Chest pain    Shortness of breath    Arthritis    Asthma    Deep vein thrombosis  (DVT)    Lymphedema of both lower extremities    Cellulitis of lower extremity    Chest pressure    Unstable angina    Cellulitis of left lower extremity without foot    Obesity, morbid    Cellulitis of left lower extremity    Non-pressure chronic ulcer left lower leg, limited to breakdown skin    Cellulitis of left foot    Venous hypertension of both lower extremities    Onychomycosis of foot with other complication    Sepsis    Dyspnea    General weakness    Thrombocytopenia    Cirrhosis of liver    Frequent falls         Medical History        Past Medical History:   Diagnosis Date    Arthritis      Asthma      Chronic deep vein thrombosis (DVT)       lt calf    Lymphedema of both lower extremities           Surgical History         Past Surgical History:   Procedure Laterality Date    APPENDECTOMY   1974    CARDIAC CATHETERIZATION N/A 2/28/2020     Procedure: Left ventriculography;  Surgeon: Kenney Reyez MD;  Location: [facility] CATH INVASIVE LOCATION;  Service: Cardiovascular;  Laterality: N/A;    CARDIAC CATHETERIZATION N/A 2/28/2020     Procedure: Coronary angiography;  Surgeon: Kenney Reyez MD;  Location: [facility] CATH INVASIVE LOCATION;  Service: Cardiovascular;  Laterality: N/A;    CARDIAC CATHETERIZATION N/A 2/28/2020     Procedure: Left Heart Cath;  Surgeon: Kenney Reyez MD;  Location: [facility] CATH INVASIVE LOCATION;  Service: Cardiovascular;  Laterality: N/A;    COLONOSCOPY N/A 11/15/2022     Procedure: COLONOSCOPY with polypectomy;  Surgeon: AMY Garcia MD;  Location: [facility] ENDOSCOPY;  Service: Gastroenterology;  Laterality: N/A;  post: diverticulosis, ascending polyp x2, transverse polyp x3, descending polyp x4, rectal polyp x1, hemorroids    ENDOSCOPY N/A 11/15/2022     Procedure: ESOPHAGOGASTRODUODENOSCOPY with biopsy;  Surgeon: AMY Garcia MD;  Location: [facility] ENDOSCOPY;  Service: Gastroenterology;  Laterality: N/A;  post: esophagitis with biopsy to rule out candida , gastric  biopsy    KNEE SURGERY Left 2018           General Information         Row Name 12/21/24 1822                 Physical Therapy Time and Intention     Document Type evaluation  -EL       Mode of Treatment individual therapy;physical therapy  -          Row Name 12/21/24 1822                 General Information     Prior Level of Function independent:;all household mobility;min assist:;ADL's  -          Row Name 12/21/24 1822                 Living Environment     People in Home facility resident;other (see comments)  Hannah ortiz EKTA  -          Row Name 12/21/24 1822                 Home Main Entrance     Number of Stairs, Main Entrance none  -EL          Row Name 12/21/24 1822                 Stairs Within Home, Primary     Number of Stairs, Within Home, Primary none  -EL          Row Name 12/21/24 1822                 Cognition     Orientation Status (Cognition) oriented x 4  -EL          Row Name 12/21/24 1822                 Safety Issues/Impairments Affecting Functional Mobility     Impairments Affecting Function (Mobility) pain;range of motion (ROM);strength;endurance/activity tolerance  -EL                       User Key  (r) = Recorded By, (t) = Taken By, (c) = Cosigned By        Initials Name Provider Type     EL Yaya Serna, PT Physical Therapist                            Mobility         Row Name 12/21/24 1823                 Bed Mobility     Bed Mobility supine-sit  -EL       Supine-Sit [place] (Bed Mobility) moderate assist (50% patient effort)  -EL       Comment, (Bed Mobility) to bring BLE off bed and to bring trunk over MALA  -          Row Name 12/21/24 1823                 Bed-Chair Transfer     Bed-Chair [place] (Transfers) minimum assist (75% patient effort)  -EL       Assistive Device (Bed-Chair Transfers) walker, front-wheeled  -          Row Name 12/21/24 1823                 Sit-Stand Transfer     Sit-Stand [place] (Transfers) minimum assist (75% patient effort)  -EL        Assistive Device (Sit-Stand Transfers) walker, front-wheeled  -EL          Row Name 12/21/24 1823                 Gait/Stairs (Locomotion)     [place] Level (Gait) minimum assist (75% patient effort)  -EL       Assistive Device (Gait) walker, front-wheeled  -       Distance in Feet (Gait) 30  -EL       Deviations/Abnormal Patterns (Gait) gait speed decreased;stride length decreased  -EL       Bilateral Gait Deviations forward flexed posture  -EL                       User Key  (r) = Recorded By, (t) = Taken By, (c) = Cosigned By        Initials Name Provider Type     Yaya Preston, NELA Physical Therapist                            Obj/Interventions         Row Name 12/21/24 1824                 Range of Motion Comprehensive     General Range of Motion lower extremity range of motion deficits identified  -EL       Comment, General Range of Motion severe lymphedema in BLE limiting to general mobility  -          Row Name 12/21/24 1824                 Strength Comprehensive (MMT)     General Manual Muscle Testing (MMT) Assessment lower extremity strength deficits identified  -EL       Comment, General Manual Muscle Testing (MMT) Assessment BLE 3+/5 throughout available mobitliy  -          Row Name 12/21/24 1824                 Balance     Balance Assessment sitting static balance;standing dynamic balance;standing static balance  -EL       Static Sitting Balance independent  -EL       Static Standing Balance minimal assist  -EL       Dynamic Standing Balance minimal assist  -EL       Position/Device Used, Standing Balance walker, front-wheeled  -          Row Name 12/21/24 1824                 Sensory Assessment (Somatosensory)     Sensory Assessment (Somatosensory) sensation intact  -EL                       User Key  (r) = Recorded By, (t) = Taken By, (c) = Cosigned By        Initials Name Provider Type     Yaya Preston, PT Physical Therapist                            Goals/Plan         Row Name  12/21/24 1831                 Bed Mobility Goal 1 (PT)     Activity/Assistive Device (Bed Mobility Goal 1, PT) bed mobility activities, all  -EL       [place] Level/Cues Needed (Bed Mobility Goal 1, PT) modified independence  -EL       Time Frame (Bed Mobility Goal 1, PT) long term goal (LTG);2 weeks  -EL          Row Name 12/21/24 1831                 Transfer Goal 1 (PT)     Activity/Assistive Device (Transfer Goal 1, PT) transfers, all;walker, rolling  -EL       [place] Level/Cues Needed (Transfer Goal 1, PT) modified independence  -EL       Time Frame (Transfer Goal 1, PT) long term goal (LTG);2 weeks  -EL          Row Name 12/21/24 1831                 Gait Training Goal 1 (PT)     Activity/Assistive Device (Gait Training Goal 1, PT) gait (walking locomotion);walker, rolling  -EL       [place] Level (Gait Training Goal 1, PT) modified independence  -EL       Distance (Gait Training Goal 1, PT) 100  -EL       Time Frame (Gait Training Goal 1, PT) long term goal (LTG);2 weeks  -EL          Row Name 12/21/24 1831                 Therapy Assessment/Plan (PT)     Planned Therapy Interventions (PT) balance training;neuromuscular re-education;bed mobility training;transfer training;gait training;patient/family education;strengthening  -EL                    User Key  (r) = Recorded By, (t) = Taken By, (c) = Cosigned By        Initials Name Provider Type     Yaya Preston, PT Physical Therapist                         Clinical Impression         Row Name 12/21/24 1825                 Pain     Pain Location extremity  -EL       Pain Side/Orientation bilateral;lower  -EL       Additional Documentation Pain Scale: FACES Pre/Post-Treatment (Group)  -EL          Row Name 12/21/24 1825                 Pain Scale: FACES Pre/Post-Treatment     Pain: FACES Scale, Pretreatment 2-->hurts little bit  -EL       Posttreatment Pain Rating 2-->hurts little bit  -EL          Row Name 12/21/24 1825                 Plan of  Care Review     Plan of Care Reviewed With patient  -EL       Outcome Evaluation Pt is a 58 YO M admitted from [facility] where he has been for nearly 1 week. Pt states generally he is able to ambulate with RWx and can perform ADLs, however the facility is concerned that he is requiring more assistance than they can provide. Pt requiring MOD A this date to come to sitting EOB, MIN A to stand and to ambulate in room wiht RWx. Pt ambluating slowly but relatively safely with RWx. Pt does appear below reported independent mobitliy baseline, and if he requires more assistance than facility is willing to provide, pt will require SNF at d/c. PT to follow while admitted.  -EL          Row Name 12/21/24 1825                 Therapy Assessment/Plan (PT)     Rehab Potential (PT) good  -EL       Criteria for Skilled Interventions Met (PT) yes  -EL       Therapy Frequency (PT) 5 times/wk  -EL       Predicted Duration of Therapy Intervention (PT) until d/c  -EL          Row Name 12/21/24 1825                 Vital Signs     Pre Patient Position Supine  -EL       Intra Patient Position Standing  -EL       Post Patient Position Sitting  -EL          Row Name 12/21/24 1825                 Positioning and Restraints     Pre-Treatment Position in bed  -EL       Post Treatment Position chair  -EL       In Chair notified nsg;reclined;call light within reach;encouraged to call for assist;exit alarm on  -EL                       User Key  (r) = Recorded By, (t) = Taken By, (c) = Cosigned By        Initials Name Provider Type     Yaya Preston, PT Physical Therapist                            Outcome Measures         Row Name 12/21/24 1831 12/21/24 0830            How much help from another person do you currently need...     Turning from your back to your side while in flat bed without using bedrails? 3  -EL 3  -BC     Moving from lying on back to sitting on the side of a flat bed without bedrails? 2  -EL 2  -BC     Moving to and from a bed to a  chair (including a wheelchair)? 3  -EL 2  -BC     Standing up from a chair using your arms (e.g., wheelchair, bedside chair)? 3  -EL 2  -BC     Climbing 3-5 steps with a railing? 1  -EL 2  -BC     To walk in hospital room? 3  -EL 2  -BC     AM-PAC 6 Clicks Score (PT) 15  -EL 13  -BC     Highest Level of Mobility Goal 4 --> Transfer to chair/commode  -EL 4 --> Transfer to chair/commode  -BC        Row Name 12/21/24 1831                 Functional Assessment     Outcome Measure Options AM-PAC 6 Clicks Basic Mobility (PT)  -                       User Key  (r) = Recorded By, (t) = Taken By, (c) = Cosigned By        Initials Name Provider Type     EL Yaya Serna, PT Physical Therapist     BC Honey Lopez, RN Registered Nurse                          Physical Therapy Education            Title: PT OT SLP Therapies (Done)         Topic: Physical Therapy (Done)         Point: Mobility training (Done)         Learning Progress Summary             Patient Acceptance, E,TB, VU by  at 12/21/2024 1832                            Point: Precautions (Done)         Learning Progress Summary             Patient Acceptance, E,TB, VU by  at 12/21/2024 1832                                                User Key         Initials Effective Dates Name Provider Type Discipline      06/23/20 -  Yaya Serna, PT Physical Therapist PT                          PT Recommendation and Plan  Planned Therapy Interventions (PT): balance training, neuromuscular re-education, bed mobility training, transfer training, gait training, patient/family education, strengthening  Outcome Evaluation: Pt is a 58 YO M admitted from [facility] where he has been for nearly 1 week. Pt states generally he is able to ambulate with RWx and can perform ADLs, however the facility is concerned that he is requiring more assistance than they can provide. Pt requiring MOD A this date to come to sitting EOB, MIN A to stand and to ambulate in room wiht RWx. Pt ambluating  slowly but relatively safely with RWx. Pt does appear below reported independent mobitliy baseline, and if he requires more assistance than facility is willing to provide, pt will require SNF at d/c. PT to follow while admitted.      Time Calculation:   PT Evaluation Complexity  History, PT Evaluation Complexity: 1-2 personal factors and/or comorbidities  Examination of Body Systems (PT Eval Complexity): total of 3 or more elements  Clinical Presentation (PT Evaluation Complexity): evolving  Clinical Decision Making (PT Evaluation Complexity): moderate complexity  Overall Complexity (PT Evaluation Complexity): moderate complexity       PT Charges         Row Name 12/21/24 1833                       Time Calculation     Start Time 0920  -EL         Stop Time 0941  -EL         Time Calculation (min) 21 min  -EL         PT Received On 12/21/24  -EL         PT - Next Appointment 12/23/24  -EL         PT Goal Re-Cert Due Date 01/04/25  -EL                      User Key  (r) = Recorded By, (t) = Taken By, (c) = Cosigned By        Initials Name Provider Type      Yaya Serna, PT Physical Therapist                       Therapy Charges for Today         Code Description Service Date Service Provider Modifiers Qty     [id number] HC PT EVAL MOD COMPLEXITY 4 12/21/2024 Yaya Serna, PT GP 1                PT G-Codes  Outcome Measure Options: AM-PAC 6 Clicks Basic Mobility (PT)  AM-PAC 6 Clicks Score (PT): 15     Yaya Serna PT             12/21/2024                                  Kaye Pa, PT   Physical Therapist  Physical Therapy  Therapy Treatment Note      Signed  Date of Service:  12/23/24 1540  Creation Time:  12/23/24 1540     Signed        Subjective: Pt agreeable to therapeutic plan of care.     Objective:      Precautions - falls     Bed mobility - Mod-A and Assist x 2  Transfers - Mod-A, Assist x 2, and with rolling walker; sit to stand several times to manage brief and urination  Ambulation - 12 feet  "Mod-A, Assist x 2, and with rolling walker; very slow nawaf w/ increasing knee flexion w/ distance. Severe lymphedema in BLEs.         Vitals: WNL     Pain: 0 VAS   Location: n/a  Intervention for pain: Repositioned, RN notified, Increased Activity, and Therapeutic Presence     Education: Provided education on the importance of mobility in the acute care setting, Verbal/Tactile Cues, Transfer Training, Gait Training, Energy conservation strategies, and WB'ing status     Assessment: Robert Randhawa Jr. Is quite loquacious and needs multiple cues to attend to task, as he tends to stop walking when he is talking, but is having increased weakness w/ increased distance. Pt presents with functional mobility impairments which indicate the need for skilled intervention. Tolerating session today without incident. Will continue to follow and progress as tolerated.      Plan/Recommendations:   If medically appropriate, Moderate Intensity Therapy recommended post-acute care. This is recommended as therapy feels the patient would require 3-4 days per week and wouldn't tolerate \"3 hour daily\" rehab intensity. SNF would be the preferred choice. If the patient does not agree to SNF, arrange HH or OP depending on home bound status. If patient is medically complex, consider LTACH. Pt requires no DME at discharge.      Pt desires Skilled Rehab placement at discharge. Pt cooperative; agreeable to therapeutic recommendations and plan of care.            Basic Mobility 6-click:  Rollin = Total, A lot = 2, A little = 3; 4 = None  Supine>Sit:                      1 = Total, A lot = 2, A little = 3; 4 = None   Sit>Stand with arms:       1 = Total, A lot = 2, A little = 3; 4 = None  Bed>Chair:                      1 = Total, A lot = 2, A little = 3; 4 = None  Ambulate in room:           1 = Total, A lot = 2, A little = 3; 4 = None  3-5 Steps with railin = Total, A lot = 2, A little = 3; 4 = " None  Score: 11     Modified [place]: N/A = No pre-op stroke/TIA     Post-Tx Position: Up in Chair, Staff Present, Alarms activated, and Call light and personal items within reach  PPE: gloves and gown     Therapy Charges for Today         Code Description Service Date Service Provider Modifiers Qty     40394730443 HC GAIT TRAINING EA 15 MIN 2024 Kaye Pa, PT GP 1     05807877196 HC PT THERAPEUTIC ACT EA 15 MIN 2024 Kaye Pa, PT GP 1               PT Charges         Row Name 24 1539                       Time Calculation     Start Time 1500  -CM         Stop Time 1523  -CM         Time Calculation (min) 23 min  -CM         PT Received On 24  -CM         PT - Next Appointment 24  -CM                 Time Calculation- PT     Total Timed Code Minutes- PT 23 minute(s)  -CM                      User Key  (r) = Recorded By, (t) = Taken By, (c) = Cosigned By        Initials Name Provider Type     CM Kaye Pa, PT Physical Therapist                                        Twila Galindo, OT   Occupational Therapist  Occupational Therapy  Therapy Evaluation      Signed  Date of Service:  24  Creation Time:  24     Signed        Expand All Collapse All  Patient Name: Robert Randhawa Jr.                   : 1965                        MRN: [id number]                              Today's Date: 2024                                 Admit Date: 2024                      Visit Dx:   Visit Diagnosis       ICD-10-CM ICD-9-CM   1. General weakness  R53.1 780.79   2. Lymphedema  I89.0 457.1         Problem List       Patient Active Problem List   Diagnosis    Chest pain    Shortness of breath    Arthritis    Asthma    Deep vein thrombosis (DVT)    Lymphedema of both lower extremities    Cellulitis of lower extremity    Chest pressure    Unstable angina    Cellulitis of left lower extremity without foot    Obesity, morbid    Cellulitis  of left lower extremity    Non-pressure chronic ulcer left lower leg, limited to breakdown skin    Cellulitis of left foot    Venous hypertension of both lower extremities    Onychomycosis of foot with other complication    Sepsis    Dyspnea    General weakness    Thrombocytopenia    Cirrhosis of liver    Frequent falls         Medical History        Past Medical History:   Diagnosis Date    Arthritis      Asthma      Chronic deep vein thrombosis (DVT)       lt calf    Lymphedema of both lower extremities           Surgical History         Past Surgical History:   Procedure Laterality Date    APPENDECTOMY   1974    CARDIAC CATHETERIZATION N/A 2/28/2020     Procedure: Left ventriculography;  Surgeon: Kenney Reyez MD;  Location: [facility] CATH INVASIVE LOCATION;  Service: Cardiovascular;  Laterality: N/A;    CARDIAC CATHETERIZATION N/A 2/28/2020     Procedure: Coronary angiography;  Surgeon: Kenney Reyez MD;  Location: [facility] CATH INVASIVE LOCATION;  Service: Cardiovascular;  Laterality: N/A;    CARDIAC CATHETERIZATION N/A 2/28/2020     Procedure: Left Heart Cath;  Surgeon: Kenney Reyez MD;  Location: [facility] CATH INVASIVE LOCATION;  Service: Cardiovascular;  Laterality: N/A;    COLONOSCOPY N/A 11/15/2022     Procedure: COLONOSCOPY with polypectomy;  Surgeon: AMY Garcia MD;  Location: [facility] ENDOSCOPY;  Service: Gastroenterology;  Laterality: N/A;  post: diverticulosis, ascending polyp x2, transverse polyp x3, descending polyp x4, rectal polyp x1, hemorroids    ENDOSCOPY N/A 11/15/2022     Procedure: ESOPHAGOGASTRODUODENOSCOPY with biopsy;  Surgeon: AMY Garcia MD;  Location: [facility] ENDOSCOPY;  Service: Gastroenterology;  Laterality: N/A;  post: esophagitis with biopsy to rule out candida , gastric biopsy    KNEE SURGERY Left 2018           General Information         Row Name 12/23/24 9840                 OT Time and Intention     Subjective Information complains of;weakness;fatigue;swelling   -KT       Document Type evaluation  -KT       Mode of Treatment occupational therapy  -KT       Patient Effort good  -KT       Symptoms Noted During/After Treatment fatigue  -KT          Row Name 12/23/24 9740                 General Information     Patient Profile Reviewed yes  -KT       Prior Level of Function independent:;ADL's;all household mobility  -KT       Barriers to Rehab medically complex;previous functional deficit;environmental barriers  -KT          Row Name 12/23/24 1547                 Occupational Profile     Reason for Services/Referral (Occupational Profile) Pt is a 59 y.o. year old male admitted to [facility] on 12/20/24 from nursing home where he has been for approx 1 week due to lymphedema in BLEs and general weakness with progressive worsening of symptoms over the past 2 days.     Pmhx significant for arthritis, astham, chronic DVT, chronic BLE lymphedema, liver cirrhosis, morbid obesity, frequent falls    At baseline, pt lives in nursing home and can genearlly amb with rxw and perform ADLs, however facility is concerned that he is requiring more assistance than they can provide. He states that he was able to do most of his ADLs independently with the exception of LB dressing/bathing.     At [facility], pt is A&Ox4 and pleasant and cooperative, though somewhat distractible. Requires frequent redirection as pt is very talkative, which increases time of amb and decreases stability over time. Pt requires mod Ax2 for bed mobility and CTS and min/mod Ax2 for functional mobility with concern for pt's standing tolerance increasing risk of falls as pt gradually moved slower and slower while amb and gradually had more and more of a flexed posture. Once seated, pt completed urinary toileting with urinal but missed urinal and required min A to stand and dep A for toileting hygiene as pt had been slightly incontinent of bowel as well. Donned new brief Max Ax2. Pt to be followed by OT while inpatient and recommending dc to SNF for  additional therapy as pt does not appear at sufficient level of independence for [facility] level of assistance.  -KT          Row Name 12/23/24 1547                 Living Environment     People in Home facility resident  -KT          Row Name 12/23/24 1547                 Home Main Entrance     Number of Stairs, Main Entrance none  -KT          Row Name 12/23/24 1547                 Cognition     Orientation Status (Cognition) oriented x 4  -KT          Row Name 12/23/24 1547                 Safety Issues/Impairments Affecting Functional Mobility     Safety Issues Affecting Function (Mobility) ability to follow commands;insight into deficits/self-awareness;judgment;positioning of assistive device;problem-solving;safety precaution awareness;safety precautions follow-through/compliance;sequencing abilities  -KT       Impairments Affecting Function (Mobility) balance;coordination;endurance/activity tolerance;motor planning;pain;postural/trunk control;strength;cognition  -KT       Cognitive Impairments, Mobility Safety/Performance attention;insight into deficits/self-awareness;judgment;problem-solving/reasoning;safety precaution awareness;safety precaution follow-through;sequencing abilities  -KT                       User Key  (r) = Recorded By, (t) = Taken By, (c) = Cosigned By        Initials Name Provider Type     KT Twila Galindo OT Occupational Therapist                                     Mobility/ADL's         Row Name 12/23/24 1549                 Bed Mobility     Bed Mobility bed mobility (all) activities  -KT       All Activities, [place] (Bed Mobility) moderate assist (50% patient effort);2 person assist  -KT          Row Name 12/23/24 1549                 Transfers     Transfers bed-chair transfer  -KT          Row Name 12/23/24 1549                 Bed-Chair Transfer     Bed-Chair [place] (Transfers) moderate assist (50% patient effort);2 person assist  -KT          Row Name 12/23/24 1540                  Sit-Stand Transfer     Sit-Stand [place] (Transfers) moderate assist (50% patient effort);2 person assist  -KT          Row Name 12/23/24 1547                 Functional Mobility     Functional Mobility- Ind. Level moderate assist (50% patient effort);2 person assist required  -KT       Functional Mobility-Distance (Feet) 8  -KT       Patient was able to Ambulate yes  -KT                       User Key  (r) = Recorded By, (t) = Taken By, (c) = Cosigned By        Initials Name Provider Type     Twila Quinones OT Occupational Therapist                            Obj/Interventions         Row Name 12/23/24 1550                 Sensory Assessment (Somatosensory)     Sensory Assessment (Somatosensory) sensation intact  -KT          Row Name 12/23/24 1550                 Vision Assessment/Intervention     Visual Impairment/Limitations WNL  -KT          Row Name 12/23/24 1550                 Range of Motion Comprehensive     General Range of Motion no range of motion deficits identified  -KT          Row Name 12/23/24 1550                 Strength Comprehensive (MMT)     General Manual Muscle Testing (MMT) Assessment upper extremity strength deficits identified  -KT       Comment, General Manual Muscle Testing (MMT) Assessment grossly 3+/5  -KT          Row Name 12/23/24 1550                 Balance     Balance Assessment sitting dynamic balance;standing dynamic balance;standing static balance  -KT       Dynamic Sitting Balance contact guard  -KT       Position, Sitting Balance unsupported;sitting edge of bed  -KT       Static Standing Balance minimal assist  -KT       Dynamic Standing Balance moderate assist;minimal assist  -KT       Position/Device Used, Standing Balance walker, front-wheeled  -KT                       User Key  (r) = Recorded By, (t) = Taken By, (c) = Cosigned By        Initials Name Provider Type     Twila Quinones OT Occupational Therapist                             Goals/Plan         Row Name 12/23/24 1552                 Transfer Goal 1 (OT)     Activity/Assistive Device (Transfer Goal 1, OT) toilet;commode  -KT       [place] Level/Cues Needed (Transfer Goal 1, OT) minimum assist (75% or more patient effort)  -KT       Time Frame (Transfer Goal 1, OT) long term goal (LTG)  -KT          Row Name 12/23/24 1552                 Toileting Goal 1 (OT)     Activity/Device (Toileting Goal 1, OT) toileting skills, all  -KT       [place] Level/Cues Needed (Toileting Goal 1, OT) moderate assist (50-74% patient effort)  -KT       Time Frame (Toileting Goal 1, OT) long term goal (LTG)  -KT          Row Name 12/23/24 1552                 Problem Specific Goal 1 (OT)     Problem Specific Goal 1 (OT) Pt will tolerate standing for 2-3 minutes while maintaining ideal posture (limited knee and hip flexion with prolonged standing) to increase safety and independence with functional mobility and ADLs.  -KT       Time Frame (Problem Specific Goal 1, OT) long term goal (LTG)  -KT          Row Name 12/23/24 1552                 Therapy Assessment/Plan (OT)     Planned Therapy Interventions (OT) activity tolerance training;adaptive equipment training;BADL retraining;functional balance retraining;IADL retraining;neuromuscular control/coordination retraining;occupation/activity based interventions;patient/caregiver education/training;ROM/therapeutic exercise;strengthening exercise;transfer/mobility retraining  -KT                    User Key  (r) = Recorded By, (t) = Taken By, (c) = Cosigned By        Initials Name Provider Type     Twila Quinones OT Occupational Therapist                         Clinical Impression         Row Name 12/23/24 1551                 Pain Assessment     Pretreatment Pain Rating 2/10  -KT       Posttreatment Pain Rating 2/10  -KT       Pain Location other (see comments)  lower legs  -KT       Pain Side/Orientation bilateral;lower  -KT          Row Name  12/23/24 8561                 Plan of Care Review     Plan of Care Reviewed With patient  -KT       Outcome Evaluation Pt is a 59 y.o. year old male admitted to [facility] on 12/20/24 from [facility] where he has been for approx 1 week due to lymphedema in BLEs and general weakness with progressive worsening of symptoms over the past 2 days.     Pmhx significant for arthritis, astham, chronic DVT, chronic BLE lymphedema, liver cirrhosis, morbid obesity, frequent falls    At baseline, pt lives in [facility] and can genearlly amb with rxw and perform ADLs, however facility is concerned that he is requiring more assistance than they can provide. He states that he was able to do most of his ADLs independently with the exception of LB dressing/bathing.     At [facility], pt is A&Ox4 and pleasant and cooperative, though somewhat distractible. Requires frequent redirection as pt is very talkative, which increases time of amb and decreases stability over time. Pt requires mod Ax2 for bed mobility and CTS and min/mod Ax2 for functional mobility with concern for pt's standing tolerance increasing risk of falls as pt gradually moved slower and slower while amb and gradually had more and more of a flexed posture. Once seated, pt completed urinary toileting with urinal but missed urinal and required min A to stand and dep A for toileting hygiene as pt had been slightly incontinent of bowel as well. Donned new brief Max Ax2. Pt to be followed by OT while inpatient and recommending dc to SNF for additional therapy as pt does not appear at sufficient level of independence for [facility] level of assistance.  -KT          Row Name 12/23/24 3382                 Therapy Assessment/Plan (OT)     Patient/Family Therapy Goal Statement (OT) eventually return to [facility]  -KT       Rehab Potential (OT) fair  -KT       Criteria for Skilled Therapeutic Interventions Met (OT) yes;skilled treatment is necessary  -KT       Therapy Frequency (OT) 3 times/wk  -KT          Row Name  12/23/24 1551                 Therapy Plan Review/Discharge Plan (OT)     Anticipated Discharge Disposition (OT) skilled nursing facility  -KT          Row Name 12/23/24 1551                 Positioning and Restraints     Pre-Treatment Position in bed  -KT       Post Treatment Position chair  -KT       In Chair call light within reach;encouraged to call for assist;exit alarm on  -KT                       User Key  (r) = Recorded By, (t) = Taken By, (c) = Cosigned By        Initials Name Provider Type     Twila Quinones, OT Occupational Therapist                            Outcome Measures         Row Name 12/23/24 6073                 How much help from another is currently needed...     Putting on and taking off regular lower body clothing? 1  -KT       Bathing (including washing, rinsing, and drying) 1  -KT       Toileting (which includes using toilet bed pan or urinal) 1  -KT       Putting on and taking off regular upper body clothing 2  -KT       Taking care of personal grooming (such as brushing teeth) 3  -KT       Eating meals 3  -KT       AM-PAC 6 Clicks Score (OT) 11  -KT          Row Name 12/23/24 0850                 How much help from another person do you currently need...     Turning from your back to your side while in flat bed without using bedrails? 3  -LB       Moving from lying on back to sitting on the side of a flat bed without bedrails? 2  -LB       Moving to and from a bed to a chair (including a wheelchair)? 2  -LB       Standing up from a chair using your arms (e.g., wheelchair, bedside chair)? 2  -LB       Climbing 3-5 steps with a railing? 2  -LB       To walk in hospital room? 2  -LB       AM-PAC 6 Clicks Score (PT) 13  -LB       Highest Level of Mobility Goal 4 --> Transfer to chair/commode  -LB          Row Name 12/23/24 5921                 Functional Assessment     Outcome Measure Options AM-PAC 6 Clicks Daily Activity (OT)  -KT                       User Key  (r) = Recorded  By, (t) = Taken By, (c) = Cosigned By        Initials Name Provider Type     Kallie Lemons, RN Registered Nurse     Twila Quinones, OT Occupational Therapist                             Occupational Therapy Education              No education to display                          OT Recommendation and Plan  Planned Therapy Interventions (OT): activity tolerance training, adaptive equipment training, BADL retraining, functional balance retraining, IADL retraining, neuromuscular control/coordination retraining, occupation/activity based interventions, patient/caregiver education/training, ROM/therapeutic exercise, strengthening exercise, transfer/mobility retraining  Therapy Frequency (OT): 3 times/wk  Plan of Care Review  Plan of Care Reviewed With: patient  Outcome Evaluation: Pt is a 59 y.o. year old male admitted to [facility] on 12/20/24 from shelter where he has been for approx 1 week due to lymphedema in BLEs and general weakness with progressive worsening of symptoms over the past 2 days.     Pmhx significant for arthritis, astham, chronic DVT, chronic BLE lymphedema, liver cirrhosis, morbid obesity, frequent falls    At baseline, pt lives in shelter and can genearlly amb with rxw and perform ADLs, however facility is concerned that he is requiring more assistance than they can provide. He states that he was able to do most of his ADLs independently with the exception of LB dressing/bathing.     At [facility], pt is A&Ox4 and pleasant and cooperative, though somewhat distractible. Requires frequent redirection as pt is very talkative, which increases time of amb and decreases stability over time. Pt requires mod Ax2 for bed mobility and CTS and min/mod Ax2 for functional mobility with concern for pt's standing tolerance increasing risk of falls as pt gradually moved slower and slower while amb and gradually had more and more of a flexed posture. Once seated, pt completed urinary toileting with urinal but missed  urinal and required min A to stand and dep A for toileting hygiene as pt had been slightly incontinent of bowel as well. Donned new brief Max Ax2. Pt to be followed by OT while inpatient and recommending dc to SNF for additional therapy as pt does not appear at sufficient level of independence for care home level of assistance.      Time Calculation:   Evaluation Complexity (OT)  Overall Complexity of Evaluation (OT): moderate complexity       Time Calculation- OT         Row Name 12/23/24 1555                       Time Calculation- OT     OT Start Time 1500  -KT         OT Stop Time 1529  -KT         OT Time Calculation (min) 29 min  -KT         OT Received On 12/23/24  -KT         OT - Next Appointment 12/26/24  -KT         OT Goal Re-Cert Due Date 01/06/25  -KT                      User Key  (r) = Recorded By, (t) = Taken By, (c) = Cosigned By        Initials Name Provider Type     KT Twila Galindo OT Occupational Therapist                       Therapy Charges for Today         Code Description Service Date Service Provider Modifiers Qty     [id number]  OT EVAL MOD COMPLEXITY 4 12/23/2024 Twila Galindo OT GO 1                   Twila Galindo OT                    12/23/2024

## 2024-12-24 NOTE — CASE MANAGEMENT/SOCIAL WORK
Continued Stay Note   [place]     Patient Name: Robert Randhawa Jr.  MRN: [id number]  Today's Date: 12/24/2024    Admit Date: 12/20/2024    Plan: DC PLAN: [place] accepted. PASRR approved. Precert pending (started 12/24)  May need transport at DC.       Discharge Plan       Row Name 12/24/24 1201       Plan    Plan DC PLAN: [place] accepted. PASRR approved. Precert pending (started 12/24)  May need transport at DC.        Patient/Family in Agreement with Plan yes    Plan Comments Reached out to CMA requesting precert to be started. Verbalized understanding.[facility] unable to submit SNF precert via [facility] portal. Called [facility] ([phone]) and spoke with Dylan, who submitted SNF precert via phone line for [facility]. Portal auth ID [id number]. Clinicals uploaded via portal as directed. Precert pending. CM made aware.                      Expected Discharge Date and Time       Expected Discharge Date Expected Discharge Time    Dec 25, 2024           Cassy Fishman RN   Case Management  [phone]

## 2024-12-24 NOTE — CASE MANAGEMENT/SOCIAL WORK
"Physicians Statement of Medical Necessity for  Ambulance Transportation    GENERAL INFORMATION   Name: Robert Randhawa Jr.  YOB: 1965  Medicare #:   Subscriber ID: [id number]         Transport Date: 12/24/2024  (Valid for round trips this date, or for scheduled repetitive trips for 60 days from the date signed below.)  Origin: [facility]  Destination: [place].    Is the Patient's stay covered under Medicare Part A (PPS/DRG?)Yes  Closest appropriate facility? Yes  If this a hosp-hosp transfer? No  Is this a hospice patient? No    MEDICAL NECESSITY QUESTIONAIRE    Ambulance Transportation is medically necessary only if other means of transportation are contraindicated or would be potentially harmful to the patient.  To meet this requirement, the patient must be either \"bed confined\" or suffer from a condition such that transport by means other than an ambulance is contraindicated by the patient's condition.  The following questions must be answered by the healthcare professional signing below for this form to be valid:     1) Describe the MEDICAL CONDITION (physical and/or mental) of this patient AT THE TIME OF AMBULANCE TRANSPORT that requires the patient to be transported in an ambulance, and why transport by other means is contraindicated by the patient's condition:     Bed Ridden. Bilateral Lower extremity edema. Wound on Lower extremity  #[id number]    Past Medical History:   Diagnosis Date    Arthritis     Asthma     Chronic deep vein thrombosis (DVT)     lt calf    Lymphedema of both lower extremities       Past Surgical History:   Procedure Laterality Date    APPENDECTOMY  1974    CARDIAC CATHETERIZATION N/A 2/28/2020    Procedure: Left ventriculography;  Surgeon: Kenney Reyez MD;  Location: [facility] INVASIVE LOCATION;  Service: Cardiovascular;  Laterality: N/A;    CARDIAC CATHETERIZATION N/A 2/28/2020    Procedure: Coronary angiography;  Surgeon: Kenney Reyez MD;  Location: " "[facility] CATH INVASIVE LOCATION;  Service: Cardiovascular;  Laterality: N/A;    CARDIAC CATHETERIZATION N/A 2/28/2020    Procedure: Left Heart Cath;  Surgeon: Kenney Reyez MD;  Location: [facility] CATH INVASIVE LOCATION;  Service: Cardiovascular;  Laterality: N/A;    COLONOSCOPY N/A 11/15/2022    Procedure: COLONOSCOPY with polypectomy;  Surgeon: AMY Garcia MD;  Location: [facility] ENDOSCOPY;  Service: Gastroenterology;  Laterality: N/A;  post: diverticulosis, ascending polyp x2, transverse polyp x3, descending polyp x4, rectal polyp x1, hemorroids    ENDOSCOPY N/A 11/15/2022    Procedure: ESOPHAGOGASTRODUODENOSCOPY with biopsy;  Surgeon: AMY Garcia MD;  Location: [facility] ENDOSCOPY;  Service: Gastroenterology;  Laterality: N/A;  post: esophagitis with biopsy to rule out candida , gastric biopsy    KNEE SURGERY Left 2018      2) Is this patient \"bed confined\" as defined below?Yes   To be \"bed confined\" the patient must satisfy all three of the following criteria:  (1) unable to get up from bed without assistance; AND (2) unable to ambulate;  AND (3) unable to sit in a chair or wheelchair.  3) Can this patient safely be transported by car or wheelchair van (I.e., may safely sit during transport, without an attendant or monitoring?)No   4. In addition to completing questions 1-3 above, please check any of the following conditions that apply*:          *Note: supporting documentation for any boxes checked must be maintained in the patient's medical records Moderate/severe pain on movement, Unable to tolerate seated position for time needed to transport, and Morbid obesity requires additional personnel/equipment to safely handle patient      SIGNATURE OF PHYSICIAN OR OTHER AUTHORIZED HEALTHCARE PROFESSIONAL    I certify that the above information is true and correct based on my evaluation of this patient, and represent that the patient requires transport by ambulance and that other forms of transport are " contraindicated.  I understand that this information will be used by the Centers for Medicare and Medicaid Services (CMS) to support the determiniation of medical necessity for ambulance services, and I represent that I have personal knowledge of the patient's condition at the time of transport.       If this box is checked, I also certify that the patient is physically or mentally incapable of signing the ambulance service's claim form and that the institution with which I am affiliated has furnished care, services or assistance to the patient.  My signature below is made on behalf of the patient pursuant to 42 .36(b)(4). In accordance with 42 .37, the specific reason(s) that the patient is physically or mentally incapable of signing the claim for is as follows:     Signature of Physician or Healthcare Professional     TORV: Dr. Gay/Haim ABARCA Date/Time:     12/24/2024 1600     (For Scheduled repetitive transport, this form is not valid for transports performed more than 60 days after this date).                                                                                                                                            --------------------------------------------------------------------------------------------  Printed Name and Credentials of Physician or Authorized Healthcare Professional     *Form must be signed by patient's attending physician for scheduled, repetitive transports,.  For non-repetitive ambulance transports, if unable to obtain the signature of the attending physician, any of the following may sign (please select below):     Physician  Clinical Nurse Specialist  Registered Nurse X    Physician Assistant  Discharge Planner  Licensed Practical Nurse     Nurse Practitioner   X

## 2024-12-24 NOTE — PLAN OF CARE
Goal Outcome Evaluation:      Pt. W/ improved functional mobility this date, sit to stand transfer mod A increased max A x 2 for all bed mobility. Pt. Completes 1-2 minutes dynamic standing activity prior to seated rest break. Pt. Continues to require complete assist for all LB ADLs. Recommend SNF placement at d/c to address aforementioned deficits.

## 2024-12-24 NOTE — THERAPY TREATMENT NOTE
"Subjective: Pt agreeable to therapeutic plan of care.  Cognition: oriented to Person, Place, and Time    Objective:     Bed Mobility: Max-A and Assist x 2   Functional Transfers: Mod-A     Balance: supported, static, and standing Min-A  Functional Ambulation: Min-A    Lower Body Dressing: Max-A  ADL Position: supported sitting and supported standing    Pain: 5 VAS  Location: BLE  Interventions for pain: Repositioned  Education: Provided education on the importance of mobility in the acute care setting      Assessment: Robert Randhawa Jr. presents with ADL impairments affecting function including balance, endurance / activity tolerance, pain, and strength. Demonstrated functioning below baseline abilities indicate the need for continued skilled intervention while inpatient. Tolerating session today without incident. Will continue to follow and progress as tolerated.     Plan/Recommendations:   Moderate Intensity Therapy recommended post-acute care. This is recommended as therapy feels the patient would require 3-4 days per week and wouldn't tolerate \"3 hour daily\" rehab intensity. SNF would be the preferred choice. If the patient does not agree to SNF, arrange HH or OP depending on home bound status. If patient is medically complex, consider LTACH.. Pt requires no DME at discharge.     Pt desires Skilled Rehab placement at discharge. Pt cooperative; agreeable to therapeutic recommendations and plan of care.     Modified [place]: N/A = No pre-op stroke/TIA    Post-Tx Position: Up in Chair  PPE: gloves    Therapy Charges for Today       Code Description Service Date Service Provider Modifiers Qty    [id number]  OT THERAPEUTIC ACT EA 15 MIN 12/24/2024 Chance Boateng OT GO 1           Time Calculation- OT       Row Name 12/24/24 1358             Time Calculation- OT    OT Start Time 1215  -MP      OT Stop Time 1232  -MP      OT Time Calculation (min) 17 min  -MP      Total Timed Code Minutes- OT 17 minute(s)  " -MP      OT Received On 12/24/24  -MP      OT - Next Appointment 12/26/24  -MP                User Key  (r) = Recorded By, (t) = Taken By, (c) = Cosigned By      Initials Name Provider Type    Chance Felix, OT Occupational Therapist

## 2024-12-24 NOTE — PLAN OF CARE
"Assessment: Robert Randhawa Jr. presents with functional mobility impairments which indicate the need for skilled intervention. Tolerating session today without incident. Will continue to follow and progress as tolerated.     Plan/Recommendations:   If medically appropriate, Moderate Intensity Therapy recommended post-acute care. This is recommended as therapy feels the patient would require 3-4 days per week and wouldn't tolerate \"3 hour daily\" rehab intensity. SNF would be the preferred choice. If the patient does not agree to SNF, arrange HH or OP depending on home bound status. If patient is medically complex, consider LTACH. Pt requires no DME at discharge.     Pt desires Skilled Rehab placement at discharge. Pt cooperative; agreeable to therapeutic recommendations and plan of care.     "

## 2024-12-24 NOTE — PLAN OF CARE
Goal Outcome Evaluation:                            Pt A&O x4. Pt able to make needs known. Pain treated per MAR. Safety precautions in place. Call light within reach. Plan of care ongoing.

## 2024-12-24 NOTE — THERAPY TREATMENT NOTE
"Subjective: Pt agreeable to therapeutic plan of care.    Objective:     Bed mobility - Supine to sitting mod-max Ax2 with max cueing.     Balance - Sitting balance min-mod A with posterior lean. Max cueing to shift weight forward.     Transfers - STS from EOB Mod Ax2 using RW    Ambulation - 6 feet Min-A, Assist x 2, and with rolling walker. Forward flexed posture with decreased nawaf. Poor B heel strike, with pt having increased difficulty clearing and advancing R foot after 6 feet.     Vitals: WNL    Pain: 6 VAS   Location: LLE  Intervention for pain: Increased Activity and Therapeutic Presence    Education: Provided education on the importance of mobility in the acute care setting, Verbal/Tactile Cues, Transfer Training, and Gait Training    Assessment: Robert Randhawa Jr. presents with functional mobility impairments which indicate the need for skilled intervention. Tolerating session today without incident. Will continue to follow and progress as tolerated.     Plan/Recommendations:   If medically appropriate, Moderate Intensity Therapy recommended post-acute care. This is recommended as therapy feels the patient would require 3-4 days per week and wouldn't tolerate \"3 hour daily\" rehab intensity. SNF would be the preferred choice. If the patient does not agree to SNF, arrange HH or OP depending on home bound status. If patient is medically complex, consider LTACH. Pt requires no DME at discharge.     Pt desires Skilled Rehab placement at discharge. Pt cooperative; agreeable to therapeutic recommendations and plan of care.         Basic Mobility 6-click:  Rollin = Total, A lot = 2, A little = 3; 4 = None  Supine>Sit:   1 = Total, A lot = 2, A little = 3; 4 = None   Sit>Stand with arms:  1 = Total, A lot = 2, A little = 3; 4 = None  Bed>Chair:   1 = Total, A lot = 2, A little = 3; 4 = None  Ambulate in room:  1 = Total, A lot = 2, A little = 3; 4 = None  3-5 Steps with railin = Total, A lot " = 2, A little = 3; 4 = None  Score: 10    Modified [place]: N/A = No pre-op stroke/TIA    Post-Tx Position: Up in Chair, Alarms activated, and Call light and personal items within reach  PPE: gloves    Therapy Charges for Today       Code Description Service Date Service Provider Modifiers Qty    [id number]  PT THERAPEUTIC ACT EA 15 MIN 12/24/2024 Navid Nick PTA GP 1           PT Charges       Row Name 12/24/24 1217             Time Calculation    Start Time 1136  -UN      Stop Time 1152  -UN      Time Calculation (min) 16 min  -UN      PT Received On 12/24/24  -UN      PT - Next Appointment 12/26/24  -UN         Time Calculation- PT    Total Timed Code Minutes- PT 16 minute(s)  -UN                User Key  (r) = Recorded By, (t) = Taken By, (c) = Cosigned By      Initials Name Provider Type    UN Navid Nick PTA Physical Therapist Assistant

## 2024-12-25 NOTE — DISCHARGE SUMMARY
[facility] Medicine Services  Discharge Summary    Date of Service: 2024  Patient Name: Robert Randhawa Jr.  : 1965  MRN: [id number]    Date of Admission: 2024  Discharge Diagnosis: General weakness  Date of Discharge: 2024  Primary Care Physician: Provider, No Known      Presenting Problem:   Lymphedema [I89.0]  General weakness [R53.1]    Active and Resolved Hospital Problems:  Active Hospital Problems    Diagnosis POA    **General weakness [R53.1] Yes    Thrombocytopenia [D69.6] Yes    Cirrhosis of liver [K74.60] Yes    Frequent falls [R29.6] Not Applicable    Obesity, morbid [E66.01] Yes    Lymphedema of both lower extremities [I89.0] Yes      Resolved Hospital Problems   No resolved problems to display.       General weakness, frequent falls likely secondary to chronic debility, severe lymphedema, no current signs of infection, fall precautions PT eval and treat, case management consult for rehab placement     Edema of both lower extremities, chronic left greater than right at baseline per patient, wound care consulted for compression wraps     Chronic thrombocytopenia, platelets 72 improved from previous at 56 likely secondary to chronic liver disease continue to monitor CBC     Cirrhosis of liver, no complaints stable Home meds unverified at this time reorder pending verification pharmacy and if appropriate     Obesity, morbid BMI 41.96 consistent carb heart healthy diet lifestyle management education       Hospital Course       History of Present Illness: Robert Randhawa Jr. is a 59 y.o. male  from [facility] with a CMH of liver cirrhosis , chronic thrombocytopenia, morbid obesity, chronic bilateral lower extremity lymphedema, frequent falls  who presented to [facility] on 2024 with  reports of increased generalized weakness over the past 2 days. He is awake and alert and answers appropriately. He is stable on room air and  denies any fever, chills, chest pain, shortness of breath, dizziness, abdominal or urinary complaints.  He has severe chronic bilateral lower extremity lymphedema left greater than right.  He has been seen by wound care in the past and reports he gets compression wraps done daily at home.  He reports they were done this morning.  He reports the edema has not increased and is at baseline for him.  He does report that he has frequent falls and fell in April and went to [facility] and was subsequently sent to [facility] rehab where he remained until this past Tuesday and was sent to [facility].  He reports he thought he could go to assisted living and take care of himself but he is having difficulty ambulating due to the chronic lymphedema.  Afebrile vital signs are stable.  Labs are essentially unremarkable except for calcium 8.5 which is similar to previous, WBC not elevated, platelets 72,000 and improved from previous at 56,000, Alysis negative for infection, chest x-ray shows cardiomegaly possible pulmonary vascular congestion versus questionable early interstitial edema.  Stable on room air denies any shortness of air.  ED provider attempted to discharge patient back to assisted living however they refused as he requires too much assistance for their capabilities.  Physical therapy and case management have been consulted here.  He has no complaints.  In addition wound care has been consulted for compression wraps.  Review of records shows he had a 2D echo in April 2023 which showed an ejection fraction of 56 to 60%, In February 2020 which showed no occlusive disease, and last stress test was 2023 which was negative for ischemia.      12/21 seen in bed NAD, No new complaints, still weak  12/22/2024 patient seen and examined in medical distress, vital signs stable, discussed with RN, patient has no new complaints, awaiting placement.  12/23/24 seen in bed  NAD, VSS,  no new complaints, dw  RN. Karey  12/24/24 seen and examined in medical distress, vital signs stable, no complaints, awaiting placement, discussed with RN.  Plan: [facility] accepted. PASRR approved. Precert required. Needing OT notes.  Will discharge patient to nursing home  Conditional discharge stable    DISCHARGE Follow Up Recommendations for labs and diagnostics:     Follow-up PCP in a week    Day of Discharge     Vital Signs:  Heart Rate:  [77] 77  BP: (120)/(77) 120/77    Physical Exam      Appearance: He is obese.   HENT:      Head: Normocephalic and atraumatic.      Right Ear: External ear normal.      Left Ear: External ear normal.      Nose: Nose normal.      Mouth/Throat:      Mouth: Mucous membranes are moist.   Eyes:      Extraocular Movements: Extraocular movements intact.   Cardiovascular:      Rate and Rhythm: Normal rate and regular rhythm.      Heart sounds: Normal heart sounds.   Pulmonary:      Effort: Pulmonary effort is normal.      Breath sounds: Normal breath sounds.   Abdominal:      General: There is distension.      Palpations: Abdomen is soft.   Genitourinary:     Comments: deferred  Musculoskeletal:         General: Swelling present.      Cervical back: Normal range of motion and neck supple.      Right lower leg: Edema present.      Left lower leg: Edema present.   Skin:     General: Skin is warm.      Findings: Erythema present.   Neurological:      General: No focal deficit present.      Mental Status: He is alert and oriented to person, place, and time.   Psychiatric:         Mood and Affect: Mood normal.         Behavior: Behavior normal.         Thought Content: Thought content normal.         Judgment: Judgment normal.                      Pertinent  and/or Most Recent Results     LAB RESULTS:      Lab 12/21/24  0001 12/20/24  1721   WBC 4.02 4.78   HEMOGLOBIN 10.6* 13.4   HEMATOCRIT 31.8* 39.2   PLATELETS 67* 72*   NEUTROS ABS 2.79 3.52   IMMATURE GRANS (ABS) 0.01 0.02   LYMPHS ABS 0.69* 0.70   MONOS ABS  0.40 0.44   EOS ABS 0.11 0.08   .6* 110.1*         Lab 12/21/24  0001 12/20/24  1721   SODIUM 135* 137   POTASSIUM 4.0 3.9   CHLORIDE 105 101   CO2 25.5 26.3   ANION GAP 4.5* 9.7   BUN 21 22   CREATININE 0.96 1.04   EGFR 90.5 82.2   GLUCOSE 128* 104*   CALCIUM 7.8* 8.5*   IONIZED CALCIUM 1.09*  --    MAGNESIUM  --  2.3             Lab 12/20/24  1721   PROBNP 367.0                 Brief Urine Lab Results  (Last result in the past 365 days)        Color   Clarity   Blood   Leuk Est   Nitrite   Protein   CREAT   Urine HCG        12/20/24 1717 Orange  Comment: Any Substance that causes an abnormal urine color can alter the accuracy of the chemical reactions.   Clear   Negative   Negative   Negative   Negative                 Microbiology Results (last 10 days)       Procedure Component Value - Date/Time    CANDIDA AURIS PCR - Swab, Axilla Right, Axilla Left and Groin [[id number]]  (Normal) Collected: 12/21/24 0004    Lab Status: Final result Specimen: Swab from Axilla Right, Axilla Left and Groin Updated: 12/22/24 1140     CANDIDA AURIS PCR Not Detected            XR Chest 1 View    Result Date: 12/20/2024  Impression: Impression: Cardiomegaly with findings suggesting pulmonary vascular congestion versus questionable early interstitial edema. Electronically Signed: Ramon Rodriguez MD  12/20/2024 5:29 PM EST  Workstation ID: [id number]     Results for orders placed during the hospital encounter of 08/17/23    Duplex Venous Lower Extremity - Bilateral    Interpretation Summary    Limited examination due to body habitus, fluid retention and patient intolerance. There is no deep or superficial vein thrombosis in the imaged segments of the lower extremities.      Results for orders placed during the hospital encounter of 08/17/23    Duplex Venous Lower Extremity - Bilateral    Interpretation Summary    Limited examination due to body habitus, fluid retention and patient intolerance. There is no deep or superficial vein  thrombosis in the imaged segments of the lower extremities.      Results for orders placed during the hospital encounter of 04/16/23    Adult Transthoracic Echo Complete W/ Cont if Necessary Per Protocol    Interpretation Summary    Left ventricular ejection fraction appears to be 56 - 60%.    Estimated right ventricular systolic pressure from tricuspid regurgitation is mildly elevated (35-45 mmHg).    Indications  Chest pain    Technically satisfactory study.  Mitral valve is structurally normal.  Tricuspid valve is structurally normal.  Aortic valve is thickened with mild aortic valve stenosis (gradient across the aortic valve 21/12 mmHg and valve area of 2.2 cm².  Pulmonic valve could not be well visualized.  No evidence for mitral tricuspid or aortic regurgitation is seen by Doppler study.  Left atrium is normal in size.  Right atrium is normal in size.  Left ventricle is normal in size and contractility with ejection fraction of 60%.  Right ventricle is normal in size.  Atrial septum is intact.  Aorta is normal.  No pericardial effusion or intracardiac thrombus is seen.    Impression  Mild aortic valve stenosis.  Gradient across the aortic valve 21/12 mmHg and valve area of 2.2 cm².  Left ventricular size and contractility is normal with ejection fraction of 60%.      Labs Pending at Discharge:  Pending Results       None            Procedures Performed           Consults:   Consults       No orders found from 11/21/2024 to 12/21/2024.              Discharge Details        Discharge Medications        Continue These Medications        Instructions Start Date   acetaminophen 325 MG tablet  Commonly known as: TYLENOL   650 mg, Every 4 Hours PRN      AQUAPHOR EX   1 Application, 2 Times Weekly      bumetanide 1 MG tablet  Commonly known as: BUMEX   1 mg, 2 Times Daily      dicyclomine 10 MG capsule  Commonly known as: BENTYL   10 mg, 3 Times Daily Before Meals      folic acid 1 MG tablet  Commonly known as:  FOLVITE   1 mg, Daily      gabapentin 800 MG tablet  Commonly known as: NEURONTIN   800 mg, 3 Times Daily      HYDROcodone-acetaminophen 7.5-325 MG per tablet  Commonly known as: NORCO   1 tablet, Oral, Every 6 Hours PRN      midodrine 5 MG tablet  Commonly known as: PROAMATINE   5 mg, 2 Times Daily      polyethylene glycol 17 GM/SCOOP powder  Commonly known as: MIRALAX   17 g, Daily PRN      potassium chloride 10 MEQ CR tablet  Commonly known as: KLOR-CON M10   10 mEq, Every Morning      riFAXIMin 550 MG tablet  Commonly known as: XIFAXAN   550 mg, Every 12 Hours Scheduled      sertraline 50 MG tablet  Commonly known as: ZOLOFT   150 mg, Every Morning      sertraline 100 MG tablet  Commonly known as: ZOLOFT   100 mg, Every Night at Bedtime      spironolactone 25 MG tablet  Commonly known as: ALDACTONE   25 mg, Every Morning      tamsulosin 0.4 MG capsule 24 hr capsule  Commonly known as: FLOMAX   1 capsule, Every Morning             Stop These Medications      Cipro 250 MG tablet  Generic drug: ciprofloxacin              Allergies   Allergen Reactions    Sulfa Antibiotics Unknown (See Comments)         Discharge Disposition:   Skilled Nursing Facility (DC - External)    Diet:  Hospital:No active diet order        Discharge Activity:   Activity Instructions       Gradually Increase Activity Until at Pre-Hospitalization Level                CODE STATUS:  Code Status and Medical Interventions: CPR (Attempt to Resuscitate); Full Support   Ordered at: 12/20/24 2037     Code Status (Patient has no pulse and is not breathing):    CPR (Attempt to Resuscitate)     Medical Interventions (Patient has pulse or is breathing):    Full Support         No future appointments.    Additional Instructions for the Follow-ups that You Need to Schedule       Discharge Follow-up with PCP   As directed       Currently Documented PCP:    Provider, No Known    PCP Phone Number:    None     Follow Up Details: 1 week                Time spent  on Discharge including face to face service:  34 minutes    Signature: Electronically signed by Gustavo Gay MD, 12/25/24, 14:29 EST.  [facility] Hospitalist Team

## 2024-12-25 NOTE — CASE MANAGEMENT/SOCIAL WORK
Case Management Discharge Note      Final Note: [facility].    Selected Continued Care - Discharged on 12/24/2024 Admission date: 12/20/2024 - Discharge disposition: Skilled Nursing Facility (DC - External)      Destination Coordination complete.      Service Provider Services Address Phone Fax Patient Preferred    [facility] [address] [place] [address] [phone] --             Transportation Services  Ambulance: [facility]  [facility] Ambulance Status: Accepted    Final Discharge Disposition Code: 03 - skilled nursing facility (SNF)

## 2025-01-29 ENCOUNTER — INPATIENT HOSPITAL (OUTPATIENT)
Dept: URBAN - METROPOLITAN AREA HOSPITAL 76 | Facility: HOSPITAL | Age: 60
End: 2025-01-29
Payer: MEDICARE

## 2025-01-29 DIAGNOSIS — R18.8 OTHER ASCITES: ICD-10-CM

## 2025-01-29 PROCEDURE — 99222 1ST HOSP IP/OBS MODERATE 55: CPT | Performed by: INTERNAL MEDICINE

## 2025-01-30 ENCOUNTER — INPATIENT HOSPITAL (OUTPATIENT)
Dept: URBAN - METROPOLITAN AREA HOSPITAL 76 | Facility: HOSPITAL | Age: 60
End: 2025-01-30
Payer: MEDICARE

## 2025-01-30 DIAGNOSIS — R41.82 ALTERED MENTAL STATUS, UNSPECIFIED: ICD-10-CM

## 2025-01-30 DIAGNOSIS — D69.59 OTHER SECONDARY THROMBOCYTOPENIA: ICD-10-CM

## 2025-01-30 DIAGNOSIS — I89.0 LYMPHEDEMA, NOT ELSEWHERE CLASSIFIED: ICD-10-CM

## 2025-01-30 DIAGNOSIS — K75.81 NONALCOHOLIC STEATOHEPATITIS (NASH): ICD-10-CM

## 2025-01-30 DIAGNOSIS — R18.8 OTHER ASCITES: ICD-10-CM

## 2025-01-30 PROCEDURE — 99232 SBSQ HOSP IP/OBS MODERATE 35: CPT | Performed by: INTERNAL MEDICINE

## 2025-01-31 ENCOUNTER — INPATIENT HOSPITAL (OUTPATIENT)
Dept: URBAN - METROPOLITAN AREA HOSPITAL 76 | Facility: HOSPITAL | Age: 60
End: 2025-01-31
Payer: MEDICARE

## 2025-01-31 DIAGNOSIS — R18.8 OTHER ASCITES: ICD-10-CM

## 2025-01-31 PROCEDURE — 99232 SBSQ HOSP IP/OBS MODERATE 35: CPT | Performed by: INTERNAL MEDICINE

## 2025-02-01 ENCOUNTER — INPATIENT HOSPITAL (OUTPATIENT)
Dept: URBAN - METROPOLITAN AREA HOSPITAL 76 | Facility: HOSPITAL | Age: 60
End: 2025-02-01
Payer: MEDICARE

## 2025-02-01 DIAGNOSIS — K75.81 NONALCOHOLIC STEATOHEPATITIS (NASH): ICD-10-CM

## 2025-02-01 PROCEDURE — 99232 SBSQ HOSP IP/OBS MODERATE 35: CPT | Performed by: INTERNAL MEDICINE

## (undated) DEVICE — BITEBLOCK ENDO W/STRAP 60F A/ LF DISP

## (undated) DEVICE — GW DIAG EMERALD HEPCOAT MOVE JTIP STD .035 3MM 150CM

## (undated) DEVICE — PINNACLE INTRODUCER SHEATH: Brand: PINNACLE

## (undated) DEVICE — TRAP WIDEEYE POLYP

## (undated) DEVICE — CATH DIAG IMPULSE FL4 5F 100CM

## (undated) DEVICE — CATH DIAG IMPULSE PIG 5F 100CM

## (undated) DEVICE — SNAR POLYP HOTSNARE/BRAIDED OVL/MINI 7F 2.8X10MM 230CM 1P/U

## (undated) DEVICE — CATH DIAG IMPULSE FR4 5F 100CM

## (undated) DEVICE — PK ENDO GI 50

## (undated) DEVICE — CATH DIAG IMPULSE FL5 5F 100CM

## (undated) DEVICE — PK TRY HEART CATH 50

## (undated) DEVICE — GW PTFE EMERALD HEPCOAT FC J TIP STD .035 3MM 150CM

## (undated) DEVICE — SINGLE-USE BIOPSY FORCEPS: Brand: RADIAL JAW 4